# Patient Record
Sex: MALE | HISPANIC OR LATINO | Employment: OTHER | URBAN - METROPOLITAN AREA
[De-identification: names, ages, dates, MRNs, and addresses within clinical notes are randomized per-mention and may not be internally consistent; named-entity substitution may affect disease eponyms.]

---

## 2017-01-06 ENCOUNTER — HOSPITAL ENCOUNTER (OUTPATIENT)
Dept: LAB | Facility: MEDICAL CENTER | Age: 72
End: 2017-01-06
Attending: FAMILY MEDICINE
Payer: MEDICARE

## 2017-01-06 LAB
ALBUMIN SERPL BCP-MCNC: 4.3 G/DL (ref 3.2–4.9)
ALBUMIN/GLOB SERPL: 1.5 G/DL
ALP SERPL-CCNC: 52 U/L (ref 30–99)
ALT SERPL-CCNC: 23 U/L (ref 2–50)
ANION GAP SERPL CALC-SCNC: 9 MMOL/L (ref 0–11.9)
AST SERPL-CCNC: 22 U/L (ref 12–45)
BILIRUB SERPL-MCNC: 0.6 MG/DL (ref 0.1–1.5)
BUN SERPL-MCNC: 14 MG/DL (ref 8–22)
CALCIUM SERPL-MCNC: 9.6 MG/DL (ref 8.5–10.5)
CHLORIDE SERPL-SCNC: 96 MMOL/L (ref 96–112)
CHOLEST SERPL-MCNC: 127 MG/DL (ref 100–199)
CO2 SERPL-SCNC: 34 MMOL/L (ref 20–33)
CREAT SERPL-MCNC: 0.86 MG/DL (ref 0.5–1.4)
EST. AVERAGE GLUCOSE BLD GHB EST-MCNC: 146 MG/DL
GLOBULIN SER CALC-MCNC: 2.8 G/DL (ref 1.9–3.5)
GLUCOSE SERPL-MCNC: 134 MG/DL (ref 65–99)
HBA1C MFR BLD: 6.7 % (ref 0–5.6)
HDLC SERPL-MCNC: 40 MG/DL
LDLC SERPL CALC-MCNC: 61 MG/DL
POTASSIUM SERPL-SCNC: 3.6 MMOL/L (ref 3.6–5.5)
PROT SERPL-MCNC: 7.1 G/DL (ref 6–8.2)
SODIUM SERPL-SCNC: 139 MMOL/L (ref 135–145)
TRIGL SERPL-MCNC: 128 MG/DL (ref 0–149)

## 2017-01-06 PROCEDURE — 36415 COLL VENOUS BLD VENIPUNCTURE: CPT

## 2017-01-06 PROCEDURE — 80061 LIPID PANEL: CPT

## 2017-01-06 PROCEDURE — 83036 HEMOGLOBIN GLYCOSYLATED A1C: CPT

## 2017-01-06 PROCEDURE — 80053 COMPREHEN METABOLIC PANEL: CPT

## 2017-01-14 ENCOUNTER — HOSPITAL ENCOUNTER (OUTPATIENT)
Dept: RADIOLOGY | Facility: MEDICAL CENTER | Age: 72
End: 2017-01-14
Attending: FAMILY MEDICINE
Payer: MEDICARE

## 2017-01-14 DIAGNOSIS — J45.21 MILD INTERMITTENT ASTHMA WITH EXACERBATION: ICD-10-CM

## 2017-01-14 PROCEDURE — 71020 DX-CHEST-2 VIEWS: CPT

## 2017-02-27 ENCOUNTER — APPOINTMENT (OUTPATIENT)
Dept: SLEEP MEDICINE | Facility: MEDICAL CENTER | Age: 72
End: 2017-02-27
Payer: MEDICARE

## 2017-04-20 ENCOUNTER — HOSPITAL ENCOUNTER (OUTPATIENT)
Dept: LAB | Facility: MEDICAL CENTER | Age: 72
End: 2017-04-20
Attending: FAMILY MEDICINE
Payer: MEDICARE

## 2017-04-20 LAB
ALBUMIN SERPL BCP-MCNC: 4.1 G/DL (ref 3.2–4.9)
ALBUMIN/GLOB SERPL: 1.2 G/DL
ALP SERPL-CCNC: 59 U/L (ref 30–99)
ALT SERPL-CCNC: 22 U/L (ref 2–50)
ANION GAP SERPL CALC-SCNC: 7 MMOL/L (ref 0–11.9)
AST SERPL-CCNC: 19 U/L (ref 12–45)
BILIRUB SERPL-MCNC: 0.7 MG/DL (ref 0.1–1.5)
BUN SERPL-MCNC: 25 MG/DL (ref 8–22)
CALCIUM SERPL-MCNC: 9.5 MG/DL (ref 8.5–10.5)
CHLORIDE SERPL-SCNC: 95 MMOL/L (ref 96–112)
CHOLEST SERPL-MCNC: 145 MG/DL (ref 100–199)
CO2 SERPL-SCNC: 33 MMOL/L (ref 20–33)
CREAT SERPL-MCNC: 1.31 MG/DL (ref 0.5–1.4)
ERYTHROCYTE [DISTWIDTH] IN BLOOD BY AUTOMATED COUNT: 46.1 FL (ref 35.9–50)
EST. AVERAGE GLUCOSE BLD GHB EST-MCNC: 166 MG/DL
GFR SERPL CREATININE-BSD FRML MDRD: 53 ML/MIN/1.73 M 2
GLOBULIN SER CALC-MCNC: 3.5 G/DL (ref 1.9–3.5)
GLUCOSE SERPL-MCNC: 147 MG/DL (ref 65–99)
HBA1C MFR BLD: 7.4 % (ref 0–5.6)
HCT VFR BLD AUTO: 42.8 % (ref 42–52)
HDLC SERPL-MCNC: 32 MG/DL
HGB BLD-MCNC: 14.4 G/DL (ref 14–18)
LDLC SERPL CALC-MCNC: 82 MG/DL
MCH RBC QN AUTO: 29.2 PG (ref 27–33)
MCHC RBC AUTO-ENTMCNC: 33.6 G/DL (ref 33.7–35.3)
MCV RBC AUTO: 86.8 FL (ref 81.4–97.8)
PLATELET # BLD AUTO: 264 K/UL (ref 164–446)
PMV BLD AUTO: 9 FL (ref 9–12.9)
POTASSIUM SERPL-SCNC: 3.7 MMOL/L (ref 3.6–5.5)
PROT SERPL-MCNC: 7.6 G/DL (ref 6–8.2)
RBC # BLD AUTO: 4.93 M/UL (ref 4.7–6.1)
SODIUM SERPL-SCNC: 135 MMOL/L (ref 135–145)
TRIGL SERPL-MCNC: 157 MG/DL (ref 0–149)
WBC # BLD AUTO: 9.4 K/UL (ref 4.8–10.8)

## 2017-04-20 PROCEDURE — 83036 HEMOGLOBIN GLYCOSYLATED A1C: CPT

## 2017-04-20 PROCEDURE — 80061 LIPID PANEL: CPT

## 2017-04-20 PROCEDURE — 36415 COLL VENOUS BLD VENIPUNCTURE: CPT

## 2017-04-20 PROCEDURE — 80053 COMPREHEN METABOLIC PANEL: CPT

## 2017-04-20 PROCEDURE — 85027 COMPLETE CBC AUTOMATED: CPT

## 2017-08-08 ENCOUNTER — SLEEP CENTER VISIT (OUTPATIENT)
Dept: SLEEP MEDICINE | Facility: MEDICAL CENTER | Age: 72
End: 2017-08-08
Payer: MEDICARE

## 2017-08-08 VITALS
TEMPERATURE: 98.1 F | HEIGHT: 64 IN | SYSTOLIC BLOOD PRESSURE: 156 MMHG | BODY MASS INDEX: 40.97 KG/M2 | HEART RATE: 101 BPM | DIASTOLIC BLOOD PRESSURE: 90 MMHG | OXYGEN SATURATION: 92 % | WEIGHT: 240 LBS | RESPIRATION RATE: 16 BRPM

## 2017-08-08 DIAGNOSIS — G47.33 OSA TREATED WITH BIPAP: ICD-10-CM

## 2017-08-08 PROCEDURE — 99213 OFFICE O/P EST LOW 20 MIN: CPT | Performed by: NURSE PRACTITIONER

## 2017-08-08 NOTE — MR AVS SNAPSHOT
"Jonathan Rebolledo   2017 9:40 AM   Sleep Center Visit   MRN: [id number]    Department:  [facility]   Dept Phone:  [phone]    Description:  Male : 1945   Provider:  VEE Martinez           Reason for Visit     Annual Exam           Allergies as of 2017     No Known Allergies      You were diagnosed with     ONI treated with BiPAP   [[id number]]         Vital Signs     Blood Pressure Pulse Temperature Respirations Height Weight    156/90 mmHg 101 36.7 °C (98.1 °F) 16 1.626 m (5' 4.02\") 108.863 kg (240 lb)    Body Mass Index Oxygen Saturation Smoking Status             41.18 kg/m2 92% Former Smoker         Basic Information     Date Of Birth Sex Race Ethnicity Preferred Language    1945 Male  or   Origin (Bulgarian,Irish,Algerian,Tuvaluan, etc) English      Your appointments     2018  9:40 AM   Follow UP with VEE Martinez   [facility] (--)    [address] [place] [id number]   [phone]              Problem List              ICD-10-CM Priority Class Noted - Resolved    ONI treated with BiPAP G47.33   5/10/2016 - Present      Health Maintenance        Date Due Completion Dates    IMM DTaP/Tdap/Td Vaccine (1 - Tdap) 1962 ---    COLONOSCOPY 1993 ---    IMM ZOSTER VACCINE 2003 ---    IMM PNEUMOCOCCAL 65+ (ADULT) LOW/MEDIUM RISK SERIES (2 of 2 - PCV13) 2013    IMM INFLUENZA (1) 2017            Current Immunizations     Influenza TIV (IM) 2012    Pneumococcal polysaccharide vaccine (PPSV-23) 2012      Below and/or attached are the medications your provider expects you to take. Review all of your home medications and newly ordered medications with your provider and/or pharmacist. Follow medication instructions as directed by your provider and/or pharmacist. Please keep your medication list with you and share with your provider. Update the " information when medications are discontinued, doses are changed, or new medications (including over-the-counter products) are added; and carry medication information at all times in the event of emergency situations     Allergies:  No Known Allergies          Medications  Valid as of: August 08, 2017 - 10:00 AM    Generic Name Brand Name Tablet Size Instructions for use    AmLODIPine Besylate (Tab) NORVASC 10 MG Take 10 mg by mouth every day.        Benazepril HCl (Tab) LOTENSIN 40 MG Take 40 mg by mouth every day.        Bepotastine Besilate (Solution) Bepotastine Besilate 1.5 % by Ophthalmic route.        Finasteride (Tab) PROSCAR 5 MG Take 5 mg by mouth every day.        Finasteride (Tab) PROSCAR 5 MG Take 5 mg by mouth every day.        Labetalol HCl (Tab) NORMODYNE 300 MG Take 300 mg by mouth 2 times a day.        MetFORMIN HCl (Tab) GLUCOPHAGE 500 MG Take 500 mg by mouth 2 times a day, with meals.        Simvastatin (Tab) ZOCOR 20 MG Take 20 mg by mouth every evening.        Triamterene-HCTZ (Cap) MAXZIDE-25/DYAZIDE 37.5-25 MG Take 1 Cap by mouth every morning.        .                 Medicines prescribed today were sent to:     [facility] [address] [place] [address] - [address] [facility]    [address] [place] [id number]    Phone: [phone] Fax: [phone]    Open 24 Hours?: No      Medication refill instructions:       If your prescription bottle indicates you have medication refills left, it is not necessary to call your provider’s office. Please contact your pharmacy and they will refill your medication.    If your prescription bottle indicates you do not have any refills left, you may request refills at any time through one of the following ways: The online [employer] system (except Urgent Care), by calling your provider’s office, or by asking your pharmacy to contact your provider’s office with a refill request. Medication refills are processed only during regular business hours and may not be available until the  next business day. Your provider may request additional information or to have a follow-up visit with you prior to refilling your medication.   *Please Note: Medication refills are assigned a new Rx number when refilled electronically. Your pharmacy may indicate that no refills were authorized even though a new prescription for the same medication is available at the pharmacy. Please request the medicine by name with the pharmacy before contacting your provider for a refill.        Instructions    1.  Continue BiPAP 18/15 cm.  2. Patient encouraged to use his machine nightly for 7-8 hours.  3. Cleaning equipment weekly and replace supplies as allowed by insurance.  4. Patient is encouraged to use Nasogel or Ayr drops for dry nasal passages.              [employer] Access Code: [email or web address]  Expires: 9/7/2017 10:00 AM    [employer]  A secure, online tool to manage your health information     [employer]® is a secure, online tool that connects you to your personalized health information from the privacy of your home -- day or night - making it very easy for you to manage your healthcare. Once the activation process is completed, you can even access your medical information using the [employer] quentin, which is available for free in the Apple Quentin store or Google Play store.     [employer] provides the following levels of access (as shown below):   My Chart Features   [facility] Doctor [facility]  Specialists [facility]  Urgent  Care Non-[facility]  Primary Care  Doctor   Email your healthcare team securely and privately 24/7 X X X    Manage appointments: schedule your next appointment; view details of past/upcoming appointments X      Request prescription refills. X      View recent personal medical records, including lab and immunizations X X X X   View health record, including health history, allergies, medications X X X X   Read reports about your outpatient visits, procedures, consult and ER notes X X X X   See your  discharge summary, which is a recap of your hospital and/or ER visit that includes your diagnosis, lab results, and care plan. X X       How to register for [employer]:  1. Go to  [email or web address].  2. Click on the Sign Up Now box, which takes you to the New Member Sign Up page. You will need to provide the following information:  a. Enter your [employer] Access Code exactly as it appears at the top of this page. (You will not need to use this code after you’ve completed the sign-up process. If you do not sign up before the expiration date, you must request a new code.)   b. Enter your date of birth.   c. Enter your home email address.   d. Click Submit, and follow the next screen’s instructions.  3. Create a [employer] ID. This will be your [employer] login ID and cannot be changed, so think of one that is secure and easy to remember.  4. Create a [employer] password. You can change your password at any time.  5. Enter your Password Reset Question and Answer. This can be used at a later time if you forget your password.   6. Enter your e-mail address. This allows you to receive e-mail notifications when new information is available in [employer].  7. Click Sign Up. You can now view your health information.    For assistance activating your [employer] account, call [phone]

## 2017-08-08 NOTE — PATIENT INSTRUCTIONS
1.  Continue BiPAP 18/15 cm.  2. Patient encouraged to use his machine nightly for 7-8 hours.  3. Cleaning equipment weekly and replace supplies as allowed by insurance.  4. Patient is encouraged to use Nasogel or Ayr drops for dry nasal passages.

## 2017-08-08 NOTE — PROGRESS NOTES
"Chief Complaint   Patient presents with   • Annual Exam         HPI:  This is a 72 y.o. male with a history of obstructive sleep apnea.  Polysomnogram indicates AHI 77.3 and minimum saturation 49%. The patient is compliant with BiPAP 18/14 cm. Compliance dated 7/9-8/7/17 indicates 73% usage for 27 minutes. The patient's AHI is been reduced to 0.4. The patient reports continued issues with dry nasal passages. He reports increasing his humidity with no change in symptoms. I have strongly encouraged him to use his machine every night for 7-8 hours.    Past Medical History   Diagnosis Date   • Diabetes    • Asthma    • Back pain    • Sleep apnea    • ONI treated with BiPAP 5/10/2016       Past Surgical History   Procedure Laterality Date   • Full thickness block resection  2/19/2013     Performed by Greyson Lopez M.D. at [facility]   • Biopsy general  2/19/2013     Performed by Greyson Lopez M.D. at [facility]   • Cataract extraction with iol         Social History   Substance Use Topics   • Smoking status: Former Smoker -- 1.00 packs/day for 10 years     Types: Cigarettes     Start date: 01/01/1985     Quit date: 01/01/1995   • Smokeless tobacco: Never Used   • Alcohol Use: None       ROS:   Constitutional: Denies fevers, chills, sweats, fatigue, and weight loss.  Eyes: Denies glasses.  Ears/nose/mouth/throat: Denies injury.  Cardiovascular: Denies chest pain, tightness.  Respiratory: Denies shortness of breath, cough, sputum, wheezing, hemoptysis.  GI: Denies heartburn, difficulty swallowing, nausea, and vomiting.  Neurological: Denies frequent headaches, dizziness, weakness.  Sleep: See history of present illness.    Vitals:  Filed Vitals:    08/08/17 0928   Height: 1.626 m (5' 4.02\")   Weight: 108.863 kg (240 lb)   Weight % change since last entry.: 0 %   BP: 156/90   Pulse: 101   BMI (Calculated): 41.18   Resp: 16   Temp: 36.7 °C (98.1 °F)   O2 sat % room air: 92 % "     Allergies:  Review of patient's allergies indicates no known allergies.    Medications.  Current Outpatient Prescriptions   Medication Sig Dispense Refill   • finasteride (PROSCAR) 5 MG Tab Take 5 mg by mouth every day.     • Bepotastine Besilate (BEPREVE) 1.5 % SOLN by Ophthalmic route.     • labetalol (NORMODYNE) 300 MG TABS Take 300 mg by mouth 2 times a day.     • metformin (GLUCOPHAGE) 500 MG TABS Take 500 mg by mouth 2 times a day, with meals.     • benazepril (LOTENSIN) 40 MG tablet Take 40 mg by mouth every day.     • triamterene/hctz (MAXZIDE-25/DYAZIDE) 37.5-25 MG CAPS Take 1 Cap by mouth every morning.     • simvastatin (ZOCOR) 20 MG TABS Take 20 mg by mouth every evening.     • amlodipine (NORVASC) 10 MG TABS Take 10 mg by mouth every day.     • finasteride (PROSCAR) 5 MG TABS Take 5 mg by mouth every day.       No current facility-administered medications for this visit.       PHYSICAL EXAM:  Appearance: Well-developed, well-nourished, no acute distress.  Eyes. PERRL.  Hearing: Grossly intact.  Oropharynx: Tongue normal, posterior pharynx without erythema or exudate.  Respiratory effort: No intercostal retractions or use of accessory muscles.  Lung auscultation: No crackles, wheezing.  Heart auscultation: No murmur, gallop, or rub. Regular rate and rhythm.  Extremities: No cyanosis or edema.  Gait and Station: Normal  Orientation: Oriented to time, place, and person.    Assessment:  1. ONI treated with BiPAP  DME MASK AND SUPPLIES         Plan:  1.  Continue BiPAP 18/15 cm.  2. Patient encouraged to use his machine nightly for 7-8 hours.  3. Cleaning equipment weekly and replace supplies as allowed by insurance.  4. Patient is encouraged to use Nasogel or Ayr drops for dry nasal passages.    Return in about 6 months (around 2/8/2018) for With JOSHUA Amor.

## 2017-09-01 ENCOUNTER — HOSPITAL ENCOUNTER (OUTPATIENT)
Dept: LAB | Facility: MEDICAL CENTER | Age: 72
End: 2017-09-01
Attending: FAMILY MEDICINE
Payer: MEDICARE

## 2017-09-01 LAB
ALBUMIN SERPL BCP-MCNC: 4.2 G/DL (ref 3.2–4.9)
ALBUMIN/GLOB SERPL: 1.3 G/DL
ALP SERPL-CCNC: 54 U/L (ref 30–99)
ALT SERPL-CCNC: 22 U/L (ref 2–50)
ANION GAP SERPL CALC-SCNC: 9 MMOL/L (ref 0–11.9)
AST SERPL-CCNC: 19 U/L (ref 12–45)
BILIRUB SERPL-MCNC: 0.4 MG/DL (ref 0.1–1.5)
BUN SERPL-MCNC: 12 MG/DL (ref 8–22)
CALCIUM SERPL-MCNC: 9.1 MG/DL (ref 8.5–10.5)
CHLORIDE SERPL-SCNC: 98 MMOL/L (ref 96–112)
CHOLEST SERPL-MCNC: 129 MG/DL (ref 100–199)
CO2 SERPL-SCNC: 30 MMOL/L (ref 20–33)
CREAT SERPL-MCNC: 0.7 MG/DL (ref 0.5–1.4)
CREAT UR-MCNC: 85.1 MG/DL
ERYTHROCYTE [DISTWIDTH] IN BLOOD BY AUTOMATED COUNT: 43.8 FL (ref 35.9–50)
EST. AVERAGE GLUCOSE BLD GHB EST-MCNC: 192 MG/DL
FASTING STATUS PATIENT QL REPORTED: NORMAL
GFR SERPL CREATININE-BSD FRML MDRD: >60 ML/MIN/1.73 M 2
GLOBULIN SER CALC-MCNC: 3.2 G/DL (ref 1.9–3.5)
GLUCOSE SERPL-MCNC: 143 MG/DL (ref 65–99)
HBA1C MFR BLD: 8.3 % (ref 0–5.6)
HCT VFR BLD AUTO: 45.9 % (ref 42–52)
HDLC SERPL-MCNC: 35 MG/DL
HGB BLD-MCNC: 15.1 G/DL (ref 14–18)
LDLC SERPL CALC-MCNC: 53 MG/DL
MCH RBC QN AUTO: 29.5 PG (ref 27–33)
MCHC RBC AUTO-ENTMCNC: 32.9 G/DL (ref 33.7–35.3)
MCV RBC AUTO: 89.8 FL (ref 81.4–97.8)
MICROALBUMIN UR-MCNC: 6 MG/DL
MICROALBUMIN/CREAT UR: 71 MG/G (ref 0–30)
PLATELET # BLD AUTO: 215 K/UL (ref 164–446)
PMV BLD AUTO: 9.1 FL (ref 9–12.9)
POTASSIUM SERPL-SCNC: 4.1 MMOL/L (ref 3.6–5.5)
PROT SERPL-MCNC: 7.4 G/DL (ref 6–8.2)
RBC # BLD AUTO: 5.11 M/UL (ref 4.7–6.1)
SODIUM SERPL-SCNC: 137 MMOL/L (ref 135–145)
TRIGL SERPL-MCNC: 207 MG/DL (ref 0–149)
TSH SERPL DL<=0.005 MIU/L-ACNC: 2.75 UIU/ML (ref 0.3–3.7)
WBC # BLD AUTO: 7.8 K/UL (ref 4.8–10.8)

## 2017-09-01 PROCEDURE — 83036 HEMOGLOBIN GLYCOSYLATED A1C: CPT

## 2017-09-01 PROCEDURE — 84443 ASSAY THYROID STIM HORMONE: CPT

## 2017-09-01 PROCEDURE — 80061 LIPID PANEL: CPT

## 2017-09-01 PROCEDURE — 80053 COMPREHEN METABOLIC PANEL: CPT

## 2017-09-01 PROCEDURE — 82570 ASSAY OF URINE CREATININE: CPT

## 2017-09-01 PROCEDURE — 85027 COMPLETE CBC AUTOMATED: CPT

## 2017-09-01 PROCEDURE — 36415 COLL VENOUS BLD VENIPUNCTURE: CPT

## 2017-09-01 PROCEDURE — 82043 UR ALBUMIN QUANTITATIVE: CPT

## 2017-10-19 ENCOUNTER — APPOINTMENT (RX ONLY)
Dept: URBAN - METROPOLITAN AREA CLINIC 4 | Facility: CLINIC | Age: 72
Setting detail: DERMATOLOGY
End: 2017-10-19

## 2017-10-19 DIAGNOSIS — L91.8 OTHER HYPERTROPHIC DISORDERS OF THE SKIN: ICD-10-CM

## 2017-10-19 DIAGNOSIS — L82.1 OTHER SEBORRHEIC KERATOSIS: ICD-10-CM

## 2017-10-19 DIAGNOSIS — L81.4 OTHER MELANIN HYPERPIGMENTATION: ICD-10-CM

## 2017-10-19 PROBLEM — I10 ESSENTIAL (PRIMARY) HYPERTENSION: Status: ACTIVE | Noted: 2017-10-19

## 2017-10-19 PROBLEM — E13.9 OTHER SPECIFIED DIABETES MELLITUS WITHOUT COMPLICATIONS: Status: ACTIVE | Noted: 2017-10-19

## 2017-10-19 PROBLEM — L57.0 ACTINIC KERATOSIS: Status: ACTIVE | Noted: 2017-10-19

## 2017-10-19 PROCEDURE — 99213 OFFICE O/P EST LOW 20 MIN: CPT

## 2017-10-19 PROCEDURE — ? COUNSELING

## 2017-10-19 PROCEDURE — ? LIQUID NITROGEN

## 2017-10-19 ASSESSMENT — LOCATION DETAILED DESCRIPTION DERM
LOCATION DETAILED: RIGHT DISTAL POSTERIOR UPPER ARM
LOCATION DETAILED: RIGHT PROXIMAL RADIAL DORSAL FOREARM
LOCATION DETAILED: LEFT ANTERIOR EARLOBE
LOCATION DETAILED: INFERIOR THORACIC SPINE
LOCATION DETAILED: LEFT DISTAL DORSAL FOREARM
LOCATION DETAILED: LEFT POSTERIOR SHOULDER
LOCATION DETAILED: SUPERIOR THORACIC SPINE
LOCATION DETAILED: LEFT FOREHEAD
LOCATION DETAILED: LEFT SUPERIOR LATERAL UPPER BACK
LOCATION DETAILED: LEFT INFERIOR CENTRAL MALAR CHEEK
LOCATION DETAILED: LEFT SUPERIOR MEDIAL UPPER BACK
LOCATION DETAILED: RIGHT SUPERIOR LATERAL NECK
LOCATION DETAILED: LEFT LATERAL MALAR CHEEK
LOCATION DETAILED: LEFT SUPERIOR UPPER BACK
LOCATION DETAILED: LEFT INFERIOR FOREHEAD
LOCATION DETAILED: RIGHT PROXIMAL POSTERIOR UPPER ARM
LOCATION DETAILED: LEFT DISTAL POSTERIOR UPPER ARM
LOCATION DETAILED: LEFT AXILLARY VAULT
LOCATION DETAILED: LEFT LATERAL ZYGOMA
LOCATION DETAILED: RIGHT CENTRAL LATERAL NECK
LOCATION DETAILED: LEFT INFERIOR LATERAL FOREHEAD
LOCATION DETAILED: LEFT SUPERIOR ANTERIOR NECK
LOCATION DETAILED: RIGHT AXILLARY VAULT
LOCATION DETAILED: LEFT MEDIAL UPPER BACK
LOCATION DETAILED: STERNUM
LOCATION DETAILED: RIGHT PROXIMAL DORSAL FOREARM
LOCATION DETAILED: RIGHT SUPERIOR LATERAL MALAR CHEEK
LOCATION DETAILED: LEFT CENTRAL LATERAL NECK
LOCATION DETAILED: RIGHT SUPERIOR UPPER BACK
LOCATION DETAILED: LEFT INFERIOR MEDIAL MIDBACK
LOCATION DETAILED: LEFT CLAVICULAR NECK
LOCATION DETAILED: LEFT MID-UPPER BACK
LOCATION DETAILED: LEFT DORSAL WRIST
LOCATION DETAILED: RIGHT ANTERIOR SHOULDER
LOCATION DETAILED: RIGHT INFERIOR UPPER BACK
LOCATION DETAILED: LEFT SUPERIOR LATERAL NECK
LOCATION DETAILED: LEFT PROXIMAL DORSAL FOREARM
LOCATION DETAILED: RIGHT DISTAL DORSAL FOREARM
LOCATION DETAILED: RIGHT CENTRAL TEMPLE
LOCATION DETAILED: RIGHT CENTRAL ZYGOMA
LOCATION DETAILED: RIGHT MID-UPPER BACK
LOCATION DETAILED: EPIGASTRIC SKIN

## 2017-10-19 ASSESSMENT — LOCATION ZONE DERM
LOCATION ZONE: FACE
LOCATION ZONE: TRUNK
LOCATION ZONE: ARM
LOCATION ZONE: EAR
LOCATION ZONE: NECK
LOCATION ZONE: AXILLAE

## 2017-10-19 ASSESSMENT — LOCATION SIMPLE DESCRIPTION DERM
LOCATION SIMPLE: LEFT WRIST
LOCATION SIMPLE: LEFT UPPER BACK
LOCATION SIMPLE: RIGHT SHOULDER
LOCATION SIMPLE: RIGHT FOREARM
LOCATION SIMPLE: RIGHT TEMPLE
LOCATION SIMPLE: LEFT LOWER BACK
LOCATION SIMPLE: LEFT CHEEK
LOCATION SIMPLE: LEFT ANTERIOR NECK
LOCATION SIMPLE: RIGHT AXILLARY VAULT
LOCATION SIMPLE: LEFT SHOULDER
LOCATION SIMPLE: LEFT FOREARM
LOCATION SIMPLE: LEFT AXILLARY VAULT
LOCATION SIMPLE: LEFT FOREHEAD
LOCATION SIMPLE: NECK
LOCATION SIMPLE: RIGHT UPPER BACK
LOCATION SIMPLE: RIGHT UPPER ARM
LOCATION SIMPLE: LEFT UPPER ARM
LOCATION SIMPLE: LEFT EAR
LOCATION SIMPLE: RIGHT CHEEK
LOCATION SIMPLE: CHEST
LOCATION SIMPLE: RIGHT ZYGOMA
LOCATION SIMPLE: UPPER BACK
LOCATION SIMPLE: ABDOMEN
LOCATION SIMPLE: LEFT ZYGOMA

## 2017-10-19 NOTE — PROCEDURE: LIQUID NITROGEN
Add 52 Modifier (Optional): no
Post-Care Instructions: I reviewed with the patient in detail post-care instructions. Patient is to wear sunprotection, and avoid picking at any of the treated lesions. Pt may apply Vaseline to crusted or scabbing areas.
Detail Level: Detailed
Consent: The patient's consent was obtained including but not limited to risks of crusting, scabbing, blistering, scarring, darker or lighter pigmentary change, recurrence, incomplete removal and infection.
Duration Of Freeze Thaw-Cycle (Seconds): 0
Medical Necessity Information: It is in your best interest to select a reason for this procedure from the list below. All of these items fulfill various CMS LCD requirements except the new and changing color options.
Medical Necessity Clause: This procedure was medically necessary because the lesions that were treated were:

## 2017-10-19 NOTE — HPI: SKIN LESIONS
Is This A New Presentation, Or A Follow-Up?: Follow Up Skin Lesions
Have Your Skin Lesions Been Treated?: been treated

## 2017-12-04 ENCOUNTER — HOSPITAL ENCOUNTER (OUTPATIENT)
Dept: LAB | Facility: MEDICAL CENTER | Age: 72
End: 2017-12-04
Attending: FAMILY MEDICINE
Payer: MEDICARE

## 2017-12-04 LAB
ALBUMIN SERPL BCP-MCNC: 4 G/DL (ref 3.2–4.9)
ALBUMIN/GLOB SERPL: 1.3 G/DL
ALP SERPL-CCNC: 51 U/L (ref 30–99)
ALT SERPL-CCNC: 15 U/L (ref 2–50)
ANION GAP SERPL CALC-SCNC: 5 MMOL/L (ref 0–11.9)
AST SERPL-CCNC: 15 U/L (ref 12–45)
BILIRUB SERPL-MCNC: 0.5 MG/DL (ref 0.1–1.5)
BUN SERPL-MCNC: 16 MG/DL (ref 8–22)
CALCIUM SERPL-MCNC: 9 MG/DL (ref 8.5–10.5)
CHLORIDE SERPL-SCNC: 101 MMOL/L (ref 96–112)
CHOLEST SERPL-MCNC: 117 MG/DL (ref 100–199)
CO2 SERPL-SCNC: 32 MMOL/L (ref 20–33)
CREAT SERPL-MCNC: 0.83 MG/DL (ref 0.5–1.4)
EST. AVERAGE GLUCOSE BLD GHB EST-MCNC: 148 MG/DL
GFR SERPL CREATININE-BSD FRML MDRD: >60 ML/MIN/1.73 M 2
GLOBULIN SER CALC-MCNC: 3.1 G/DL (ref 1.9–3.5)
GLUCOSE SERPL-MCNC: 144 MG/DL (ref 65–99)
HBA1C MFR BLD: 6.8 % (ref 0–5.6)
HDLC SERPL-MCNC: 35 MG/DL
LDLC SERPL CALC-MCNC: 48 MG/DL
POTASSIUM SERPL-SCNC: 4.1 MMOL/L (ref 3.6–5.5)
PROT SERPL-MCNC: 7.1 G/DL (ref 6–8.2)
SODIUM SERPL-SCNC: 138 MMOL/L (ref 135–145)
TRIGL SERPL-MCNC: 169 MG/DL (ref 0–149)

## 2017-12-04 PROCEDURE — 83036 HEMOGLOBIN GLYCOSYLATED A1C: CPT

## 2017-12-04 PROCEDURE — 80061 LIPID PANEL: CPT

## 2017-12-04 PROCEDURE — 80053 COMPREHEN METABOLIC PANEL: CPT

## 2017-12-04 PROCEDURE — 36415 COLL VENOUS BLD VENIPUNCTURE: CPT

## 2018-02-21 ENCOUNTER — SLEEP CENTER VISIT (OUTPATIENT)
Dept: SLEEP MEDICINE | Facility: MEDICAL CENTER | Age: 73
End: 2018-02-21
Payer: MEDICARE

## 2018-02-21 VITALS
RESPIRATION RATE: 16 BRPM | TEMPERATURE: 97.9 F | HEART RATE: 80 BPM | OXYGEN SATURATION: 93 % | DIASTOLIC BLOOD PRESSURE: 84 MMHG | SYSTOLIC BLOOD PRESSURE: 134 MMHG | WEIGHT: 262 LBS | BODY MASS INDEX: 44.73 KG/M2 | HEIGHT: 64 IN

## 2018-02-21 DIAGNOSIS — G47.33 OSA TREATED WITH BIPAP: ICD-10-CM

## 2018-02-21 PROCEDURE — 99213 OFFICE O/P EST LOW 20 MIN: CPT | Performed by: FAMILY MEDICINE

## 2018-02-21 NOTE — PROGRESS NOTES
[facility] Follow Up Note     Date: 2/21/2018 / Time: 8:08 AM    Patient ID:   Name:             Jonathan Rebolledo     YOB: 1945  Age:                 72 y.o.  male   MRN:               [id number]      Thank you for requesting a sleep medicine consultation on Jonathan Rebolledo at the sleep center. He presents today with the chief complaints of ONI follow up.     HISTORY OF PRESENT ILLNESS:        Polysomnogram indicates AHI 77.3 and minimum saturation 49%. The patient is on BiPAP 18/14 cm. He goes to sleep around 10 pm and wakes up around 6 am.  He is using BiPAP most days of the week. Pt reports 1 hrs of average nightly use of BiPAP. Pt denies snoring, gasping,choking.Pt has  significant mask leak that is interfering with sleep his mask is more than 6 months old. He is also unable to use the BiPPA due to dry mouth     The 30 day compliance was downloaded which shows inadequate compliance with more that 4 hr usage about 0%. The AHI is has improved to 0.7/hr.  The symptoms of excessive daytime, snoring and gasping has improved.             REVIEW OF SYSTEMS:       Constitutional: Denies fevers, Denies weight changes  Eyes: Denies changes in vision, no eye pain  Ears/Nose/Throat/Mouth: Denies nasal congestion or sore throat   Cardiovascular: Denies chest pain or palpitations   Respiratory: Denies shortness of breath , Denies cough  Gastrointestinal/Hepatic: Denies abdominal pain, nausea, vomiting, diarrhea, constipation or GI bleeding   Genitourinary: Denies bladder dysfunction, dysuria or frequency  Musculoskeletal/Rheum: Denies  joint pain and swelling   Skin/Breast: Denies rash,   Neurological: Denies headache, confusion, memory loss or focal weakness/parasthesias  Psychiatric: denies mood disorder     Comprehensive review of systems form is reviewed with the patient and is attached in the EMR.     PMH:  has a past medical history of Asthma; Back pain; Diabetes; ONI treated with  BiPAP (5/10/2016); and Sleep apnea. He also has no past medical history of ASTHMA.  MEDS:   Current Outpatient Prescriptions:   •  finasteride (PROSCAR) 5 MG Tab, Take 5 mg by mouth every day., Disp: , Rfl:   •  Bepotastine Besilate (BEPREVE) 1.5 % SOLN, by Ophthalmic route., Disp: , Rfl:   •  labetalol (NORMODYNE) 300 MG TABS, Take 300 mg by mouth 2 times a day., Disp: , Rfl:   •  metformin (GLUCOPHAGE) 500 MG TABS, Take 500 mg by mouth 2 times a day, with meals., Disp: , Rfl:   •  benazepril (LOTENSIN) 40 MG tablet, Take 40 mg by mouth every day., Disp: , Rfl:   •  triamterene/hctz (MAXZIDE-25/DYAZIDE) 37.5-25 MG CAPS, Take 1 Cap by mouth every morning., Disp: , Rfl:   •  simvastatin (ZOCOR) 20 MG TABS, Take 20 mg by mouth every evening., Disp: , Rfl:   •  amlodipine (NORVASC) 10 MG TABS, Take 10 mg by mouth every day., Disp: , Rfl:   •  finasteride (PROSCAR) 5 MG TABS, Take 5 mg by mouth every day., Disp: , Rfl:   ALLERGIES: No Known Allergies  SURGHX:   Past Surgical History:   Procedure Laterality Date   • FULL THICKNESS BLOCK RESECTION  2/19/2013    Performed by Greyson Lopez M.D. at [facility]   • BIOPSY GENERAL  2/19/2013    Performed by Greyson Lopez M.D. at [facility]   • CATARACT EXTRACTION WITH IOL       SOCHX:  reports that he quit smoking about 23 years ago. His smoking use included Cigarettes. He started smoking about 33 years ago. He has a 10.00 pack-year smoking history. He has never used smokeless tobacco..  FH:   Family History   Problem Relation Age of Onset   • Cancer Neg Hx          Physical Exam:  Vitals/ General Appearance:   Weight/BMI: There is no height or weight on file to calculate BMI.  There were no vitals taken for this visit.  There were no vitals filed for this visit.    Pt. is alert and oriented to time, place and person. Cooperative and in no apparent distress.       1. Head: Atraumatic, normocephalic.   2. Ears: Normal tympanic membrane and  no discharge  3. Nose: No inferior turbinate hypertophy, no septal deviation, no polyp.   4. Throat: Oropharynx appears crowded in that the palate is overhanging (Malam Agata scale 3).   5. Neck: Supple. No thyromegaly  6. Chest: Trachea central, no spine deformity   7. Lungs auscultation: B/L good air entry, vesicular breath sounds, no adventitious sounds  8. Heart auscultation: 1st and 2nd heart sounds normal, regular rhythm. No appreciable murmur.  9. Abdomen: Soft, non tender, no organomegaly. Bowel sounds present  10. Extremities:no clubbing, no pedal edema.  11. Skin: No rash  12. NEUROLOGICAL EXAMINATION: On neurological exam, the patient was alert and oriented x3. speech was clear and fluent without dysarthria.      INVESTIGATIONS:           ASSESSMENT AND PLAN     1.Obstructive Sleep Apnea (ONI) 30 day compliance was downloaded which shows inadequate compliance. The symptoms of excessive daytime, snoring and gasping has improved      The pathophysiology of ONI and the increased risk of cardiovascular morbidity from untreated ONI is discussed in detail with the patient.      He is urged to avoid supine sleep, weight gain and alcoholic beverages since all of these can worsen ONI. He is cautioned against drowsy driving. If He feels sleepy while driving, He must pull over for a break/nap, rather than persist on the road, in the interest of He own safety and that of others on the road.   Plan   - Continue BiPAP 18/14 cm with nasal nuance M pillow mask    - compliance download was reviewed and discussed with the pt   - compliance was reinforced     2.  Regarding treatment of other past medical problems and general health maintenance,  He is urged to follow up with PCP.

## 2018-03-05 ENCOUNTER — HOSPITAL ENCOUNTER (OUTPATIENT)
Dept: LAB | Facility: MEDICAL CENTER | Age: 73
End: 2018-03-05
Attending: FAMILY MEDICINE
Payer: MEDICARE

## 2018-03-05 LAB
ALBUMIN SERPL BCP-MCNC: 4.2 G/DL (ref 3.2–4.9)
ALBUMIN/GLOB SERPL: 1.5 G/DL
ALP SERPL-CCNC: 44 U/L (ref 30–99)
ALT SERPL-CCNC: 24 U/L (ref 2–50)
ANION GAP SERPL CALC-SCNC: 7 MMOL/L (ref 0–11.9)
AST SERPL-CCNC: 20 U/L (ref 12–45)
BILIRUB SERPL-MCNC: 0.3 MG/DL (ref 0.1–1.5)
BUN SERPL-MCNC: 17 MG/DL (ref 8–22)
CALCIUM SERPL-MCNC: 9.2 MG/DL (ref 8.5–10.5)
CHLORIDE SERPL-SCNC: 102 MMOL/L (ref 96–112)
CHOLEST SERPL-MCNC: 118 MG/DL (ref 100–199)
CO2 SERPL-SCNC: 29 MMOL/L (ref 20–33)
CREAT SERPL-MCNC: 0.77 MG/DL (ref 0.5–1.4)
EST. AVERAGE GLUCOSE BLD GHB EST-MCNC: 151 MG/DL
GLOBULIN SER CALC-MCNC: 2.8 G/DL (ref 1.9–3.5)
GLUCOSE SERPL-MCNC: 131 MG/DL (ref 65–99)
HBA1C MFR BLD: 6.9 % (ref 0–5.6)
HDLC SERPL-MCNC: 31 MG/DL
LDLC SERPL CALC-MCNC: 44 MG/DL
POTASSIUM SERPL-SCNC: 3.8 MMOL/L (ref 3.6–5.5)
PROT SERPL-MCNC: 7 G/DL (ref 6–8.2)
SODIUM SERPL-SCNC: 138 MMOL/L (ref 135–145)
TRIGL SERPL-MCNC: 213 MG/DL (ref 0–149)

## 2018-03-05 PROCEDURE — 36415 COLL VENOUS BLD VENIPUNCTURE: CPT

## 2018-03-05 PROCEDURE — 80061 LIPID PANEL: CPT

## 2018-03-05 PROCEDURE — 83036 HEMOGLOBIN GLYCOSYLATED A1C: CPT

## 2018-03-05 PROCEDURE — 80053 COMPREHEN METABOLIC PANEL: CPT

## 2018-05-18 ENCOUNTER — APPOINTMENT (RX ONLY)
Dept: URBAN - METROPOLITAN AREA CLINIC 4 | Facility: CLINIC | Age: 73
Setting detail: DERMATOLOGY
End: 2018-05-18

## 2018-05-18 DIAGNOSIS — D22 MELANOCYTIC NEVI: ICD-10-CM

## 2018-05-18 DIAGNOSIS — L81.4 OTHER MELANIN HYPERPIGMENTATION: ICD-10-CM

## 2018-05-18 DIAGNOSIS — L82.1 OTHER SEBORRHEIC KERATOSIS: ICD-10-CM

## 2018-05-18 PROBLEM — D48.5 NEOPLASM OF UNCERTAIN BEHAVIOR OF SKIN: Status: ACTIVE | Noted: 2018-05-18

## 2018-05-18 PROCEDURE — ? LIQUID NITROGEN

## 2018-05-18 PROCEDURE — 11100: CPT

## 2018-05-18 PROCEDURE — 99213 OFFICE O/P EST LOW 20 MIN: CPT | Mod: 25

## 2018-05-18 PROCEDURE — ? BIOPSY BY SHAVE METHOD

## 2018-05-18 PROCEDURE — ? COUNSELING

## 2018-05-18 ASSESSMENT — LOCATION DETAILED DESCRIPTION DERM
LOCATION DETAILED: LEFT SUPERIOR MEDIAL MALAR CHEEK
LOCATION DETAILED: RIGHT POSTERIOR SHOULDER
LOCATION DETAILED: RIGHT CLAVICULAR NECK
LOCATION DETAILED: RIGHT SUPERIOR LATERAL UPPER BACK
LOCATION DETAILED: LEFT MEDIAL TEMPLE
LOCATION DETAILED: LEFT RADIAL DORSAL HAND
LOCATION DETAILED: RIGHT ANTERIOR DISTAL UPPER ARM
LOCATION DETAILED: LEFT SUPERIOR FOREHEAD
LOCATION DETAILED: LEFT CENTRAL TEMPLE
LOCATION DETAILED: RIGHT LATERAL UPPER BACK
LOCATION DETAILED: RIGHT CENTRAL POSTAURICULAR SKIN
LOCATION DETAILED: LEFT SUPERIOR LATERAL NECK
LOCATION DETAILED: RIGHT INFERIOR FOREHEAD
LOCATION DETAILED: LEFT ANTECUBITAL SKIN
LOCATION DETAILED: LEFT INFERIOR MEDIAL MALAR CHEEK
LOCATION DETAILED: LEFT CENTRAL ZYGOMA
LOCATION DETAILED: INFERIOR THORACIC SPINE
LOCATION DETAILED: LEFT MEDIAL SUPERIOR CHEST
LOCATION DETAILED: RIGHT CENTRAL LATERAL NECK
LOCATION DETAILED: LEFT SUPERIOR CENTRAL MALAR CHEEK
LOCATION DETAILED: RIGHT INFERIOR LATERAL UPPER BACK
LOCATION DETAILED: RIGHT SUPERIOR UPPER BACK
LOCATION DETAILED: LEFT SUPERIOR MEDIAL LOWER BACK
LOCATION DETAILED: LEFT SUPERIOR MEDIAL MIDBACK
LOCATION DETAILED: LEFT LATERAL SUPERIOR CHEST
LOCATION DETAILED: RIGHT INFERIOR UPPER BACK
LOCATION DETAILED: RIGHT LATERAL TRAPEZIAL NECK
LOCATION DETAILED: LEFT VENTRAL PROXIMAL FOREARM
LOCATION DETAILED: LEFT CLAVICULAR SKIN
LOCATION DETAILED: LEFT POSTERIOR SHOULDER
LOCATION DETAILED: LEFT MEDIAL INFERIOR CHEST
LOCATION DETAILED: GLABELLA
LOCATION DETAILED: LEFT INFERIOR MEDIAL UPPER BACK
LOCATION DETAILED: LEFT SUPERIOR ANTERIOR NECK
LOCATION DETAILED: LEFT LATERAL UPPER BACK
LOCATION DETAILED: RIGHT SUPERIOR LATERAL NECK
LOCATION DETAILED: RIGHT VENTRAL PROXIMAL FOREARM
LOCATION DETAILED: RIGHT LATERAL MALAR CHEEK
LOCATION DETAILED: LEFT LATERAL TEMPLE
LOCATION DETAILED: RIGHT RADIAL DORSAL HAND
LOCATION DETAILED: LEFT LATERAL FOREHEAD
LOCATION DETAILED: SUPERIOR LUMBAR SPINE
LOCATION DETAILED: RIGHT INFERIOR TEMPLE
LOCATION DETAILED: RIGHT SUPERIOR MEDIAL LOWER BACK
LOCATION DETAILED: LEFT INFERIOR ANTERIOR NECK
LOCATION DETAILED: LEFT NASAL SIDEWALL
LOCATION DETAILED: RIGHT MID-UPPER BACK
LOCATION DETAILED: RIGHT CENTRAL MALAR CHEEK
LOCATION DETAILED: LEFT INFERIOR FOREHEAD
LOCATION DETAILED: RIGHT SUPERIOR LATERAL MIDBACK
LOCATION DETAILED: STERNUM
LOCATION DETAILED: LEFT INFERIOR UPPER BACK
LOCATION DETAILED: EPIGASTRIC SKIN
LOCATION DETAILED: LEFT SUPERIOR UPPER BACK
LOCATION DETAILED: RIGHT CENTRAL ZYGOMA
LOCATION DETAILED: LEFT MEDIAL ZYGOMA
LOCATION DETAILED: LEFT SUPERIOR LATERAL UPPER BACK
LOCATION DETAILED: RIGHT MEDIAL SUPERIOR CHEST
LOCATION DETAILED: RIGHT MEDIAL UPPER BACK
LOCATION DETAILED: RIGHT SUPERIOR MEDIAL MIDBACK
LOCATION DETAILED: RIGHT CENTRAL TEMPLE
LOCATION DETAILED: RIGHT CLAVICULAR SKIN
LOCATION DETAILED: LEFT CLAVICULAR NECK

## 2018-05-18 ASSESSMENT — LOCATION SIMPLE DESCRIPTION DERM
LOCATION SIMPLE: LEFT SHOULDER
LOCATION SIMPLE: NECK
LOCATION SIMPLE: LEFT ELBOW
LOCATION SIMPLE: RIGHT TEMPLE
LOCATION SIMPLE: LEFT HAND
LOCATION SIMPLE: LEFT NOSE
LOCATION SIMPLE: LEFT TEMPLE
LOCATION SIMPLE: RIGHT CHEEK
LOCATION SIMPLE: RIGHT HAND
LOCATION SIMPLE: LEFT CHEEK
LOCATION SIMPLE: LEFT LOWER BACK
LOCATION SIMPLE: RIGHT UPPER ARM
LOCATION SIMPLE: LEFT FOREHEAD
LOCATION SIMPLE: POSTERIOR NECK
LOCATION SIMPLE: RIGHT CLAVICULAR SKIN
LOCATION SIMPLE: ABDOMEN
LOCATION SIMPLE: CHEST
LOCATION SIMPLE: SCALP
LOCATION SIMPLE: RIGHT FOREHEAD
LOCATION SIMPLE: RIGHT SHOULDER
LOCATION SIMPLE: LEFT CLAVICULAR SKIN
LOCATION SIMPLE: RIGHT FOREARM
LOCATION SIMPLE: GLABELLA
LOCATION SIMPLE: UPPER BACK
LOCATION SIMPLE: LEFT FOREARM
LOCATION SIMPLE: LEFT ZYGOMA
LOCATION SIMPLE: RIGHT UPPER BACK
LOCATION SIMPLE: LOWER BACK
LOCATION SIMPLE: RIGHT ZYGOMA
LOCATION SIMPLE: LEFT ANTERIOR NECK
LOCATION SIMPLE: LEFT UPPER BACK
LOCATION SIMPLE: RIGHT LOWER BACK
LOCATION SIMPLE: RIGHT ANTERIOR NECK

## 2018-05-18 ASSESSMENT — LOCATION ZONE DERM
LOCATION ZONE: SCALP
LOCATION ZONE: ARM
LOCATION ZONE: NOSE
LOCATION ZONE: TRUNK
LOCATION ZONE: HAND
LOCATION ZONE: NECK
LOCATION ZONE: FACE

## 2018-05-18 NOTE — PROCEDURE: LIQUID NITROGEN
Medical Necessity Clause: This procedure was medically necessary because the lesions that were treated were:
Add 52 Modifier (Optional): no
Medical Necessity Information: It is in your best interest to select a reason for this procedure from the list below. All of these items fulfill various CMS LCD requirements except the new and changing color options.
Post-Care Instructions: I reviewed with the patient in detail post-care instructions. Patient is to wear sunprotection, and avoid picking at any of the treated lesions. Pt may apply Vaseline to crusted or scabbing areas.
Duration Of Freeze Thaw-Cycle (Seconds): 0
Detail Level: Detailed
Consent: The patient's consent was obtained including but not limited to risks of crusting, scabbing, blistering, scarring, darker or lighter pigmentary change, recurrence, incomplete removal and infection.

## 2018-05-18 NOTE — PROCEDURE: BIOPSY BY SHAVE METHOD
Was A Bandage Applied: Yes
Dressing: bandage
Billing Type: Third-Party Bill
Additional Anesthesia Volume In Cc (Will Not Render If 0): 0
Hemostasis: Electrocautery
Post-Care Instructions: I reviewed with the patient in detail post-care instructions. Patient is to keep the biopsy site dry overnight, and then apply bacitracin twice daily until healed. Patient may apply hydrogen peroxide soaks to remove any crusting.
Silver Nitrate Text: The wound bed was treated with silver nitrate after the biopsy was performed.
Bill For Surgical Tray: no
Lab Facility: 
Biopsy Type: H and E
Anesthesia Volume In Cc: 0.5
Wound Care: Vaseline
Curettage Text: The wound bed was treated with curettage after the biopsy was performed.
Electrodesiccation Text: The wound bed was treated with electrodesiccation after the biopsy was performed.
Cryotherapy Text: The wound bed was treated with cryotherapy after the biopsy was performed.
Lab: [id number]
Size Of Lesion In Cm: 0.8
Consent: Written consent was obtained and risks were reviewed including but not limited to scarring, infection, bleeding, scabbing, incomplete removal, nerve damage and allergy to anesthesia.
Anesthesia Type: 1% lidocaine with epinephrine
Notification Instructions: Patient will be notified of biopsy results. However, patient instructed to call the office if not contacted within 2 weeks.
Electrodesiccation And Curettage Text: The wound bed was treated with electrodesiccation and curettage after the biopsy was performed.
Type Of Destruction Used: Electrodesiccation
Biopsy Method: Dermablade
Detail Level: Detailed

## 2018-05-22 ENCOUNTER — APPOINTMENT (OUTPATIENT)
Dept: SLEEP MEDICINE | Facility: MEDICAL CENTER | Age: 73
End: 2018-05-22
Payer: MEDICARE

## 2018-06-01 ENCOUNTER — SLEEP CENTER VISIT (OUTPATIENT)
Dept: SLEEP MEDICINE | Facility: MEDICAL CENTER | Age: 73
End: 2018-06-01
Payer: MEDICARE

## 2018-06-01 VITALS
SYSTOLIC BLOOD PRESSURE: 130 MMHG | BODY MASS INDEX: 45.24 KG/M2 | HEIGHT: 64 IN | OXYGEN SATURATION: 94 % | WEIGHT: 265 LBS | DIASTOLIC BLOOD PRESSURE: 80 MMHG | HEART RATE: 86 BPM | RESPIRATION RATE: 18 BRPM

## 2018-06-01 DIAGNOSIS — G47.33 OSA TREATED WITH BIPAP: ICD-10-CM

## 2018-06-01 PROCEDURE — 99213 OFFICE O/P EST LOW 20 MIN: CPT | Performed by: INTERNAL MEDICINE

## 2018-06-01 RX ORDER — GLIPIZIDE AND METFORMIN HCL 5; 500 MG/1; MG/1
1 TABLET, FILM COATED ORAL
COMMUNITY
End: 2021-11-30 | Stop reason: SDUPTHER

## 2018-06-01 NOTE — PROGRESS NOTES
Chief Complaint   Patient presents with   • Follow-Up     ONI       HPI: This patient is a 73 y.o. Male who returns for follow-up of sleep apnea.  He has severe ONI with AHI 77 events per hour desaturations to 49%.  He has been on BiPAP: 18/15 cm of water with prior poor compliance.  He has since worked with his DME and switched supplies and humidifier which he feels has made a significant difference in his tolerance to CPAP.  He no longer has a dry mouth which caused him to remove his CPAP mask prematurely.  Compliance card shows 100% BiPAP usage for over 4 hours nightly.  His average AHI is 0.4.  He is sleeping better on BiPAP and awakening feeling rested.  He denies daytime somnolence.      Past Medical History:   Diagnosis Date   • Asthma    • Back pain    • Diabetes    • ONI treated with BiPAP 5/10/2016   • Sleep apnea        Social History     Social History   • Marital status:      Spouse name: N/A   • Number of children: N/A   • Years of education: N/A     Occupational History   • Not on file.     Social History Main Topics   • Smoking status: Former Smoker     Packs/day: 1.00     Years: 10.00     Types: Cigarettes     Start date: 1/1/1985     Quit date: 1/1/1995   • Smokeless tobacco: Never Used   • Alcohol use 2.4 - 3.6 oz/week     4 - 6 Cans of beer per week   • Drug use: Unknown   • Sexual activity: Not on file     Other Topics Concern   • Not on file     Social History Narrative    ** Merged History Encounter **            Family History   Problem Relation Age of Onset   • Cancer Neg Hx        Current Outpatient Prescriptions on File Prior to Visit   Medication Sig Dispense Refill   • Bepotastine Besilate (BEPREVE) 1.5 % SOLN by Ophthalmic route.     • labetalol (NORMODYNE) 300 MG TABS Take 300 mg by mouth 2 times a day.     • simvastatin (ZOCOR) 20 MG TABS Take 20 mg by mouth every evening.     • amlodipine (NORVASC) 10 MG TABS Take 10 mg by mouth every day.     • finasteride (PROSCAR) 5 MG TABS  "Take 5 mg by mouth every day.       No current facility-administered medications on file prior to visit.        Allergies: Patient has no known allergies.    ROS:   Constitutional: Denies fevers, chills, night sweats, fatigue or weight loss  Eyes: Denies vision loss, pain, drainage, double vision  Ears, Nose, Throat: Denies earache, difficulty hearing, tinnitus, nasal congestion, hoarseness  Cardiovascular: Denies chest pain, tightness, palpitations, orthopnea or edema  Respiratory: Denies shortness of breath, cough, wheezing, hemoptysis  Sleep: Denies daytime sleepiness, snoring, apneas, insomnia, morning headaches  GI: Denies heartburn, dysphagia, nausea, abdominal pain, diarrhea or constipation  : Denies frequent urination, hematuria, discharge or painful urination  Musculoskeletal: Denies back pain, painful joints, sore muscles  Neurological: Denies weakness or headaches  Skin: No rashes    Blood pressure 130/80, pulse 86, resp. rate 18, height 1.626 m (5' 4\"), weight 120.2 kg (265 lb), SpO2 94 %.    Physical Exam:  Appearance: Well-nourished, well-developed, in no acute distress  HEENT: Normocephalic, atraumatic, white sclera, PERRLA, Mallampati 3  Neck: No adenopathy or masses  Respiratory: no intercostal retractions or accessory muscle use  Lungs auscultation: Clear to auscultation bilaterally  Cardiovascular: Regular rate rhythm. No murmurs, rubs or gallops.  No LE edema  Abdomen: soft, nondistended  Gait: Normal  Digits: No clubbing, cyanosis  Motor: No focal deficits  Orientation: Oriented to time, person and place    Diagnosis:  1. ONI treated with BiPAP     2. BMI 45.0-49.9, adult ([facility]CC)         Plan:  The patient shows significantly improved compliance on BiPAP, with normal AHI.  Continue BiPAP: 18/15 cm of water for minimum 4 hours nightly.  Replace nasal pillows monthly.  Weight loss encouraged.  Return in about 6 months (around 12/1/2018).      "

## 2018-06-02 ENCOUNTER — HOSPITAL ENCOUNTER (OUTPATIENT)
Dept: LAB | Facility: MEDICAL CENTER | Age: 73
End: 2018-06-02
Attending: FAMILY MEDICINE
Payer: MEDICARE

## 2018-06-02 LAB
ALBUMIN SERPL BCP-MCNC: 4 G/DL (ref 3.2–4.9)
ALBUMIN/GLOB SERPL: 1.3 G/DL
ALP SERPL-CCNC: 44 U/L (ref 30–99)
ALT SERPL-CCNC: 22 U/L (ref 2–50)
ANION GAP SERPL CALC-SCNC: 10 MMOL/L (ref 0–11.9)
AST SERPL-CCNC: 17 U/L (ref 12–45)
BILIRUB SERPL-MCNC: 0.4 MG/DL (ref 0.1–1.5)
BUN SERPL-MCNC: 15 MG/DL (ref 8–22)
CALCIUM SERPL-MCNC: 8.9 MG/DL (ref 8.5–10.5)
CHLORIDE SERPL-SCNC: 103 MMOL/L (ref 96–112)
CHOLEST SERPL-MCNC: 122 MG/DL (ref 100–199)
CO2 SERPL-SCNC: 27 MMOL/L (ref 20–33)
CREAT SERPL-MCNC: 0.82 MG/DL (ref 0.5–1.4)
EST. AVERAGE GLUCOSE BLD GHB EST-MCNC: 148 MG/DL
GLOBULIN SER CALC-MCNC: 3 G/DL (ref 1.9–3.5)
GLUCOSE SERPL-MCNC: 113 MG/DL (ref 65–99)
HBA1C MFR BLD: 6.8 % (ref 0–5.6)
HDLC SERPL-MCNC: 40 MG/DL
LDLC SERPL CALC-MCNC: 57 MG/DL
POTASSIUM SERPL-SCNC: 4.2 MMOL/L (ref 3.6–5.5)
PROT SERPL-MCNC: 7 G/DL (ref 6–8.2)
SODIUM SERPL-SCNC: 140 MMOL/L (ref 135–145)
TRIGL SERPL-MCNC: 123 MG/DL (ref 0–149)

## 2018-06-02 PROCEDURE — 83036 HEMOGLOBIN GLYCOSYLATED A1C: CPT

## 2018-06-02 PROCEDURE — 80061 LIPID PANEL: CPT

## 2018-06-02 PROCEDURE — 36415 COLL VENOUS BLD VENIPUNCTURE: CPT

## 2018-06-02 PROCEDURE — 80053 COMPREHEN METABOLIC PANEL: CPT

## 2018-08-24 ENCOUNTER — APPOINTMENT (RX ONLY)
Dept: URBAN - METROPOLITAN AREA CLINIC 4 | Facility: CLINIC | Age: 73
Setting detail: DERMATOLOGY
End: 2018-08-24

## 2018-08-24 DIAGNOSIS — L82.1 OTHER SEBORRHEIC KERATOSIS: ICD-10-CM

## 2018-08-24 DIAGNOSIS — L81.0 POSTINFLAMMATORY HYPERPIGMENTATION: ICD-10-CM

## 2018-08-24 PROCEDURE — 99212 OFFICE O/P EST SF 10 MIN: CPT

## 2018-08-24 PROCEDURE — ? LIQUID NITROGEN

## 2018-08-24 PROCEDURE — ? COUNSELING

## 2018-08-24 ASSESSMENT — LOCATION DETAILED DESCRIPTION DERM
LOCATION DETAILED: LEFT LATERAL TEMPLE
LOCATION DETAILED: RIGHT MID-UPPER BACK
LOCATION DETAILED: LEFT VENTRAL PROXIMAL FOREARM
LOCATION DETAILED: RIGHT SUPERIOR MEDIAL UPPER BACK
LOCATION DETAILED: LEFT MEDIAL TEMPLE
LOCATION DETAILED: LEFT MEDIAL ZYGOMA
LOCATION DETAILED: RIGHT ULNAR DORSAL HAND
LOCATION DETAILED: RIGHT MEDIAL SUPERIOR CHEST
LOCATION DETAILED: RIGHT CLAVICULAR NECK
LOCATION DETAILED: LEFT VENTRAL LATERAL PROXIMAL FOREARM
LOCATION DETAILED: RIGHT SUPERIOR UPPER BACK
LOCATION DETAILED: RIGHT LATERAL NECK
LOCATION DETAILED: LEFT CLAVICULAR SKIN
LOCATION DETAILED: RIGHT INFERIOR LATERAL MIDBACK
LOCATION DETAILED: LEFT MEDIAL INFERIOR CHEST
LOCATION DETAILED: RIGHT CENTRAL TEMPLE
LOCATION DETAILED: RIGHT RADIAL DORSAL HAND
LOCATION DETAILED: RIGHT VENTRAL PROXIMAL FOREARM
LOCATION DETAILED: LEFT SUPERIOR FRONTAL SCALP
LOCATION DETAILED: LEFT INFERIOR MEDIAL UPPER BACK
LOCATION DETAILED: LEFT INFERIOR ANTERIOR NECK
LOCATION DETAILED: RIGHT LATERAL ZYGOMA
LOCATION DETAILED: RIGHT LATERAL TRAPEZIAL NECK
LOCATION DETAILED: RIGHT PROXIMAL POSTERIOR UPPER ARM
LOCATION DETAILED: RIGHT INFERIOR LATERAL UPPER BACK
LOCATION DETAILED: RIGHT MEDIAL TRAPEZIAL NECK
LOCATION DETAILED: LEFT MID-UPPER BACK
LOCATION DETAILED: RIGHT CLAVICULAR SKIN
LOCATION DETAILED: RIGHT FOREHEAD
LOCATION DETAILED: LEFT DISTAL DORSAL FOREARM
LOCATION DETAILED: LEFT ANTECUBITAL SKIN
LOCATION DETAILED: LEFT FOREHEAD
LOCATION DETAILED: RIGHT SUPERIOR LATERAL MIDBACK
LOCATION DETAILED: RIGHT DISTAL DORSAL FOREARM
LOCATION DETAILED: RIGHT DISTAL ULNAR DORSAL FOREARM
LOCATION DETAILED: RIGHT POSTERIOR SHOULDER
LOCATION DETAILED: LEFT SUPERIOR MEDIAL MIDBACK
LOCATION DETAILED: RIGHT MEDIAL INFERIOR CHEST
LOCATION DETAILED: LEFT CENTRAL LATERAL NECK
LOCATION DETAILED: LEFT CLAVICULAR NECK
LOCATION DETAILED: LEFT ANTERIOR PROXIMAL UPPER ARM
LOCATION DETAILED: RIGHT INFERIOR POSTERIOR NECK
LOCATION DETAILED: LEFT INFERIOR UPPER BACK
LOCATION DETAILED: RIGHT PROXIMAL DORSAL FOREARM
LOCATION DETAILED: EPIGASTRIC SKIN
LOCATION DETAILED: RIGHT LATERAL DORSAL WRIST
LOCATION DETAILED: RIGHT LATERAL UPPER BACK
LOCATION DETAILED: RIGHT CENTRAL ZYGOMA
LOCATION DETAILED: LEFT PROXIMAL DORSAL FOREARM
LOCATION DETAILED: STERNUM
LOCATION DETAILED: RIGHT CENTRAL POSTAURICULAR SKIN

## 2018-08-24 ASSESSMENT — LOCATION ZONE DERM
LOCATION ZONE: SCALP
LOCATION ZONE: TRUNK
LOCATION ZONE: HAND
LOCATION ZONE: NECK
LOCATION ZONE: FACE
LOCATION ZONE: ARM

## 2018-08-24 ASSESSMENT — LOCATION SIMPLE DESCRIPTION DERM
LOCATION SIMPLE: RIGHT CLAVICULAR SKIN
LOCATION SIMPLE: RIGHT UPPER BACK
LOCATION SIMPLE: RIGHT ANTERIOR NECK
LOCATION SIMPLE: LEFT CLAVICULAR SKIN
LOCATION SIMPLE: NECK
LOCATION SIMPLE: POSTERIOR NECK
LOCATION SIMPLE: RIGHT HAND
LOCATION SIMPLE: RIGHT FOREHEAD
LOCATION SIMPLE: LEFT ANTERIOR NECK
LOCATION SIMPLE: SCALP
LOCATION SIMPLE: RIGHT SHOULDER
LOCATION SIMPLE: LEFT TEMPLE
LOCATION SIMPLE: LEFT ZYGOMA
LOCATION SIMPLE: CHEST
LOCATION SIMPLE: RIGHT UPPER ARM
LOCATION SIMPLE: LEFT ELBOW
LOCATION SIMPLE: LEFT LOWER BACK
LOCATION SIMPLE: LEFT FOREHEAD
LOCATION SIMPLE: RIGHT LOWER BACK
LOCATION SIMPLE: LEFT FOREARM
LOCATION SIMPLE: RIGHT FOREARM
LOCATION SIMPLE: RIGHT TEMPLE
LOCATION SIMPLE: ABDOMEN
LOCATION SIMPLE: LEFT UPPER ARM
LOCATION SIMPLE: RIGHT ZYGOMA
LOCATION SIMPLE: RIGHT WRIST
LOCATION SIMPLE: LEFT UPPER BACK

## 2018-08-24 NOTE — PROCEDURE: LIQUID NITROGEN
Duration Of Freeze Thaw-Cycle (Seconds): 0
Include Z78.9 (Other Specified Conditions Influencing Health Status) As An Associated Diagnosis?: No
Medical Necessity Clause: This procedure was medically necessary because the lesions that were treated were:  If lesion does not resolve, bx is needed.
Post-Care Instructions: I reviewed with the patient in detail post-care instructions. Patient is to wear sunprotection, and avoid picking at any of the treated lesions. Pt may apply Vaseline to crusted or scabbing areas.
Medical Necessity Information: It is in your best interest to select a reason for this procedure from the list below. All of these items fulfill various CMS LCD requirements except the new and changing color options.
Detail Level: Detailed
Consent: The patient's consent was obtained including but not limited to risks of crusting, scabbing, blistering, scarring, darker or lighter pigmentary change, recurrence, incomplete removal and infection.

## 2018-09-01 ENCOUNTER — HOSPITAL ENCOUNTER (OUTPATIENT)
Dept: LAB | Facility: MEDICAL CENTER | Age: 73
End: 2018-09-01
Attending: FAMILY MEDICINE
Payer: MEDICARE

## 2018-09-01 LAB
ALBUMIN SERPL BCP-MCNC: 4.1 G/DL (ref 3.2–4.9)
ALBUMIN/GLOB SERPL: 1.5 G/DL
ALP SERPL-CCNC: 47 U/L (ref 30–99)
ALT SERPL-CCNC: 27 U/L (ref 2–50)
ANION GAP SERPL CALC-SCNC: 7 MMOL/L (ref 0–11.9)
APPEARANCE UR: CLEAR
AST SERPL-CCNC: 25 U/L (ref 12–45)
BILIRUB SERPL-MCNC: 0.3 MG/DL (ref 0.1–1.5)
BILIRUB UR QL STRIP.AUTO: NEGATIVE
BUN SERPL-MCNC: 18 MG/DL (ref 8–22)
CALCIUM SERPL-MCNC: 8.9 MG/DL (ref 8.5–10.5)
CHLORIDE SERPL-SCNC: 101 MMOL/L (ref 96–112)
CHOLEST SERPL-MCNC: 132 MG/DL (ref 100–199)
CO2 SERPL-SCNC: 31 MMOL/L (ref 20–33)
COLOR UR: YELLOW
CREAT SERPL-MCNC: 0.95 MG/DL (ref 0.5–1.4)
CREAT UR-MCNC: 155.6 MG/DL
EST. AVERAGE GLUCOSE BLD GHB EST-MCNC: 163 MG/DL
GLOBULIN SER CALC-MCNC: 2.7 G/DL (ref 1.9–3.5)
GLUCOSE SERPL-MCNC: 146 MG/DL (ref 65–99)
GLUCOSE UR STRIP.AUTO-MCNC: NEGATIVE MG/DL
HBA1C MFR BLD: 7.3 % (ref 0–5.6)
HDLC SERPL-MCNC: 34 MG/DL
KETONES UR STRIP.AUTO-MCNC: NEGATIVE MG/DL
LDLC SERPL CALC-MCNC: 54 MG/DL
LEUKOCYTE ESTERASE UR QL STRIP.AUTO: NEGATIVE
MICRO URNS: NORMAL
MICROALBUMIN UR-MCNC: 8 MG/DL
MICROALBUMIN/CREAT UR: 51 MG/G (ref 0–30)
NITRITE UR QL STRIP.AUTO: NEGATIVE
PH UR STRIP.AUTO: 7 [PH]
POTASSIUM SERPL-SCNC: 3.9 MMOL/L (ref 3.6–5.5)
PROT SERPL-MCNC: 6.8 G/DL (ref 6–8.2)
PROT UR QL STRIP: NEGATIVE MG/DL
RBC UR QL AUTO: NEGATIVE
SODIUM SERPL-SCNC: 139 MMOL/L (ref 135–145)
SP GR UR STRIP.AUTO: 1.02
TRIGL SERPL-MCNC: 222 MG/DL (ref 0–149)
TSH SERPL DL<=0.005 MIU/L-ACNC: 3.34 UIU/ML (ref 0.38–5.33)
UROBILINOGEN UR STRIP.AUTO-MCNC: 0.2 MG/DL

## 2018-09-01 PROCEDURE — 84443 ASSAY THYROID STIM HORMONE: CPT

## 2018-09-01 PROCEDURE — 81003 URINALYSIS AUTO W/O SCOPE: CPT

## 2018-09-01 PROCEDURE — 83036 HEMOGLOBIN GLYCOSYLATED A1C: CPT

## 2018-09-01 PROCEDURE — 82570 ASSAY OF URINE CREATININE: CPT

## 2018-09-01 PROCEDURE — 82043 UR ALBUMIN QUANTITATIVE: CPT

## 2018-09-01 PROCEDURE — 36415 COLL VENOUS BLD VENIPUNCTURE: CPT

## 2018-09-01 PROCEDURE — 80061 LIPID PANEL: CPT

## 2018-09-01 PROCEDURE — 80053 COMPREHEN METABOLIC PANEL: CPT

## 2018-09-28 ENCOUNTER — HOSPITAL ENCOUNTER (OUTPATIENT)
Dept: RADIOLOGY | Facility: MEDICAL CENTER | Age: 73
End: 2018-09-28
Attending: FAMILY MEDICINE
Payer: MEDICARE

## 2018-09-28 DIAGNOSIS — Z87.891 PERSONAL HISTORY OF TOBACCO USE, PRESENTING HAZARDS TO HEALTH: ICD-10-CM

## 2018-09-28 DIAGNOSIS — Z00.00 ROUTINE GENERAL MEDICAL EXAMINATION AT A HEALTH CARE FACILITY: ICD-10-CM

## 2018-09-28 PROCEDURE — 76775 US EXAM ABDO BACK WALL LIM: CPT

## 2018-11-30 ENCOUNTER — APPOINTMENT (RX ONLY)
Dept: URBAN - METROPOLITAN AREA CLINIC 4 | Facility: CLINIC | Age: 73
Setting detail: DERMATOLOGY
End: 2018-11-30

## 2018-11-30 DIAGNOSIS — L82.1 OTHER SEBORRHEIC KERATOSIS: ICD-10-CM

## 2018-11-30 DIAGNOSIS — L81.4 OTHER MELANIN HYPERPIGMENTATION: ICD-10-CM

## 2018-11-30 PROCEDURE — 99213 OFFICE O/P EST LOW 20 MIN: CPT

## 2018-11-30 PROCEDURE — ? COUNSELING

## 2018-11-30 PROCEDURE — ? LIQUID NITROGEN

## 2018-11-30 ASSESSMENT — LOCATION DETAILED DESCRIPTION DERM
LOCATION DETAILED: LEFT ANTECUBITAL SKIN
LOCATION DETAILED: RIGHT ANTERIOR DISTAL UPPER ARM
LOCATION DETAILED: LEFT SUPERIOR FRONTAL SCALP
LOCATION DETAILED: RIGHT LATERAL ZYGOMA
LOCATION DETAILED: RIGHT CENTRAL ZYGOMA
LOCATION DETAILED: STERNUM
LOCATION DETAILED: LEFT MEDIAL ZYGOMA
LOCATION DETAILED: LEFT SUPERIOR MEDIAL MIDBACK
LOCATION DETAILED: LEFT MID-UPPER BACK
LOCATION DETAILED: SUPERIOR LUMBAR SPINE
LOCATION DETAILED: RIGHT DISTAL DORSAL FOREARM
LOCATION DETAILED: LEFT INFERIOR MEDIAL UPPER BACK
LOCATION DETAILED: RIGHT LATERAL UPPER BACK
LOCATION DETAILED: RIGHT SUPERIOR UPPER BACK
LOCATION DETAILED: RIGHT PROXIMAL POSTERIOR UPPER ARM
LOCATION DETAILED: RIGHT LATERAL NECK
LOCATION DETAILED: RIGHT LATERAL DORSAL WRIST
LOCATION DETAILED: LEFT CLAVICULAR SKIN
LOCATION DETAILED: LEFT CLAVICULAR NECK
LOCATION DETAILED: RIGHT MEDIAL TRAPEZIAL NECK
LOCATION DETAILED: RIGHT CLAVICULAR NECK
LOCATION DETAILED: RIGHT INFERIOR LATERAL UPPER BACK
LOCATION DETAILED: RIGHT LATERAL TRAPEZIAL NECK
LOCATION DETAILED: LEFT MEDIAL TEMPLE
LOCATION DETAILED: LEFT ANTERIOR PROXIMAL UPPER ARM
LOCATION DETAILED: RIGHT DISTAL ULNAR DORSAL FOREARM
LOCATION DETAILED: LEFT FOREHEAD
LOCATION DETAILED: LEFT DISTAL DORSAL FOREARM
LOCATION DETAILED: RIGHT SUPERIOR MEDIAL UPPER BACK
LOCATION DETAILED: RIGHT ULNAR DORSAL HAND
LOCATION DETAILED: RIGHT CLAVICULAR SKIN
LOCATION DETAILED: LEFT PROXIMAL DORSAL FOREARM
LOCATION DETAILED: INFERIOR MID FOREHEAD
LOCATION DETAILED: RIGHT RADIAL DORSAL HAND
LOCATION DETAILED: RIGHT INFERIOR POSTERIOR NECK
LOCATION DETAILED: RIGHT POSTERIOR SHOULDER
LOCATION DETAILED: RIGHT INFERIOR LATERAL MIDBACK
LOCATION DETAILED: EPIGASTRIC SKIN
LOCATION DETAILED: RIGHT CENTRAL TEMPLE
LOCATION DETAILED: LEFT VENTRAL PROXIMAL FOREARM
LOCATION DETAILED: GLABELLA
LOCATION DETAILED: RIGHT VENTRAL PROXIMAL FOREARM
LOCATION DETAILED: LEFT LATERAL TEMPLE
LOCATION DETAILED: LEFT CENTRAL LATERAL NECK
LOCATION DETAILED: RIGHT CENTRAL POSTAURICULAR SKIN
LOCATION DETAILED: LEFT INFERIOR ANTERIOR NECK
LOCATION DETAILED: RIGHT MEDIAL SUPERIOR CHEST
LOCATION DETAILED: RIGHT SUPERIOR LATERAL MIDBACK
LOCATION DETAILED: RIGHT SUPERIOR MEDIAL MIDBACK
LOCATION DETAILED: RIGHT PROXIMAL DORSAL FOREARM
LOCATION DETAILED: LEFT MEDIAL INFERIOR CHEST
LOCATION DETAILED: RIGHT FOREHEAD
LOCATION DETAILED: RIGHT MEDIAL INFERIOR CHEST

## 2018-11-30 ASSESSMENT — LOCATION SIMPLE DESCRIPTION DERM
LOCATION SIMPLE: LOWER BACK
LOCATION SIMPLE: LEFT CLAVICULAR SKIN
LOCATION SIMPLE: CHEST
LOCATION SIMPLE: LEFT UPPER ARM
LOCATION SIMPLE: INFERIOR FOREHEAD
LOCATION SIMPLE: LEFT TEMPLE
LOCATION SIMPLE: RIGHT UPPER ARM
LOCATION SIMPLE: POSTERIOR NECK
LOCATION SIMPLE: NECK
LOCATION SIMPLE: RIGHT HAND
LOCATION SIMPLE: RIGHT TEMPLE
LOCATION SIMPLE: RIGHT ANTERIOR NECK
LOCATION SIMPLE: RIGHT UPPER BACK
LOCATION SIMPLE: RIGHT FOREARM
LOCATION SIMPLE: LEFT FOREARM
LOCATION SIMPLE: RIGHT WRIST
LOCATION SIMPLE: RIGHT CLAVICULAR SKIN
LOCATION SIMPLE: LEFT LOWER BACK
LOCATION SIMPLE: GLABELLA
LOCATION SIMPLE: LEFT FOREHEAD
LOCATION SIMPLE: LEFT UPPER BACK
LOCATION SIMPLE: RIGHT FOREHEAD
LOCATION SIMPLE: RIGHT SHOULDER
LOCATION SIMPLE: ABDOMEN
LOCATION SIMPLE: SCALP
LOCATION SIMPLE: RIGHT LOWER BACK
LOCATION SIMPLE: RIGHT ZYGOMA
LOCATION SIMPLE: LEFT ZYGOMA
LOCATION SIMPLE: LEFT ANTERIOR NECK
LOCATION SIMPLE: LEFT ELBOW

## 2018-11-30 ASSESSMENT — LOCATION ZONE DERM
LOCATION ZONE: ARM
LOCATION ZONE: HAND
LOCATION ZONE: FACE
LOCATION ZONE: SCALP
LOCATION ZONE: NECK
LOCATION ZONE: TRUNK

## 2018-11-30 NOTE — PROCEDURE: LIQUID NITROGEN
Duration Of Freeze Thaw-Cycle (Seconds): 0
Include Z78.9 (Other Specified Conditions Influencing Health Status) As An Associated Diagnosis?: No
Consent: The patient's consent was obtained including but not limited to risks of crusting, scabbing, blistering, scarring, darker or lighter pigmentary change, recurrence, incomplete removal and infection.
Post-Care Instructions: I reviewed with the patient in detail post-care instructions. Patient is to wear sunprotection, and avoid picking at any of the treated lesions. Pt may apply Vaseline to crusted or scabbing areas.
Detail Level: Detailed
Medical Necessity Information: It is in your best interest to select a reason for this procedure from the list below. All of these items fulfill various CMS LCD requirements except the new and changing color options.
Medical Necessity Clause: This procedure was medically necessary because the lesions that were treated were:  If lesion does not resolve, bx is needed.

## 2018-12-15 ENCOUNTER — HOSPITAL ENCOUNTER (OUTPATIENT)
Dept: LAB | Facility: MEDICAL CENTER | Age: 73
End: 2018-12-15
Attending: FAMILY MEDICINE
Payer: MEDICARE

## 2018-12-15 LAB
ERYTHROCYTE [DISTWIDTH] IN BLOOD BY AUTOMATED COUNT: 46.3 FL (ref 35.9–50)
HCT VFR BLD AUTO: 43.3 % (ref 42–52)
HGB BLD-MCNC: 14.3 G/DL (ref 14–18)
MCH RBC QN AUTO: 30.2 PG (ref 27–33)
MCHC RBC AUTO-ENTMCNC: 33 G/DL (ref 33.7–35.3)
MCV RBC AUTO: 91.5 FL (ref 81.4–97.8)
PLATELET # BLD AUTO: 223 K/UL (ref 164–446)
PMV BLD AUTO: 9.1 FL (ref 9–12.9)
RBC # BLD AUTO: 4.73 M/UL (ref 4.7–6.1)
WBC # BLD AUTO: 9 K/UL (ref 4.8–10.8)

## 2018-12-15 PROCEDURE — 85027 COMPLETE CBC AUTOMATED: CPT

## 2018-12-15 PROCEDURE — 80061 LIPID PANEL: CPT

## 2018-12-15 PROCEDURE — 36415 COLL VENOUS BLD VENIPUNCTURE: CPT

## 2018-12-15 PROCEDURE — 83036 HEMOGLOBIN GLYCOSYLATED A1C: CPT

## 2018-12-15 PROCEDURE — 80053 COMPREHEN METABOLIC PANEL: CPT

## 2018-12-16 LAB
ALBUMIN SERPL BCP-MCNC: 4.3 G/DL (ref 3.2–4.9)
ALBUMIN/GLOB SERPL: 1.7 G/DL
ALP SERPL-CCNC: 52 U/L (ref 30–99)
ALT SERPL-CCNC: 28 U/L (ref 2–50)
ANION GAP SERPL CALC-SCNC: 13 MMOL/L (ref 0–11.9)
AST SERPL-CCNC: 23 U/L (ref 12–45)
BILIRUB SERPL-MCNC: 0.3 MG/DL (ref 0.1–1.5)
BUN SERPL-MCNC: 15 MG/DL (ref 8–22)
CALCIUM SERPL-MCNC: 9.1 MG/DL (ref 8.5–10.5)
CHLORIDE SERPL-SCNC: 105 MMOL/L (ref 96–112)
CHOLEST SERPL-MCNC: 110 MG/DL (ref 100–199)
CO2 SERPL-SCNC: 23 MMOL/L (ref 20–33)
CREAT SERPL-MCNC: 0.84 MG/DL (ref 0.5–1.4)
FASTING STATUS PATIENT QL REPORTED: NORMAL
GLOBULIN SER CALC-MCNC: 2.5 G/DL (ref 1.9–3.5)
GLUCOSE SERPL-MCNC: 139 MG/DL (ref 65–99)
HDLC SERPL-MCNC: 35 MG/DL
LDLC SERPL CALC-MCNC: 54 MG/DL
POTASSIUM SERPL-SCNC: 4.2 MMOL/L (ref 3.6–5.5)
PROT SERPL-MCNC: 6.8 G/DL (ref 6–8.2)
SODIUM SERPL-SCNC: 141 MMOL/L (ref 135–145)
TRIGL SERPL-MCNC: 104 MG/DL (ref 0–149)

## 2018-12-18 LAB
EST. AVERAGE GLUCOSE BLD GHB EST-MCNC: 143 MG/DL
HBA1C MFR BLD: 6.6 % (ref 0–5.6)

## 2018-12-21 ENCOUNTER — SLEEP CENTER VISIT (OUTPATIENT)
Dept: SLEEP MEDICINE | Facility: MEDICAL CENTER | Age: 73
End: 2018-12-21
Payer: MEDICARE

## 2018-12-21 VITALS
WEIGHT: 243.9 LBS | OXYGEN SATURATION: 93 % | HEIGHT: 64 IN | SYSTOLIC BLOOD PRESSURE: 126 MMHG | HEART RATE: 85 BPM | DIASTOLIC BLOOD PRESSURE: 74 MMHG | RESPIRATION RATE: 18 BRPM | BODY MASS INDEX: 41.64 KG/M2 | TEMPERATURE: 97.5 F

## 2018-12-21 DIAGNOSIS — G47.33 OSA TREATED WITH BIPAP: ICD-10-CM

## 2018-12-21 PROCEDURE — 99213 OFFICE O/P EST LOW 20 MIN: CPT | Performed by: INTERNAL MEDICINE

## 2018-12-21 NOTE — PROGRESS NOTES
Chief Complaint   Patient presents with   • Follow-Up     6 month follow up       HPI: This patient is a 73 y.o. Male who returns for follow-up of sleep apnea.  He has severe ONI with AHI :77 events per hour desaturations to 49%.  He has been on BiPAP: 18/15 cm of water with compliance card confirming 100% BiPAP usage for 4.5 hours nightly.  His average AHI is 0.3.  He uses nasal pillows which he replaces weekly.  He sleeps well on BiPAP and awakens feeling rested.  Weight has been stable.  Denies any issues with BiPAP use.      Past Medical History:   Diagnosis Date   • Asthma    • Back pain    • Diabetes    • ONI treated with BiPAP 5/10/2016   • Sleep apnea        Social History     Social History   • Marital status:      Spouse name: N/A   • Number of children: N/A   • Years of education: N/A     Occupational History   • Not on file.     Social History Main Topics   • Smoking status: Former Smoker     Packs/day: 1.00     Years: 10.00     Types: Cigarettes     Start date: 1/1/1985     Quit date: 1/1/1995   • Smokeless tobacco: Never Used   • Alcohol use 2.4 - 3.6 oz/week     4 - 6 Cans of beer per week   • Drug use: Unknown   • Sexual activity: Not on file     Other Topics Concern   • Not on file     Social History Narrative    ** Merged History Encounter **            Family History   Problem Relation Age of Onset   • Cancer Neg Hx        Current Outpatient Prescriptions on File Prior to Visit   Medication Sig Dispense Refill   • glipizide-metformin (METAGLIP) 5-500 MG per tablet Take 1 Tab by mouth 2 times daily, before breakfast and dinner.     • Bepotastine Besilate (BEPREVE) 1.5 % SOLN by Ophthalmic route.     • labetalol (NORMODYNE) 300 MG TABS Take 300 mg by mouth 2 times a day.     • simvastatin (ZOCOR) 20 MG TABS Take 20 mg by mouth every evening.     • amlodipine (NORVASC) 10 MG TABS Take 10 mg by mouth every day.     • finasteride (PROSCAR) 5 MG TABS Take 5 mg by mouth every day.       No current  "facility-administered medications on file prior to visit.        Allergies: Patient has no known allergies.    ROS:   Constitutional: Denies fevers, chills, night sweats, fatigue or weight loss  Eyes: Denies vision loss, pain, drainage, double vision  Ears, Nose, Throat: Denies earache, difficulty hearing, tinnitus, nasal congestion, hoarseness  Cardiovascular: Denies chest pain, tightness, palpitations, orthopnea or edema  Respiratory: Denies shortness of breath, cough, wheezing, hemoptysis  Sleep: Denies daytime sleepiness, snoring, apneas, insomnia, morning headaches  GI: Denies heartburn, dysphagia, nausea, abdominal pain, diarrhea or constipation  : Denies frequent urination, hematuria, discharge or painful urination  Musculoskeletal: Denies back pain, painful joints, sore muscles  Neurological: Denies weakness or headaches  Skin: No rashes    Blood pressure 126/74, pulse 85, temperature 36.4 °C (97.5 °F), temperature source Temporal, resp. rate 18, height 1.626 m (5' 4\"), weight 110.6 kg (243 lb 14.4 oz), SpO2 93 %.    Physical Exam:  Appearance: Well-nourished, well-developed, in no acute distress  HEENT: Normocephalic, atraumatic, white sclera, PERRLA, Mallampati 3  Neck: No adenopathy or masses  Respiratory: no intercostal retractions or accessory muscle use  Lungs auscultation: Clear to auscultation bilaterally  Cardiovascular: Regular rate rhythm. No murmurs, rubs or gallops.  No LE edema  Abdomen: soft, nondistended  Gait: Normal  Digits: No clubbing, cyanosis  Motor: No focal deficits  Orientation: Oriented to time, person and place    Diagnosis:  1. ONI treated with BiPAP     2. BMI 40.0-44.9, adult ([facility])  OBESITY COUNSELING (No Charge): Patient identified as having weight management issue.  Appropriate orders and counseling given.       Plan:  Jonathan shows excellent compliance and response to BiPAP therapy and is benefiting from treatment.  Maintain BiPAP: 18/15 cm of water. Replace nasal pillows at " least monthly.  Patient's body mass index is 41.87 kg/m². Exercise and nutrition counseling were performed at this visit.  Return in about 6 months (around 6/21/2019).

## 2019-03-01 ENCOUNTER — APPOINTMENT (RX ONLY)
Dept: URBAN - METROPOLITAN AREA CLINIC 4 | Facility: CLINIC | Age: 74
Setting detail: DERMATOLOGY
End: 2019-03-01

## 2019-03-01 DIAGNOSIS — L81.0 POSTINFLAMMATORY HYPERPIGMENTATION: ICD-10-CM

## 2019-03-01 DIAGNOSIS — L82.1 OTHER SEBORRHEIC KERATOSIS: ICD-10-CM

## 2019-03-01 PROCEDURE — ? LIQUID NITROGEN

## 2019-03-01 PROCEDURE — ? COUNSELING

## 2019-03-01 PROCEDURE — 99213 OFFICE O/P EST LOW 20 MIN: CPT

## 2019-03-01 ASSESSMENT — LOCATION DETAILED DESCRIPTION DERM
LOCATION DETAILED: LEFT INFERIOR ANTERIOR NECK
LOCATION DETAILED: LEFT SUPERIOR LATERAL MIDBACK
LOCATION DETAILED: LEFT MID-UPPER BACK
LOCATION DETAILED: LEFT INFERIOR MEDIAL UPPER BACK
LOCATION DETAILED: LEFT SUPERIOR LATERAL UPPER BACK
LOCATION DETAILED: LEFT LATERAL UPPER BACK
LOCATION DETAILED: LEFT INFERIOR LATERAL NECK
LOCATION DETAILED: SUPERIOR THORACIC SPINE
LOCATION DETAILED: RIGHT SUPERIOR LATERAL MIDBACK
LOCATION DETAILED: RIGHT SUPERIOR ANTERIOR NECK
LOCATION DETAILED: RIGHT MEDIAL UPPER BACK
LOCATION DETAILED: RIGHT SUPERIOR MEDIAL MIDBACK
LOCATION DETAILED: RIGHT POSTERIOR SHOULDER
LOCATION DETAILED: LEFT INFERIOR MEDIAL MIDBACK
LOCATION DETAILED: LEFT INFERIOR FOREHEAD
LOCATION DETAILED: LEFT SUPERIOR UPPER BACK
LOCATION DETAILED: RIGHT MID-UPPER BACK
LOCATION DETAILED: RIGHT CENTRAL MALAR CHEEK
LOCATION DETAILED: LEFT INFERIOR LATERAL FOREHEAD
LOCATION DETAILED: RIGHT SUPERIOR LATERAL UPPER BACK

## 2019-03-01 ASSESSMENT — LOCATION SIMPLE DESCRIPTION DERM
LOCATION SIMPLE: LEFT LOWER BACK
LOCATION SIMPLE: UPPER BACK
LOCATION SIMPLE: RIGHT SHOULDER
LOCATION SIMPLE: RIGHT LOWER BACK
LOCATION SIMPLE: RIGHT UPPER BACK
LOCATION SIMPLE: LEFT ANTERIOR NECK
LOCATION SIMPLE: RIGHT ANTERIOR NECK
LOCATION SIMPLE: RIGHT CHEEK
LOCATION SIMPLE: LEFT UPPER BACK
LOCATION SIMPLE: LEFT FOREHEAD

## 2019-03-01 ASSESSMENT — LOCATION ZONE DERM
LOCATION ZONE: TRUNK
LOCATION ZONE: FACE
LOCATION ZONE: NECK
LOCATION ZONE: ARM

## 2019-03-01 NOTE — PROCEDURE: LIQUID NITROGEN
Add 52 Modifier (Optional): no
Duration Of Freeze Thaw-Cycle (Seconds): 0
Consent: The patient's consent was obtained including but not limited to risks of crusting, scabbing, blistering, scarring, darker or lighter pigmentary change, recurrence, incomplete removal and infection.
Post-Care Instructions: I reviewed with the patient in detail post-care instructions. Patient is to wear sunprotection, and avoid picking at any of the treated lesions. Pt may apply Vaseline to crusted or scabbing areas.
Medical Necessity Clause: This procedure was medically necessary because the lesions that were treated were:  If lesion does not resolve, bx is needed.
Detail Level: Detailed
Medical Necessity Information: It is in your best interest to select a reason for this procedure from the list below. All of these items fulfill various CMS LCD requirements except the new and changing color options.

## 2019-04-20 ENCOUNTER — HOSPITAL ENCOUNTER (OUTPATIENT)
Facility: MEDICAL CENTER | Age: 74
End: 2019-04-22
Attending: EMERGENCY MEDICINE | Admitting: HOSPITALIST
Payer: MEDICARE

## 2019-04-20 ENCOUNTER — APPOINTMENT (OUTPATIENT)
Dept: RADIOLOGY | Facility: MEDICAL CENTER | Age: 74
End: 2019-04-20
Attending: EMERGENCY MEDICINE
Payer: MEDICARE

## 2019-04-20 DIAGNOSIS — I45.10 RBBB: ICD-10-CM

## 2019-04-20 DIAGNOSIS — R55 NEAR SYNCOPE: ICD-10-CM

## 2019-04-20 LAB
ALBUMIN SERPL BCP-MCNC: 4 G/DL (ref 3.2–4.9)
ALBUMIN/GLOB SERPL: 1.6 G/DL
ALP SERPL-CCNC: 49 U/L (ref 30–99)
ALT SERPL-CCNC: 24 U/L (ref 2–50)
ANION GAP SERPL CALC-SCNC: 7 MMOL/L (ref 0–11.9)
APPEARANCE UR: ABNORMAL
AST SERPL-CCNC: 21 U/L (ref 12–45)
BACTERIA #/AREA URNS HPF: NEGATIVE /HPF
BASOPHILS # BLD AUTO: 0.5 % (ref 0–1.8)
BASOPHILS # BLD: 0.04 K/UL (ref 0–0.12)
BILIRUB SERPL-MCNC: 0.4 MG/DL (ref 0.1–1.5)
BILIRUB UR QL STRIP.AUTO: NEGATIVE
BUN SERPL-MCNC: 16 MG/DL (ref 8–22)
CALCIUM SERPL-MCNC: 8.4 MG/DL (ref 8.5–10.5)
CHLORIDE SERPL-SCNC: 103 MMOL/L (ref 96–112)
CO2 SERPL-SCNC: 28 MMOL/L (ref 20–33)
COLOR UR: YELLOW
CREAT SERPL-MCNC: 0.93 MG/DL (ref 0.5–1.4)
EKG IMPRESSION: NORMAL
EOSINOPHIL # BLD AUTO: 0.21 K/UL (ref 0–0.51)
EOSINOPHIL NFR BLD: 2.9 % (ref 0–6.9)
EPI CELLS #/AREA URNS HPF: NEGATIVE /HPF
ERYTHROCYTE [DISTWIDTH] IN BLOOD BY AUTOMATED COUNT: 44.2 FL (ref 35.9–50)
GLOBULIN SER CALC-MCNC: 2.5 G/DL (ref 1.9–3.5)
GLUCOSE SERPL-MCNC: 116 MG/DL (ref 65–99)
GLUCOSE UR STRIP.AUTO-MCNC: NEGATIVE MG/DL
HCT VFR BLD AUTO: 40.4 % (ref 42–52)
HGB BLD-MCNC: 13.2 G/DL (ref 14–18)
HYALINE CASTS #/AREA URNS LPF: ABNORMAL /LPF
IMM GRANULOCYTES # BLD AUTO: 0.02 K/UL (ref 0–0.11)
IMM GRANULOCYTES NFR BLD AUTO: 0.3 % (ref 0–0.9)
KETONES UR STRIP.AUTO-MCNC: NEGATIVE MG/DL
LEUKOCYTE ESTERASE UR QL STRIP.AUTO: NEGATIVE
LYMPHOCYTES # BLD AUTO: 2.49 K/UL (ref 1–4.8)
LYMPHOCYTES NFR BLD: 34 % (ref 22–41)
MAGNESIUM SERPL-MCNC: 1.8 MG/DL (ref 1.5–2.5)
MCH RBC QN AUTO: 29.5 PG (ref 27–33)
MCHC RBC AUTO-ENTMCNC: 32.7 G/DL (ref 33.7–35.3)
MCV RBC AUTO: 90.2 FL (ref 81.4–97.8)
MICRO URNS: ABNORMAL
MONOCYTES # BLD AUTO: 0.77 K/UL (ref 0–0.85)
MONOCYTES NFR BLD AUTO: 10.5 % (ref 0–13.4)
NEUTROPHILS # BLD AUTO: 3.79 K/UL (ref 1.82–7.42)
NEUTROPHILS NFR BLD: 51.8 % (ref 44–72)
NITRITE UR QL STRIP.AUTO: NEGATIVE
NRBC # BLD AUTO: 0 K/UL
NRBC BLD-RTO: 0 /100 WBC
PH UR STRIP.AUTO: 8 [PH]
PLATELET # BLD AUTO: 196 K/UL (ref 164–446)
PMV BLD AUTO: 8.8 FL (ref 9–12.9)
POTASSIUM SERPL-SCNC: 3.6 MMOL/L (ref 3.6–5.5)
PROT SERPL-MCNC: 6.5 G/DL (ref 6–8.2)
PROT UR QL STRIP: NEGATIVE MG/DL
RBC # BLD AUTO: 4.48 M/UL (ref 4.7–6.1)
RBC # URNS HPF: ABNORMAL /HPF
RBC UR QL AUTO: NEGATIVE
SODIUM SERPL-SCNC: 138 MMOL/L (ref 135–145)
SP GR UR STRIP.AUTO: 1.02
TROPONIN I SERPL-MCNC: 0.01 NG/ML (ref 0–0.04)
UROBILINOGEN UR STRIP.AUTO-MCNC: 0.2 MG/DL
WBC # BLD AUTO: 7.3 K/UL (ref 4.8–10.8)
WBC #/AREA URNS HPF: ABNORMAL /HPF

## 2019-04-20 PROCEDURE — 36415 COLL VENOUS BLD VENIPUNCTURE: CPT

## 2019-04-20 PROCEDURE — 85025 COMPLETE CBC W/AUTO DIFF WBC: CPT

## 2019-04-20 PROCEDURE — 93005 ELECTROCARDIOGRAM TRACING: CPT

## 2019-04-20 PROCEDURE — 99285 EMERGENCY DEPT VISIT HI MDM: CPT

## 2019-04-20 PROCEDURE — 84443 ASSAY THYROID STIM HORMONE: CPT

## 2019-04-20 PROCEDURE — G0378 HOSPITAL OBSERVATION PER HR: HCPCS

## 2019-04-20 PROCEDURE — 99220 PR INITIAL OBSERVATION CARE,LEVL III: CPT | Performed by: HOSPITALIST

## 2019-04-20 PROCEDURE — 80053 COMPREHEN METABOLIC PANEL: CPT

## 2019-04-20 PROCEDURE — 71045 X-RAY EXAM CHEST 1 VIEW: CPT

## 2019-04-20 PROCEDURE — 81001 URINALYSIS AUTO W/SCOPE: CPT

## 2019-04-20 PROCEDURE — 84484 ASSAY OF TROPONIN QUANT: CPT

## 2019-04-20 PROCEDURE — 83735 ASSAY OF MAGNESIUM: CPT

## 2019-04-20 PROCEDURE — 84100 ASSAY OF PHOSPHORUS: CPT

## 2019-04-20 RX ORDER — SIMVASTATIN 20 MG
20 TABLET ORAL NIGHTLY
Status: DISCONTINUED | OUTPATIENT
Start: 2019-04-21 | End: 2019-04-22 | Stop reason: HOSPADM

## 2019-04-20 RX ORDER — BISACODYL 10 MG
10 SUPPOSITORY, RECTAL RECTAL
Status: DISCONTINUED | OUTPATIENT
Start: 2019-04-20 | End: 2019-04-22 | Stop reason: HOSPADM

## 2019-04-20 RX ORDER — ONDANSETRON 2 MG/ML
4 INJECTION INTRAMUSCULAR; INTRAVENOUS EVERY 4 HOURS PRN
Status: DISCONTINUED | OUTPATIENT
Start: 2019-04-20 | End: 2019-04-22 | Stop reason: HOSPADM

## 2019-04-20 RX ORDER — LABETALOL 300 MG/1
300 TABLET, FILM COATED ORAL 2 TIMES DAILY
Status: DISCONTINUED | OUTPATIENT
Start: 2019-04-21 | End: 2019-04-22 | Stop reason: HOSPADM

## 2019-04-20 RX ORDER — ONDANSETRON 4 MG/1
4 TABLET, ORALLY DISINTEGRATING ORAL EVERY 4 HOURS PRN
Status: DISCONTINUED | OUTPATIENT
Start: 2019-04-20 | End: 2019-04-22 | Stop reason: HOSPADM

## 2019-04-20 RX ORDER — AMLODIPINE BESYLATE 10 MG/1
10 TABLET ORAL DAILY
Status: DISCONTINUED | OUTPATIENT
Start: 2019-04-21 | End: 2019-04-22 | Stop reason: HOSPADM

## 2019-04-20 RX ORDER — DEXTROSE MONOHYDRATE 25 G/50ML
25 INJECTION, SOLUTION INTRAVENOUS
Status: DISCONTINUED | OUTPATIENT
Start: 2019-04-20 | End: 2019-04-22 | Stop reason: HOSPADM

## 2019-04-20 RX ORDER — POLYETHYLENE GLYCOL 3350 17 G/17G
1 POWDER, FOR SOLUTION ORAL
Status: DISCONTINUED | OUTPATIENT
Start: 2019-04-20 | End: 2019-04-22 | Stop reason: HOSPADM

## 2019-04-20 RX ORDER — HEPARIN SODIUM 5000 [USP'U]/ML
5000 INJECTION, SOLUTION INTRAVENOUS; SUBCUTANEOUS EVERY 8 HOURS
Status: DISCONTINUED | OUTPATIENT
Start: 2019-04-21 | End: 2019-04-22 | Stop reason: HOSPADM

## 2019-04-20 RX ORDER — AMOXICILLIN 250 MG
2 CAPSULE ORAL 2 TIMES DAILY
Status: DISCONTINUED | OUTPATIENT
Start: 2019-04-21 | End: 2019-04-22 | Stop reason: HOSPADM

## 2019-04-21 ENCOUNTER — APPOINTMENT (OUTPATIENT)
Dept: RADIOLOGY | Facility: MEDICAL CENTER | Age: 74
End: 2019-04-21
Attending: HOSPITALIST
Payer: MEDICARE

## 2019-04-21 ENCOUNTER — APPOINTMENT (OUTPATIENT)
Dept: CARDIOLOGY | Facility: MEDICAL CENTER | Age: 74
End: 2019-04-21
Attending: HOSPITALIST
Payer: MEDICARE

## 2019-04-21 PROBLEM — D64.9 ANEMIA: Status: ACTIVE | Noted: 2019-04-21

## 2019-04-21 PROBLEM — E78.5 HLD (HYPERLIPIDEMIA): Status: ACTIVE | Noted: 2019-04-21

## 2019-04-21 PROBLEM — N40.0 BPH (BENIGN PROSTATIC HYPERPLASIA): Status: ACTIVE | Noted: 2019-04-21

## 2019-04-21 PROBLEM — I10 HTN (HYPERTENSION): Status: ACTIVE | Noted: 2019-04-21

## 2019-04-21 PROBLEM — R55 SYNCOPE: Status: ACTIVE | Noted: 2019-04-21

## 2019-04-21 PROBLEM — E66.9 OBESITY: Status: ACTIVE | Noted: 2019-04-21

## 2019-04-21 PROBLEM — E11.9 DM (DIABETES MELLITUS) (HCC): Status: ACTIVE | Noted: 2019-04-21

## 2019-04-21 LAB
ALBUMIN SERPL BCP-MCNC: 3.7 G/DL (ref 3.2–4.9)
ALBUMIN/GLOB SERPL: 1.4 G/DL
ALP SERPL-CCNC: 46 U/L (ref 30–99)
ALT SERPL-CCNC: 20 U/L (ref 2–50)
ANION GAP SERPL CALC-SCNC: 9 MMOL/L (ref 0–11.9)
AST SERPL-CCNC: 16 U/L (ref 12–45)
BASOPHILS # BLD AUTO: 0.4 % (ref 0–1.8)
BASOPHILS # BLD: 0.03 K/UL (ref 0–0.12)
BILIRUB SERPL-MCNC: 0.4 MG/DL (ref 0.1–1.5)
BUN SERPL-MCNC: 22 MG/DL (ref 8–22)
CALCIUM SERPL-MCNC: 8.6 MG/DL (ref 8.5–10.5)
CHLORIDE SERPL-SCNC: 105 MMOL/L (ref 96–112)
CO2 SERPL-SCNC: 30 MMOL/L (ref 20–33)
CREAT SERPL-MCNC: 1.04 MG/DL (ref 0.5–1.4)
EOSINOPHIL # BLD AUTO: 0.18 K/UL (ref 0–0.51)
EOSINOPHIL NFR BLD: 2.4 % (ref 0–6.9)
ERYTHROCYTE [DISTWIDTH] IN BLOOD BY AUTOMATED COUNT: 43.8 FL (ref 35.9–50)
GLOBULIN SER CALC-MCNC: 2.7 G/DL (ref 1.9–3.5)
GLUCOSE BLD-MCNC: 130 MG/DL (ref 65–99)
GLUCOSE BLD-MCNC: 137 MG/DL (ref 65–99)
GLUCOSE BLD-MCNC: 151 MG/DL (ref 65–99)
GLUCOSE BLD-MCNC: 172 MG/DL (ref 65–99)
GLUCOSE SERPL-MCNC: 128 MG/DL (ref 65–99)
HCT VFR BLD AUTO: 39.4 % (ref 42–52)
HGB BLD-MCNC: 12.9 G/DL (ref 14–18)
IMM GRANULOCYTES # BLD AUTO: 0.03 K/UL (ref 0–0.11)
IMM GRANULOCYTES NFR BLD AUTO: 0.4 % (ref 0–0.9)
LV EJECT FRACT  99904: 60
LYMPHOCYTES # BLD AUTO: 2.57 K/UL (ref 1–4.8)
LYMPHOCYTES NFR BLD: 33.7 % (ref 22–41)
MCH RBC QN AUTO: 29.4 PG (ref 27–33)
MCHC RBC AUTO-ENTMCNC: 32.7 G/DL (ref 33.7–35.3)
MCV RBC AUTO: 89.7 FL (ref 81.4–97.8)
MONOCYTES # BLD AUTO: 0.78 K/UL (ref 0–0.85)
MONOCYTES NFR BLD AUTO: 10.2 % (ref 0–13.4)
NEUTROPHILS # BLD AUTO: 4.03 K/UL (ref 1.82–7.42)
NEUTROPHILS NFR BLD: 52.9 % (ref 44–72)
NRBC # BLD AUTO: 0 K/UL
NRBC BLD-RTO: 0 /100 WBC
PHOSPHATE SERPL-MCNC: 2.7 MG/DL (ref 2.5–4.5)
PLATELET # BLD AUTO: 186 K/UL (ref 164–446)
PMV BLD AUTO: 8.4 FL (ref 9–12.9)
POTASSIUM SERPL-SCNC: 3.8 MMOL/L (ref 3.6–5.5)
PROT SERPL-MCNC: 6.4 G/DL (ref 6–8.2)
RBC # BLD AUTO: 4.39 M/UL (ref 4.7–6.1)
SODIUM SERPL-SCNC: 144 MMOL/L (ref 135–145)
TROPONIN I SERPL-MCNC: 0.01 NG/ML (ref 0–0.04)
TSH SERPL DL<=0.005 MIU/L-ACNC: 3.32 UIU/ML (ref 0.38–5.33)
WBC # BLD AUTO: 7.6 K/UL (ref 4.8–10.8)

## 2019-04-21 PROCEDURE — G0378 HOSPITAL OBSERVATION PER HR: HCPCS

## 2019-04-21 PROCEDURE — 94660 CPAP INITIATION&MGMT: CPT

## 2019-04-21 PROCEDURE — 93880 EXTRACRANIAL BILAT STUDY: CPT

## 2019-04-21 PROCEDURE — 93306 TTE W/DOPPLER COMPLETE: CPT

## 2019-04-21 PROCEDURE — 82962 GLUCOSE BLOOD TEST: CPT | Mod: 91

## 2019-04-21 PROCEDURE — 93306 TTE W/DOPPLER COMPLETE: CPT | Mod: 26 | Performed by: INTERNAL MEDICINE

## 2019-04-21 PROCEDURE — 700111 HCHG RX REV CODE 636 W/ 250 OVERRIDE (IP): Performed by: HOSPITALIST

## 2019-04-21 PROCEDURE — 36415 COLL VENOUS BLD VENIPUNCTURE: CPT

## 2019-04-21 PROCEDURE — 96372 THER/PROPH/DIAG INJ SC/IM: CPT | Mod: XU

## 2019-04-21 PROCEDURE — 700117 HCHG RX CONTRAST REV CODE 255: Performed by: HOSPITALIST

## 2019-04-21 PROCEDURE — 70450 CT HEAD/BRAIN W/O DYE: CPT

## 2019-04-21 PROCEDURE — A9270 NON-COVERED ITEM OR SERVICE: HCPCS | Performed by: HOSPITALIST

## 2019-04-21 PROCEDURE — 70553 MRI BRAIN STEM W/O & W/DYE: CPT

## 2019-04-21 PROCEDURE — 85025 COMPLETE CBC W/AUTO DIFF WBC: CPT

## 2019-04-21 PROCEDURE — 84484 ASSAY OF TROPONIN QUANT: CPT

## 2019-04-21 PROCEDURE — A9585 GADOBUTROL INJECTION: HCPCS | Performed by: HOSPITALIST

## 2019-04-21 PROCEDURE — 99226 PR SUBSEQUENT OBSERVATION CARE,LEVEL III: CPT | Performed by: HOSPITALIST

## 2019-04-21 PROCEDURE — 700102 HCHG RX REV CODE 250 W/ 637 OVERRIDE(OP): Performed by: HOSPITALIST

## 2019-04-21 PROCEDURE — 80053 COMPREHEN METABOLIC PANEL: CPT

## 2019-04-21 RX ORDER — FINASTERIDE 5 MG/1
5 TABLET, FILM COATED ORAL DAILY
Status: DISCONTINUED | OUTPATIENT
Start: 2019-04-21 | End: 2019-04-22 | Stop reason: HOSPADM

## 2019-04-21 RX ORDER — GADOBUTROL 604.72 MG/ML
10 INJECTION INTRAVENOUS ONCE
Status: COMPLETED | OUTPATIENT
Start: 2019-04-21 | End: 2019-04-21

## 2019-04-21 RX ADMIN — LABETALOL HCL 300 MG: 300 TABLET, FILM COATED ORAL at 08:36

## 2019-04-21 RX ADMIN — HEPARIN SODIUM 5000 UNITS: 5000 INJECTION, SOLUTION INTRAVENOUS; SUBCUTANEOUS at 05:53

## 2019-04-21 RX ADMIN — HUMAN ALBUMIN MICROSPHERES AND PERFLUTREN 3 ML: 10; .22 INJECTION, SOLUTION INTRAVENOUS at 11:00

## 2019-04-21 RX ADMIN — AMLODIPINE BESYLATE 10 MG: 10 TABLET ORAL at 05:53

## 2019-04-21 RX ADMIN — HEPARIN SODIUM 5000 UNITS: 5000 INJECTION, SOLUTION INTRAVENOUS; SUBCUTANEOUS at 14:05

## 2019-04-21 RX ADMIN — SENNOSIDES, DOCUSATE SODIUM 2 TABLET: 50; 8.6 TABLET, FILM COATED ORAL at 17:37

## 2019-04-21 RX ADMIN — SIMVASTATIN 20 MG: 20 TABLET, FILM COATED ORAL at 21:23

## 2019-04-21 RX ADMIN — ASPIRIN 81 MG: 81 TABLET, COATED ORAL at 17:37

## 2019-04-21 RX ADMIN — LABETALOL HCL 300 MG: 300 TABLET, FILM COATED ORAL at 17:37

## 2019-04-21 RX ADMIN — HEPARIN SODIUM 5000 UNITS: 5000 INJECTION, SOLUTION INTRAVENOUS; SUBCUTANEOUS at 21:23

## 2019-04-21 RX ADMIN — INSULIN HUMAN 1 UNITS: 100 INJECTION, SOLUTION PARENTERAL at 17:36

## 2019-04-21 RX ADMIN — FINASTERIDE 5 MG: 5 TABLET, FILM COATED ORAL at 05:53

## 2019-04-21 RX ADMIN — GADOBUTROL 10 ML: 604.72 INJECTION INTRAVENOUS at 15:39

## 2019-04-21 ASSESSMENT — COGNITIVE AND FUNCTIONAL STATUS - GENERAL
MOVING FROM LYING ON BACK TO SITTING ON SIDE OF FLAT BED: A LITTLE
SUGGESTED CMS G CODE MODIFIER MOBILITY: CK
HELP NEEDED FOR BATHING: A LITTLE
WALKING IN HOSPITAL ROOM: A LITTLE
DRESSING REGULAR LOWER BODY CLOTHING: A LITTLE
MOBILITY SCORE: 19
SUGGESTED CMS G CODE MODIFIER DAILY ACTIVITY: CK
MOVING TO AND FROM BED TO CHAIR: A LITTLE
TOILETING: A LITTLE
DRESSING REGULAR UPPER BODY CLOTHING: A LITTLE
DAILY ACTIVITIY SCORE: 18
EATING MEALS: A LITTLE
PERSONAL GROOMING: A LITTLE
CLIMB 3 TO 5 STEPS WITH RAILING: A LITTLE
STANDING UP FROM CHAIR USING ARMS: A LITTLE

## 2019-04-21 ASSESSMENT — ENCOUNTER SYMPTOMS
HEMOPTYSIS: 0
FEVER: 0
FLANK PAIN: 0
SHORTNESS OF BREATH: 1
BRUISES/BLEEDS EASILY: 0
PALPITATIONS: 0
COUGH: 0
SENSORY CHANGE: 0
DIZZINESS: 0
VOMITING: 0
CHILLS: 0
BLURRED VISION: 0
NAUSEA: 0
WEAKNESS: 1
SINUS PAIN: 0
BLURRED VISION: 1
HALLUCINATIONS: 0
MYALGIAS: 0
HEADACHES: 0
HEARTBURN: 0
DEPRESSION: 0
DIZZINESS: 1
EYE DISCHARGE: 0
SPEECH CHANGE: 0
ABDOMINAL PAIN: 0
NECK PAIN: 0
FOCAL WEAKNESS: 0
DOUBLE VISION: 0

## 2019-04-21 ASSESSMENT — LIFESTYLE VARIABLES
ALCOHOL_USE: NO
SUBSTANCE_ABUSE: 0

## 2019-04-21 ASSESSMENT — PATIENT HEALTH QUESTIONNAIRE - PHQ9
2. FEELING DOWN, DEPRESSED, IRRITABLE, OR HOPELESS: NOT AT ALL
SUM OF ALL RESPONSES TO PHQ9 QUESTIONS 1 AND 2: 0
1. LITTLE INTEREST OR PLEASURE IN DOING THINGS: NOT AT ALL

## 2019-04-21 NOTE — CARE PLAN
Problem: Safety  Goal: Will remain free from falls  Outcome: PROGRESSING AS EXPECTED  Pt mobility assessed at beginning of shift. Pt is standby assist. Fall precautions in place. Non-slip socks on. Bed in lowest locked position. Bed alarm on. Call light within reach. Pt educated to call for assistance and verbalizes understanding.      Problem: Knowledge Deficit  Goal: Knowledge of disease process/condition, treatment plan, diagnostic tests, and medications will improve  Outcome: PROGRESSING AS EXPECTED  Pt educated about disease process. Reason why medications are taken. And informed about treatment plan.

## 2019-04-21 NOTE — PROGRESS NOTES
Assumed care of PT A&O 4. Pt resting in bed with no signs of labored breathing. Tele monitor in place, cardiac rhythm being monitored. Call light within reach, bed in lowest position, upper bed rails up. Pt was updated on plan of care for the day . Will continue to monitor.

## 2019-04-21 NOTE — ED NOTES
Med Rec Updated and Complete per Pts family at bedside with List (returned)  Allergies Reviewed  No PO ABX last 30 days.    Family reports Pt took morning and night meds today.

## 2019-04-21 NOTE — THERAPY
Physical Therapy Contact Note    PT order received and acknowledged. Per RN patient currently undergoing MRI. Will re attempt as appropriate and able.    France Martínez, PT, DPT  [phone]

## 2019-04-21 NOTE — ED NOTES
Tele RN at bedside to transport patient upstairs. Bedside report given to Tele RN. Patient A/Ox4, good color and NAD upon leaving department.

## 2019-04-21 NOTE — ED PROVIDER NOTES
ED Provider Note    CHIEF COMPLAINT  Chief Complaint   Patient presents with   • Near Syncopal     Pt leaned over to get some chips at home, when standing up got lightheaded and felt he was going to pass out. Pt's family member assisted him to a chair.    • Chest Pain     8/10 initially, given 324 ASA and x2 0.4 nitro with EMS, pain decreased to 5/10   • Shortness of Breath     Developed SOB as well, felt he was having trouble breathing       HPI  Jonathan Rebolledo is a 74 y.o. male who presents with a near syncopal episode.  He states that he stood up to go to the kitchen and grabbed some chips to eat while watching TV.  He suddenly felt very lightheaded and almost passed out and hit the ground.  A family member was present in the room and was able to catch him prior to contact to the ground.  Patient had slight confusion following this.  He also had what the family was describing as an apparent panic attack.  The patient was tearful and frightened.    Family at bedside notes that he had a similar episode about 3 years ago that was almost identical and he was admitted with an unremarkable work-up at that time.  No clear diagnosis was established at that time.  Denies prior cardiovascular disease history.  Does have a history of hypertension and diabetes.  Blood glucose was checked at home following the syncopal event and it was found to be normal.  No seizure history.  No seizure-like activity.  No headaches.  No change in vision or weakness.    Patient did note he had some chest discomfort described as a tightness that was nonradiating and in the center of his chest.  About 9 out of 10 in terms of pain.  Now about 5 out of 10.  He was given aspirin en route by EMS.    No prior cardiovascular disease history.  No history of PE.  No significant risk factors for this.  No lower extremity swelling or pain.    REVIEW OF SYSTEMS  See HPI for further details. All other systems are negative.     PAST MEDICAL HISTORY    "has a past medical history of Asthma; Back pain; Diabetes; ONI treated with BiPAP (5/10/2016); and Sleep apnea.    SOCIAL HISTORY  Social History     Social History Main Topics   • Smoking status: Former Smoker     Packs/day: 1.00     Years: 10.00     Types: Cigarettes     Start date: 1/1/1985     Quit date: 1/1/1995   • Smokeless tobacco: Never Used   • Alcohol use 2.4 - 3.6 oz/week     4 - 6 Cans of beer per week   • Drug use: No   • Sexual activity: Not on file       SURGICAL HISTORY   has a past surgical history that includes full thickness block resection (2/19/2013); biopsy general (2/19/2013); and cataract extraction with iol.    CURRENT MEDICATIONS  Home Medications     Reviewed by Fariba Luna (Pharmacy Tech) on 04/20/19 at [facility]  Med List Status: Complete   Medication Last Dose Status   amlodipine (NORVASC) 10 MG TABS 4/20/2019 Active   finasteride (PROSCAR) 5 MG TABS 4/20/2019 Active   glipizide-metformin (METAGLIP) 5-500 MG per tablet 4/20/2019 Active   labetalol (NORMODYNE) 300 MG TABS 4/20/2019 Active   simvastatin (ZOCOR) 20 MG TABS 4/20/2019 Active                ALLERGIES  No Known Allergies    PHYSICAL EXAM  VITAL SIGNS: Pulse 80   Resp 20   Ht 1.626 m (5' 4\")   Wt 111.5 kg (245 lb 14.8 oz)   SpO2 94%   BMI 42.21 kg/m²   Pulse ox interpretation: I interpret this pulse ox as normal.  Constitutional: Alert in no apparent distress.  HENT: No signs of trauma, Bilateral external ears normal, Nose normal.   Eyes: Pupils are equal and reactive, Conjunctiva normal, Non-icteric.   Neck: Normal range of motion, No tenderness, Supple, No stridor.   Cardiovascular: Regular rate and rhythm.   Thorax & Lungs: Normal breath sounds, No respiratory distress, No wheezing, No chest tenderness.   Abdomen: Bowel sounds normal, Soft, No tenderness, No masses, No pulsatile masses. No peritoneal signs.  Skin: Warm, Dry, No erythema, No rash.   Back: No bony tenderness, No CVA tenderness.   Extremities: " Intact distal pulses, No edema, No tenderness, No cyanosis  Musculoskeletal: Good range of motion in all major joints. No tenderness to palpation or major deformities noted.   Neurologic: Alert, Normal motor function and gait, Normal sensory function, No focal deficits noted.   Psychiatric: Anxious in appearance      DIAGNOSTIC STUDIES / PROCEDURES    EKG  Results for orders placed or performed during the hospital encounter of 19   EKG   Result Value Ref Range    Report       [facility] Emergency Dept.    Test Date:  2019  Pt Name:    OLENA HOLLAND           Department: ER  MRN:        [id number]                      Room:       [facility]  Gender:     Male                         Technician: [id number]  :        1945                   Requested By:BOY SORIANO  Order #:    [id number]                    Reading MD: BOY SORIANO MD    Measurements  Intervals                                Axis  Rate:       82                           P:          44  UT:         156                          QRS:        -59  QRSD:       134                          T:          11  QT:         420  QTc:        491    Interpretive Statements  SINUS RHYTHM  RBBB AND LAFB  Compared to ECG 2016 06:06:06  Left anterior fascicular block now present  Right bundle-branch block now present  Left-axis deviation no longer present    Electronically Signed On 2019 22:31:18 PDT by BOY SORIANO MD         LABS  Labs Reviewed   CBC WITH DIFFERENTIAL - Abnormal; Notable for the following:        Result Value    RBC 4.48 (*)     Hemoglobin 13.2 (*)     Hematocrit 40.4 (*)     MCHC 32.7 (*)     MPV 8.8 (*)     All other components within normal limits   COMP METABOLIC PANEL - Abnormal; Notable for the following:     Glucose 116 (*)     Calcium 8.4 (*)     All other components within normal limits   TROPONIN   MAGNESIUM   ESTIMATED GFR   URINALYSIS   CBC WITH DIFFERENTIAL   COMP METABOLIC PANEL   TROPONIN    PHOSPHORUS   TSH       RADIOLOGY  DX-CHEST-PORTABLE (1 VIEW)   Final Result         1.  No acute cardiopulmonary disease.   2.  Cardiomegaly   3.  Atherosclerosis      EC-ECHOCARDIOGRAM COMPLETE W/O CONT    (Results Pending)       COURSE & MEDICAL DECISION MAKING    Medications   senna-docusate (PERICOLACE or SENOKOT S) 8.6-50 MG per tablet 2 Tab (not administered)     And   polyethylene glycol/lytes (MIRALAX) PACKET 1 Packet (not administered)     And   magnesium hydroxide (MILK OF MAGNESIA) suspension 30 mL (not administered)     And   bisacodyl (DULCOLAX) suppository 10 mg (not administered)   heparin injection 5,000 Units (not administered)   ondansetron (ZOFRAN) syringe/vial injection 4 mg (not administered)   ondansetron (ZOFRAN ODT) dispertab 4 mg (not administered)   labetalol (NORMODYNE) tablet 300 mg (not administered)   amLODIPine (NORVASC) tablet 10 mg (not administered)   simvastatin (ZOCOR) tablet 20 mg (not administered)   insulin regular (HUMULIN R) injection 1-6 Units (not administered)     And   glucose 4 g chewable tablet 16 g (not administered)     And   dextrose 50% (D50W) injection 25 mL (not administered)       Pertinent Labs & Imaging studies reviewed. (See chart for details)  74 y.o. male with a history of diabetes and hypertension presenting with near syncopal event at home today.  Had a similar episode about 3 years ago for which she was admitted and had an unremarkable work-up.  Had some associated chest pain today without vomiting, diaphoresis.  Mild chest discomfort at this time.  EKG showing right bundle branch block morphology that is new compared to prior EKG from a few years ago.  No signs of acute ischemia.  Troponin is negative.  Patient has a heart score of 3 given age and risk factors.  Low risk for pulmonary embolus and no hypoxia or active shortness of breath symptoms at this time.  He did feel slightly short of breath and panicked at that time of onset of symptoms.  No  "lower extremity swelling or calf tenderness.    During the patient's stay here in the emergency department, on cardiac monitor, he was found to have a transient episode of what appears to be V. fib/torsades that lasted a few seconds.  He was asymptomatic at the time.    Given the patient's near syncopal episode with abnormal EKG, the patient warrants further inpatient evaluation.  Laboratory studies are largely unremarkable including negative troponin.  Normal vitals.  The patient is in no distress.  Resting comfortably here in the emergency department.  Did receive aspirin prior to arrival    11:38 PM spoke with the hospitalist, Dr. Acharya, he is agreeable with the admission.      /72   Pulse 76   Temp 36.9 °C (98.4 °F) (Temporal)   Resp 15   Ht 1.626 m (5' 4\")   Wt 111.5 kg (245 lb 14.8 oz)   SpO2 97%   BMI 42.21 kg/m²     The patient was referred to primary care where they will receive further BP management.        FINAL IMPRESSION  1. Near syncope    2. RBBB            Electronically signed by: Tor Goldstein, 4/20/2019 10:21 PM    "

## 2019-04-21 NOTE — PROGRESS NOTES
Pt resting in bed at this time. Even visible chest rise. No signs of distress. Bed alarm on. Call light in place. Bed in low and locked position. Family at bedside.

## 2019-04-21 NOTE — ASSESSMENT & PLAN NOTE
Unclear etiology but by history possible cardio neurogenic in origin possible vasovagal, echo is within normal limits, carotid ultrasound show right-sided 50-65% stenosis, continue telemetry, will check MRI brain.

## 2019-04-21 NOTE — PROGRESS NOTES
[facility] Medicine Daily Progress Note    Date of Service  4/21/2019    Chief Complaint  74 y.o. male admitted 4/20/2019 with syncope      Interval Problem Update  Patient is resting in bed, family at bedside had long conversation with patient and family regarding plan of care and result of his evaluation, patient is alert and oriented follows commands, no fever or chills no palpitation, no chest pain no dizziness.     Consultants/Specialty  none    Code Status  Full code    Disposition  Tbd.     Review of Systems  Review of Systems   Constitutional: Negative for chills and fever.   HENT: Negative for congestion and sinus pain.    Eyes: Negative for blurred vision and double vision.   Respiratory: Negative for cough and hemoptysis.    Cardiovascular: Negative for chest pain and palpitations.   Gastrointestinal: Negative for heartburn and nausea.   Genitourinary: Negative for dysuria and urgency.   Musculoskeletal: Negative for myalgias and neck pain.   Skin: Negative for itching.   Neurological: Negative for dizziness and headaches.   Endo/Heme/Allergies: Does not bruise/bleed easily.   Psychiatric/Behavioral: Negative for depression and suicidal ideas.        Physical Exam  Temp:  [36.5 °C (97.7 °F)-37.3 °C (99.2 °F)] 37.3 °C (99.1 °F)  Pulse:  [69-80] 77  Resp:  [15-24] 16  BP: (104-153)/(57-96) 123/78  SpO2:  [93 %-97 %] 95 %    Physical Exam   Constitutional: He is oriented to person, place, and time. He appears well-nourished. No distress.   HENT:   Head: Normocephalic and atraumatic.   Mouth/Throat: No oropharyngeal exudate.   Eyes: Conjunctivae are normal. Right eye exhibits no discharge. Left eye exhibits no discharge. No scleral icterus.   Neck: Normal range of motion. Neck supple. No JVD present.   Cardiovascular: Normal rate and regular rhythm.  Exam reveals no gallop.    No murmur heard.  Pulmonary/Chest: Effort normal and breath sounds normal. No stridor. No respiratory distress. He has no wheezes. He  has no rales.   Abdominal: Soft. Bowel sounds are normal. He exhibits no distension. There is no tenderness. There is no rebound.   Musculoskeletal: Normal range of motion. He exhibits no edema.   Lymphadenopathy:     He has no cervical adenopathy.   Neurological: He is alert and oriented to person, place, and time. He exhibits normal muscle tone.   Skin: Skin is dry. No erythema.   Nursing note and vitals reviewed.      Fluids    Intake/Output Summary (Last 24 hours) at 04/21/19 1548  Last data filed at 04/21/19 1300   Gross per 24 hour   Intake              480 ml   Output                0 ml   Net              480 ml       Laboratory  Recent Labs      04/20/19 2227 04/21/19   0341   WBC  7.3  7.6   RBC  4.48*  4.39*   HEMOGLOBIN  13.2*  12.9*   HEMATOCRIT  40.4*  39.4*   MCV  90.2  89.7   MCH  29.5  29.4   MCHC  32.7*  32.7*   RDW  44.2  43.8   PLATELETCT  196  186   MPV  8.8*  8.4*     Recent Labs      04/20/19 2227 04/21/19   0341   SODIUM  138  144   POTASSIUM  3.6  3.8   CHLORIDE  103  105   CO2  28  30   GLUCOSE  116*  128*   BUN  16  22   CREATININE  0.93  1.04   CALCIUM  8.4*  8.6                   Imaging  EC-ECHOCARDIOGRAM COMPLETE W/O CONT   Final Result      US-CAROTID DOPPLER BILAT         CT-HEAD W/O   Final Result         1.  No acute intracranial abnormality is identified, there are nonspecific white matter changes, commonly associated with small vessel ischemic disease.  Associated mild cerebral atrophy is noted.   2.  Atherosclerosis.      DX-CHEST-PORTABLE (1 VIEW)   Final Result         1.  No acute cardiopulmonary disease.   2.  Cardiomegaly   3.  Atherosclerosis      EC-ECHOCARDIOGRAM COMPLETE W/ CONT    (Results Pending)   MR-BRAIN-WITH & W/O    (Results Pending)        Assessment/Plan  * Syncope   Assessment & Plan    Unclear etiology but by history possible cardio neurogenic in origin possible vasovagal, echo is within normal limits, carotid ultrasound show right-sided 50-65%  stenosis, continue telemetry, will check MRI brain.     Obesity   Assessment & Plan    Body mass index is 41.93 kg/m².  Morbid obesity  Need to lose weight.      Anemia   Assessment & Plan    Mild, continue monitoring, recheck iron levels     HLD (hyperlipidemia)   Assessment & Plan    Lipid panel in a.m., continue starting     BPH (benign prostatic hyperplasia)   Assessment & Plan    Continue Proscar      DM (diabetes mellitus) (HCC)   Assessment & Plan    Holding oral medication, continue sliding scale insulin     HTN (hypertension)   Assessment & Plan    Continue amlodipine     ONI treated with BiPAP- (present on admission)   Assessment & Plan    Ordered for cpap/bipap          VTE prophylaxis: heparin

## 2019-04-21 NOTE — PROGRESS NOTES
· 2 RN skin check complete with LADI Villanueva.  · Devices in place none..     Pt's skin intact. Pt is ambulatory, no assistive devices. Pt's bilat LE are discolored, brawny, and dry skin/flaky. Pitting 2+ edema. Feet are dry/ flaky. Pedal pulses +1

## 2019-04-21 NOTE — ED NOTES
Pt assisted to stand to use urinal, denies dizziness/chest pain/SOB. Urine specimen collected and sent. ERP at bedside to update pt and family.

## 2019-04-21 NOTE — ED TRIAGE NOTES
"Chief Complaint   Patient presents with   • Near Syncopal     Pt leaned over to get some chips at home, when standing up got lightheaded and felt he was going to pass out. Pt's family member assisted him to a chair.    • Chest Pain     8/10 initially, given 324 ASA and x2 0.4 nitro with EMS, pain decreased to 5/10   • Shortness of Breath     Developed SOB as well, felt he was having trouble breathing     /72   Pulse 80   Temp 36.9 °C (98.4 °F) (Temporal)   Resp 20   Ht 1.626 m (5' 4\")   Wt 111.5 kg (245 lb 14.8 oz)   SpO2 94%   BMI 42.21 kg/m²     Pt BIB [facility] for above. Labs collected and sent.   "

## 2019-04-21 NOTE — H&P
Hospital Medicine History & Physical Note    Date of Service  4/20/2019    Primary Care Physician  Angel Guerin M.D.    Consultants  none    Code Status  full    Chief Complaint  Near syncope     History of Presenting Illness  74 y.o. male who presented 4/20/2019 with pmx of  Diabetes, htn, inderjit who presents with syncopal episode.  This patient was watching TV when he stepped to go to the kitchen.  He went to grab some chips while he fell very lightheaded and dizzy.  He also has shortness of breath and chest pain at that time.  Family at the bedside states that he was having a panic attack.  He was tearful and frightened.  He did state he loss vision but was awake during the event.  It is unclear if he had any true syncope.  He had similar episode 3 years ago.  He has no known alleviating or exacerbating factors to his symptoms.  He will be admitted to the hospital for syncope workup    Review of Systems  Review of Systems   Constitutional: Negative for chills and fever.   HENT: Negative for congestion, hearing loss and tinnitus.    Eyes: Positive for blurred vision. Negative for double vision and discharge.   Respiratory: Positive for shortness of breath. Negative for cough and hemoptysis.    Cardiovascular: Positive for chest pain. Negative for palpitations and leg swelling.   Gastrointestinal: Negative for abdominal pain, heartburn, nausea and vomiting.   Genitourinary: Negative for dysuria and flank pain.   Musculoskeletal: Negative for joint pain and myalgias.   Skin: Negative for rash.   Neurological: Positive for dizziness and weakness. Negative for sensory change, speech change and focal weakness.   Endo/Heme/Allergies: Negative for environmental allergies. Does not bruise/bleed easily.   Psychiatric/Behavioral: Negative for depression, hallucinations and substance abuse.       Past Medical History   has a past medical history of Asthma; Back pain; Diabetes; INDERJIT treated with BiPAP (5/10/2016); and Sleep  apnea. He also has no past medical history of ASTHMA.    Surgical History   has a past surgical history that includes full thickness block resection (2/19/2013); biopsy general (2/19/2013); and cataract extraction with iol.     Family History  Reviewed and not pertinent     Social History   reports that he quit smoking about 24 years ago. His smoking use included Cigarettes. He started smoking about 34 years ago. He has a 10.00 pack-year smoking history. He has never used smokeless tobacco. He reports that he drinks about 2.4 - 3.6 oz of alcohol per week . He reports that he does not use drugs.    Allergies  No Known Allergies    Medications  Prior to Admission Medications   Prescriptions Last Dose Informant Patient Reported? Taking?   amlodipine (NORVASC) 10 MG TABS 4/20/2019 at AM Family Member Yes No   Sig: Take 10 mg by mouth every day.   finasteride (PROSCAR) 5 MG TABS 4/20/2019 at AM Family Member Yes No   Sig: Take 5 mg by mouth every day.   glipizide-metformin (METAGLIP) 5-500 MG per tablet 4/20/2019 at PM Family Member Yes No   Sig: Take 1 Tab by mouth 2 times daily, before breakfast and dinner.   labetalol (NORMODYNE) 300 MG TABS 4/20/2019 at PM Family Member Yes No   Sig: Take 300 mg by mouth 2 times a day.   simvastatin (ZOCOR) 20 MG TABS 4/20/2019 at PM Family Member Yes No   Sig: Take 20 mg by mouth every evening.      Facility-Administered Medications: None       Physical Exam  Temp:  [36.9 °C (98.4 °F)] 36.9 °C (98.4 °F)  Pulse:  [76-80] 76  Resp:  [15-20] 15  BP: (122)/(72) 122/72  SpO2:  [94 %-97 %] 97 %    Physical Exam   Constitutional: He is oriented to person, place, and time. He appears well-developed and well-nourished. He appears distressed.   HENT:   Head: Normocephalic and atraumatic.   Eyes: Pupils are equal, round, and reactive to light. Conjunctivae and EOM are normal.   Neck: Normal range of motion. Neck supple. No JVD present.   Cardiovascular: Normal rate, regular rhythm, normal  heart sounds and intact distal pulses.    No murmur heard.  Pulmonary/Chest: Effort normal and breath sounds normal. No respiratory distress. He exhibits no tenderness.   Abdominal: Soft. Bowel sounds are normal. He exhibits distension. There is no tenderness.   Musculoskeletal: Normal range of motion. He exhibits no edema.   Neurological: He is alert and oriented to person, place, and time. No cranial nerve deficit. He exhibits normal muscle tone.   Skin: Skin is warm and dry. No erythema.   Psychiatric:   anxious   Nursing note and vitals reviewed.      Laboratory:  Recent Labs      04/20/19 2227   WBC  7.3   RBC  4.48*   HEMOGLOBIN  13.2*   HEMATOCRIT  40.4*   MCV  90.2   MCH  29.5   MCHC  32.7*   RDW  44.2   PLATELETCT  196   MPV  8.8*     Recent Labs      04/20/19 2227   SODIUM  138   POTASSIUM  3.6   CHLORIDE  103   CO2  28   GLUCOSE  116*   BUN  16   CREATININE  0.93   CALCIUM  8.4*     Recent Labs      04/20/19   2227   ALTSGPT  24   ASTSGOT  21   ALKPHOSPHAT  49   TBILIRUBIN  0.4   GLUCOSE  116*                 Recent Labs      04/20/19   2227   TROPONINI  0.01       Urinalysis:    No results found     Imaging:  DX-CHEST-PORTABLE (1 VIEW)   Final Result         1.  No acute cardiopulmonary disease.   2.  Cardiomegaly   3.  Atherosclerosis      EC-ECHOCARDIOGRAM COMPLETE W/O CONT    (Results Pending)   US-CAROTID DOPPLER BILAT    (Results Pending)         Assessment/Plan:  I anticipate this patient is appropriate for observation status at this time.    * Syncope   Assessment & Plan    Unclear etiology, does describe symptoms consistent with both panic attack and possible amaurosis fugax  He does have new right bundle branch block on EKG, he also had chest pain during the event.  Serial troponin has been ordered  Cardiac monitoring for any arrhythmias  Echocardiogram has been ordered  Given the chest pain this patient may need a cardiac stress test.  He did have some visual changes so have ordered a  carotid evaluation and check CT head   Check orthostatics   No seizure like activity reported     Anemia   Assessment & Plan    Mild, no evidence of bleeding   Cont to monitor      HLD (hyperlipidemia)   Assessment & Plan    Resume statin therapy      BPH (benign prostatic hyperplasia)   Assessment & Plan    Resume proscar      DM (diabetes mellitus) (HCC)   Assessment & Plan    SSI Ordered  Hold home oral meds for now      HTN (hypertension)   Assessment & Plan    Resume home anti hypertensive medications      ONI treated with BiPAP- (present on admission)   Assessment & Plan    Ordered for cpap/bipap         VTE prophylaxis: heparin

## 2019-04-22 VITALS
SYSTOLIC BLOOD PRESSURE: 118 MMHG | WEIGHT: 245.59 LBS | OXYGEN SATURATION: 93 % | BODY MASS INDEX: 41.93 KG/M2 | TEMPERATURE: 97.2 F | RESPIRATION RATE: 18 BRPM | HEART RATE: 70 BPM | HEIGHT: 64 IN | DIASTOLIC BLOOD PRESSURE: 79 MMHG

## 2019-04-22 LAB
25(OH)D3 SERPL-MCNC: 15 NG/ML (ref 30–100)
ANION GAP SERPL CALC-SCNC: 11 MMOL/L (ref 0–11.9)
BUN SERPL-MCNC: 14 MG/DL (ref 8–22)
CALCIUM SERPL-MCNC: 9 MG/DL (ref 8.5–10.5)
CHLORIDE SERPL-SCNC: 101 MMOL/L (ref 96–112)
CHOLEST SERPL-MCNC: 123 MG/DL (ref 100–199)
CO2 SERPL-SCNC: 27 MMOL/L (ref 20–33)
CREAT SERPL-MCNC: 0.88 MG/DL (ref 0.5–1.4)
FERRITIN SERPL-MCNC: 64.2 NG/ML (ref 22–322)
FOLATE SERPL-MCNC: 12.2 NG/ML
GLUCOSE BLD-MCNC: 130 MG/DL (ref 65–99)
GLUCOSE BLD-MCNC: 182 MG/DL (ref 65–99)
GLUCOSE SERPL-MCNC: 138 MG/DL (ref 65–99)
HCT VFR BLD AUTO: 40.8 % (ref 42–52)
HDLC SERPL-MCNC: 32 MG/DL
HGB BLD-MCNC: 13.6 G/DL (ref 14–18)
IRON SATN MFR SERPL: 21 % (ref 15–55)
IRON SERPL-MCNC: 83 UG/DL (ref 50–180)
LDLC SERPL CALC-MCNC: 44 MG/DL
MAGNESIUM SERPL-MCNC: 2 MG/DL (ref 1.5–2.5)
POTASSIUM SERPL-SCNC: 3.6 MMOL/L (ref 3.6–5.5)
SODIUM SERPL-SCNC: 139 MMOL/L (ref 135–145)
TIBC SERPL-MCNC: 403 UG/DL (ref 250–450)
TRIGL SERPL-MCNC: 233 MG/DL (ref 0–149)
VIT B12 SERPL-MCNC: 201 PG/ML (ref 211–911)

## 2019-04-22 PROCEDURE — 82962 GLUCOSE BLOOD TEST: CPT

## 2019-04-22 PROCEDURE — 96372 THER/PROPH/DIAG INJ SC/IM: CPT

## 2019-04-22 PROCEDURE — 36415 COLL VENOUS BLD VENIPUNCTURE: CPT

## 2019-04-22 PROCEDURE — 93880 EXTRACRANIAL BILAT STUDY: CPT | Mod: 26 | Performed by: SURGERY

## 2019-04-22 PROCEDURE — 83735 ASSAY OF MAGNESIUM: CPT

## 2019-04-22 PROCEDURE — A9270 NON-COVERED ITEM OR SERVICE: HCPCS | Performed by: HOSPITALIST

## 2019-04-22 PROCEDURE — 80061 LIPID PANEL: CPT

## 2019-04-22 PROCEDURE — 85014 HEMATOCRIT: CPT

## 2019-04-22 PROCEDURE — G0378 HOSPITAL OBSERVATION PER HR: HCPCS

## 2019-04-22 PROCEDURE — 97161 PT EVAL LOW COMPLEX 20 MIN: CPT

## 2019-04-22 PROCEDURE — 82746 ASSAY OF FOLIC ACID SERUM: CPT

## 2019-04-22 PROCEDURE — 700102 HCHG RX REV CODE 250 W/ 637 OVERRIDE(OP): Performed by: HOSPITALIST

## 2019-04-22 PROCEDURE — 83550 IRON BINDING TEST: CPT

## 2019-04-22 PROCEDURE — 82306 VITAMIN D 25 HYDROXY: CPT

## 2019-04-22 PROCEDURE — 83540 ASSAY OF IRON: CPT

## 2019-04-22 PROCEDURE — 80048 BASIC METABOLIC PNL TOTAL CA: CPT

## 2019-04-22 PROCEDURE — 700111 HCHG RX REV CODE 636 W/ 250 OVERRIDE (IP): Performed by: HOSPITALIST

## 2019-04-22 PROCEDURE — 82607 VITAMIN B-12: CPT

## 2019-04-22 PROCEDURE — 99217 PR OBSERVATION CARE DISCHARGE: CPT | Performed by: HOSPITALIST

## 2019-04-22 PROCEDURE — 82728 ASSAY OF FERRITIN: CPT

## 2019-04-22 PROCEDURE — 85018 HEMOGLOBIN: CPT

## 2019-04-22 RX ORDER — CHOLECALCIFEROL (VITAMIN D3) 125 MCG
1000 CAPSULE ORAL DAILY
Status: DISCONTINUED | OUTPATIENT
Start: 2019-04-22 | End: 2019-04-22 | Stop reason: HOSPADM

## 2019-04-22 RX ORDER — ASPIRIN 81 MG/1
81 TABLET ORAL DAILY
Qty: 30 TAB | Refills: 0 | Status: SHIPPED | OUTPATIENT
Start: 2019-04-23 | End: 2019-07-22

## 2019-04-22 RX ADMIN — VITAMIN D, TAB 1000IU (100/BT) 1000 UNITS: 25 TAB at 08:08

## 2019-04-22 RX ADMIN — LABETALOL HCL 300 MG: 300 TABLET, FILM COATED ORAL at 05:03

## 2019-04-22 RX ADMIN — ASPIRIN 81 MG: 81 TABLET, COATED ORAL at 05:03

## 2019-04-22 RX ADMIN — AMLODIPINE BESYLATE 10 MG: 10 TABLET ORAL at 05:03

## 2019-04-22 RX ADMIN — CYANOCOBALAMIN TAB 500 MCG 1000 MCG: 500 TAB at 08:08

## 2019-04-22 RX ADMIN — INSULIN HUMAN 1 UNITS: 100 INJECTION, SOLUTION PARENTERAL at 00:28

## 2019-04-22 RX ADMIN — HEPARIN SODIUM 5000 UNITS: 5000 INJECTION, SOLUTION INTRAVENOUS; SUBCUTANEOUS at 05:03

## 2019-04-22 RX ADMIN — SENNOSIDES, DOCUSATE SODIUM 2 TABLET: 50; 8.6 TABLET, FILM COATED ORAL at 05:03

## 2019-04-22 RX ADMIN — FINASTERIDE 5 MG: 5 TABLET, FILM COATED ORAL at 05:03

## 2019-04-22 ASSESSMENT — COGNITIVE AND FUNCTIONAL STATUS - GENERAL
SUGGESTED CMS G CODE MODIFIER MOBILITY: CH
MOBILITY SCORE: 24

## 2019-04-22 ASSESSMENT — GAIT ASSESSMENTS
GAIT LEVEL OF ASSIST: SUPERVISED
DISTANCE (FEET): 200

## 2019-04-22 NOTE — PROGRESS NOTES
D/c instructions given to pt regarding medications and follow up appt. Educated on worsening s/s, pt understands and questions answered. Vss, sats >90 on RA

## 2019-04-22 NOTE — THERAPY
"Physical Therapy Evaluation completed.   Bed Mobility:  Supine to Sit:  (up in chair)  Transfers: Sit to Stand: Supervised  Gait: Level Of Assist: Supervised with No Equipment Needed       Plan of Care: Patient with no further skilled PT needs in the acute care setting at this time  Discharge Recommendations: Equipment: No Equipment Needed.  74 y.o. male who presented 4/20/2019 with pmx of  Diabetes, htn, inderjit who presents with syncopal episode. Pt reports feeling lightheaded and dizzy during the event, shortness of breath and chest pain at that time.  Family at the bedside states that he was having a panic attack.  He was tearful and frightened.  He did state he loss vision but was awake during the event.  It is unclear if he had any true syncope. Today, pt is calm and joking, happy to get up to walk. Pt tolerated ambulation x 200 feet with no AD and up/down stairs with supervision, only report is SOB, but not lightheaded. Pt lives with wife and adult daughter, will have assist at home as needed. No further inpt PT needs. See \"Rehab Therapy-Acute\" Patient Summary Report for complete documentation.     "

## 2019-04-22 NOTE — CARE PLAN
Problem: Safety  Goal: Will remain free from injury  Outcome: PROGRESSING AS EXPECTED  Pt knows to use call light prior to getting out of bed    Problem: Infection  Goal: Will remain free from infection  Outcome: PROGRESSING AS EXPECTED  WBC monitored,

## 2019-04-22 NOTE — DISCHARGE INSTRUCTIONS
Discharge Instructions    Discharged to home by car with relative. Discharged via walking, hospital escort: Refused.  Special equipment needed: Not Applicable    Be sure to schedule a follow-up appointment with your primary care doctor or any specialists as instructed.     Discharge Plan:   Diet Plan: Discussed  Activity Level: Discussed  Confirmed Follow up Appointment: Patient to Call and Schedule Appointment  Confirmed Symptoms Management: Discussed  Medication Reconciliation Updated: Yes  Pneumococcal Vaccine Administered/Refused: Not given - Patient refused pneumococcal vaccine  Influenza Vaccine Indication: Not indicated: Previously immunized this influenza season and > 8 years of age    I understand that a diet low in cholesterol, fat, and sodium is recommended for good health. Unless I have been given specific instructions below for another diet, I accept this instruction as my diet prescription.   Other diet: diabetic diet    Special Instructions: START EXERCISE ROUTINE AND LOSE WEIGHT    · Is patient discharged on Warfarin / Coumadin?   No       Syncope  Introduction  Syncope is when you lose temporarily pass out (faint). Signs that you may be about to pass out include:  · Feeling dizzy or light-headed.  · Feeling sick to your stomach (nauseous).  · Seeing all white or all black.  · Having cold, clammy skin.  If you passed out, get help right away. Call your local emergency services (911 in the [place] Do not drive yourself to the hospital.  Follow these instructions at home:  Pay attention to any changes in your symptoms. Take these actions to help with your condition:  · Have someone stay with you until you feel stable.  · Do not drive, use machinery, or play sports until your doctor says it is okay.  · Keep all follow-up visits as told by your doctor. This is important.  · If you start to feel like you might pass out, lie down right away and raise (elevate) your feet above the level of your heart.  Breathe deeply and steadily. Wait until all of the symptoms are gone.  · Drink enough fluid to keep your pee (urine) clear or pale yellow.  · If you are taking blood pressure or heart medicine, get up slowly and spend many minutes getting ready to sit and then stand. This can help with dizziness.  · Take over-the-counter and prescription medicines only as told by your doctor.  Get help right away if:  · You have a very bad headache.  · You have unusual pain in your chest, tummy, or back.  · You are bleeding from your mouth or rectum.  · You have black or tarry poop (stool).  · You have a very fast or uneven heartbeat (palpitations).  · It hurts to breathe.  · You pass out once or more than once.  · You have jerky movements that you cannot control (seizure).  · You are confused.  · You have trouble walking.  · You are very weak.  · You have vision problems.  These symptoms may be an emergency. Do not wait to see if the symptoms will go away. Get medical help right away. Call your local emergency services (911 in the [place] Do not drive yourself to the hospital.   This information is not intended to replace advice given to you by your health care provider. Make sure you discuss any questions you have with your health care provider.  Document Released: 06/05/2009 Document Revised: 05/25/2017 Document Reviewed: 08/31/2016  © 2017 [employer]vier      Depression / Suicide Risk    As you are discharged from this [facility] facility, it is important to learn how to keep safe from harming yourself.    Recognize the warning signs:  · Abrupt changes in personality, positive or negative- including increase in energy   · Giving away possessions  · Change in eating patterns- significant weight changes-  positive or negative  · Change in sleeping patterns- unable to sleep or sleeping all the time   · Unwillingness or inability to communicate  · Depression  · Unusual sadness, discouragement and loneliness  · Talk of wanting to  die  · Neglect of personal appearance   · Rebelliousness- reckless behavior  · Withdrawal from people/activities they love  · Confusion- inability to concentrate     If you or a loved one observes any of these behaviors or has concerns about self-harm, here's what you can do:  · Talk about it- your feelings and reasons for harming yourself  · Remove any means that you might use to hurt yourself (examples: pills, rope, extension cords, firearm)  · Get professional help from the community (Mental Health, Substance Abuse, psychological counseling)  · Do not be alone:Call your Safe Contact- someone whom you trust who will be there for you.  · Call your local CRISIS HOTLINE 874-9824 or 762-424-3917  · Call your local Children's Mobile Crisis Response Team [place] (088) 274-5700 or www.[employer]  · Call the toll free National Suicide Prevention Hotlines   · National Suicide Prevention Lifeline 270-322-DRQV (3771)  · National Hope Line Network 800-SUICIDE (539-0695)

## 2019-04-22 NOTE — DISCHARGE SUMMARY
Discharge Summary    CHIEF COMPLAINT ON ADMISSION  Chief Complaint   Patient presents with   • Near Syncopal     Pt leaned over to get some chips at home, when standing up got lightheaded and felt he was going to pass out. Pt's family member assisted him to a chair.    • Chest Pain     8/10 initially, given 324 ASA and x2 0.4 nitro with EMS, pain decreased to 5/10   • Shortness of Breath     Developed SOB as well, felt he was having trouble breathing       Reason for Admission    Syncope    Admission Date  4/20/2019    CODE STATUS  Full code    HPI & HOSPITAL COURSE  Please see original H&P for specific information, patient was admitted due to near syncope episode, initial workup neg, ekg showed NSR, neg trop, echo wnl, carotid us showed right carotid stenosis 50-69% MRI brain did not show acute stroke but showed old right sided small stroke patient started on ASA, he is on statin, lipid panel showed mild elevated TG normal LDL he is back to baseline near syncope possible vasovagal, patient will f/u with PCP and with vascular surgery as outpatient, patient has low vit d and vit b12 he is started on supplement.   Patient is alert and oriented follows commands, ambulating w/o dizziness, family at bedside all questions answered they expressed understanding of his dc plan and agreed with it.   Patient was recommended to start exercise routine and follow low fat diet.          Therefore, he is discharged in good and stable condition to home with close outpatient follow-up.        Discharge Date  4/22/2019    FOLLOW UP ITEMS POST DISCHARGE  PCP  Vascular surgery    DISCHARGE DIAGNOSES  Principal Problem:    Syncope POA: Unknown  Active Problems:    ONI treated with BiPAP POA: Yes      Overview: AHI 77.3, minimum saturation 49%, on BiPAP 17/13cmcm.    HTN (hypertension) POA: Unknown    DM (diabetes mellitus) (HCC) POA: Unknown    BPH (benign prostatic hyperplasia) POA: Unknown    HLD (hyperlipidemia) POA: Unknown    Anemia  POA: Unknown    Obesity POA: Unknown  Resolved Problems:    * No resolved hospital problems. *      FOLLOW UP  Future Appointments  Date Time Provider Department Center   6/21/2019 10:40 AM Alisha Cisse M.D. [facility] None     Angel Guerin M.D.  [phone]  [phone]  [place] [address]  [phone]    In 1 week        MEDICATIONS ON DISCHARGE     Medication List      START taking these medications      Instructions   aspirin 81 MG EC tablet  Start taking on:  4/23/2019   Take 1 Tab by mouth every day.  Dose:  81 mg     Cholecalciferol 1000 UNIT Tabs  Start taking on:  4/23/2019  Commonly known as:  VITAMIND D3   Take 1 Tab by mouth every day.  Dose:  1000 Units     cyanocobalamin 1000 MCG Tabs  Start taking on:  4/23/2019  Commonly known as:  VITAMIN B12   Take 1 Tab by mouth every day.  Dose:  1000 mcg        CONTINUE taking these medications      Instructions   amLODIPine 10 MG Tabs  Commonly known as:  NORVASC   Take 10 mg by mouth every day.  Dose:  10 mg     finasteride 5 MG Tabs  Commonly known as:  PROSCAR   Take 5 mg by mouth every day.  Dose:  5 mg     glipizide-metformin 5-500 MG per tablet  Commonly known as:  METAGLIP   Take 1 Tab by mouth 2 times daily, before breakfast and dinner.  Dose:  1 Tab     labetalol 300 MG Tabs  Commonly known as:  NORMODYNE   Take 300 mg by mouth 2 times a day.  Dose:  300 mg     simvastatin 20 MG Tabs  Commonly known as:  ZOCOR   Take 20 mg by mouth every evening.  Dose:  20 mg            Allergies  No Known Allergies    DIET  Low fat diet    ACTIVITY  As tolerated.  Weight bearing as tolerated    CONSULTATIONS  none    PROCEDURES  none    LABORATORY  Lab Results   Component Value Date    SODIUM 139 04/22/2019    POTASSIUM 3.6 04/22/2019    CHLORIDE 101 04/22/2019    CO2 27 04/22/2019    GLUCOSE 138 (H) 04/22/2019    BUN 14 04/22/2019    CREATININE 0.88 04/22/2019    CREATININE 1.0 02/19/2007        Lab Results   Component Value Date    WBC 7.6 04/21/2019    HEMOGLOBIN  13.6 (L) 04/22/2019    HEMATOCRIT 40.8 (L) 04/22/2019    PLATELETCT 186 04/21/2019        Total time of the discharge process exceeds 30 minutes.

## 2019-04-23 ENCOUNTER — PATIENT OUTREACH (OUTPATIENT)
Dept: HEALTH INFORMATION MANAGEMENT | Facility: OTHER | Age: 74
End: 2019-04-23

## 2019-04-23 RX ORDER — ATORVASTATIN CALCIUM 20 MG/1
40 TABLET, FILM COATED ORAL NIGHTLY
COMMUNITY
End: 2019-07-22

## 2019-05-01 LAB — LV EJECT FRACT  99904: 60

## 2019-05-22 ENCOUNTER — PATIENT OUTREACH (OUTPATIENT)
Dept: HEALTH INFORMATION MANAGEMENT | Facility: OTHER | Age: 74
End: 2019-05-22

## 2019-05-31 ENCOUNTER — OFFICE VISIT (OUTPATIENT)
Dept: CARDIOLOGY | Facility: MEDICAL CENTER | Age: 74
End: 2019-05-31
Payer: MEDICARE

## 2019-05-31 VITALS
SYSTOLIC BLOOD PRESSURE: 128 MMHG | WEIGHT: 242.51 LBS | DIASTOLIC BLOOD PRESSURE: 84 MMHG | BODY MASS INDEX: 41.4 KG/M2 | OXYGEN SATURATION: 95 % | HEART RATE: 86 BPM | HEIGHT: 64 IN

## 2019-05-31 DIAGNOSIS — E78.5 DYSLIPIDEMIA: ICD-10-CM

## 2019-05-31 DIAGNOSIS — R55 VASOVAGAL NEAR SYNCOPE: Primary | ICD-10-CM

## 2019-05-31 DIAGNOSIS — I45.10 RBBB: ICD-10-CM

## 2019-05-31 DIAGNOSIS — I10 ESSENTIAL HYPERTENSION, BENIGN: ICD-10-CM

## 2019-05-31 DIAGNOSIS — I44.4 LAFB (LEFT ANTERIOR FASCICULAR BLOCK): ICD-10-CM

## 2019-05-31 PROCEDURE — 99204 OFFICE O/P NEW MOD 45 MIN: CPT | Performed by: INTERNAL MEDICINE

## 2019-05-31 RX ORDER — LISINOPRIL 20 MG/1
20 TABLET ORAL DAILY
Qty: 30 TAB | Refills: 11 | Status: SHIPPED | OUTPATIENT
Start: 2019-05-31 | End: 2019-06-04 | Stop reason: SDUPTHER

## 2019-05-31 ASSESSMENT — ENCOUNTER SYMPTOMS
BLURRED VISION: 1
SHORTNESS OF BREATH: 1
BACK PAIN: 1
CARDIOVASCULAR NEGATIVE: 1
BRUISES/BLEEDS EASILY: 1
HEARTBURN: 1

## 2019-05-31 NOTE — PATIENT INSTRUCTIONS
Taper off of labetalol taking 1/2 tab twice daily for the days, then 1/2 tab once daily for 3 days, then stop the medication    After the taper, start lisinopril 20 mg by mouth daily    After starting lisinopril, get a non-fasting blood test in 2 weeks

## 2019-05-31 NOTE — LETTER
Name:          Jonathan Rebolledo   YOB: 1945  Date:     05/31/2019      Angel Guerin M.D.  [address]  [place] [address]     Alex Mcdaniel MD  [address], [address]  [place] [address]  Phone: [phone]  Back Line: [phone]  Fax: [phone]  E-mail: [email or web address]   Dear Dr. Guerin,    We had the pleasure of seeing your patient, Jonathan Rebolledo, in Cardiology Clinic at [facility] today.    As you know, he is a 74-year-old man with a history of type 2 diabetes, hypertension, dyslipidemia, COPD, and sleep apnea referred after a syncopal episode for evaluation.    The syncopal episodes that he describes are consistent with neurocardiogenic etiology, and I encouraged increased hydration.  However, in conjunction with his conduction disease including right bundle branch block and left anterior fascicular block I do think that labetalol may be at part responsible for such episodes.  As such, I asked him to taper off of his labetalol and put him on lisinopril at 20 mg p.o. daily.  Apart from that, I do not think he needs additional cardiac work-up at this time.    Return in about 3 months (around 8/31/2019).    Thank you for the referral and please do not hesitate to contact me at any time. My contact information is listed above.    This note was dictated using Dragon speech recognition software.     A full note including my physical examination and a full list of rectified medications is available in our medical record, and can be faxed as well.    Alex Mcdaniel MD  Cardiologist  [facility]

## 2019-05-31 NOTE — PROGRESS NOTES
Chief Complaint   Patient presents with   • Shortness of Breath       Subjective:   Jonathan Rebolledo is a 74 -year-old man with a history of type 2 diabetes, hypertension, dyslipidemia, COPD, and sleep apnea referred after a syncopal episode for evaluation.    He tells me today about 2 episodes of syncope that he has had in the last several years.  2-1/2 years ago in September 2016 he had gotten up to go to the bathroom when he felt shaky developed blurry vision and visual loss and syncopized.  He ultimately was evaluated in the hospital at that time, and was diagnosed with dehydration and sent home after an otherwise negative work-up.    He tells me more recently about his episode in April when he had been watching TV for some time and got up to get a bag of chips from the counter when he developed blurry vision shakiness and recurrent falls though does not sound to have had any clear syncopal episode.  His daughter is a medical assistant and took his blood pressure to 144/68 mmHg and his heart rate is 118 bpm.  He tells me that he had associated vertigo and midsternal chest discomfort.    Outside of those episodes he tells me that he drinks 4 x 12 ounce bottles of water per day.  He has not been checking his blood pressure regularly lately though his niece took some blood pressures a few days after his discharge and they were generally in the 120s-130s mmHg range.    Past Medical History:   Diagnosis Date   • Asthma    • Back pain    • Diabetes    • ONI treated with BiPAP 5/10/2016   • Sleep apnea      Past Surgical History:   Procedure Laterality Date   • FULL THICKNESS BLOCK RESECTION  2/19/2013    Performed by Greyson Lopez M.D. at [facility]   • BIOPSY GENERAL  2/19/2013    Performed by Greyson Lopez M.D. at [facility]   • CATARACT EXTRACTION WITH IOL       Family History   Problem Relation Age of Onset   • Heart Attack Brother    • Cancer Neg Hx      Social History      Social History   • Marital status:      Spouse name: N/A   • Number of children: N/A   • Years of education: N/A     Occupational History   • Not on file.     Social History Main Topics   • Smoking status: Former Smoker     Packs/day: 1.00     Years: 10.00     Types: Cigarettes     Start date: 1/1/1985     Quit date: 1/1/1995   • Smokeless tobacco: Never Used   • Alcohol use 2.4 - 3.6 oz/week     4 - 6 Cans of beer per week   • Drug use: No   • Sexual activity: Not on file     Other Topics Concern   • Not on file     Social History Narrative    ** Merged History Encounter **          No Known Allergies  Outpatient Encounter Prescriptions as of 5/31/2019   Medication Sig Dispense Refill   • lisinopril (PRINIVIL) 20 MG Tab Take 1 Tab by mouth every day. 30 Tab 11   • atorvastatin (LIPITOR) 20 MG Tab Take 40 mg by mouth every evening.     • aspirin EC 81 MG EC tablet Take 1 Tab by mouth every day. 30 Tab 0   • cyanocobalamin (VITAMIN B12) 1000 MCG Tab Take 1 Tab by mouth every day. 30 Tab 0   • vitamin D (VITAMIND D3) 1000 UNIT Tab Take 1 Tab by mouth every day. 60 Tab 0   • glipizide-metformin (METAGLIP) 5-500 MG per tablet Take 1 Tab by mouth 2 times daily, before breakfast and dinner.     • amlodipine (NORVASC) 10 MG TABS Take 10 mg by mouth every day.     • finasteride (PROSCAR) 5 MG TABS Take 5 mg by mouth every day.     • [DISCONTINUED] labetalol (NORMODYNE) 300 MG TABS Take 300 mg by mouth 2 times a day.       No facility-administered encounter medications on file as of 5/31/2019.      Review of Systems   Eyes: Positive for blurred vision (Glaucoma).   Respiratory: Positive for shortness of breath.    Cardiovascular: Negative.    Gastrointestinal: Positive for heartburn.   Musculoskeletal: Positive for back pain and joint pain.   Skin: Positive for itching.   Endo/Heme/Allergies: Bruises/bleeds easily.   All other systems reviewed and are negative.       Objective:   /84 (BP Location: Right arm,  "Patient Position: Sitting, BP Cuff Size: Adult)   Pulse 86   Ht 1.626 m (5' 4\")   Wt 110 kg (242 lb 8.1 oz)   SpO2 95%   BMI 41.63 kg/m²     Physical Exam   Constitutional: He is oriented to person, place, and time. He appears well-developed and well-nourished. No distress.   Pleasant, in no distress   Eyes: Pupils are equal, round, and reactive to light. EOM are normal.   Neck: No JVD present.   Cardiovascular: Normal rate and regular rhythm.  Exam reveals no gallop and no friction rub.    No murmur heard.  No carotid bruits   Pulmonary/Chest: Effort normal and breath sounds normal. No respiratory distress. He has no wheezes. He has no rales.   Abdominal: Soft. Bowel sounds are normal. He exhibits no distension.   Central obesity noted   Musculoskeletal: He exhibits no edema (No significant lower extremity edema bilaterally).   Neurological: He is alert and oriented to person, place, and time.   Skin: Skin is warm and dry. Rash noted. He is not diaphoretic. No erythema. No pallor.   Venous stasis changes   Psychiatric: He has a normal mood and affect. Judgment and thought content normal.   Vitals reviewed.    Lab Results   Component Value Date/Time    WBC 7.6 04/21/2019 03:41 AM    RBC 4.39 (L) 04/21/2019 03:41 AM    HEMOGLOBIN 13.6 (L) 04/22/2019 05:14 AM    HEMATOCRIT 40.8 (L) 04/22/2019 05:14 AM    MCV 89.7 04/21/2019 03:41 AM    MCH 29.4 04/21/2019 03:41 AM    MCHC 32.7 (L) 04/21/2019 03:41 AM    MPV 8.4 (L) 04/21/2019 03:41 AM        Lab Results   Component Value Date/Time    SODIUM 139 04/22/2019 05:14 AM    POTASSIUM 3.6 04/22/2019 05:14 AM    CHLORIDE 101 04/22/2019 05:14 AM    CO2 27 04/22/2019 05:14 AM    GLUCOSE 138 (H) 04/22/2019 05:14 AM    BUN 14 04/22/2019 05:14 AM    CREATININE 0.88 04/22/2019 05:14 AM    CREATININE 1.0 02/19/2007 05:55 PM        Lab Results   Component Value Date/Time    ASTSGOT 16 04/21/2019 03:41 AM    ALTSGPT 20 04/21/2019 03:41 AM        Lab Results   Component Value " "Date/Time    CHOLSTRLTOT 123 04/22/2019 05:14 AM    LDL 44 04/22/2019 05:14 AM    HDL 32 (A) 04/22/2019 05:14 AM    TRIGLYCERIDE 233 (H) 04/22/2019 05:14 AM         No results found for this or any previous visit.    SPECT myocardial perfusion imaging, 3/2012:  \"1. THE GATED STUDY REVEALS A NORMAL EJECTION FRACTION WITHOUT SEGMENTAL WALL MOTION ABNORMALITY.  2. SPECT IMAGES ARE FELT TO BE NORMAL WITHOUT EVIDENCE OF SIGNIFICANT ISCHEMIA OR INFARCTION\"    Echocardiogram, 4/21/2019, images and report reviewed, my interpretation: Normal left ventricular ejection fraction of 60%, no valvular disease identified outside of aortic sclerosis.  The study was technically difficult.    Assessment:     1. Vasovagal near syncope     2. Essential hypertension, benign  lisinopril (PRINIVIL) 20 MG Tab    Basic Metabolic Panel   3. Dyslipidemia     4. RBBB     5. LAFB (left anterior fascicular block)         Medical Decision Making:  Today's Assessment / Status / Plan:     The syncopal episodes that he describes are consistent with neurocardiogenic etiology, and I encouraged increased hydration.  However, in conjunction with his conduction disease including right bundle branch block and left anterior fascicular block I do think that labetalol may be at part responsible for such episodes.  As such, I asked him to taper off of his labetalol and put him on lisinopril at 20 mg p.o. daily.  Apart from that, I do not think he needs additional cardiac work-up at this time.    Alex Mcdaniel MD  Cardiologist, [facility] [place]     Return in about 3 months (around 8/31/2019).    "

## 2019-06-04 DIAGNOSIS — I10 ESSENTIAL HYPERTENSION, BENIGN: ICD-10-CM

## 2019-06-04 RX ORDER — LISINOPRIL 20 MG/1
20 TABLET ORAL DAILY
Qty: 100 TAB | Refills: 3 | Status: SHIPPED | OUTPATIENT
Start: 2019-06-04 | End: 2019-07-22

## 2019-06-04 NOTE — PROGRESS NOTES
Patient Jonathan Slade was admitted to [facility] on 4/20/19 for syncope.  The patient was discharged on 4/22/19 and instructed to follow up with his PCP and vascular surgeon.  The patient successfully filled his discharge medications.  The patient's daughter declined to schedule with his PCP and Vascular Surgeon and these appointments have not been scheduled. The patient is scheduled for 2 future appointments with Cardiology on 5/31/19 and Pulmonology on 6/21/19.  PPS 90.

## 2019-06-15 ENCOUNTER — HOSPITAL ENCOUNTER (OUTPATIENT)
Dept: LAB | Facility: MEDICAL CENTER | Age: 74
End: 2019-06-15
Attending: FAMILY MEDICINE
Payer: MEDICARE

## 2019-06-15 LAB
ALBUMIN SERPL BCP-MCNC: 4.3 G/DL (ref 3.2–4.9)
ALBUMIN/GLOB SERPL: 1.4 G/DL
ALP SERPL-CCNC: 50 U/L (ref 30–99)
ALT SERPL-CCNC: 27 U/L (ref 2–50)
ANION GAP SERPL CALC-SCNC: 7 MMOL/L (ref 0–11.9)
APPEARANCE UR: CLEAR
AST SERPL-CCNC: 19 U/L (ref 12–45)
BILIRUB SERPL-MCNC: 0.3 MG/DL (ref 0.1–1.5)
BILIRUB UR QL STRIP.AUTO: NEGATIVE
BUN SERPL-MCNC: 20 MG/DL (ref 8–22)
CALCIUM SERPL-MCNC: 9.3 MG/DL (ref 8.5–10.5)
CHLORIDE SERPL-SCNC: 102 MMOL/L (ref 96–112)
CHOLEST SERPL-MCNC: 112 MG/DL (ref 100–199)
CO2 SERPL-SCNC: 29 MMOL/L (ref 20–33)
COLOR UR: YELLOW
CREAT SERPL-MCNC: 0.88 MG/DL (ref 0.5–1.4)
CREAT UR-MCNC: 97.1 MG/DL
ERYTHROCYTE [DISTWIDTH] IN BLOOD BY AUTOMATED COUNT: 46.8 FL (ref 35.9–50)
FASTING STATUS PATIENT QL REPORTED: NORMAL
GLOBULIN SER CALC-MCNC: 3 G/DL (ref 1.9–3.5)
GLUCOSE SERPL-MCNC: 132 MG/DL (ref 65–99)
GLUCOSE UR STRIP.AUTO-MCNC: NEGATIVE MG/DL
HCT VFR BLD AUTO: 46.4 % (ref 42–52)
HDLC SERPL-MCNC: 35 MG/DL
HGB BLD-MCNC: 14.6 G/DL (ref 14–18)
KETONES UR STRIP.AUTO-MCNC: NEGATIVE MG/DL
LDLC SERPL CALC-MCNC: 56 MG/DL
LEUKOCYTE ESTERASE UR QL STRIP.AUTO: NEGATIVE
MCH RBC QN AUTO: 28.5 PG (ref 27–33)
MCHC RBC AUTO-ENTMCNC: 31.5 G/DL (ref 33.7–35.3)
MCV RBC AUTO: 90.4 FL (ref 81.4–97.8)
MICRO URNS: NORMAL
MICROALBUMIN UR-MCNC: 7.6 MG/DL
MICROALBUMIN/CREAT UR: 78 MG/G (ref 0–30)
NITRITE UR QL STRIP.AUTO: NEGATIVE
PH UR STRIP.AUTO: 6.5 [PH]
PLATELET # BLD AUTO: 254 K/UL (ref 164–446)
PMV BLD AUTO: 9.6 FL (ref 9–12.9)
POTASSIUM SERPL-SCNC: 4.3 MMOL/L (ref 3.6–5.5)
PROT SERPL-MCNC: 7.3 G/DL (ref 6–8.2)
PROT UR QL STRIP: NEGATIVE MG/DL
RBC # BLD AUTO: 5.13 M/UL (ref 4.7–6.1)
RBC UR QL AUTO: NEGATIVE
SODIUM SERPL-SCNC: 138 MMOL/L (ref 135–145)
SP GR UR STRIP.AUTO: 1.02
TRIGL SERPL-MCNC: 103 MG/DL (ref 0–149)
TSH SERPL DL<=0.005 MIU/L-ACNC: 3.07 UIU/ML (ref 0.38–5.33)
UROBILINOGEN UR STRIP.AUTO-MCNC: 0.2 MG/DL
WBC # BLD AUTO: 10 K/UL (ref 4.8–10.8)

## 2019-06-15 PROCEDURE — 85027 COMPLETE CBC AUTOMATED: CPT

## 2019-06-15 PROCEDURE — 80061 LIPID PANEL: CPT

## 2019-06-15 PROCEDURE — 82043 UR ALBUMIN QUANTITATIVE: CPT

## 2019-06-15 PROCEDURE — 84443 ASSAY THYROID STIM HORMONE: CPT

## 2019-06-15 PROCEDURE — 82570 ASSAY OF URINE CREATININE: CPT

## 2019-06-15 PROCEDURE — 80053 COMPREHEN METABOLIC PANEL: CPT

## 2019-06-15 PROCEDURE — 81003 URINALYSIS AUTO W/O SCOPE: CPT

## 2019-06-15 PROCEDURE — 83036 HEMOGLOBIN GLYCOSYLATED A1C: CPT

## 2019-06-15 PROCEDURE — 36415 COLL VENOUS BLD VENIPUNCTURE: CPT

## 2019-06-16 LAB
EST. AVERAGE GLUCOSE BLD GHB EST-MCNC: 146 MG/DL
HBA1C MFR BLD: 6.7 % (ref 0–5.6)

## 2019-06-21 ENCOUNTER — APPOINTMENT (OUTPATIENT)
Dept: SLEEP MEDICINE | Facility: MEDICAL CENTER | Age: 74
End: 2019-06-21
Payer: MEDICARE

## 2019-06-28 ENCOUNTER — SLEEP CENTER VISIT (OUTPATIENT)
Dept: SLEEP MEDICINE | Facility: MEDICAL CENTER | Age: 74
End: 2019-06-28
Payer: MEDICARE

## 2019-06-28 VITALS
SYSTOLIC BLOOD PRESSURE: 134 MMHG | OXYGEN SATURATION: 92 % | RESPIRATION RATE: 15 BRPM | BODY MASS INDEX: 40.63 KG/M2 | DIASTOLIC BLOOD PRESSURE: 82 MMHG | WEIGHT: 238 LBS | HEART RATE: 85 BPM | HEIGHT: 64 IN

## 2019-06-28 DIAGNOSIS — G47.33 OSA TREATED WITH BIPAP: ICD-10-CM

## 2019-06-28 PROCEDURE — 99213 OFFICE O/P EST LOW 20 MIN: CPT | Performed by: FAMILY MEDICINE

## 2019-06-28 NOTE — PROGRESS NOTES
[facility] Follow Up Note     Date: 6/28/2019 / Time: 10:57 AM    Patient ID:   Name:             Jonathan Rebolledo     YOB: 1945  Age:                 74 y.o.  male   MRN:               [id number]      Thank you for requesting a sleep medicine consultation on Jonathan Rebolledo at the sleep center. He presents today with the chief complaints of ONI follow up.     HISTORY OF PRESENT ILLNESS:       Pt is currently on  BiPAP 18/14 cm with nasal nuance. He goes to sleep around 10-11 pm and wakes up around 5:30-6 am. He is getting about 6.5 hrs of sleep on a good night. He rarely has a bad night. Overall, he doesnot finds his sleep refreshing.  He does not take regular naps.   He is using BiPAp most days of the week. Pt reports 3 hrs of average nightly use of CPAP. Pt denies snoring, gasping,choking.Pt also denies significant mask leak that is interfering with sleep. The 30 day compliance was downloaded which shows inadequate compliance with more that 4 hr usage about 40%. The AHI is has improved to 0.3/hr. The mask leak is normal. The symptoms of excessive daytime, snoring and gasping has improved. The compliance has dropped due to mask leak which is due to stretched out head gear,    SLEEP HISTORY   Polysomnogram indicates AHI 77.3 and minimum saturation 49%      REVIEW OF SYSTEMS:       Constitutional: Denies fevers, Denies weight changes  Eyes: Denies changes in vision, no eye pain  Ears/Nose/Throat/Mouth: Denies nasal congestion or sore throat   Cardiovascular: Denies chest pain or palpitations   Respiratory: Denies shortness of breath , Denies cough  Gastrointestinal/Hepatic: Denies abdominal pain, nausea, vomiting, diarrhea, constipation or GI bleeding   Genitourinary: Deniesdysuria or frequency  Musculoskeletal/Rheum: Denies  joint pain and swelling   Skin/Breast: Denies rash,   Neurological: Denies headache, confusion, memory loss or focal weakness/parasthesias  Psychiatric:  denies mood disorder   Sleep: + snoring    Comprehensive review of systems form is reviewed with the patient and is attached in the EMR.     PMH:  has a past medical history of Asthma; Back pain; Diabetes; ONI treated with BiPAP (5/10/2016); and Sleep apnea. He also has no past medical history of ASTHMA.  MEDS:   Current Outpatient Prescriptions:   •  lisinopril (PRINIVIL) 20 MG Tab, Take 1 Tab by mouth every day., Disp: 100 Tab, Rfl: 3  •  atorvastatin (LIPITOR) 20 MG Tab, Take 40 mg by mouth every evening., Disp: , Rfl:   •  aspirin EC 81 MG EC tablet, Take 1 Tab by mouth every day., Disp: 30 Tab, Rfl: 0  •  cyanocobalamin (VITAMIN B12) 1000 MCG Tab, Take 1 Tab by mouth every day., Disp: 30 Tab, Rfl: 0  •  vitamin D (VITAMIND D3) 1000 UNIT Tab, Take 1 Tab by mouth every day., Disp: 60 Tab, Rfl: 0  •  glipizide-metformin (METAGLIP) 5-500 MG per tablet, Take 1 Tab by mouth 2 times daily, before breakfast and dinner., Disp: , Rfl:   •  amlodipine (NORVASC) 10 MG TABS, Take 10 mg by mouth every day., Disp: , Rfl:   •  finasteride (PROSCAR) 5 MG TABS, Take 5 mg by mouth every day., Disp: , Rfl:   ALLERGIES: No Known Allergies  SURGHX:   Past Surgical History:   Procedure Laterality Date   • FULL THICKNESS BLOCK RESECTION  2/19/2013    Performed by Greyson Lopez M.D. at [facility]   • BIOPSY GENERAL  2/19/2013    Performed by Greyson Lopez M.D. at [facility]   • CATARACT EXTRACTION WITH IOL       SOCHX:  reports that he quit smoking about 24 years ago. His smoking use included Cigarettes. He started smoking about 34 years ago. He has a 10.00 pack-year smoking history. He has never used smokeless tobacco. He reports that he drinks about 2.4 - 3.6 oz of alcohol per week . He reports that he does not use drugs..  FH:   Family History   Problem Relation Age of Onset   • Heart Attack Brother    • Cancer Neg Hx          Physical Exam:  Vitals/ General Appearance:   Weight/BMI: Body mass  "index is 40.85 kg/m².  /82 (BP Location: Left arm, Patient Position: Sitting, BP Cuff Size: Adult)   Pulse 85   Resp 15   Ht 1.626 m (5' 4\")   Wt 108 kg (238 lb)   SpO2 92%   Vitals:    06/28/19 1038   BP: 134/82   BP Location: Left arm   Patient Position: Sitting   BP Cuff Size: Adult   Pulse: 85   Resp: 15   SpO2: 92%   Weight: 108 kg (238 lb)   Height: 1.626 m (5' 4\")       Pt. is alert and oriented to time, place and person. Cooperative and in no apparent distress.       -Head: Atraumatic, normocephalic.   -. Ears: Normal tympanic membrane and no discharge  -. Nose: No inferior turbinate hypertoph  -. Throat: Oropharynx appears crowded in that the palate is overhanging -. Neck: Supple. No thyromegaly  -. Chest: Trachea central, no spine deformity   -. Lungs auscultation: B/L good air entry, vesicular breath sounds, no adventitious sounds  -. Heart auscultation: 1st and 2nd heart sounds normal, regular rhythm. No appreciable murmur.  - Extremities: no clubbing, no pedal edema.  - Skin: No rash  - NEUROLOGICAL EXAMINATION: On neurological exam, the patient was alert and oriented x3. speech was clear and fluent without dysarthria.      ASSESSMENT AND PLAN     1. Sleep Apnea      The pathophysiology of sleep anea and the increased risk of cardiovascular morbidity from untreated sleep apnea is discussed in detail with the patient     He is urged to avoid supine sleep, weight gain and alcoholic beverages since all of these can worsen sleep apnea. He is cautioned against drowsy driving. If He feels sleepy while driving, He must pull over for a break/nap, rather than persist on the road, in the interest of He own safety and that of others on the road.   Plan   - Continue BIPAP at 18/14 cm with nuance mask    - compliance download was reviewed and discussed with the pt   - compliance was reinforced     2. Regarding treatment of other past medical problems and general health maintenance,  He is urged to follow " up with PCP.

## 2019-07-22 ENCOUNTER — HOSPITAL ENCOUNTER (INPATIENT)
Facility: MEDICAL CENTER | Age: 74
LOS: 2 days | DRG: 379 | End: 2019-07-24
Attending: EMERGENCY MEDICINE | Admitting: HOSPITALIST
Payer: MEDICARE

## 2019-07-22 DIAGNOSIS — K92.2 UPPER GI BLEED: ICD-10-CM

## 2019-07-22 DIAGNOSIS — R10.13 ABDOMINAL PAIN, ACUTE, EPIGASTRIC: ICD-10-CM

## 2019-07-22 DIAGNOSIS — D62 ACUTE BLOOD LOSS ANEMIA: ICD-10-CM

## 2019-07-22 PROBLEM — D50.0 ANEMIA DUE TO GASTROINTESTINAL BLOOD LOSS: Status: ACTIVE | Noted: 2019-07-22

## 2019-07-22 LAB
ABO + RH BLD: NORMAL
ABO GROUP BLD: NORMAL
ALBUMIN SERPL BCP-MCNC: 3.7 G/DL (ref 3.2–4.9)
ALBUMIN/GLOB SERPL: 1.6 G/DL
ALP SERPL-CCNC: 37 U/L (ref 30–99)
ALT SERPL-CCNC: 18 U/L (ref 2–50)
ANION GAP SERPL CALC-SCNC: 8 MMOL/L (ref 0–11.9)
APTT PPP: 26.3 SEC (ref 24.7–36)
AST SERPL-CCNC: 15 U/L (ref 12–45)
BASOPHILS # BLD AUTO: 0.6 % (ref 0–1.8)
BASOPHILS # BLD: 0.05 K/UL (ref 0–0.12)
BILIRUB SERPL-MCNC: 0.2 MG/DL (ref 0.1–1.5)
BLD GP AB SCN SERPL QL: NORMAL
BUN SERPL-MCNC: 31 MG/DL (ref 8–22)
CALCIUM SERPL-MCNC: 8.4 MG/DL (ref 8.5–10.5)
CHLORIDE SERPL-SCNC: 104 MMOL/L (ref 96–112)
CO2 SERPL-SCNC: 25 MMOL/L (ref 20–33)
CREAT SERPL-MCNC: 0.73 MG/DL (ref 0.5–1.4)
EKG IMPRESSION: NORMAL
EOSINOPHIL # BLD AUTO: 0.22 K/UL (ref 0–0.51)
EOSINOPHIL NFR BLD: 2.7 % (ref 0–6.9)
ERYTHROCYTE [DISTWIDTH] IN BLOOD BY AUTOMATED COUNT: 48.3 FL (ref 35.9–50)
GLOBULIN SER CALC-MCNC: 2.3 G/DL (ref 1.9–3.5)
GLUCOSE BLD-MCNC: 148 MG/DL (ref 65–99)
GLUCOSE SERPL-MCNC: 148 MG/DL (ref 65–99)
HCT VFR BLD AUTO: 28.2 % (ref 42–52)
HGB BLD-MCNC: 8.5 G/DL (ref 14–18)
HGB BLD-MCNC: 8.7 G/DL (ref 14–18)
HGB BLD-MCNC: 8.9 G/DL (ref 14–18)
IMM GRANULOCYTES # BLD AUTO: 0.05 K/UL (ref 0–0.11)
IMM GRANULOCYTES NFR BLD AUTO: 0.6 % (ref 0–0.9)
INR PPP: 1.07 (ref 0.87–1.13)
LIPASE SERPL-CCNC: 28 U/L (ref 11–82)
LYMPHOCYTES # BLD AUTO: 1.99 K/UL (ref 1–4.8)
LYMPHOCYTES NFR BLD: 24.2 % (ref 22–41)
MCH RBC QN AUTO: 29.5 PG (ref 27–33)
MCHC RBC AUTO-ENTMCNC: 31.6 G/DL (ref 33.7–35.3)
MCV RBC AUTO: 93.4 FL (ref 81.4–97.8)
MONOCYTES # BLD AUTO: 0.65 K/UL (ref 0–0.85)
MONOCYTES NFR BLD AUTO: 7.9 % (ref 0–13.4)
NEUTROPHILS # BLD AUTO: 5.27 K/UL (ref 1.82–7.42)
NEUTROPHILS NFR BLD: 64 % (ref 44–72)
NRBC # BLD AUTO: 0 K/UL
NRBC BLD-RTO: 0 /100 WBC
PLATELET # BLD AUTO: 197 K/UL (ref 164–446)
PMV BLD AUTO: 8.9 FL (ref 9–12.9)
POTASSIUM SERPL-SCNC: 4 MMOL/L (ref 3.6–5.5)
PROT SERPL-MCNC: 6 G/DL (ref 6–8.2)
PROTHROMBIN TIME: 14.1 SEC (ref 12–14.6)
RBC # BLD AUTO: 3.02 M/UL (ref 4.7–6.1)
RH BLD: NORMAL
SODIUM SERPL-SCNC: 137 MMOL/L (ref 135–145)
WBC # BLD AUTO: 8.2 K/UL (ref 4.8–10.8)

## 2019-07-22 PROCEDURE — 700105 HCHG RX REV CODE 258: Performed by: HOSPITALIST

## 2019-07-22 PROCEDURE — A9270 NON-COVERED ITEM OR SERVICE: HCPCS | Performed by: HOSPITALIST

## 2019-07-22 PROCEDURE — 85610 PROTHROMBIN TIME: CPT

## 2019-07-22 PROCEDURE — 85025 COMPLETE CBC W/AUTO DIFF WBC: CPT

## 2019-07-22 PROCEDURE — 770006 HCHG ROOM/CARE - MED/SURG/GYN SEMI*

## 2019-07-22 PROCEDURE — 85730 THROMBOPLASTIN TIME PARTIAL: CPT

## 2019-07-22 PROCEDURE — 99223 1ST HOSP IP/OBS HIGH 75: CPT | Performed by: HOSPITALIST

## 2019-07-22 PROCEDURE — 80053 COMPREHEN METABOLIC PANEL: CPT

## 2019-07-22 PROCEDURE — 83690 ASSAY OF LIPASE: CPT

## 2019-07-22 PROCEDURE — C9113 INJ PANTOPRAZOLE SODIUM, VIA: HCPCS | Performed by: EMERGENCY MEDICINE

## 2019-07-22 PROCEDURE — 96375 TX/PRO/DX INJ NEW DRUG ADDON: CPT

## 2019-07-22 PROCEDURE — 93005 ELECTROCARDIOGRAM TRACING: CPT | Performed by: EMERGENCY MEDICINE

## 2019-07-22 PROCEDURE — 302135 SEQUENTIAL COMPRESSION MACHINE: Performed by: HOSPITALIST

## 2019-07-22 PROCEDURE — 86901 BLOOD TYPING SEROLOGIC RH(D): CPT

## 2019-07-22 PROCEDURE — 700102 HCHG RX REV CODE 250 W/ 637 OVERRIDE(OP): Performed by: HOSPITALIST

## 2019-07-22 PROCEDURE — 700105 HCHG RX REV CODE 258: Performed by: EMERGENCY MEDICINE

## 2019-07-22 PROCEDURE — 36415 COLL VENOUS BLD VENIPUNCTURE: CPT

## 2019-07-22 PROCEDURE — 86900 BLOOD TYPING SEROLOGIC ABO: CPT

## 2019-07-22 PROCEDURE — 96374 THER/PROPH/DIAG INJ IV PUSH: CPT

## 2019-07-22 PROCEDURE — C9113 INJ PANTOPRAZOLE SODIUM, VIA: HCPCS | Performed by: HOSPITALIST

## 2019-07-22 PROCEDURE — 82962 GLUCOSE BLOOD TEST: CPT

## 2019-07-22 PROCEDURE — 99285 EMERGENCY DEPT VISIT HI MDM: CPT

## 2019-07-22 PROCEDURE — 85018 HEMOGLOBIN: CPT

## 2019-07-22 PROCEDURE — A9270 NON-COVERED ITEM OR SERVICE: HCPCS | Performed by: EMERGENCY MEDICINE

## 2019-07-22 PROCEDURE — 700111 HCHG RX REV CODE 636 W/ 250 OVERRIDE (IP): Performed by: HOSPITALIST

## 2019-07-22 PROCEDURE — 86850 RBC ANTIBODY SCREEN: CPT

## 2019-07-22 PROCEDURE — 700111 HCHG RX REV CODE 636 W/ 250 OVERRIDE (IP): Performed by: EMERGENCY MEDICINE

## 2019-07-22 PROCEDURE — 700102 HCHG RX REV CODE 250 W/ 637 OVERRIDE(OP): Performed by: EMERGENCY MEDICINE

## 2019-07-22 RX ORDER — ONDANSETRON 2 MG/ML
4 INJECTION INTRAMUSCULAR; INTRAVENOUS EVERY 4 HOURS PRN
Status: DISCONTINUED | OUTPATIENT
Start: 2019-07-22 | End: 2019-07-22

## 2019-07-22 RX ORDER — LISINOPRIL 20 MG/1
20 TABLET ORAL DAILY
COMMUNITY
End: 2020-02-19 | Stop reason: SDUPTHER

## 2019-07-22 RX ORDER — PANTOPRAZOLE SODIUM 40 MG/10ML
40 INJECTION, POWDER, LYOPHILIZED, FOR SOLUTION INTRAVENOUS 2 TIMES DAILY
Status: DISCONTINUED | OUTPATIENT
Start: 2019-07-22 | End: 2019-07-24 | Stop reason: HOSPADM

## 2019-07-22 RX ORDER — POLYETHYLENE GLYCOL 3350 17 G/17G
1 POWDER, FOR SOLUTION ORAL
Status: DISCONTINUED | OUTPATIENT
Start: 2019-07-22 | End: 2019-07-24 | Stop reason: HOSPADM

## 2019-07-22 RX ORDER — ACETAMINOPHEN 325 MG/1
650 TABLET ORAL EVERY 6 HOURS PRN
Status: DISCONTINUED | OUTPATIENT
Start: 2019-07-22 | End: 2019-07-24 | Stop reason: HOSPADM

## 2019-07-22 RX ORDER — CHLORAL HYDRATE 500 MG
1000 CAPSULE ORAL DAILY
COMMUNITY

## 2019-07-22 RX ORDER — ONDANSETRON 4 MG/1
4 TABLET, ORALLY DISINTEGRATING ORAL EVERY 4 HOURS PRN
Status: DISCONTINUED | OUTPATIENT
Start: 2019-07-22 | End: 2019-07-22

## 2019-07-22 RX ORDER — BISACODYL 10 MG
10 SUPPOSITORY, RECTAL RECTAL
Status: DISCONTINUED | OUTPATIENT
Start: 2019-07-22 | End: 2019-07-24 | Stop reason: HOSPADM

## 2019-07-22 RX ORDER — SODIUM CHLORIDE, SODIUM LACTATE, POTASSIUM CHLORIDE, CALCIUM CHLORIDE 600; 310; 30; 20 MG/100ML; MG/100ML; MG/100ML; MG/100ML
INJECTION, SOLUTION INTRAVENOUS CONTINUOUS
Status: DISCONTINUED | OUTPATIENT
Start: 2019-07-22 | End: 2019-07-24 | Stop reason: ALTCHOICE

## 2019-07-22 RX ORDER — LANOLIN ALCOHOL/MO/W.PET/CERES
1000 CREAM (GRAM) TOPICAL DAILY
COMMUNITY
End: 2021-11-30

## 2019-07-22 RX ORDER — ONDANSETRON 2 MG/ML
4 INJECTION INTRAMUSCULAR; INTRAVENOUS ONCE
Status: COMPLETED | OUTPATIENT
Start: 2019-07-22 | End: 2019-07-22

## 2019-07-22 RX ORDER — ATORVASTATIN CALCIUM 40 MG/1
40 TABLET, FILM COATED ORAL DAILY
Refills: 3 | COMMUNITY
Start: 2019-06-05 | End: 2021-11-30 | Stop reason: SDUPTHER

## 2019-07-22 RX ORDER — SODIUM CHLORIDE 9 MG/ML
1000 INJECTION, SOLUTION INTRAVENOUS ONCE
Status: COMPLETED | OUTPATIENT
Start: 2019-07-22 | End: 2019-07-22

## 2019-07-22 RX ORDER — AMOXICILLIN 250 MG
2 CAPSULE ORAL 2 TIMES DAILY
Status: DISCONTINUED | OUTPATIENT
Start: 2019-07-22 | End: 2019-07-24 | Stop reason: HOSPADM

## 2019-07-22 RX ADMIN — ONDANSETRON 4 MG: 2 INJECTION INTRAMUSCULAR; INTRAVENOUS at 09:09

## 2019-07-22 RX ADMIN — LIDOCAINE HYDROCHLORIDE 30 ML: 20 SOLUTION OROPHARYNGEAL at 09:08

## 2019-07-22 RX ADMIN — SODIUM CHLORIDE, POTASSIUM CHLORIDE, SODIUM LACTATE AND CALCIUM CHLORIDE: 600; 310; 30; 20 INJECTION, SOLUTION INTRAVENOUS at 14:30

## 2019-07-22 RX ADMIN — PANTOPRAZOLE SODIUM 40 MG: 40 INJECTION, POWDER, LYOPHILIZED, FOR SOLUTION INTRAVENOUS at 17:16

## 2019-07-22 RX ADMIN — SODIUM CHLORIDE 1000 ML: 9 INJECTION, SOLUTION INTRAVENOUS at 09:06

## 2019-07-22 RX ADMIN — SENNOSIDES, DOCUSATE SODIUM 2 TABLET: 50; 8.6 TABLET, FILM COATED ORAL at 17:16

## 2019-07-22 RX ADMIN — SODIUM CHLORIDE 80 MG: 9 INJECTION, SOLUTION INTRAVENOUS at 09:49

## 2019-07-22 ASSESSMENT — COGNITIVE AND FUNCTIONAL STATUS - GENERAL
MOBILITY SCORE: 24
DAILY ACTIVITIY SCORE: 24
SUGGESTED CMS G CODE MODIFIER DAILY ACTIVITY: CH
SUGGESTED CMS G CODE MODIFIER MOBILITY: CH

## 2019-07-22 ASSESSMENT — PATIENT HEALTH QUESTIONNAIRE - PHQ9
SUM OF ALL RESPONSES TO PHQ9 QUESTIONS 1 AND 2: 0
2. FEELING DOWN, DEPRESSED, IRRITABLE, OR HOPELESS: NOT AT ALL
1. LITTLE INTEREST OR PLEASURE IN DOING THINGS: NOT AT ALL

## 2019-07-22 ASSESSMENT — ENCOUNTER SYMPTOMS
ABDOMINAL PAIN: 1
LOSS OF CONSCIOUSNESS: 0
WEAKNESS: 0
BLOOD IN STOOL: 1
SHORTNESS OF BREATH: 1
DIZZINESS: 1
FEVER: 0
VOMITING: 1
CHILLS: 0

## 2019-07-22 ASSESSMENT — LIFESTYLE VARIABLES
DO YOU DRINK ALCOHOL: NO
EVER_SMOKED: NEVER

## 2019-07-22 NOTE — H&P
"Hospital Medicine History & Physical Note    Date of Service  7/22/2019    Primary Care Physician  Angel Guerin M.D.    Consultants  Dr. Rothman GI, was called from the ER    Code Status  full    Chief Complaint  Black stools    History of Presenting Illness  74 y.o. male who presented 7/22/2019 with 3 days of black stools. Mr. Rebolledo has a past medical history of diabetes, hypertension, and obstructive sleep apnea takes an aspirin a day and 3 days ago noticed black stools that have been about 2-3 times per day.  Last night he had about 4 episodes of black stools.  The days ago, he vomited twice and it up \"clumps\" were black in nature and quite malodorous.  He states he has had epigastric pain is felt dizzy and short of breath though no chest pain.  He does take one aspirin per day and only takes Aleve about 1-2 times per month.  Presented emergency room with these complaints here has a drop in his hemoglobin which was 14.6 on 6/15/2019 now down to 8.9 today.  He will be admitted for upper GI bleed and gastroneurology has been consulted for possible EGD.  His wife and daughter at the bedside and is with daughter will bring in his CPAP machine for tonight.    Review of Systems  Review of Systems   Constitutional: Negative for chills and fever.   Respiratory: Positive for shortness of breath.    Cardiovascular: Negative for chest pain.   Gastrointestinal: Positive for abdominal pain, blood in stool, melena and vomiting.   Neurological: Positive for dizziness. Negative for loss of consciousness and weakness.   All other systems reviewed and are negative.      Past Medical History   has a past medical history of Asthma; Back pain; Diabetes; ONI treated with BiPAP (5/10/2016); and Sleep apnea. He also has no past medical history of ASTHMA.    Surgical History   has a past surgical history that includes full thickness block resection (2/19/2013); biopsy general (2/19/2013); and cataract extraction with iol. "     Family History  family history includes Heart Attack in his brother.     Social History   reports that he quit smoking about 24 years ago. His smoking use included Cigarettes. He started smoking about 34 years ago. He has a 10.00 pack-year smoking history. He has never used smokeless tobacco. He reports that he drinks about 2.4 - 3.6 oz of alcohol per week . He reports that he does not use drugs.he lives with his wife    Allergies  No Known Allergies    Medications  Prior to Admission Medications   Prescriptions Last Dose Informant Patient Reported? Taking?   Cyanocobalamin (VITAMIN B-12) 1000 MCG Tab 7/21/2019 at AM Family Member Yes Yes   Sig: Take 1,000 mcg by mouth every day.   Omega-3 Fatty Acids (FISH OIL) 1000 MG Cap capsule 7/21/2019 at AM Family Member Yes Yes   Sig: Take 1,000 mg by mouth every day.   amlodipine (NORVASC) 10 MG TABS 7/21/2019 at PM Family Member Yes No   Sig: Take 10 mg by mouth every day.   aspirin EC (ECOTRIN) 81 MG Tablet Delayed Response 7/21/2019 at PM Family Member Yes Yes   Sig: Take 81 mg by mouth every day.   atorvastatin (LIPITOR) 40 MG Tab 7/21/2019 at PM Family Member Yes No   Sig: Take 40 mg by mouth every day.   finasteride (PROSCAR) 5 MG TABS 7/21/2019 at PM Family Member Yes No   Sig: Take 5 mg by mouth every day.   glipizide-metformin (METAGLIP) 5-500 MG per tablet 7/21/2019 at PM Family Member Yes No   Sig: Take 1 Tab by mouth 2 times daily, before breakfast and dinner.   lisinopril (PRINIVIL) 20 MG Tab 7/21/2019 at PM Family Member Yes Yes   Sig: Take 20 mg by mouth every day.   vitamin D (CHOLECALCIFEROL) 1000 UNIT Tab 7/21/2019 at AM Family Member Yes Yes   Sig: Take 1,000 Units by mouth every day.      Facility-Administered Medications: None       Physical Exam  Temp:  [36.9 °C (98.4 °F)] 36.9 °C (98.4 °F)  Pulse:  [88-97] 88  SpO2:  [95 %-98 %] 95 %    Physical Exam   Constitutional: He is oriented to person, place, and time. No distress.   obese   HENT:   Head:  Normocephalic and atraumatic.   Dry mucous membranes   Eyes: Right eye exhibits no discharge. Left eye exhibits no discharge. No scleral icterus.   Pale conjunctiva   Neck: Normal range of motion. Neck supple.   Cardiovascular: Normal rate and regular rhythm.    No murmur heard.  Pulmonary/Chest: Effort normal. No respiratory distress. He has no wheezes.   Abdominal: Soft. He exhibits distension.   Mild epigastric tenderness   Musculoskeletal: He exhibits no tenderness.   Slight edema   Neurological: He is alert and oriented to person, place, and time.   Skin: Skin is warm and dry. He is not diaphoretic.   Venous stasis changes shins   Psychiatric: He has a normal mood and affect. His behavior is normal.   Nursing note and vitals reviewed.      Laboratory:  Recent Labs      07/22/19   0830   WBC  8.2   RBC  3.02*   HEMOGLOBIN  8.9*   HEMATOCRIT  28.2*   MCV  93.4   MCH  29.5   MCHC  31.6*   RDW  48.3   PLATELETCT  197   MPV  8.9*     Recent Labs      07/22/19   0830   SODIUM  137   POTASSIUM  4.0   CHLORIDE  104   CO2  25   GLUCOSE  148*   BUN  31*   CREATININE  0.73   CALCIUM  8.4*     Recent Labs      07/22/19   0830   ALTSGPT  18   ASTSGOT  15   ALKPHOSPHAT  37   TBILIRUBIN  0.2   LIPASE  28   GLUCOSE  148*     Recent Labs      07/22/19   0830   APTT  26.3   INR  1.07     No results for input(s): NTPROBNP in the last 72 hours.      No results for input(s): TROPONINT in the last 72 hours.    Urinalysis:    No results found     Imaging:  No orders to display         Assessment/Plan:  I anticipate this patient will require at least two midnights for appropriate medical management, necessitating inpatient admission.    * Upper GI bleed- (present on admission)   Assessment & Plan    Associated with acute blood loss  Hb down to 8.9  IV protonix  GI consulted for probable EGD  Hold aspirin     Anemia due to gastrointestinal blood loss- (present on admission)   Assessment & Plan    Hemoglobin 6/15/19 was 14.6  Hb is now  down to 8.9  Secondary to upper GI bleed  Serial Hb levels q8 hours and transfuse for Hb less than 7      Obesity- (present on admission)   Assessment & Plan    BMI 38     DM (diabetes mellitus) (HCC)- (present on admission)   Assessment & Plan    With hyperglycemia present upon admit; glucose in the   Hold home glipizide-metformin and utilize sliding scale once he is tolerating a diet     HTN (hypertension)- (present on admission)   Assessment & Plan    Hold outpatient norvasc 10 mg and lisinopril 20 mg due to risk of hypotension in the setting of GI bleed     ONI treated with BiPAP- (present on admission)   Assessment & Plan    Hx of  His family will bring in his home CPAP machine.         VTE prophylaxis: SCDs

## 2019-07-22 NOTE — ASSESSMENT & PLAN NOTE
Hold outpatient norvasc 10 mg and lisinopril 20 mg due to risk of hypotension in the setting of GI bleed

## 2019-07-22 NOTE — DISCHARGE PLANNING
Pt independent prior to admission, lives with his wife and has good family support. His daughter (Melba Holm [phone]) states pt has DPOA but does not have paperwork here.     Care Transition Team Assessment    Information Source  Orientation : Oriented x 4  Information Given By: Patient, Relative  Informant's Name: Craigr  Who is responsible for making decisions for patient? : Patient    Readmission Evaluation  Is this a readmission?: No    Elopement Risk  Legal Hold: No  Ambulatory or Self Mobile in Wheelchair: Yes    Interdisciplinary Discharge Planning  Does Admitting Nurse Feel This Could be a Complex Discharge?: No  Primary Care Physician: Angel Guerin with [facility]  Lives with - Patient's Self Care Capacity: Spouse  Support Systems: Spouse / Significant Other, Children  Do You Take your Prescribed Medications Regularly: Yes  Able to Return to Previous ADL's: Yes  Mobility Issues: No  Prior Services: None  Patient Expects to be Discharged to:: home  Assistance Needed: No  Durable Medical Equipment: Other - Specify (CPAP)  DME Provider / Phone: pt owns cpap    Discharge Preparedness  What is your plan after discharge?: Home with help  What are your discharge supports?: Spouse, Child  Prior Functional Level: Independent with Activities of Daily Living  Difficulity with ADLs: None  Difficulity with IADLs: None    Functional Assesment  Prior Functional Level: Independent with Activities of Daily Living    Finances  Financial Barriers to Discharge: No  Prescription Coverage: Yes    Advance Directive  Advance Directive?: DPOA for Health Care  Durable Power of  Name and Contact : Melba Holm [phone]    Domestic Abuse  Have you ever been the victim of abuse or violence?: No    Discharge Risks or Barriers  Discharge risks or barriers?: No    Anticipated Discharge Information  Anticipated discharge disposition: Home  Discharge Address: [address] [place] [address] Ari Jackson  Discharge Contact Phone  Number: [phone]

## 2019-07-22 NOTE — ASSESSMENT & PLAN NOTE
With hyperglycemia present upon admit; glucose in the   Hold home glipizide-metformin and utilize sliding scale once he is tolerating a diet

## 2019-07-22 NOTE — CONSULTS
DATE OF SERVICE:  07/22/2019    GASTROENTEROLOGY CONSULTATION    REFERRING PHYSICIAN:  Jorge Huang DO    REASON FOR CONSULTATION:  GI bleeding and anemia.    HISTORY OF PRESENT ILLNESS:  The patient is a 74-year-old gentleman who on   Saturday felt particularly unwell and vomited up some liquidy material, which   possibly contained some clots.  He subsequently then passed several liquid   black tarry stools, which had a very foul smell per his wife's report.  The   patient appeared very pale, and weak.  In the emergency room, the patient was   found to have a hemoglobin of 8.9, compared with his baseline last hemoglobin   in June of this year of 14.6.  Platelet count is 197,000.  INR 1.07.  His   chemistry panel demonstrates an elevated BUN of 31 with a creatinine of 0.73.    His liver tests are normal.  His iron studies demonstrated an iron level of   83, TIBC of 403 with a saturation of 21%.  His troponin is not elevated.  The   patient does use an aspirin on a daily basis.  He does not use any kind of   antiacid and/or antisecretory medications.  He denies any chronic upper GI   symptoms, though since Saturday, he has been having some epigastric   discomfort.  He reports having had a colonoscopy about 12 years ago, which   demonstrated some diverticulosis, otherwise normal.  Prior to Saturday, his   bowel movements have been regular and normal.    PAST MEDICAL HISTORY:  Significant for asthma, back pain, diabetes,   obstructive sleep apnea.    PAST SURGICAL HISTORY:  Includes cataract surgery.    MEDICATIONS AT HOME:  Include vitamin B12, fish oil, Norvasc, aspirin,   Lipitor, Proscar, metformin, Prinivil, and cholecalciferol.    ALLERGIES:  No known drug allergies.    SOCIAL HISTORY:  The patient has remote history of smoking, with a total of   10-pack-year smoking history.  He reports occasional alcohol use.    FAMILY HISTORY:  Significant for coronary artery disease with MI in his   brother.    REVIEW OF  SYSTEMS:  Significant for some shortness of breath earlier today,   weakness, epigastric discomfort, vomiting, and melena as described above.  No   complaints of fevers, chills, chest pain, dysuria or hematuria.  No lower   extremity swelling, numbness, tingling, pain, or weakness.    PHYSICAL EXAMINATION:  VITAL SIGNS:  Temperature is 36.9, pulse 88, blood pressure is 116/65.  GENERAL:  This is a well-developed gentleman who is alert and oriented and   able to answer my questions appropriately.  HEENT:  Sclerae are anicteric.  LUNGS:  Clear to auscultation bilaterally.  HEART:  Has a regular rhythm.  ABDOMEN:  Soft.  There is some mild tenderness in the epigastric region   without rebound or guarding.  Bowel sounds are active.  EXTREMITIES:  No rashes.  There is some very mild edema bilaterally in his   lower extremities.    LABORATORY DATA:  Chemistry panel is normal.  Iron studies normal.  Hemoglobin   is 8.9 with an MCV of 93.4.    ASSESSMENT AND PLAN:  1.  Gastrointestinal bleeding.  2.  Normocytic anemia.  3.  Diabetes.  4.  Obstructive sleep apnea.  5.  Chronic back pain.  6.  History of asthma.    DISCUSSION:  The patient is a 74-year-old gentleman with a gastrointestinal   bleed with subsequent anemia.  I suspect this is likely secondary to an upper   GI source, most likely peptic ulcer disease given his use of aspirin on a   daily basis.  The patient is currently hemodynamically stable, and is doing   much better with IV hydration.  I would recommend transfusion if his   hemoglobin falls below 7.  He has already been placed on IV PPIs, which is   appropriate.  He does not need antibiotics from the GI standpoint.  We will   plan on proceeding with an upper endoscopy as soon as this can be arranged,   ideally later this afternoon.  I discussed the upper endoscopy with the   patient, and he agrees to proceed.  It is possible that the patient may   require a colonoscopy on this particular admission; however,  we can defer on   this decision until after his upper endoscopy.    Thank you for allowing us to participate in the care of this patient.       ____________________________________     MD AMADOU PERES / ARPAN    DD:  07/22/2019 11:24:38  DT:  07/22/2019 11:45:58    D#:  [id number]  Job#:  [id number]

## 2019-07-22 NOTE — ED NOTES
Pt bib [facility] with c/c black stools x4 since Saturday & epigastric pain.  Pt a&ox4.  Pt given 4mg Zofran OTD by medics PTA.  Blood sugar 124.  Chart up for ERP evaluation.

## 2019-07-22 NOTE — ASSESSMENT & PLAN NOTE
Hemoglobin dropped  Secondary to upper GI bleed  Serial Hb levels q8 hours and transfuse for Hb less than 7

## 2019-07-22 NOTE — CARE PLAN
Problem: Infection  Goal: Will remain free from infection  Outcome: PROGRESSING AS EXPECTED  Appropriate ABX prescribed and administered. Pt progressing towards remaining free from infection.

## 2019-07-22 NOTE — CARE PLAN
Problem: Safety  Goal: Will remain free from falls  Outcome: PROGRESSING AS EXPECTED  Fall precautions maintained. Nonskid socks maintained, bed to lowest position, side rails up X2, call light in reach, belongings within reach.

## 2019-07-22 NOTE — ED PROVIDER NOTES
"ED Provider  Scribed for Jorge Huang D.O. by Ruel Stanton. 7/22/2019  8:34 AM    Means of arrival: EMS  History obtained from: Patient and daughter  History limited by: None    CHIEF COMPLAINT  Chief Complaint   Patient presents with   • Lower GI Bleed     black stool x4 days   • Epigastric Pain       HPI  Jonathan Rebolledo is a 74 y.o. male who presents to the ER complaining of diarrhea and vomiting that began 7/20/19. The patient's stool and vomit is described as \"very, very dark\". He reports associated upper abdominal pain, nausea, lightheadedness, chills, and sweats. The patient reports his nausea has been mildly alleviated by Zofran given by EMS en route. He denies headache.  He is not currently using Pepto Bismol. No history of frequent Motrin or Tylenol use. He had diverticulosis about 10-12 years ago and saw a gastroenterologist who prescribed medications. Additionally, he has a history of hypertension, CVA, and right bundle branch block, and diabetes. His daughter states that his sugar levels have been normal.    REVIEW OF SYSTEMS  See HPI for further details. All other systems are negative.     PAST MEDICAL HISTORY   has a past medical history of Asthma; Back pain; Diabetes; ONI treated with BiPAP (5/10/2016); and Sleep apnea.    SOCIAL HISTORY  Social History     Social History Main Topics   • Smoking status: Former Smoker     Packs/day: 1.00     Years: 10.00     Types: Cigarettes     Start date: 1/1/1985     Quit date: 1/1/1995   • Smokeless tobacco: Never Used   • Alcohol use 2.4 - 3.6 oz/week     4 - 6 Cans of beer per week   • Drug use: No   • Sexual activity: Not noted       SURGICAL HISTORY   has a past surgical history that includes full thickness block resection (2/19/2013); biopsy general (2/19/2013); and cataract extraction with iol.    CURRENT MEDICATIONS  Current Outpatient Prescriptions:   •  lisinopril (PRINIVIL) 20 MG Tab, Take 1 Tab by mouth every day., Disp: 100 Tab, Rfl: 3  •  " "atorvastatin (LIPITOR) 20 MG Tab, Take 40 mg by mouth every evening., Disp: , Rfl:   •  aspirin EC 81 MG EC tablet, Take 1 Tab by mouth every day., Disp: 30 Tab, Rfl: 0  •  cyanocobalamin (VITAMIN B12) 1000 MCG Tab, Take 1 Tab by mouth every day., Disp: 30 Tab, Rfl: 0  •  vitamin D (VITAMIND D3) 1000 UNIT Tab, Take 1 Tab by mouth every day., Disp: 60 Tab, Rfl: 0  •  glipizide-metformin (METAGLIP) 5-500 MG per tablet, Take 1 Tab by mouth 2 times daily, before breakfast and dinner., Disp: , Rfl:   •  amlodipine (NORVASC) 10 MG TABS, Take 10 mg by mouth every day., Disp: , Rfl:   •  finasteride (PROSCAR) 5 MG TABS, Take 5 mg by mouth every day., Disp: , Rfl:     ALLERGIES  No Known Allergies    PHYSICAL EXAM  VITAL SIGNS: Pulse 97   Temp 36.9 °C (98.4 °F) (Temporal)   Ht 1.676 m (5' 6\")   Wt 107.5 kg (237 lb)   SpO2 98%   BMI 38.25 kg/m²   Constitutional: Alert in no apparent distress.  HENT: No signs of trauma, mucous membranes are moist  Eyes: Conjunctiva normal, Non-icteric.   Neck: Normal range of motion, No tenderness, Supple.  Lymphatic: No lymphadenopathy noted.   Cardiovascular: Regular rate and rhythm, no murmurs.   Thorax & Lungs: Normal breath sounds, No respiratory distress, No wheezing, No chest tenderness.   Abdomen: Mild epigastric tenderness, Stool is black, Bowel sounds normal, Soft, No masses, No pulsatile masses. No peritoneal signs, No guarding, no rigidity.   Skin: Warm, Dry, normal color.   Back: No bony tenderness, No CVA tenderness.   Extremities: No edema, No tenderness, No cyanosis  Musculoskeletal: Good range of motion in all major joints. No tenderness to palpation or major deformities noted.   Neurologic: Alert and oriented x4, Normal motor function, Normal sensory function, No focal deficits noted.   Psychiatric: Affect normal, Judgment normal, Mood normal.     MEDICAL DECISION MAKING  This is a 74 y.o. male who presents with a vomiting, diarrhea.  The diarrhea has been black vomit is " been black also.  Is a midepigastric pain.  He is also complaining of lightheadedness.  Differential included gastritis, GI bleed, gastroenteritis.  On rectal exam the stool is grossly Hemoccult positive.  Hemoglobin is 8.9 which is a significant decrease from 1 month ago his previous records shows that he had a hemoglobin of 14.  While his hemoglobin has dropped significantly and is not needing a transfusion at this time.  This will require further observation.  I did speak with the hospitalist and with gastroenterology for consult and admission.    DIAGNOSTIC STUDIES / PROCEDURES    LABS  Results for orders placed or performed during the hospital encounter of 07/22/19   CBC WITH DIFFERENTIAL   Result Value Ref Range    WBC 8.2 4.8 - 10.8 K/uL    RBC 3.02 (L) 4.70 - 6.10 M/uL    Hemoglobin 8.9 (L) 14.0 - 18.0 g/dL    Hematocrit 28.2 (L) 42.0 - 52.0 %    MCV 93.4 81.4 - 97.8 fL    MCH 29.5 27.0 - 33.0 pg    MCHC 31.6 (L) 33.7 - 35.3 g/dL    RDW 48.3 35.9 - 50.0 fL    Platelet Count 197 164 - 446 K/uL    MPV 8.9 (L) 9.0 - 12.9 fL    Neutrophils-Polys 64.00 44.00 - 72.00 %    Lymphocytes 24.20 22.00 - 41.00 %    Monocytes 7.90 0.00 - 13.40 %    Eosinophils 2.70 0.00 - 6.90 %    Basophils 0.60 0.00 - 1.80 %    Immature Granulocytes 0.60 0.00 - 0.90 %    Nucleated RBC 0.00 /100 WBC    Neutrophils (Absolute) 5.27 1.82 - 7.42 K/uL    Lymphs (Absolute) 1.99 1.00 - 4.80 K/uL    Monos (Absolute) 0.65 0.00 - 0.85 K/uL    Eos (Absolute) 0.22 0.00 - 0.51 K/uL    Baso (Absolute) 0.05 0.00 - 0.12 K/uL    Immature Granulocytes (abs) 0.05 0.00 - 0.11 K/uL    NRBC (Absolute) 0.00 K/uL   COMP METABOLIC PANEL   Result Value Ref Range    Sodium 137 135 - 145 mmol/L    Potassium 4.0 3.6 - 5.5 mmol/L    Chloride 104 96 - 112 mmol/L    Co2 25 20 - 33 mmol/L    Anion Gap 8.0 0.0 - 11.9    Glucose 148 (H) 65 - 99 mg/dL    Bun 31 (H) 8 - 22 mg/dL    Creatinine 0.73 0.50 - 1.40 mg/dL    Calcium 8.4 (L) 8.5 - 10.5 mg/dL    AST(SGOT) 15 12 - 45  U/L    ALT(SGPT) 18 2 - 50 U/L    Alkaline Phosphatase 37 30 - 99 U/L    Total Bilirubin 0.2 0.1 - 1.5 mg/dL    Albumin 3.7 3.2 - 4.9 g/dL    Total Protein 6.0 6.0 - 8.2 g/dL    Globulin 2.3 1.9 - 3.5 g/dL    A-G Ratio 1.6 g/dL   LIPASE   Result Value Ref Range    Lipase 28 11 - 82 U/L   PROTHROMBIN TIME (INR)   Result Value Ref Range    PT 14.1 12.0 - 14.6 sec    INR 1.07 0.87 - 1.13   APTT   Result Value Ref Range    APTT 26.3 24.7 - 36.0 sec   COD - Adult (Type and Screen)   Result Value Ref Range    ABO Grouping Only O     Rh Grouping Only POS     Antibody Screen-Cod NEG    ESTIMATED GFR   Result Value Ref Range    GFR If African American >60 >60 mL/min/1.73 m 2    GFR If Non African American >60 >60 mL/min/1.73 m 2   ABO Rh Confirm   Result Value Ref Range    ABO Rh Confirm O POS    EKG (NOW)   Result Value Ref Range    Report       [facility] Emergency Dept.    Test Date:  2019  Pt Name:    OLENA HOLLAND           Department: ER  MRN:        [id number]                      Room:       [facility]  Gender:     Male                         Technician: [id number]  :        [id number]                   Requested By:BONIFACIO NASH  Order #:    [id number]                    Reading MD: BONIFACIO NASH D.O.    Measurements  Intervals                                Axis  Rate:       89                           P:          67  MA:         152                          QRS:        -36  QRSD:       134                          T:          21  QT:         408  QTc:        497    Interpretive Statements  SINUS RHYTHM  RBBB AND LAFB  Compared to ECG 2019 22:19:48  No significant changes    Electronically Signed On 2019 10:03:07 PDT by BONIFACIO NASH D.O.       All labs reviewed by me.  EKG interpreted as above by me.     COURSE  Pertinent Labs & Imaging studies reviewed. (See chart for details)    8:34 AM - Patient seen and examined at bedside. Discussed plan of care. The patient will  be resuscitated with 1L NS IV and medicated with GI cocktail oral suspension 30 mL, Zofran, 4 mg, NS infusion 1000 mL, Protonix 80 mg. Ordered for COD-Adult, CBC with differential, CMP, Lipase, Prothrombin time (INR), APTT, Estimated GFR, ABO Rh Confirm, EKG to evaluate his symptoms.     9:30 AM - Patient was reevaluated at bedside. Discussed lab results with the patient and informed them that his stool was positive for blood.     9:47 AM - Paged Hospitalist.    9:53 AM - Paged GRACIE Sparrow.     9:53 AM - Patient was reevaluated at bedside. The patient's abdominal pain and nausea have resolved following fluid and medication administration. I updated him on the findings and discussed the plan for admission. The patient understands and agrees to the plan for admission.     10:00 AM - I spoke to GRACIE Sparrow. He agrees to consult regarding the patient's results.     10:26 AM - I spoke to Dr. Tan, Hospitalist, who accepts the patient for admission.     DISPOSITION:  Patient will be admitted to Dr. Tan, Hospitalist, in guarded condition.    FINAL IMPRESSION  1. Abdominal pain, acute, epigastric    2. Upper GI bleed    3. Acute blood loss anemia         Ruel MUNROE (Waqar), am scribing for, and in the presence of, Jorge Huang D.O..    Electronically signed by: Ruel Stanton (Scribe), 7/22/2019    Jorge MUNROE D.O. personally performed the services described in this documentation, as scribed by Ruel Stanton in my presence, and it is both accurate and complete. C    The note accurately reflects work and decisions made by me.  Jorge Huang  7/22/2019  1:07 PM

## 2019-07-22 NOTE — ASSESSMENT & PLAN NOTE
Associated with acute blood loss  IV protonix  GI consulted s/p EGD today appreciate rec.   Hold aspirin

## 2019-07-22 NOTE — ED NOTES
Med Rec completed per patient, family, and med list (returned)  Allergies reviewed  No ORAL antibiotics in last 14 days

## 2019-07-23 LAB
HGB BLD-MCNC: 7.9 G/DL (ref 14–18)
HGB BLD-MCNC: 8 G/DL (ref 14–18)
PATHOLOGY CONSULT NOTE: NORMAL

## 2019-07-23 PROCEDURE — 160203 HCHG ENDO MINUTES - 1ST 30 MINS LEVEL 4: Performed by: INTERNAL MEDICINE

## 2019-07-23 PROCEDURE — C9113 INJ PANTOPRAZOLE SODIUM, VIA: HCPCS | Performed by: HOSPITALIST

## 2019-07-23 PROCEDURE — 88312 SPECIAL STAINS GROUP 1: CPT

## 2019-07-23 PROCEDURE — 160035 HCHG PACU - 1ST 60 MINS PHASE I: Performed by: INTERNAL MEDICINE

## 2019-07-23 PROCEDURE — 99152 MOD SED SAME PHYS/QHP 5/>YRS: CPT | Performed by: INTERNAL MEDICINE

## 2019-07-23 PROCEDURE — 36415 COLL VENOUS BLD VENIPUNCTURE: CPT

## 2019-07-23 PROCEDURE — 85018 HEMOGLOBIN: CPT | Mod: 91

## 2019-07-23 PROCEDURE — 500066 HCHG BITE BLOCK, ECT: Performed by: INTERNAL MEDICINE

## 2019-07-23 PROCEDURE — 99232 SBSQ HOSP IP/OBS MODERATE 35: CPT | Performed by: INTERNAL MEDICINE

## 2019-07-23 PROCEDURE — 88305 TISSUE EXAM BY PATHOLOGIST: CPT

## 2019-07-23 PROCEDURE — 700111 HCHG RX REV CODE 636 W/ 250 OVERRIDE (IP)

## 2019-07-23 PROCEDURE — 700105 HCHG RX REV CODE 258: Performed by: HOSPITALIST

## 2019-07-23 PROCEDURE — 700111 HCHG RX REV CODE 636 W/ 250 OVERRIDE (IP): Performed by: INTERNAL MEDICINE

## 2019-07-23 PROCEDURE — 160048 HCHG OR STATISTICAL LEVEL 1-5: Performed by: INTERNAL MEDICINE

## 2019-07-23 PROCEDURE — 94660 CPAP INITIATION&MGMT: CPT

## 2019-07-23 PROCEDURE — 770006 HCHG ROOM/CARE - MED/SURG/GYN SEMI*

## 2019-07-23 PROCEDURE — 700111 HCHG RX REV CODE 636 W/ 250 OVERRIDE (IP): Performed by: HOSPITALIST

## 2019-07-23 PROCEDURE — 0DB68ZX EXCISION OF STOMACH, VIA NATURAL OR ARTIFICIAL OPENING ENDOSCOPIC, DIAGNOSTIC: ICD-10-PCS | Performed by: INTERNAL MEDICINE

## 2019-07-23 PROCEDURE — 160002 HCHG RECOVERY MINUTES (STAT): Performed by: INTERNAL MEDICINE

## 2019-07-23 RX ORDER — MIDAZOLAM HYDROCHLORIDE 1 MG/ML
.5-2 INJECTION INTRAMUSCULAR; INTRAVENOUS PRN
Status: DISCONTINUED | OUTPATIENT
Start: 2019-07-23 | End: 2019-07-23 | Stop reason: HOSPADM

## 2019-07-23 RX ORDER — MIDAZOLAM HYDROCHLORIDE 1 MG/ML
INJECTION INTRAMUSCULAR; INTRAVENOUS
Status: DISCONTINUED | OUTPATIENT
Start: 2019-07-23 | End: 2019-07-23 | Stop reason: HOSPADM

## 2019-07-23 RX ORDER — SODIUM CHLORIDE 9 MG/ML
500 INJECTION, SOLUTION INTRAVENOUS
Status: DISCONTINUED | OUTPATIENT
Start: 2019-07-23 | End: 2019-07-23 | Stop reason: HOSPADM

## 2019-07-23 RX ADMIN — SODIUM CHLORIDE, POTASSIUM CHLORIDE, SODIUM LACTATE AND CALCIUM CHLORIDE: 600; 310; 30; 20 INJECTION, SOLUTION INTRAVENOUS at 00:18

## 2019-07-23 RX ADMIN — PANTOPRAZOLE SODIUM 40 MG: 40 INJECTION, POWDER, LYOPHILIZED, FOR SOLUTION INTRAVENOUS at 06:11

## 2019-07-23 RX ADMIN — PANTOPRAZOLE SODIUM 40 MG: 40 INJECTION, POWDER, LYOPHILIZED, FOR SOLUTION INTRAVENOUS at 17:40

## 2019-07-23 RX ADMIN — SODIUM CHLORIDE, POTASSIUM CHLORIDE, SODIUM LACTATE AND CALCIUM CHLORIDE: 600; 310; 30; 20 INJECTION, SOLUTION INTRAVENOUS at 23:32

## 2019-07-23 RX ADMIN — SODIUM CHLORIDE, POTASSIUM CHLORIDE, SODIUM LACTATE AND CALCIUM CHLORIDE: 600; 310; 30; 20 INJECTION, SOLUTION INTRAVENOUS at 17:40

## 2019-07-23 ASSESSMENT — ENCOUNTER SYMPTOMS
EYE REDNESS: 0
NAUSEA: 0
PALPITATIONS: 0
ABDOMINAL PAIN: 0
DIZZINESS: 0
VOMITING: 0
BACK PAIN: 0
HEADACHES: 0
DEPRESSION: 0
MYALGIAS: 0
FEVER: 0
EYE PAIN: 0
COUGH: 0
SHORTNESS OF BREATH: 0
CHILLS: 0

## 2019-07-23 NOTE — PROGRESS NOTES
[facility] Medicine Daily Progress Note    Date of Service  7/23/2019    Chief Complaint  74 y.o. male admitted 7/22/2019 with black stool.    Hospital Course    This is a 75 y/o M with PMHX of DM, HTN and ONI. Who was admitted on 7/22/19 after presenting to ER complaining of black tarry stools. He was found also to have anemia and there is concern for upper GI bleed. So pt was admitted for further work up and treatment . GI was consulted and pt had EGD done on 7/23/19.      Interval Problem Update  Pt seen and examined, afebrile, no overnight events, afebrile, no nausea or vomiting, no abdominal pain, GI following EGD today appreciate rec     Consultants/Specialty  GI    Code Status  Full code     Disposition  Home when medically cleared     Review of Systems  Review of Systems   Constitutional: Negative for chills and fever.   HENT: Negative for ear discharge and ear pain.    Eyes: Negative for pain and redness.   Respiratory: Negative for cough and shortness of breath.    Cardiovascular: Negative for chest pain and palpitations.   Gastrointestinal: Positive for melena. Negative for abdominal pain, nausea and vomiting.   Musculoskeletal: Negative for back pain and myalgias.   Skin: Negative for itching and rash.   Neurological: Negative for dizziness and headaches.   Psychiatric/Behavioral: Negative for depression and suicidal ideas.        Physical Exam  Temp:  [36.2 °C (97.1 °F)-36.7 °C (98 °F)] 36.3 °C (97.3 °F)  Pulse:  [82-93] 93  Resp:  [13-23] 17  BP: (107-132)/(51-70) 113/51  SpO2:  [90 %-100 %] 95 %    Physical Exam   Constitutional: He is oriented to person, place, and time.   HENT:   Head: Normocephalic and atraumatic.   Eyes: Conjunctivae are normal. No scleral icterus.   Neck: Neck supple. No JVD present.   Cardiovascular: Normal rate.  Exam reveals no gallop.    Pulmonary/Chest: He has no wheezes. He has no rales.   Abdominal: Soft. Bowel sounds are normal. He exhibits no distension. There is no  tenderness.   Musculoskeletal: He exhibits no edema or tenderness.   Neurological: He is alert and oriented to person, place, and time.   Skin: Skin is warm and dry. No erythema.   Nursing note and vitals reviewed.      Fluids    Intake/Output Summary (Last 24 hours) at 07/23/19 1255  Last data filed at 07/23/19 0600   Gross per 24 hour   Intake             1440 ml   Output                0 ml   Net             1440 ml       Laboratory  Recent Labs      07/22/19   0830   07/22/19   1846  07/23/19   0235  07/23/19   1056   WBC  8.2   --    --    --    --    RBC  3.02*   --    --    --    --    HEMOGLOBIN  8.9*   < >  8.7*  8.0*  7.9*   HEMATOCRIT  28.2*   --    --    --    --    MCV  93.4   --    --    --    --    MCH  29.5   --    --    --    --    MCHC  31.6*   --    --    --    --    RDW  48.3   --    --    --    --    PLATELETCT  197   --    --    --    --    MPV  8.9*   --    --    --    --     < > = values in this interval not displayed.     Recent Labs      07/22/19   0830   SODIUM  137   POTASSIUM  4.0   CHLORIDE  104   CO2  25   GLUCOSE  148*   BUN  31*   CREATININE  0.73   CALCIUM  8.4*     Recent Labs      07/22/19   0830   APTT  26.3   INR  1.07               Imaging  No orders to display        Assessment/Plan  * Upper GI bleed- (present on admission)   Assessment & Plan    Associated with acute blood loss  IV protonix  GI consulted s/p EGD today appreciate rec.   Hold aspirin     Anemia due to gastrointestinal blood loss- (present on admission)   Assessment & Plan    Hemoglobin dropped  Secondary to upper GI bleed  Serial Hb levels q8 hours and transfuse for Hb less than 7      Obesity- (present on admission)   Assessment & Plan    BMI 38     DM (diabetes mellitus) (HCC)- (present on admission)   Assessment & Plan    With hyperglycemia present upon admit; glucose in the   Hold home glipizide-metformin and utilize sliding scale once he is tolerating a diet     HTN (hypertension)- (present on  admission)   Assessment & Plan    Hold outpatient norvasc 10 mg and lisinopril 20 mg due to risk of hypotension in the setting of GI bleed     ONI treated with BiPAP- (present on admission)   Assessment & Plan    Hx of  His family will bring in his home CPAP machine.          VTE prophylaxis: scd

## 2019-07-23 NOTE — PROGRESS NOTES
Rec'd report from day shift RN. Assumed pt care. Assessment completed. AA&OX4. Denies pain at this time. No s/s of discomfort or distress. Pt self turns in bed. BLE are bronze/brown colored. Pt is up self, and ambulates with a steady gait. Bed in lowest position, bed locked, treaded socks in place, RN and CNA numbers provided, call light within reach.

## 2019-07-23 NOTE — OR SURGEON
Immediate Post OP Note    PreOp Diagnosis: GI bleed, anemia    PostOp Diagnosis: Large gastric ulcers (2), clean-based, diffuse gastritis.      Procedure(s):  GASTROSCOPY - Wound Class: Clean Contaminated    Surgeon(s):  Tyler Rothman M.D.    Anesthesiologist/Type of Anesthesia:  No anesthesia staff entered./fentanyl 75, versed 4.    Start 9:53, Stop 10:03    Surgical Staff:  Endoscopy Technician: Sandra Galaviz  Sedation/Monitoring Nurse: Ken James R.N.    Specimens removed if any:  ID Type Source Tests Collected by Time Destination   A : r/o gastritis Tissue Gastric PATHOLOGY SPECIMEN Tyler Rothman M.D. 7/23/2019 10:02 AM        Estimated Blood Loss: none  Findings: as above, o/w normal    Complications: none    Plan:  Advance diet.  Continue IV PPI bid for now.  Anticipate d/c tomorrow AM.  F/U biopsy for H. Pylori, treat if positive.  Will need repeat EGD in 4-6 weeks as outpatient to confirm healing.        7/23/2019 10:06 AM Tyler Rothman M.D.

## 2019-07-23 NOTE — CARE PLAN
Problem: Venous Thromboembolism (VTW)/Deep Vein Thrombosis (DVT) Prevention:  Goal: Patient will participate in Venous Thrombosis (VTE)/Deep Vein Thrombosis (DVT)Prevention Measures    Intervention: Assess and monitor for anticoagulation complications  SCDs in place for VTE prophylaxis.       Problem: Knowledge Deficit  Goal: Knowledge of disease process/condition, treatment plan, diagnostic tests, and medications will improve    Intervention: Explain information regarding disease process/condition, treatment plan, diagnostic tests, and medications and document in education  Discussed plan of care with pt including possible EGD per GI MD notes. Pt verbalized understanding of EGD plan, was also informed by MDs.

## 2019-07-23 NOTE — OP REPORT
DATE OF SERVICE:  07/23/2019    PROCEDURE:  Upper endoscopy with biopsy.    INDICATION FOR PROCEDURE:  Anemia and GI bleeding.    CONSENT:  Informed consent was obtained directly from the patient after   benefits, risks and possible alternatives were discussed.    MEDICATIONS:  The patient received a total of 75 mcg of fentanyl, 4 mg of   Versed given throughout the procedure.  Start time was 9:53 a.m., stop time   was 10:03 a.m.  The medications were given under my direct supervision   throughout.    PROCEDURE DESCRIPTION:  The patient was placed in left lateral decubitus   position and provided with supplemental oxygen via nasal cannula.  When ready,   the upper endoscope was placed in the patient's mouth and advanced easily and   carefully to the esophagus and subsequently the stomach and duodenum.    FINDINGS:  The esophagus appeared normal including views of the GE junction.    In the stomach, there was diffuse gastritis, particularly in the distal   stomach, as well as a 1 cm clean-based ulcer at the angulus, and a clean based   ulcer, which was approximately 2 cm in the prepyloric region.  Neither of   these ulcers had obvious malignant features.  Biopsy was obtained to rule out   the possibility of active H. pylori.  The duodenum appeared normal to the   second portion.  There was no blood or bleeding throughout the entire   examination.  The scope was then withdrawn after excess air was removed from   the gastric lumen.    COMPLICATIONS:  No complications during or in the immediate postoperative   period.    IMPRESSION:  1.  Diffuse gastritis.  2.  Two clean-based ulcers, one at the angulus, and the other in the   prepyloric region as described above.  3.  Otherwise, normal upper endoscopy.    RECOMMENDATION:  The patient will have his diet advanced.  We will continue   him on IV PPIs b.i.d. for now.  We will follow up on the biopsies and treat H.   pylori if positive.  The patient will require repeat upper  endoscopy in 4-6   weeks as an outpatient to confirm healing.  I anticipate discharge tomorrow   morning as long as the patient demonstrates clinical stability and absence of   bleeding.       ____________________________________     MD AMADOU PERES / ARPAN    DD:  07/23/2019 10:11:00  DT:  07/23/2019 10:16:30    D#:  [id number]  Job#:  [id number]

## 2019-07-23 NOTE — CARE PLAN
Problem: Bowel/Gastric:  Goal: Normal bowel function is maintained or improved  Outcome: PROGRESSING AS EXPECTED  Appropriate diet, tests, and activity prescribed. Pt progressing towards improvement in bowel function.

## 2019-07-24 VITALS
TEMPERATURE: 97.7 F | SYSTOLIC BLOOD PRESSURE: 136 MMHG | HEIGHT: 66 IN | WEIGHT: 238.1 LBS | OXYGEN SATURATION: 94 % | HEART RATE: 96 BPM | RESPIRATION RATE: 18 BRPM | BODY MASS INDEX: 38.27 KG/M2 | DIASTOLIC BLOOD PRESSURE: 76 MMHG

## 2019-07-24 LAB
ANION GAP SERPL CALC-SCNC: 6 MMOL/L (ref 0–11.9)
BUN SERPL-MCNC: 14 MG/DL (ref 8–22)
CALCIUM SERPL-MCNC: 8 MG/DL (ref 8.5–10.5)
CHLORIDE SERPL-SCNC: 104 MMOL/L (ref 96–112)
CO2 SERPL-SCNC: 28 MMOL/L (ref 20–33)
CREAT SERPL-MCNC: 0.99 MG/DL (ref 0.5–1.4)
ERYTHROCYTE [DISTWIDTH] IN BLOOD BY AUTOMATED COUNT: 49.1 FL (ref 35.9–50)
ERYTHROCYTE [DISTWIDTH] IN BLOOD BY AUTOMATED COUNT: 49.4 FL (ref 35.9–50)
GLUCOSE SERPL-MCNC: 151 MG/DL (ref 65–99)
HCT VFR BLD AUTO: 25 % (ref 42–52)
HCT VFR BLD AUTO: 25.8 % (ref 42–52)
HGB BLD-MCNC: 7.9 G/DL (ref 14–18)
HGB BLD-MCNC: 7.9 G/DL (ref 14–18)
MCH RBC QN AUTO: 29.3 PG (ref 27–33)
MCH RBC QN AUTO: 29.6 PG (ref 27–33)
MCHC RBC AUTO-ENTMCNC: 30.6 G/DL (ref 33.7–35.3)
MCHC RBC AUTO-ENTMCNC: 31.6 G/DL (ref 33.7–35.3)
MCV RBC AUTO: 93.6 FL (ref 81.4–97.8)
MCV RBC AUTO: 95.6 FL (ref 81.4–97.8)
PLATELET # BLD AUTO: 168 K/UL (ref 164–446)
PLATELET # BLD AUTO: 179 K/UL (ref 164–446)
PMV BLD AUTO: 8.6 FL (ref 9–12.9)
PMV BLD AUTO: 8.6 FL (ref 9–12.9)
POTASSIUM SERPL-SCNC: 4.3 MMOL/L (ref 3.6–5.5)
RBC # BLD AUTO: 2.67 M/UL (ref 4.7–6.1)
RBC # BLD AUTO: 2.7 M/UL (ref 4.7–6.1)
SODIUM SERPL-SCNC: 138 MMOL/L (ref 135–145)
WBC # BLD AUTO: 5.8 K/UL (ref 4.8–10.8)
WBC # BLD AUTO: 8.4 K/UL (ref 4.8–10.8)

## 2019-07-24 PROCEDURE — 80048 BASIC METABOLIC PNL TOTAL CA: CPT

## 2019-07-24 PROCEDURE — 85027 COMPLETE CBC AUTOMATED: CPT

## 2019-07-24 PROCEDURE — 700111 HCHG RX REV CODE 636 W/ 250 OVERRIDE (IP): Performed by: HOSPITALIST

## 2019-07-24 PROCEDURE — 36415 COLL VENOUS BLD VENIPUNCTURE: CPT

## 2019-07-24 PROCEDURE — C9113 INJ PANTOPRAZOLE SODIUM, VIA: HCPCS | Performed by: HOSPITALIST

## 2019-07-24 PROCEDURE — 99239 HOSP IP/OBS DSCHRG MGMT >30: CPT | Performed by: INTERNAL MEDICINE

## 2019-07-24 RX ORDER — OMEPRAZOLE 20 MG/1
20 CAPSULE, DELAYED RELEASE ORAL 2 TIMES DAILY
Qty: 60 CAP | Refills: 1 | Status: SHIPPED | OUTPATIENT
Start: 2019-07-24 | End: 2021-11-30

## 2019-07-24 RX ADMIN — PANTOPRAZOLE SODIUM 40 MG: 40 INJECTION, POWDER, LYOPHILIZED, FOR SOLUTION INTRAVENOUS at 04:50

## 2019-07-24 ASSESSMENT — ENCOUNTER SYMPTOMS
GASTROINTESTINAL NEGATIVE: 1
RESPIRATORY NEGATIVE: 1
CARDIOVASCULAR NEGATIVE: 1

## 2019-07-24 NOTE — DISCHARGE INSTRUCTIONS
Discharge Instructions    Discharged to home by car with relative. Discharged via walking, hospital escort: Refused.  Special equipment needed: Not Applicable    Be sure to schedule a follow-up appointment with your primary care doctor or any specialists as instructed.     Discharge Plan:   Pneumococcal Vaccine Administered/Refused: Not given - Patient refused pneumococcal vaccine  Influenza Vaccine Indication: Patient Refuses    I understand that a diet low in cholesterol, fat, and sodium is recommended for good health. Unless I have been given specific instructions below for another diet, I accept this instruction as my diet prescription.   Other diet: Cardiac    Special Instructions: None    · Is patient discharged on Warfarin / Coumadin?   No     Depression / Suicide Risk    As you are discharged from this [facility] facility, it is important to learn how to keep safe from harming yourself.    Recognize the warning signs:  · Abrupt changes in personality, positive or negative- including increase in energy   · Giving away possessions  · Change in eating patterns- significant weight changes-  positive or negative  · Change in sleeping patterns- unable to sleep or sleeping all the time   · Unwillingness or inability to communicate  · Depression  · Unusual sadness, discouragement and loneliness  · Talk of wanting to die  · Neglect of personal appearance   · Rebelliousness- reckless behavior  · Withdrawal from people/activities they love  · Confusion- inability to concentrate     If you or a loved one observes any of these behaviors or has concerns about self-harm, here's what you can do:  · Talk about it- your feelings and reasons for harming yourself  · Remove any means that you might use to hurt yourself (examples: pills, rope, extension cords, firearm)  · Get professional help from the community (Mental Health, Substance Abuse, psychological counseling)  · Do not be alone:Call your Safe Contact- someone whom  you trust who will be there for you.  · Call your local CRISIS HOTLINE 939-7629 or 055-252-6346  · Call your local Children's Mobile Crisis Response Team [place] (656) 194-4250 or www.[employer] Less[employer]  · Call the toll free National Suicide Prevention Hotlines   · National Suicide Prevention Lifeline 205-944-UGEJ (1055)  · [facility] Line Network 800-SUICIDE (448-4516)      Discharge Instructions per Dominic Benitez M.D.    Follow up with GI as outpt as will need repeat EGD in 4-6 weeks  Do not use aspirin    DIET: Diabetic diet     ACTIVITY: as tolerated    DIAGNOSIS: Upper GI bleed     Return to ER if symptoms worsen

## 2019-07-24 NOTE — PROGRESS NOTES
A&Ox4, no pain reported, up self, running LR @ 100.  All pt needs met at this time.      Bed is locked and in lowest position, call light and bedside table are within reach, treaded slipper socks are on, and hourly rounding is in place.

## 2019-07-24 NOTE — CARE PLAN
Problem: Safety  Goal: Will remain free from injury  Outcome: PROGRESSING AS EXPECTED  Safety precautions in use including use of call bell for assistance, bed in lowest position, use of treaded socks. Objects in room moved to allow access to bathroom

## 2019-07-24 NOTE — CARE PLAN
Problem: Communication  Goal: The ability to communicate needs accurately and effectively will improve  Outcome: PROGRESSING AS EXPECTED      Problem: Safety  Goal: Will remain free from falls  Outcome: PROGRESSING AS EXPECTED  Bed is locked and in lowest position, call light and bedside table are within reach, treaded slipper socks are on, and hourly rounding is in place.

## 2019-07-24 NOTE — PROGRESS NOTES
Patient refused SCDs, educated regarding importance.  Education reinforced by LADI Isidro.  Patient continues to refuse.

## 2019-07-24 NOTE — PROGRESS NOTES
"Received bedside report from Night RN. Pt awake and alert. Bed alarm in not use. No complains of pain at this time. Discussed plan of care and possible discharge today. Monitor hemoglobin levels. /60   Pulse 92   Temp 36.6 °C (97.8 °F) (Temporal)   Resp 20   Ht 1.676 m (5' 6\")   Wt 108 kg (238 lb 1.6 oz)   SpO2 90%   BMI 38.43 kg/m²     "

## 2019-07-24 NOTE — PROGRESS NOTES
Patient given discharge and follow up instructions. He acknowledged understanding. Pt transported home with family member in private car.

## 2019-07-24 NOTE — PROGRESS NOTES
Gastroenterology Progress Note     Author: Tyler Rothman   Date & Time Created: 2019 10:57 AM    Chief Complaint:  Gi bleed    Interval History:  Gastric ulcers/gastritis seen yesterday at EGD.    No signs of ongoing bleeding, Hgb stable.  Tolerating diet, no complaints.    Review of Systems:  Review of Systems   Respiratory: Negative.    Cardiovascular: Negative.    Gastrointestinal: Negative.        Physical Exam:  Physical Exam   Cardiovascular: Normal rate.    Pulmonary/Chest: Effort normal.   Abdominal: Soft.       Labs:          Recent Labs      19   SODIUM  137  138   POTASSIUM  4.0  4.3   CHLORIDE  104  104   CO2  25  28   BUN  31*  14   CREATININE  0.73  0.99   CALCIUM  8.4*  8.0*     Recent Labs      19   ALTSGPT  18   --    ASTSGOT  15   --    ALKPHOSPHAT  37   --    TBILIRUBIN  0.2   --    LIPASE  28   --    GLUCOSE  148*  151*     Recent Labs      19   0819   1056  19   RBC  3.02*   --    --   2.67*  2.70*   HEMOGLOBIN  8.9*   < >  7.9*  7.9*  7.9*   HEMATOCRIT  28.2*   --    --   25.0*  25.8*   PLATELETCT  197   --    --   168  179   PROTHROMBTM  14.1   --    --    --    --    APTT  26.3   --    --    --    --    INR  1.07   --    --    --    --     < > = values in this interval not displayed.     Recent Labs      1943   WBC  8.2  8.4  5.8   NEUTSPOLYS  64.00   --    --    LYMPHOCYTES  24.20   --    --    MONOCYTES  7.90   --    --    EOSINOPHILS  2.70   --    --    BASOPHILS  0.60   --    --    ASTSGOT  15   --    --    ALTSGPT  18   --    --    ALKPHOSPHAT  37   --    --    TBILIRUBIN  0.2   --    --      Hemodynamics:  Temp (24hrs), Av.3 °C (97.4 °F), Min:36 °C (96.8 °F), Max:36.6 °C (97.8 °F)  Temperature: 36.5 °C (97.7 °F)  Pulse  Av.7  Min: 82  Max: 97   Blood Pressure : 136/76     Respiratory:    Respiration: 18, Pulse Oximetry: 94  %        RUL Breath Sounds: Clear, RML Breath Sounds: Clear, RLL Breath Sounds: Diminished, REBECCA Breath Sounds: Clear, LLL Breath Sounds: Diminished  Fluids:    Intake/Output Summary (Last 24 hours) at 07/24/19 1057  Last data filed at 07/24/19 1047   Gross per 24 hour   Intake          3411.67 ml   Output                0 ml   Net          3411.67 ml        GI/Nutrition:  Orders Placed This Encounter   Procedures   • Diet Order Cardiac     Standing Status:   Standing     Number of Occurrences:   1     Order Specific Question:   Diet:     Answer:   Cardiac [6]     Medical Decision Making, by Problem:  Active Hospital Problems    Diagnosis   • *Upper GI bleed [K92.2]   • Anemia due to gastrointestinal blood loss [D50.0]   • DM (diabetes mellitus) (HCC) [E11.9]   • HTN (hypertension) [I10]   • Obesity [E66.9]   • ONI treated with BiPAP [G47.33]       Plan:  OK for d/c home  Will arrange outpatient EGD to confirm ulcer healing in 6-8 weeks  Omeprazole 20 mg po bid as outpatient  Advance diet as tolerated.  No ASA or NSAIDs.    Discussed recommendations and plan with patient, all questions answered.  Will s/o please call if necessary.  Quality-Core Measures   Reviewed items::  Radiology images reviewed, Labs reviewed and Medications reviewed

## 2019-07-24 NOTE — DISCHARGE SUMMARY
Discharge Summary    CHIEF COMPLAINT ON ADMISSION  Chief Complaint   Patient presents with   • Lower GI Bleed     black stool x4 days   • Epigastric Pain       Reason for Admission  ems     Admission Date  7/22/2019    CODE STATUS  Prior    HPI & HOSPITAL COURSE      This is a 73 y/o M with PMHX of DM, HTN and ONI. Who was admitted on 7/22/19 after presenting to ER complaining of black tarry stools. He was found also to have anemia and there is concern for upper GI bleed. So pt was admitted for further work up and treatment . GI was consulted and pt had EGD done on 7/23/19. Which did showed 2 large gastric ulcers with clean base. Pt was continued on PPI, will need to be on 20 mg BID as outpt. His hemoglobin has been stable since EGD.  Per GI stand point can be discharged and will need to follow up with GI as outpt in 6-8 weeks for another EGD.  Pt feeling better, tolerating diet no more blody stool.  Pt will be discharged home today     The patient met 2-midnight criteria for an inpatient stay at the time of discharge.    Discharge Date  7/24/2019    FOLLOW UP ITEMS POST DISCHARGE  PCP  GI    DISCHARGE DIAGNOSES  Principal Problem:    Upper GI bleed POA: Yes  Active Problems:    Anemia due to gastrointestinal blood loss POA: Yes    ONI treated with BiPAP POA: Yes      Overview: AHI 77.3, minimum saturation 49%, on BiPAP 17/13cmcm.    HTN (hypertension) POA: Yes    DM (diabetes mellitus) (HCC) POA: Yes    Obesity POA: Yes  Resolved Problems:    * No resolved hospital problems. *      FOLLOW UP  Future Appointments  Date Time Provider Department Center   9/6/2019 12:40 PM Suzi Villatoro, A.P.N. [facility] None     Tyler Rothman M.D.  [address] [place] [address]  [phone]    Call        MEDICATIONS ON DISCHARGE     Medication List      START taking these medications      Instructions   omeprazole 20 MG delayed-release capsule  Commonly known as:  PRILOSEC   Take 1 Cap by mouth 2 times a day.  Dose:  20 mg         CONTINUE taking these medications      Instructions   amLODIPine 10 MG Tabs  Commonly known as:  NORVASC   Take 10 mg by mouth every day.  Dose:  10 mg     atorvastatin 40 MG Tabs  Commonly known as:  LIPITOR   Take 40 mg by mouth every day.  Dose:  40 mg     finasteride 5 MG Tabs  Commonly known as:  PROSCAR   Take 5 mg by mouth every day.  Dose:  5 mg     fish oil 1000 MG Caps capsule   Take 1,000 mg by mouth every day.  Dose:  1000 mg     glipizide-metformin 5-500 MG per tablet  Commonly known as:  METAGLIP   Take 1 Tab by mouth 2 times daily, before breakfast and dinner.  Dose:  1 Tab     lisinopril 20 MG Tabs  Commonly known as:  PRINIVIL   Take 20 mg by mouth every day.  Dose:  20 mg     vitamin B-12 1000 MCG Tabs   Take 1,000 mcg by mouth every day.  Dose:  1000 mcg     vitamin D 1000 UNIT Tabs  Commonly known as:  cholecalciferol   Take 1,000 Units by mouth every day.  Dose:  1000 Units        STOP taking these medications    aspirin EC 81 MG Tbec  Commonly known as:  ECOTRIN            Allergies  No Known Allergies    DIET  No orders of the defined types were placed in this encounter.      ACTIVITY  As tolerated.  Weight bearing as tolerated    CONSULTATIONS  GI    PROCEDURES  EGD    LABORATORY  Lab Results   Component Value Date    SODIUM 138 07/24/2019    POTASSIUM 4.3 07/24/2019    CHLORIDE 104 07/24/2019    CO2 28 07/24/2019    GLUCOSE 151 (H) 07/24/2019    BUN 14 07/24/2019    CREATININE 0.99 07/24/2019    CREATININE 1.0 02/19/2007        Lab Results   Component Value Date    WBC 5.8 07/24/2019    HEMOGLOBIN 7.9 (L) 07/24/2019    HEMATOCRIT 25.8 (L) 07/24/2019    PLATELETCT 179 07/24/2019        Total time of the discharge process exceeds 42 minutes.

## 2019-07-25 ENCOUNTER — PATIENT OUTREACH (OUTPATIENT)
Dept: HEALTH INFORMATION MANAGEMENT | Facility: OTHER | Age: 74
End: 2019-07-25

## 2019-07-26 ENCOUNTER — TELEPHONE (OUTPATIENT)
Dept: SLEEP MEDICINE | Facility: MEDICAL CENTER | Age: 74
End: 2019-07-26

## 2019-07-26 DIAGNOSIS — G47.33 OSA (OBSTRUCTIVE SLEEP APNEA): Primary | ICD-10-CM

## 2019-07-26 NOTE — TELEPHONE ENCOUNTER
Pt came into the [facility] stating that he contacted [facility] regarding an order for mask and supplies but was informed that they have not received an order. I checked the pt chart and I don't see an order for supplies.    Can you please sign pending order.

## 2019-07-29 NOTE — TELEPHONE ENCOUNTER
Order was faxed with last ov, demo, copy of id & insurance  Sent to DME:  [facility] / ph [phone] / fax [phone]

## 2019-08-09 ENCOUNTER — TELEPHONE (OUTPATIENT)
Dept: SLEEP MEDICINE | Facility: MEDICAL CENTER | Age: 74
End: 2019-08-09

## 2019-08-09 DIAGNOSIS — G47.33 OSA (OBSTRUCTIVE SLEEP APNEA): ICD-10-CM

## 2019-08-09 NOTE — TELEPHONE ENCOUNTER
Pt granddaughter cade called stating that the pt machine is not working properly anymore and it is not blowing any air anymore and this has been going on now for a week. She stated that the pt machine has had the machine for 5 years or more now. She asked if an order for a new machine could be place and faxed to [facility].     Last OV: 6/28/19 - Dr. Comer   NOV: NONE    Please advise.

## 2019-08-12 NOTE — TELEPHONE ENCOUNTER
Called pt granddaughter Geovanna to inform her the order for the new BIPAP was signed and faxed to [facility] with all the needed documentation.

## 2019-08-23 ENCOUNTER — PATIENT OUTREACH (OUTPATIENT)
Dept: HEALTH INFORMATION MANAGEMENT | Facility: OTHER | Age: 74
End: 2019-08-23

## 2019-08-23 NOTE — PROGRESS NOTES
A 74-year-old male was an emergent admission to [facility] from 7/22/2019 to 7/24/2019 to treat Gastrointestinal Hemorrhage. IHD visited the patient bedside. The patient was discharged Home. The patient was ordered to start/continue to take the following medication: Omeprazole. The patient successfully filled the medication.    The patient was ordered to follow-up with Specialist and PCP. The patient had the following appointment:   7/31/2019 @ 4:00 Angel Guerin general/family kalyan - CONFIRMED AS KEPT.     The patient has future appointments scheduled for:     1) 8/30/2019 @ 8:00 Tyler Rothman, gastroenterology - SCHEDULED     2) 9/6/2019 @ 12:40 Nereyda Villatoro cardiology - SCHEDULED     3) 9/20/2019 @ 3:00 Angel Guerin general/family kalyan - SCHEDULED    IHD followed the patient for a total of 31 days and patient was receptive to IHD services. PPS screening was conducted at 80%.

## 2019-08-31 ENCOUNTER — HOSPITAL ENCOUNTER (OUTPATIENT)
Dept: LAB | Facility: MEDICAL CENTER | Age: 74
End: 2019-08-31
Attending: INTERNAL MEDICINE
Payer: MEDICARE

## 2019-08-31 DIAGNOSIS — I10 ESSENTIAL HYPERTENSION, BENIGN: ICD-10-CM

## 2019-08-31 LAB
ANION GAP SERPL CALC-SCNC: 9 MMOL/L (ref 0–11.9)
BUN SERPL-MCNC: 14 MG/DL (ref 8–22)
CALCIUM SERPL-MCNC: 9.4 MG/DL (ref 8.5–10.5)
CHLORIDE SERPL-SCNC: 100 MMOL/L (ref 96–112)
CO2 SERPL-SCNC: 29 MMOL/L (ref 20–33)
CREAT SERPL-MCNC: 0.9 MG/DL (ref 0.5–1.4)
GLUCOSE SERPL-MCNC: 128 MG/DL (ref 65–99)
POTASSIUM SERPL-SCNC: 3.9 MMOL/L (ref 3.6–5.5)
SODIUM SERPL-SCNC: 138 MMOL/L (ref 135–145)

## 2019-08-31 PROCEDURE — 36415 COLL VENOUS BLD VENIPUNCTURE: CPT

## 2019-08-31 PROCEDURE — 80048 BASIC METABOLIC PNL TOTAL CA: CPT

## 2019-09-06 ENCOUNTER — OFFICE VISIT (OUTPATIENT)
Dept: CARDIOLOGY | Facility: MEDICAL CENTER | Age: 74
End: 2019-09-06
Payer: MEDICARE

## 2019-09-06 ENCOUNTER — HOSPITAL ENCOUNTER (OUTPATIENT)
Dept: LAB | Facility: MEDICAL CENTER | Age: 74
End: 2019-09-06
Attending: FAMILY MEDICINE
Payer: MEDICARE

## 2019-09-06 VITALS
BODY MASS INDEX: 40.84 KG/M2 | HEIGHT: 64 IN | WEIGHT: 239.2 LBS | SYSTOLIC BLOOD PRESSURE: 138 MMHG | DIASTOLIC BLOOD PRESSURE: 82 MMHG | OXYGEN SATURATION: 95 % | HEART RATE: 100 BPM

## 2019-09-06 DIAGNOSIS — I10 ESSENTIAL HYPERTENSION: ICD-10-CM

## 2019-09-06 DIAGNOSIS — E78.5 HYPERLIPIDEMIA, UNSPECIFIED HYPERLIPIDEMIA TYPE: ICD-10-CM

## 2019-09-06 LAB
ALBUMIN SERPL BCP-MCNC: 4 G/DL (ref 3.2–4.9)
ALBUMIN/GLOB SERPL: 1.3 G/DL
ALP SERPL-CCNC: 59 U/L (ref 30–99)
ALT SERPL-CCNC: 13 U/L (ref 2–50)
ANION GAP SERPL CALC-SCNC: 9 MMOL/L (ref 0–11.9)
AST SERPL-CCNC: 15 U/L (ref 12–45)
BILIRUB SERPL-MCNC: 0.4 MG/DL (ref 0.1–1.5)
BUN SERPL-MCNC: 20 MG/DL (ref 8–22)
CALCIUM SERPL-MCNC: 9.1 MG/DL (ref 8.5–10.5)
CHLORIDE SERPL-SCNC: 103 MMOL/L (ref 96–112)
CHOLEST SERPL-MCNC: 110 MG/DL (ref 100–199)
CO2 SERPL-SCNC: 29 MMOL/L (ref 20–33)
CREAT SERPL-MCNC: 0.83 MG/DL (ref 0.5–1.4)
ERYTHROCYTE [DISTWIDTH] IN BLOOD BY AUTOMATED COUNT: 48.4 FL (ref 35.9–50)
EST. AVERAGE GLUCOSE BLD GHB EST-MCNC: 128 MG/DL
FASTING STATUS PATIENT QL REPORTED: NORMAL
FERRITIN SERPL-MCNC: 12.9 NG/ML (ref 22–322)
GLOBULIN SER CALC-MCNC: 3.1 G/DL (ref 1.9–3.5)
GLUCOSE SERPL-MCNC: 127 MG/DL (ref 65–99)
HBA1C MFR BLD: 6.1 % (ref 0–5.6)
HCT VFR BLD AUTO: 36.6 % (ref 42–52)
HDLC SERPL-MCNC: 35 MG/DL
HGB BLD-MCNC: 10.9 G/DL (ref 14–18)
LDLC SERPL CALC-MCNC: 52 MG/DL
MCH RBC QN AUTO: 25.4 PG (ref 27–33)
MCHC RBC AUTO-ENTMCNC: 29.8 G/DL (ref 33.7–35.3)
MCV RBC AUTO: 85.3 FL (ref 81.4–97.8)
PLATELET # BLD AUTO: 307 K/UL (ref 164–446)
PMV BLD AUTO: 9.1 FL (ref 9–12.9)
POTASSIUM SERPL-SCNC: 4.2 MMOL/L (ref 3.6–5.5)
PROT SERPL-MCNC: 7.1 G/DL (ref 6–8.2)
RBC # BLD AUTO: 4.29 M/UL (ref 4.7–6.1)
SODIUM SERPL-SCNC: 141 MMOL/L (ref 135–145)
TRIGL SERPL-MCNC: 115 MG/DL (ref 0–149)
WBC # BLD AUTO: 7.7 K/UL (ref 4.8–10.8)

## 2019-09-06 PROCEDURE — 36415 COLL VENOUS BLD VENIPUNCTURE: CPT

## 2019-09-06 PROCEDURE — 85027 COMPLETE CBC AUTOMATED: CPT

## 2019-09-06 PROCEDURE — 80061 LIPID PANEL: CPT

## 2019-09-06 PROCEDURE — 83036 HEMOGLOBIN GLYCOSYLATED A1C: CPT

## 2019-09-06 PROCEDURE — 82728 ASSAY OF FERRITIN: CPT

## 2019-09-06 PROCEDURE — 80053 COMPREHEN METABOLIC PANEL: CPT

## 2019-09-06 PROCEDURE — 99213 OFFICE O/P EST LOW 20 MIN: CPT | Performed by: NURSE PRACTITIONER

## 2019-09-06 ASSESSMENT — ENCOUNTER SYMPTOMS
SPUTUM PRODUCTION: 0
HEADACHES: 0
DIZZINESS: 0
WHEEZING: 0
PALPITATIONS: 0
CLAUDICATION: 0
NAUSEA: 0
ORTHOPNEA: 0
PND: 0
COUGH: 0
CHILLS: 0
HEMOPTYSIS: 0
SHORTNESS OF BREATH: 0
VOMITING: 0
FEVER: 0

## 2019-09-06 NOTE — PROGRESS NOTES
Chief Complaint   Patient presents with   • Syncope       Subjective:   Jonathan Rebolledo is a 74 y.o. male who presents today with HTN, TLD, COPD, ONI, DM 2, referred for syncopal episode.  Patient was last seen by Dr. Alex Mcdaniel 5/31/2019.    The patient has had no syncopal episodes.  He denies dizziness, palpitations, chest pain, claudication, and PND.  He has some lower extremity edema left greater than right but has had previous knee surgery.    He reports his blood pressure is typically 130/80.  He is compliant with amlodipine 10 mg daily, atorvastatin 40 mg every evening, and lisinopril 20 mg daily.  He has no problems tolerating his medication and has no significant side effects from them.    Past Medical History:   Diagnosis Date   • Asthma    • Back pain    • Diabetes    • ONI treated with BiPAP 5/10/2016   • Sleep apnea      Past Surgical History:   Procedure Laterality Date   • GASTROSCOPY-ENDO N/A 7/23/2019    Procedure: GASTROSCOPY;  Surgeon: Tyler Rothman M.D.;  Location: ENDOSCOPY [facility];  Service: Gastroenterology   • FULL THICKNESS BLOCK RESECTION  2/19/2013    Performed by Greyson Lopez M.D. at SURGERY [facility] ORS   • BIOPSY GENERAL  2/19/2013    Performed by Greyson Lopez M.D. at SURGERY [facility] ORS   • CATARACT EXTRACTION WITH IOL       Family History   Problem Relation Age of Onset   • Heart Attack Brother    • Cancer Neg Hx      Social History     Socioeconomic History   • Marital status:      Spouse name: Not on file   • Number of children: Not on file   • Years of education: Not on file   • Highest education level: Not on file   Occupational History   • Not on file   Social Needs   • Financial resource strain: Not on file   • Food insecurity:     Worry: Not on file     Inability: Not on file   • Transportation needs:     Medical: Not on file     Non-medical: Not on file   Tobacco Use   • Smoking status: Former Smoker     Packs/day: 1.00     Years:  10.00     Pack years: 10.00     Types: Cigarettes     Start date: 1985     Last attempt to quit: 1995     Years since quittin.6   • Smokeless tobacco: Never Used   Substance and Sexual Activity   • Alcohol use: Yes     Alcohol/week: 2.4 - 3.6 oz     Types: 4 - 6 Cans of beer per week   • Drug use: No   • Sexual activity: Not on file   Lifestyle   • Physical activity:     Days per week: Not on file     Minutes per session: Not on file   • Stress: Not on file   Relationships   • Social connections:     Talks on phone: Not on file     Gets together: Not on file     Attends Anabaptism service: Not on file     Active member of club or organization: Not on file     Attends meetings of clubs or organizations: Not on file     Relationship status: Not on file   • Intimate partner violence:     Fear of current or ex partner: Not on file     Emotionally abused: Not on file     Physically abused: Not on file     Forced sexual activity: Not on file   Other Topics Concern   • Not on file   Social History Narrative    ** Merged History Encounter **          No Known Allergies  Outpatient Encounter Medications as of 2019   Medication Sig Dispense Refill   • omeprazole (PRILOSEC) 20 MG delayed-release capsule Take 1 Cap by mouth 2 times a day. 60 Cap 1   • atorvastatin (LIPITOR) 40 MG Tab Take 40 mg by mouth every day.  3   • Cyanocobalamin (VITAMIN B-12) 1000 MCG Tab Take 1,000 mcg by mouth every day.     • lisinopril (PRINIVIL) 20 MG Tab Take 20 mg by mouth every day.     • vitamin D (CHOLECALCIFEROL) 1000 UNIT Tab Take 1,000 Units by mouth every day.     • Omega-3 Fatty Acids (FISH OIL) 1000 MG Cap capsule Take 1,000 mg by mouth every day.     • glipizide-metformin (METAGLIP) 5-500 MG per tablet Take 1 Tab by mouth 2 times daily, before breakfast and dinner.     • amlodipine (NORVASC) 10 MG TABS Take 10 mg by mouth every day.     • finasteride (PROSCAR) 5 MG TABS Take 5 mg by mouth every day.       No  "facility-administered encounter medications on file as of 9/6/2019.      Review of Systems   Constitutional: Negative for chills and fever.   Respiratory: Negative for cough, hemoptysis, sputum production, shortness of breath and wheezing.    Cardiovascular: Negative for chest pain, palpitations, orthopnea, claudication, leg swelling and PND.   Gastrointestinal: Negative for nausea and vomiting.   Neurological: Negative for dizziness and headaches.   All other systems reviewed and are negative.       Objective:   /82 (BP Location: Right arm, Patient Position: Sitting, BP Cuff Size: Adult)   Pulse 100   Ht 1.626 m (5' 4\")   Wt 108.5 kg (239 lb 3.2 oz)   SpO2 95%   BMI 41.06 kg/m²     Physical Exam   Constitutional: He appears well-developed and well-nourished.   Eyes: EOM are normal.   Neck: Neck supple. No JVD present.   Cardiovascular: Normal rate, regular rhythm and normal heart sounds.   No murmur heard.  Pulmonary/Chest: Effort normal and breath sounds normal.   Abdominal: Soft.   Neurological:   CN II-XII grossly intact   Skin: Skin is warm and dry.   Psychiatric: He has a normal mood and affect. His behavior is normal. Judgment and thought content normal.   Nursing note and vitals reviewed.      Assessment:     1. Hyperlipidemia, unspecified hyperlipidemia type     2. Essential hypertension         Medical Decision Making:  Today's Assessment / Status / Plan:   Patient will continue on amlodipine, lisinopril, and atorvastatin.  He will follow-up in approximately 6 months and establish care with Dr. Crowley.  He will continue to monitor his blood pressure.    This has been dictated using voice recognition software.  Please allow for mistakes.    JOSHUA Martinez  "

## 2019-09-13 ENCOUNTER — APPOINTMENT (RX ONLY)
Dept: URBAN - METROPOLITAN AREA CLINIC 4 | Facility: CLINIC | Age: 74
Setting detail: DERMATOLOGY
End: 2019-09-13

## 2019-09-13 DIAGNOSIS — L91.8 OTHER HYPERTROPHIC DISORDERS OF THE SKIN: ICD-10-CM

## 2019-09-13 DIAGNOSIS — L82.1 OTHER SEBORRHEIC KERATOSIS: ICD-10-CM

## 2019-09-13 PROCEDURE — ? LIQUID NITROGEN

## 2019-09-13 PROCEDURE — 99213 OFFICE O/P EST LOW 20 MIN: CPT

## 2019-09-13 PROCEDURE — ? COUNSELING

## 2019-09-13 ASSESSMENT — LOCATION DETAILED DESCRIPTION DERM
LOCATION DETAILED: LEFT INFERIOR MEDIAL UPPER BACK
LOCATION DETAILED: RIGHT POSTERIOR SHOULDER
LOCATION DETAILED: LEFT SUPERIOR LATERAL FOREHEAD
LOCATION DETAILED: LEFT LATERAL BUCCAL CHEEK
LOCATION DETAILED: RIGHT MEDIAL FOREHEAD
LOCATION DETAILED: RIGHT CENTRAL TEMPLE
LOCATION DETAILED: LEFT PROXIMAL ULNAR DORSAL FOREARM
LOCATION DETAILED: LEFT ANTECUBITAL SKIN
LOCATION DETAILED: RIGHT CLAVICULAR SKIN
LOCATION DETAILED: RIGHT DISTAL DORSAL FOREARM
LOCATION DETAILED: RIGHT INFERIOR UPPER BACK
LOCATION DETAILED: RIGHT LATERAL INFERIOR CHEST
LOCATION DETAILED: LEFT PROXIMAL DORSAL FOREARM
LOCATION DETAILED: LEFT DISTAL RADIAL DORSAL FOREARM
LOCATION DETAILED: RIGHT VENTRAL DISTAL FOREARM
LOCATION DETAILED: EPIGASTRIC SKIN
LOCATION DETAILED: RIGHT SUPERIOR MEDIAL MIDBACK
LOCATION DETAILED: LEFT SUPERIOR PREAURICULAR CHEEK
LOCATION DETAILED: RIGHT RADIAL DORSAL HAND
LOCATION DETAILED: LEFT PROXIMAL RADIAL DORSAL FOREARM
LOCATION DETAILED: LEFT MEDIAL UPPER BACK
LOCATION DETAILED: LEFT SUPERIOR LATERAL UPPER BACK
LOCATION DETAILED: LEFT CENTRAL TEMPLE
LOCATION DETAILED: RIGHT LATERAL UPPER BACK
LOCATION DETAILED: RIGHT INFERIOR LATERAL UPPER BACK
LOCATION DETAILED: RIGHT PROXIMAL POSTERIOR UPPER ARM
LOCATION DETAILED: LEFT SUPERIOR ANTERIOR NECK
LOCATION DETAILED: RIGHT ULNAR DORSAL HAND
LOCATION DETAILED: RIGHT SUPERIOR UPPER BACK
LOCATION DETAILED: LEFT INFERIOR UPPER BACK
LOCATION DETAILED: LEFT SUPERIOR MEDIAL FOREHEAD
LOCATION DETAILED: LEFT CLAVICULAR NECK
LOCATION DETAILED: RIGHT ANTERIOR LATERAL DISTAL UPPER ARM
LOCATION DETAILED: RIGHT CLAVICULAR NECK
LOCATION DETAILED: RIGHT SUPERIOR MEDIAL UPPER BACK
LOCATION DETAILED: RIGHT VENTRAL PROXIMAL FOREARM
LOCATION DETAILED: LEFT LATERAL ELBOW
LOCATION DETAILED: GLABELLA
LOCATION DETAILED: LEFT VENTRAL PROXIMAL FOREARM
LOCATION DETAILED: LEFT DISTAL DORSAL FOREARM
LOCATION DETAILED: RIGHT DISTAL RADIAL DORSAL FOREARM
LOCATION DETAILED: RIGHT SUPERIOR FOREHEAD
LOCATION DETAILED: RIGHT SUPERIOR LATERAL FOREHEAD
LOCATION DETAILED: LEFT ANTERIOR DISTAL UPPER ARM
LOCATION DETAILED: LEFT LATERAL FOREHEAD
LOCATION DETAILED: RIGHT PROXIMAL DORSAL FOREARM
LOCATION DETAILED: RIGHT ANTERIOR PROXIMAL UPPER ARM
LOCATION DETAILED: RIGHT LATERAL TRAPEZIAL NECK
LOCATION DETAILED: LEFT POSTERIOR SHOULDER
LOCATION DETAILED: LEFT MEDIAL TEMPLE
LOCATION DETAILED: LEFT SUPERIOR MEDIAL UPPER BACK
LOCATION DETAILED: INFERIOR THORACIC SPINE
LOCATION DETAILED: RIGHT MID-UPPER BACK
LOCATION DETAILED: LEFT LATERAL TEMPLE
LOCATION DETAILED: RIGHT ANTERIOR DISTAL UPPER ARM
LOCATION DETAILED: LEFT SUPERIOR UPPER BACK
LOCATION DETAILED: RIGHT SUPERIOR LATERAL MIDBACK
LOCATION DETAILED: LEFT CENTRAL MALAR CHEEK

## 2019-09-13 ASSESSMENT — LOCATION SIMPLE DESCRIPTION DERM
LOCATION SIMPLE: UPPER BACK
LOCATION SIMPLE: LEFT FOREARM
LOCATION SIMPLE: RIGHT SHOULDER
LOCATION SIMPLE: CHEST
LOCATION SIMPLE: LEFT CHEEK
LOCATION SIMPLE: RIGHT FOREARM
LOCATION SIMPLE: LEFT ANTERIOR NECK
LOCATION SIMPLE: POSTERIOR NECK
LOCATION SIMPLE: LEFT UPPER BACK
LOCATION SIMPLE: RIGHT ANTERIOR NECK
LOCATION SIMPLE: LEFT TEMPLE
LOCATION SIMPLE: LEFT UPPER ARM
LOCATION SIMPLE: RIGHT FOREHEAD
LOCATION SIMPLE: RIGHT HAND
LOCATION SIMPLE: LEFT FOREHEAD
LOCATION SIMPLE: GLABELLA
LOCATION SIMPLE: ABDOMEN
LOCATION SIMPLE: RIGHT UPPER ARM
LOCATION SIMPLE: RIGHT CLAVICULAR SKIN
LOCATION SIMPLE: LEFT SHOULDER
LOCATION SIMPLE: RIGHT UPPER BACK
LOCATION SIMPLE: LEFT ELBOW
LOCATION SIMPLE: RIGHT TEMPLE
LOCATION SIMPLE: RIGHT LOWER BACK

## 2019-09-13 ASSESSMENT — LOCATION ZONE DERM
LOCATION ZONE: FACE
LOCATION ZONE: NECK
LOCATION ZONE: TRUNK
LOCATION ZONE: HAND
LOCATION ZONE: ARM

## 2019-09-13 NOTE — PROCEDURE: LIQUID NITROGEN
Render Post-Care Instructions In Note?: no
Medical Necessity Information: It is in your best interest to select a reason for this procedure from the list below. All of these items fulfill various CMS LCD requirements except the new and changing color options.
Detail Level: Detailed
Duration Of Freeze Thaw-Cycle (Seconds): 0
Consent: The patient's consent was obtained including but not limited to risks of crusting, scabbing, blistering, scarring, darker or lighter pigmentary change, recurrence, incomplete removal and infection.
Post-Care Instructions: I reviewed with the patient in detail post-care instructions. Patient is to wear sunprotection, and avoid picking at any of the treated lesions. Pt may apply Vaseline to crusted or scabbing areas.
Medical Necessity Clause: This procedure was medically necessary because the lesions that were treated were:  If lesion does not resolve, bx is needed.

## 2019-12-13 ENCOUNTER — HOSPITAL ENCOUNTER (OUTPATIENT)
Dept: LAB | Facility: MEDICAL CENTER | Age: 74
End: 2019-12-13
Attending: FAMILY MEDICINE
Payer: MEDICARE

## 2019-12-13 LAB
ALBUMIN SERPL BCP-MCNC: 4.3 G/DL (ref 3.2–4.9)
ALBUMIN/GLOB SERPL: 1.5 G/DL
ALP SERPL-CCNC: 56 U/L (ref 30–99)
ALT SERPL-CCNC: 18 U/L (ref 2–50)
ANION GAP SERPL CALC-SCNC: 11 MMOL/L (ref 0–11.9)
AST SERPL-CCNC: 18 U/L (ref 12–45)
BILIRUB SERPL-MCNC: 0.3 MG/DL (ref 0.1–1.5)
BUN SERPL-MCNC: 21 MG/DL (ref 8–22)
CALCIUM SERPL-MCNC: 9.8 MG/DL (ref 8.5–10.5)
CHLORIDE SERPL-SCNC: 102 MMOL/L (ref 96–112)
CHOLEST SERPL-MCNC: 116 MG/DL (ref 100–199)
CO2 SERPL-SCNC: 26 MMOL/L (ref 20–33)
CREAT SERPL-MCNC: 0.93 MG/DL (ref 0.5–1.4)
ERYTHROCYTE [DISTWIDTH] IN BLOOD BY AUTOMATED COUNT: 65.1 FL (ref 35.9–50)
FASTING STATUS PATIENT QL REPORTED: NORMAL
GLOBULIN SER CALC-MCNC: 2.9 G/DL (ref 1.9–3.5)
GLUCOSE SERPL-MCNC: 136 MG/DL (ref 65–99)
HCT VFR BLD AUTO: 43.5 % (ref 42–52)
HDLC SERPL-MCNC: 36 MG/DL
HGB BLD-MCNC: 13.5 G/DL (ref 14–18)
LDLC SERPL CALC-MCNC: 37 MG/DL
MCH RBC QN AUTO: 25.8 PG (ref 27–33)
MCHC RBC AUTO-ENTMCNC: 31 G/DL (ref 33.7–35.3)
MCV RBC AUTO: 83.2 FL (ref 81.4–97.8)
PLATELET # BLD AUTO: 300 K/UL (ref 164–446)
PMV BLD AUTO: 9.6 FL (ref 9–12.9)
POTASSIUM SERPL-SCNC: 3.9 MMOL/L (ref 3.6–5.5)
PROT SERPL-MCNC: 7.2 G/DL (ref 6–8.2)
RBC # BLD AUTO: 5.23 M/UL (ref 4.7–6.1)
SODIUM SERPL-SCNC: 139 MMOL/L (ref 135–145)
TRIGL SERPL-MCNC: 214 MG/DL (ref 0–149)
WBC # BLD AUTO: 8 K/UL (ref 4.8–10.8)

## 2019-12-13 PROCEDURE — 80061 LIPID PANEL: CPT

## 2019-12-13 PROCEDURE — 82728 ASSAY OF FERRITIN: CPT

## 2019-12-13 PROCEDURE — 36415 COLL VENOUS BLD VENIPUNCTURE: CPT

## 2019-12-13 PROCEDURE — 83036 HEMOGLOBIN GLYCOSYLATED A1C: CPT

## 2019-12-13 PROCEDURE — 80053 COMPREHEN METABOLIC PANEL: CPT

## 2019-12-13 PROCEDURE — 85027 COMPLETE CBC AUTOMATED: CPT

## 2019-12-14 LAB
EST. AVERAGE GLUCOSE BLD GHB EST-MCNC: 157 MG/DL
FERRITIN SERPL-MCNC: 29.3 NG/ML (ref 22–322)
HBA1C MFR BLD: 7.1 % (ref 0–5.6)

## 2020-01-06 ENCOUNTER — OFFICE VISIT (OUTPATIENT)
Dept: URGENT CARE | Facility: PHYSICIAN GROUP | Age: 75
End: 2020-01-06
Payer: MEDICARE

## 2020-01-06 ENCOUNTER — HOSPITAL ENCOUNTER (OUTPATIENT)
Dept: RADIOLOGY | Facility: MEDICAL CENTER | Age: 75
End: 2020-01-06
Attending: FAMILY MEDICINE
Payer: MEDICARE

## 2020-01-06 VITALS
TEMPERATURE: 98.3 F | RESPIRATION RATE: 24 BRPM | HEIGHT: 64 IN | SYSTOLIC BLOOD PRESSURE: 140 MMHG | HEART RATE: 80 BPM | OXYGEN SATURATION: 95 % | BODY MASS INDEX: 41.66 KG/M2 | DIASTOLIC BLOOD PRESSURE: 86 MMHG | WEIGHT: 244 LBS

## 2020-01-06 DIAGNOSIS — J22 LRTI (LOWER RESPIRATORY TRACT INFECTION): ICD-10-CM

## 2020-01-06 PROCEDURE — 99204 OFFICE O/P NEW MOD 45 MIN: CPT | Performed by: FAMILY MEDICINE

## 2020-01-06 PROCEDURE — 71046 X-RAY EXAM CHEST 2 VIEWS: CPT

## 2020-01-06 RX ORDER — MOMETASONE FUROATE AND FORMOTEROL FUMARATE DIHYDRATE 200; 5 UG/1; UG/1
1 AEROSOL RESPIRATORY (INHALATION)
COMMUNITY
Start: 2019-12-19 | End: 2021-07-23

## 2020-01-06 RX ORDER — ALBUTEROL SULFATE 90 UG/1
AEROSOL, METERED RESPIRATORY (INHALATION)
COMMUNITY
Start: 2019-12-19 | End: 2021-11-01

## 2020-01-06 RX ORDER — AZITHROMYCIN 250 MG/1
TABLET, FILM COATED ORAL
Qty: 6 TAB | Refills: 0 | Status: SHIPPED | OUTPATIENT
Start: 2020-01-06 | End: 2021-07-23

## 2020-01-06 ASSESSMENT — ENCOUNTER SYMPTOMS
CHILLS: 0
COUGH: 1
MYALGIAS: 0
SHORTNESS OF BREATH: 1
VOMITING: 0
WHEEZING: 1
NAUSEA: 0
DIZZINESS: 0
SORE THROAT: 0
FEVER: 0
EYE PAIN: 0

## 2020-01-06 NOTE — PROGRESS NOTES
Subjective:     Jonathan Rebolledo  is a 74 y.o. male who presents for Cough (phelegm, SOB, dizziness x6 days)       Cough   This is a new problem. The current episode started in the past 7 days. The problem has been gradually worsening. The problem occurs every few minutes. The cough is productive of sputum. Associated symptoms include shortness of breath and wheezing. Pertinent negatives include no chest pain, chills, fever, myalgias, nasal congestion, postnasal drip, rash or sore throat.     Past Medical History:   Diagnosis Date   • Asthma    • Back pain    • Diabetes    • ONI treated with BiPAP 5/10/2016   • Sleep apnea      Past Surgical History:   Procedure Laterality Date   • GASTROSCOPY-ENDO N/A 2019    Procedure: GASTROSCOPY;  Surgeon: Tyler Rothman M.D.;  Location: [facility];  Service: Gastroenterology   • FULL THICKNESS BLOCK RESECTION  2013    Performed by Greyson Lopez M.D. at SURGERY [facility] ORS   • BIOPSY GENERAL  2013    Performed by Greyson Lopez M.D. at SURGERY [facility]   • CATARACT EXTRACTION WITH IOL       Social History     Socioeconomic History   • Marital status:      Spouse name: Not on file   • Number of children: Not on file   • Years of education: Not on file   • Highest education level: Not on file   Occupational History   • Not on file   Social Needs   • Financial resource strain: Not on file   • Food insecurity:     Worry: Not on file     Inability: Not on file   • Transportation needs:     Medical: Not on file     Non-medical: Not on file   Tobacco Use   • Smoking status: Former Smoker     Packs/day: 1.00     Years: 10.00     Pack years: 10.00     Types: Cigarettes     Start date: 1985     Last attempt to quit: 1995     Years since quittin.0   • Smokeless tobacco: Never Used   Substance and Sexual Activity   • Alcohol use: Not Currently     Alcohol/week: 2.4 - 3.6 oz     Types: 4 - 6 Cans of beer per week   •  "Drug use: No   • Sexual activity: Not on file   Lifestyle   • Physical activity:     Days per week: Not on file     Minutes per session: Not on file   • Stress: Not on file   Relationships   • Social connections:     Talks on phone: Not on file     Gets together: Not on file     Attends Latter day service: Not on file     Active member of club or organization: Not on file     Attends meetings of clubs or organizations: Not on file     Relationship status: Not on file   • Intimate partner violence:     Fear of current or ex partner: Not on file     Emotionally abused: Not on file     Physically abused: Not on file     Forced sexual activity: Not on file   Other Topics Concern   • Not on file   Social History Narrative    ** Merged History Encounter **           Family History   Problem Relation Age of Onset   • Heart Attack Brother    • Cancer Neg Hx     Review of Systems   Constitutional: Negative for chills and fever.   HENT: Negative for postnasal drip and sore throat.    Eyes: Negative for pain.   Respiratory: Positive for cough, shortness of breath and wheezing.    Cardiovascular: Negative for chest pain.   Gastrointestinal: Negative for nausea and vomiting.   Genitourinary: Negative for hematuria.   Musculoskeletal: Negative for myalgias.   Skin: Negative for rash.   Neurological: Negative for dizziness.   No Known Allergies   Objective:   /86 (BP Location: Left arm, Patient Position: Sitting, BP Cuff Size: Adult)   Pulse 80   Temp 36.8 °C (98.3 °F) (Temporal)   Resp (!) 24   Ht 1.626 m (5' 4\")   Wt 110.7 kg (244 lb)   SpO2 95%   BMI 41.88 kg/m²   Physical Exam  Constitutional:       General: He is not in acute distress.     Appearance: He is well-developed.   HENT:      Head: Normocephalic and atraumatic.   Eyes:      Conjunctiva/sclera: Conjunctivae normal.      Pupils: Pupils are equal, round, and reactive to light.   Cardiovascular:      Rate and Rhythm: Normal rate and regular rhythm.      " Heart sounds: No murmur.   Pulmonary:      Effort: Pulmonary effort is normal. No respiratory distress.      Breath sounds: Wheezing present. No rhonchi or rales.   Abdominal:      General: There is no distension.      Palpations: Abdomen is soft.      Tenderness: There is no tenderness.   Skin:     General: Skin is warm and dry.   Neurological:      Mental Status: He is alert and oriented to person, place, and time.      Sensory: No sensory deficit.      Deep Tendon Reflexes: Reflexes are normal and symmetric.           Assessment/Plan:   Assessment    1. LRTI (lower respiratory tract infection)  - DX-CHEST-2 VIEWS; Future  - azithromycin (ZITHROMAX) 250 MG Tab; Take 2 tablets by mouth on day one. Take one tablet by mouth the remaining days until gone  Dispense: 6 Tab; Refill: 0    Other orders  - DULERA 200-5 MCG/ACT Aerosol; Inhale 1 Puff by mouth.  - albuterol 108 (90 Base) MCG/ACT Aero Soln inhalation aerosol; INHALE 1 PUFF BY MOUTH EVERY 4 HOURS AS NEEDED FOR SHORTNESS OF BREATH AND WHEEZE.    Differential diagnosis, natural history, supportive care, and indications for immediate follow-up discussed.

## 2020-02-19 DIAGNOSIS — I10 ESSENTIAL HYPERTENSION: Primary | ICD-10-CM

## 2020-02-19 RX ORDER — LISINOPRIL 20 MG/1
20 TABLET ORAL DAILY
Qty: 100 TAB | Refills: 3 | Status: SHIPPED | OUTPATIENT
Start: 2020-02-19 | End: 2021-05-18

## 2020-03-13 ENCOUNTER — APPOINTMENT (RX ONLY)
Dept: URBAN - METROPOLITAN AREA CLINIC 4 | Facility: CLINIC | Age: 75
Setting detail: DERMATOLOGY
End: 2020-03-13

## 2020-03-13 DIAGNOSIS — L29.8 OTHER PRURITUS: ICD-10-CM

## 2020-03-13 DIAGNOSIS — L81.0 POSTINFLAMMATORY HYPERPIGMENTATION: ICD-10-CM

## 2020-03-13 DIAGNOSIS — L82.1 OTHER SEBORRHEIC KERATOSIS: ICD-10-CM

## 2020-03-13 DIAGNOSIS — L29.89 OTHER PRURITUS: ICD-10-CM

## 2020-03-13 PROCEDURE — ? COUNSELING

## 2020-03-13 PROCEDURE — ? LIQUID NITROGEN

## 2020-03-13 PROCEDURE — 99213 OFFICE O/P EST LOW 20 MIN: CPT

## 2020-03-13 ASSESSMENT — LOCATION DETAILED DESCRIPTION DERM
LOCATION DETAILED: LEFT INFERIOR MEDIAL MIDBACK
LOCATION DETAILED: RIGHT SUPERIOR MEDIAL MIDBACK
LOCATION DETAILED: LEFT SUPERIOR UPPER BACK
LOCATION DETAILED: LEFT SUPERIOR MEDIAL LOWER BACK
LOCATION DETAILED: RIGHT SUPERIOR LATERAL MIDBACK
LOCATION DETAILED: RIGHT SUPERIOR LATERAL UPPER BACK
LOCATION DETAILED: LEFT POSTERIOR SHOULDER
LOCATION DETAILED: RIGHT INFERIOR LATERAL MIDBACK
LOCATION DETAILED: RIGHT SUPERIOR UPPER BACK
LOCATION DETAILED: LEFT LATERAL UPPER BACK
LOCATION DETAILED: LEFT INFERIOR LATERAL UPPER BACK
LOCATION DETAILED: LEFT SUPERIOR LATERAL MIDBACK
LOCATION DETAILED: RIGHT INFERIOR MEDIAL UPPER BACK
LOCATION DETAILED: LEFT INFERIOR UPPER BACK
LOCATION DETAILED: RIGHT INFERIOR LATERAL UPPER BACK
LOCATION DETAILED: RIGHT MEDIAL UPPER BACK
LOCATION DETAILED: INFERIOR THORACIC SPINE
LOCATION DETAILED: SUPERIOR LUMBAR SPINE
LOCATION DETAILED: LEFT SUPERIOR MEDIAL MIDBACK
LOCATION DETAILED: LEFT MID-UPPER BACK
LOCATION DETAILED: RIGHT INFERIOR MEDIAL MIDBACK
LOCATION DETAILED: LEFT INFERIOR LATERAL MIDBACK
LOCATION DETAILED: LEFT MEDIAL UPPER BACK
LOCATION DETAILED: RIGHT MID-UPPER BACK

## 2020-03-13 ASSESSMENT — LOCATION SIMPLE DESCRIPTION DERM
LOCATION SIMPLE: LEFT LOWER BACK
LOCATION SIMPLE: LEFT UPPER BACK
LOCATION SIMPLE: LEFT SHOULDER
LOCATION SIMPLE: LOWER BACK
LOCATION SIMPLE: RIGHT LOWER BACK
LOCATION SIMPLE: UPPER BACK
LOCATION SIMPLE: RIGHT UPPER BACK

## 2020-03-13 ASSESSMENT — LOCATION ZONE DERM
LOCATION ZONE: ARM
LOCATION ZONE: TRUNK

## 2020-06-18 ENCOUNTER — HOSPITAL ENCOUNTER (OUTPATIENT)
Dept: LAB | Facility: MEDICAL CENTER | Age: 75
End: 2020-06-18
Attending: FAMILY MEDICINE
Payer: MEDICARE

## 2020-06-18 LAB
ALBUMIN SERPL BCP-MCNC: 4.3 G/DL (ref 3.2–4.9)
ALBUMIN/GLOB SERPL: 1.5 G/DL
ALP SERPL-CCNC: 66 U/L (ref 30–99)
ALT SERPL-CCNC: 25 U/L (ref 2–50)
ANION GAP SERPL CALC-SCNC: 12 MMOL/L (ref 7–16)
APPEARANCE UR: CLEAR
AST SERPL-CCNC: 16 U/L (ref 12–45)
BILIRUB SERPL-MCNC: 0.3 MG/DL (ref 0.1–1.5)
BILIRUB UR QL STRIP.AUTO: NEGATIVE
BUN SERPL-MCNC: 13 MG/DL (ref 8–22)
CALCIUM SERPL-MCNC: 9.3 MG/DL (ref 8.5–10.5)
CHLORIDE SERPL-SCNC: 99 MMOL/L (ref 96–112)
CHOLEST SERPL-MCNC: 127 MG/DL (ref 100–199)
CO2 SERPL-SCNC: 28 MMOL/L (ref 20–33)
COLOR UR: YELLOW
CREAT SERPL-MCNC: 0.77 MG/DL (ref 0.5–1.4)
CREAT UR-MCNC: 57.35 MG/DL
ERYTHROCYTE [DISTWIDTH] IN BLOOD BY AUTOMATED COUNT: 45.4 FL (ref 35.9–50)
EST. AVERAGE GLUCOSE BLD GHB EST-MCNC: 177 MG/DL
FASTING STATUS PATIENT QL REPORTED: NORMAL
GLOBULIN SER CALC-MCNC: 2.8 G/DL (ref 1.9–3.5)
GLUCOSE SERPL-MCNC: 150 MG/DL (ref 65–99)
GLUCOSE UR STRIP.AUTO-MCNC: NEGATIVE MG/DL
HBA1C MFR BLD: 7.8 % (ref 0–5.6)
HCT VFR BLD AUTO: 45.6 % (ref 42–52)
HDLC SERPL-MCNC: 34 MG/DL
HGB BLD-MCNC: 14.6 G/DL (ref 14–18)
KETONES UR STRIP.AUTO-MCNC: NEGATIVE MG/DL
LDLC SERPL CALC-MCNC: 50 MG/DL
LEUKOCYTE ESTERASE UR QL STRIP.AUTO: NEGATIVE
MCH RBC QN AUTO: 29.1 PG (ref 27–33)
MCHC RBC AUTO-ENTMCNC: 32 G/DL (ref 33.7–35.3)
MCV RBC AUTO: 91 FL (ref 81.4–97.8)
MICRO URNS: NORMAL
MICROALBUMIN UR-MCNC: 7.6 MG/DL
MICROALBUMIN/CREAT UR: 133 MG/G (ref 0–30)
NITRITE UR QL STRIP.AUTO: NEGATIVE
PH UR STRIP.AUTO: 7 [PH] (ref 5–8)
PLATELET # BLD AUTO: 262 K/UL (ref 164–446)
PMV BLD AUTO: 8.9 FL (ref 9–12.9)
POTASSIUM SERPL-SCNC: 4.2 MMOL/L (ref 3.6–5.5)
PROT SERPL-MCNC: 7.1 G/DL (ref 6–8.2)
PROT UR QL STRIP: NEGATIVE MG/DL
RBC # BLD AUTO: 5.01 M/UL (ref 4.7–6.1)
RBC UR QL AUTO: NEGATIVE
SODIUM SERPL-SCNC: 139 MMOL/L (ref 135–145)
SP GR UR STRIP.AUTO: 1.01
TRIGL SERPL-MCNC: 216 MG/DL (ref 0–149)
TSH SERPL DL<=0.005 MIU/L-ACNC: 3.67 UIU/ML (ref 0.38–5.33)
UROBILINOGEN UR STRIP.AUTO-MCNC: 0.2 MG/DL
WBC # BLD AUTO: 9 K/UL (ref 4.8–10.8)

## 2020-06-18 PROCEDURE — 81003 URINALYSIS AUTO W/O SCOPE: CPT

## 2020-06-18 PROCEDURE — 83036 HEMOGLOBIN GLYCOSYLATED A1C: CPT

## 2020-06-18 PROCEDURE — 84443 ASSAY THYROID STIM HORMONE: CPT

## 2020-06-18 PROCEDURE — 82570 ASSAY OF URINE CREATININE: CPT

## 2020-06-18 PROCEDURE — 82043 UR ALBUMIN QUANTITATIVE: CPT

## 2020-06-18 PROCEDURE — 80061 LIPID PANEL: CPT

## 2020-06-18 PROCEDURE — 85027 COMPLETE CBC AUTOMATED: CPT

## 2020-06-18 PROCEDURE — 80053 COMPREHEN METABOLIC PANEL: CPT

## 2020-06-18 PROCEDURE — 36415 COLL VENOUS BLD VENIPUNCTURE: CPT

## 2020-07-07 ENCOUNTER — SLEEP CENTER VISIT (OUTPATIENT)
Dept: SLEEP MEDICINE | Facility: MEDICAL CENTER | Age: 75
End: 2020-07-07
Payer: MEDICARE

## 2020-07-07 VITALS
SYSTOLIC BLOOD PRESSURE: 142 MMHG | DIASTOLIC BLOOD PRESSURE: 84 MMHG | BODY MASS INDEX: 42.85 KG/M2 | WEIGHT: 251 LBS | RESPIRATION RATE: 14 BRPM | HEART RATE: 105 BPM | HEIGHT: 64 IN | OXYGEN SATURATION: 95 %

## 2020-07-07 DIAGNOSIS — G47.33 OSA TREATED WITH BIPAP: ICD-10-CM

## 2020-07-07 PROCEDURE — 99213 OFFICE O/P EST LOW 20 MIN: CPT | Performed by: FAMILY MEDICINE

## 2020-07-07 ASSESSMENT — FIBROSIS 4 INDEX: FIB4 SCORE: 0.94

## 2020-07-07 NOTE — PATIENT INSTRUCTIONS
"  CPAP and BPAP Information  CPAP and BPAP are methods of helping a person breathe with the use of air pressure. CPAP stands for \"continuous positive airway pressure.\" BPAP stands for \"bi-level positive airway pressure.\" In both methods, air is blown through your nose or mouth and into your air passages to help you breathe well.  CPAP and BPAP use different amounts of pressure to blow air. With CPAP, the amount of pressure stays the same while you breathe in and out. With BPAP, the amount of pressure is increased when you breathe in (inhale) so that you can take larger breaths. Your health care provider will recommend whether CPAP or BPAP would be more helpful for you.  Why are CPAP and BPAP treatments used?  CPAP or BPAP can be helpful if you have:  · Sleep apnea.  · Chronic obstructive pulmonary disease (COPD).  · Heart failure.  · Medical conditions that weaken the muscles of the chest including muscular dystrophy, or neurological diseases such as amyotrophic lateral sclerosis (ALS).  · Other problems that cause breathing to be weak, abnormal, or difficult.  CPAP is most commonly used for obstructive sleep apnea (ONI) to keep the airways from collapsing when the muscles relax during sleep.  How is CPAP or BPAP administered?  Both CPAP and BPAP are provided by a small machine with a flexible plastic tube that attaches to a plastic mask. You wear the mask. Air is blown through the mask into your nose or mouth. The amount of pressure that is used to blow the air can be adjusted on the machine. Your health care provider will determine the pressure setting that should be used based on your individual needs.  When should CPAP or BPAP be used?  In most cases, the mask only needs to be worn during sleep. Generally, the mask needs to be worn throughout the night and during any daytime naps. People with certain medical conditions may also need to wear the mask at other times when they are awake. Follow instructions from " your health care provider about when to use the machine.  What are some tips for using the mask?    · Because the mask needs to be snug, some people feel trapped or closed-in (claustrophobic) when first using the mask. If you feel this way, you may need to get used to the mask. One way to do this is by holding the mask loosely over your nose or mouth and then gradually applying the mask more snugly. You can also gradually increase the amount of time that you use the mask.  · Masks are available in various types and sizes. Some fit over your mouth and nose while others fit over just your nose. If your mask does not fit well, talk with your health care provider about getting a different one.  · If you are using a mask that fits over your nose and you tend to breathe through your mouth, a chin strap may be applied to help keep your mouth closed.  · The CPAP and BPAP machines have alarms that may sound if the mask comes off or develops a leak.  · If you have trouble with the mask, it is very important that you talk with your health care provider about finding a way to make the mask easier to tolerate. Do not stop using the mask. Stopping the use of the mask could have a negative impact on your health.  What are some tips for using the machine?  · Place your CPAP or BPAP machine on a secure table or stand near an electrical outlet.  · Know where the on/off switch is located on the machine.  · Follow instructions from your health care provider about how to set the pressure on your machine and when you should use it.  · Do not eat or drink while the CPAP or BPAP machine is on. Food or fluids could get pushed into your lungs by the pressure of the CPAP or BPAP.  · Do not smoke. Tobacco smoke residue can damage the machine.  · For home use, CPAP and BPAP machines can be rented or purchased through home health care companies. Many different brands of machines are available. Renting a machine before purchasing may help you  find out which particular machine works well for you.  · Keep the CPAP or BPAP machine and attachments clean. Ask your health care provider for specific instructions.  Get help right away if:  · You have redness or open areas around your nose or mouth where the mask fits.  · You have trouble using the CPAP or BPAP machine.  · You cannot tolerate wearing the CPAP or BPAP mask.  · You have pain, discomfort, and bloating in your abdomen.  Summary  · CPAP and BPAP are methods of helping a person breathe with the use of air pressure.  · Both CPAP and BPAP are provided by a small machine with a flexible plastic tube that attaches to a plastic mask.  · If you have trouble with the mask, it is very important that you talk with your health care provider about finding a way to make the mask easier to tolerate.  This information is not intended to replace advice given to you by your health care provider. Make sure you discuss any questions you have with your health care provider.  Document Released: 09/15/2005 Document Revised: 04/08/2020 Document Reviewed: 11/06/2017  Elsevier Patient Education © 2020 [employer]

## 2020-07-07 NOTE — PROGRESS NOTES
[facility] Follow Up Note     Date: 7/7/2020 / Time: 10:36 AM    Patient ID:   Name:             Jonathan Rebolledo     YOB: 1945  Age:                 75 y.o.  male   MRN:               [id number]      Thank you for requesting a sleep medicine consultation on Jonathan Rebolledo at the sleep center. He presents today with the chief complaints of ONI yearly follow up. Polysomnogram indicated AHI 77.3 and minimum saturation 49%.    HISTORY OF PRESENT ILLNESS:       Pt is currently on  BiPAP 18/14 cm with nasal nuance. He goes to sleep around 10-11 pm and wakes up around 7-9 am. He is getting about 7 hrs of sleep on a good night and about 5 hr of sleep on a bad night. The bad nights are about 1-2 per week. He wakes up 2-3 time for nocturia. Overall,  He does  finds his sleep refreshing. He does not take regular naps. Denies any changes or new sleep disorder. Denies usage of a sleep aid.    He is using BiPAP most days of the week. Pt reports 5 hrs of average nightly use of BiPAP. Pt denies snoring, gasping,choking.Pt also denies significant mask leak that is interfering with sleep. The 30 day compliance was downloaded which shows adequate compliance with more that 4 hr usage about 80%. The AHI is has improved to 0.5/hr. The mask leak is normal. The symptoms of ONI are well controlled with the therapy.     REVIEW OF SYSTEMS:       Constitutional: Denies fevers, Denies weight changes  Eyes: Denies changes in vision, no eye pain  Ears/Nose/Throat/Mouth: Denies nasal congestion or sore throat   Cardiovascular: Denies chest pain or palpitations   Respiratory: Denies shortness of breath , Denies cough  Gastrointestinal/Hepatic: Denies abdominal pain, nausea, vomiting,   Musculoskeletal/Rheum: Denies  joint pain and swelling   Skin/Breast: Denies rash,   Neurological: Denies headache,   Psychiatric: denies mood disorder   Sleep: denies snoring     Comprehensive review of systems form is  reviewed with the patient and is attached in the EMR.     PMH:  has a past medical history of Asthma, Back pain, Diabetes, ONI treated with BiPAP (5/10/2016), and Sleep apnea. He also has no past medical history of ASTHMA.  MEDS:   Current Outpatient Medications:   •  Ferrous Sulfate (IRON PO), Take  by mouth., Disp: , Rfl:   •  lisinopril (PRINIVIL) 20 MG Tab, Take 1 Tab by mouth every day., Disp: 100 Tab, Rfl: 3  •  DULERA 200-5 MCG/ACT Aerosol, Inhale 1 Puff by mouth., Disp: , Rfl:   •  albuterol 108 (90 Base) MCG/ACT Aero Soln inhalation aerosol, INHALE 1 PUFF BY MOUTH EVERY 4 HOURS AS NEEDED FOR SHORTNESS OF BREATH AND WHEEZE., Disp: , Rfl:   •  azithromycin (ZITHROMAX) 250 MG Tab, Take 2 tablets by mouth on day one. Take one tablet by mouth the remaining days until gone, Disp: 6 Tab, Rfl: 0  •  omeprazole (PRILOSEC) 20 MG delayed-release capsule, Take 1 Cap by mouth 2 times a day., Disp: 60 Cap, Rfl: 1  •  atorvastatin (LIPITOR) 40 MG Tab, Take 40 mg by mouth every day., Disp: , Rfl: 3  •  Cyanocobalamin (VITAMIN B-12) 1000 MCG Tab, Take 1,000 mcg by mouth every day., Disp: , Rfl:   •  vitamin D (CHOLECALCIFEROL) 1000 UNIT Tab, Take 1,000 Units by mouth every day., Disp: , Rfl:   •  Omega-3 Fatty Acids (FISH OIL) 1000 MG Cap capsule, Take 1,000 mg by mouth every day., Disp: , Rfl:   •  amlodipine (NORVASC) 10 MG TABS, Take 10 mg by mouth every day., Disp: , Rfl:   •  finasteride (PROSCAR) 5 MG TABS, Take 5 mg by mouth every day., Disp: , Rfl:   •  glipizide-metformin (METAGLIP) 5-500 MG per tablet, Take 1 Tab by mouth 2 times daily, before breakfast and dinner., Disp: , Rfl:   ALLERGIES: No Known Allergies  SURGHX:   Past Surgical History:   Procedure Laterality Date   • GASTROSCOPY-ENDO N/A 7/23/2019    Procedure: GASTROSCOPY;  Surgeon: Tyler Rothman M.D.;  Location: ENDOSCOPY [facility];  Service: Gastroenterology   • FULL THICKNESS BLOCK RESECTION  2/19/2013    Performed by Greyson Lopez M.D. at  "[facility]   • BIOPSY GENERAL  2/19/2013    Performed by Greyson Lopez M.D. at [facility]   • CATARACT EXTRACTION WITH IOL       SOCHX:  reports that he quit smoking about 25 years ago. His smoking use included cigarettes. He started smoking about 35 years ago. He has a 10.00 pack-year smoking history. He has never used smokeless tobacco. He reports previous alcohol use of about 2.4 - 3.6 oz of alcohol per week. He reports that he does not use drugs..  FH:   Family History   Problem Relation Age of Onset   • Heart Attack Brother    • Cancer Neg Hx          Physical Exam:  Vitals/ General Appearance:   Weight/BMI: Body mass index is 43.08 kg/m².  /84 (BP Location: Left arm, Patient Position: Sitting, BP Cuff Size: Adult)   Pulse (!) 105   Resp 14   Ht 1.626 m (5' 4\")   Wt 113.9 kg (251 lb)   SpO2 95%   Vitals:    07/07/20 1021   BP: 142/84   BP Location: Left arm   Patient Position: Sitting   BP Cuff Size: Adult   Pulse: (!) 105   Resp: 14   SpO2: 95%   Weight: 113.9 kg (251 lb)   Height: 1.626 m (5' 4\")       Pt. is alert and oriented to time, place and person. Cooperative and in no apparent distress.       Constitutional: Alert, no distress, well-groomed.  Skin: No rashes in visible areas.  Eye: Round. Conjunctiva clear, lids normal. No icterus.    Throat: Oropharynx appears crowded in that the palate is overhanging   ENMT: moist mucous membranes. Phonation normal.  Neck: No masses, no thyromegaly. Moves freely without pain.  -. Lungs auscultation: B/L good air entry, vesicular breath sounds, no adventitious sounds  -. Heart auscultation: 1st and 2nd heart sounds normal, regular rhythm. No appreciable murmur.  - Extremities: no clubbing, no pedal edema.  - NEUROLOGICAL EXAMINATION: On neurological exam, the patient was alert and oriented x3. speech was clear and fluent without dysarthria.    ASSESSMENT AND PLAN     1. Sleep Apnea  He is urged to avoid supine sleep, " weight gain and alcoholic beverages since all of these can worsen sleep apnea. He is cautioned against drowsy driving. If He feels sleepy while driving, He must pull over for a break/nap, rather than persist on the road, in the interest of He own safety and that of others on the road.   Plan   - Continue  BiPAP 18/14 cm with nasal nuance   - supplies are refilled    - OPO is ordered because his initial PSG showed significant sleep hypoxia. No recent OPO on file    - compliance download was reviewed and discussed with the pt   - compliance was reinforced     2. Regarding treatment of other past medical problems and general health maintenance,  He is urged to follow up with PCP.

## 2020-07-10 ENCOUNTER — APPOINTMENT (OUTPATIENT)
Dept: SLEEP MEDICINE | Facility: MEDICAL CENTER | Age: 75
End: 2020-07-10
Attending: FAMILY MEDICINE
Payer: MEDICARE

## 2020-07-10 DIAGNOSIS — G47.33 OSA TREATED WITH BIPAP: ICD-10-CM

## 2020-07-16 ENCOUNTER — TELEPHONE (OUTPATIENT)
Dept: SLEEP MEDICINE | Facility: MEDICAL CENTER | Age: 75
End: 2020-07-16

## 2020-07-16 ENCOUNTER — HOME STUDY (OUTPATIENT)
Dept: SLEEP MEDICINE | Facility: MEDICAL CENTER | Age: 75
End: 2020-07-16
Attending: FAMILY MEDICINE
Payer: MEDICARE

## 2020-07-16 PROCEDURE — 94762 N-INVAS EAR/PLS OXIMTRY CONT: CPT | Performed by: INTERNAL MEDICINE

## 2020-07-16 NOTE — TELEPHONE ENCOUNTER
LVM for pt in Occitan and english  informing him that there is no data on the OPO he complete a few days ago and I was wanting to ask him if use the machine or if something happened during the night. I also informed him that we are wanting to R/S this testing due to this and I advise the pt to give us a call to schedule this.

## 2020-07-20 NOTE — PROCEDURES
Interpretation:    This overnight oximetry was recorded on 7/17/2020 with the patient on BiPAP 18/14 cm water.  The analysis duration was 6 hours.  42 total oximetric events occurred encompassing 41.6 minutes .  The average event duration was 59.4 seconds .  The saturations were less than 90% for 12.2 % of the recording.  The edin saturation was 80 % .  The patient spent 6.3 minutes with saturations < 88%.  Overall, this continuous nocturnal oximetry demonstrates minimal nocturnal desaturation while on positive airway pressure therapy.    Recommendation:    Clinical correlation is needed.  Given that saturations were less than 80% for only 6.3 minutes there is likely little benefit in providing a change in bilevel treatment.              .

## 2020-07-22 ENCOUNTER — TELEPHONE (OUTPATIENT)
Dept: SLEEP MEDICINE | Facility: MEDICAL CENTER | Age: 75
End: 2020-07-22

## 2020-07-22 NOTE — TELEPHONE ENCOUNTER
Called pt regarding the result of the OPO he completed on 7/16/2020. Per Dr. Sainz Recommendation:   Clinical correlation is needed.  Given that saturations were less than 80% for only 6.3 minutes there is likely little benefit in providing a change in bilevel treatment.       However per Dr. Comer Ok to use the BiPAP at the current pressure. No change in therapy. Called the pt to relay Dr. Comer message pt understood.

## 2020-09-17 ENCOUNTER — HOSPITAL ENCOUNTER (OUTPATIENT)
Dept: LAB | Facility: MEDICAL CENTER | Age: 75
End: 2020-09-17
Attending: FAMILY MEDICINE
Payer: MEDICARE

## 2020-09-17 LAB
ALBUMIN SERPL BCP-MCNC: 4 G/DL (ref 3.2–4.9)
ALBUMIN/GLOB SERPL: 1.3 G/DL
ALP SERPL-CCNC: 66 U/L (ref 30–99)
ALT SERPL-CCNC: 21 U/L (ref 2–50)
ANION GAP SERPL CALC-SCNC: 14 MMOL/L (ref 7–16)
AST SERPL-CCNC: 19 U/L (ref 12–45)
BILIRUB SERPL-MCNC: 0.3 MG/DL (ref 0.1–1.5)
BUN SERPL-MCNC: 20 MG/DL (ref 8–22)
CALCIUM SERPL-MCNC: 9.4 MG/DL (ref 8.5–10.5)
CHLORIDE SERPL-SCNC: 98 MMOL/L (ref 96–112)
CHOLEST SERPL-MCNC: 119 MG/DL (ref 100–199)
CO2 SERPL-SCNC: 27 MMOL/L (ref 20–33)
CREAT SERPL-MCNC: 0.85 MG/DL (ref 0.5–1.4)
EST. AVERAGE GLUCOSE BLD GHB EST-MCNC: 180 MG/DL
FASTING STATUS PATIENT QL REPORTED: NORMAL
GLOBULIN SER CALC-MCNC: 3 G/DL (ref 1.9–3.5)
GLUCOSE SERPL-MCNC: 167 MG/DL (ref 65–99)
HBA1C MFR BLD: 7.9 % (ref 0–5.6)
HDLC SERPL-MCNC: 30 MG/DL
LDLC SERPL CALC-MCNC: 43 MG/DL
POTASSIUM SERPL-SCNC: 4.3 MMOL/L (ref 3.6–5.5)
PROT SERPL-MCNC: 7 G/DL (ref 6–8.2)
PSA SERPL-MCNC: 2.2 NG/ML (ref 0–4)
SODIUM SERPL-SCNC: 139 MMOL/L (ref 135–145)
TRIGL SERPL-MCNC: 229 MG/DL (ref 0–149)

## 2020-09-17 PROCEDURE — 83036 HEMOGLOBIN GLYCOSYLATED A1C: CPT

## 2020-09-17 PROCEDURE — 80061 LIPID PANEL: CPT

## 2020-09-17 PROCEDURE — 36415 COLL VENOUS BLD VENIPUNCTURE: CPT

## 2020-09-17 PROCEDURE — 80053 COMPREHEN METABOLIC PANEL: CPT

## 2020-09-17 PROCEDURE — 84153 ASSAY OF PSA TOTAL: CPT

## 2020-10-02 ENCOUNTER — OFFICE VISIT (OUTPATIENT)
Dept: CARDIOLOGY | Facility: MEDICAL CENTER | Age: 75
End: 2020-10-02
Payer: MEDICARE

## 2020-10-02 VITALS
OXYGEN SATURATION: 96 % | HEART RATE: 99 BPM | HEIGHT: 64 IN | DIASTOLIC BLOOD PRESSURE: 82 MMHG | SYSTOLIC BLOOD PRESSURE: 140 MMHG | WEIGHT: 248.4 LBS | BODY MASS INDEX: 42.41 KG/M2

## 2020-10-02 DIAGNOSIS — I10 ESSENTIAL HYPERTENSION, BENIGN: ICD-10-CM

## 2020-10-02 DIAGNOSIS — E78.2 MIXED HYPERLIPIDEMIA: ICD-10-CM

## 2020-10-02 DIAGNOSIS — E66.01 MORBID OBESITY (HCC): ICD-10-CM

## 2020-10-02 DIAGNOSIS — R55 VASOVAGAL SYNCOPE: ICD-10-CM

## 2020-10-02 DIAGNOSIS — R94.31 ABNORMAL EKG: ICD-10-CM

## 2020-10-02 DIAGNOSIS — I44.4 LAFB (LEFT ANTERIOR FASCICULAR BLOCK): ICD-10-CM

## 2020-10-02 PROCEDURE — 99214 OFFICE O/P EST MOD 30 MIN: CPT | Performed by: INTERNAL MEDICINE

## 2020-10-02 RX ORDER — CYCLOBENZAPRINE HCL 10 MG
TABLET ORAL
COMMUNITY
Start: 2020-09-23 | End: 2021-07-23

## 2020-10-02 RX ORDER — CLONAZEPAM 1 MG/1
TABLET ORAL
COMMUNITY
Start: 2020-09-05 | End: 2021-07-23

## 2020-10-02 RX ORDER — LOSARTAN POTASSIUM 100 MG/1
TABLET ORAL
COMMUNITY
Start: 2020-08-15 | End: 2021-11-30

## 2020-10-02 RX ORDER — LEVOTHYROXINE SODIUM 88 UG/1
TABLET ORAL
COMMUNITY
Start: 2020-09-05 | End: 2021-07-23

## 2020-10-02 RX ORDER — LOVASTATIN 40 MG/1
TABLET ORAL
COMMUNITY
Start: 2020-08-15 | End: 2021-07-23

## 2020-10-02 ASSESSMENT — ENCOUNTER SYMPTOMS
SHORTNESS OF BREATH: 0
LOSS OF CONSCIOUSNESS: 0
MYALGIAS: 0
COUGH: 0
PALPITATIONS: 0
DIZZINESS: 0

## 2020-10-02 ASSESSMENT — FIBROSIS 4 INDEX: FIB4 SCORE: 1.22

## 2020-10-02 NOTE — PROGRESS NOTES
Chief Complaint   Patient presents with   • HTN (Controlled)   • Syncope   • Hyperlipidemia       Subjective:   Jonathan Rebolledo is a 75 y.o. male who presents today for follow-up evaluation of vasovagal syncope, abnormal EKG with a history of hypertension, dyslipidemia, diabetes mellitus, ONI, COPD.      Jonathan Rebolledo is a 74 y.o. male who presents today with HTN, TLD, COPD, ONI, DM 2, referred for syncopal episode.  Patient was last seen by Dr. Alex Mcdaniel 5/31/2019    Last seen on 9/6/2019 by JOSHUA Amor.    Since 9/6/2019 the patient's had no cardiac problems or symptoms.  No recurrent syncope.  Has struggled to lose weight.  Past Medical History:   Diagnosis Date   • Asthma    • Back pain    • Diabetes    • ONI treated with BiPAP 5/10/2016   • Sleep apnea      Past Surgical History:   Procedure Laterality Date   • GASTROSCOPY-ENDO N/A 7/23/2019    Procedure: GASTROSCOPY;  Surgeon: Tyler Rothman M.D.;  Location: ENDOSCOPY [facility];  Service: Gastroenterology   • FULL THICKNESS BLOCK RESECTION  2/19/2013    Performed by Greyson Lopez M.D. at SURGERY [facility] ORS   • BIOPSY GENERAL  2/19/2013    Performed by Greyson Lopez M.D. at SURGERY [facility] ORS   • CATARACT EXTRACTION WITH IOL       Family History   Problem Relation Age of Onset   • Heart Attack Brother    • Cancer Neg Hx      Social History     Socioeconomic History   • Marital status:      Spouse name: Not on file   • Number of children: Not on file   • Years of education: Not on file   • Highest education level: Not on file   Occupational History   • Not on file   Social Needs   • Financial resource strain: Not on file   • Food insecurity     Worry: Not on file     Inability: Not on file   • Transportation needs     Medical: Not on file     Non-medical: Not on file   Tobacco Use   • Smoking status: Former Smoker     Packs/day: 1.00     Years: 10.00     Pack years: 10.00     Types: Cigarettes     Start date:  1985     Quit date: 1995     Years since quittin.7   • Smokeless tobacco: Never Used   Substance and Sexual Activity   • Alcohol use: Not Currently     Alcohol/week: 2.4 - 3.6 oz     Types: 4 - 6 Cans of beer per week   • Drug use: No   • Sexual activity: Not on file   Lifestyle   • Physical activity     Days per week: Not on file     Minutes per session: Not on file   • Stress: Not on file   Relationships   • Social connections     Talks on phone: Not on file     Gets together: Not on file     Attends Restoration service: Not on file     Active member of club or organization: Not on file     Attends meetings of clubs or organizations: Not on file     Relationship status: Not on file   • Intimate partner violence     Fear of current or ex partner: Not on file     Emotionally abused: Not on file     Physically abused: Not on file     Forced sexual activity: Not on file   Other Topics Concern   • Not on file   Social History Narrative    ** Merged History Encounter **          No Known Allergies  Outpatient Encounter Medications as of 10/2/2020   Medication Sig Dispense Refill   • lovastatin (MEVACOR) 40 MG tablet      • losartan (COZAAR) 100 MG Tab      • Ferrous Sulfate (IRON PO) Take  by mouth.     • lisinopril (PRINIVIL) 20 MG Tab Take 1 Tab by mouth every day. 100 Tab 3   • DULERA 200-5 MCG/ACT Aerosol Inhale 1 Puff by mouth.     • albuterol 108 (90 Base) MCG/ACT Aero Soln inhalation aerosol INHALE 1 PUFF BY MOUTH EVERY 4 HOURS AS NEEDED FOR SHORTNESS OF BREATH AND WHEEZE.     • omeprazole (PRILOSEC) 20 MG delayed-release capsule Take 1 Cap by mouth 2 times a day. 60 Cap 1   • atorvastatin (LIPITOR) 40 MG Tab Take 40 mg by mouth every day.  3   • Cyanocobalamin (VITAMIN B-12) 1000 MCG Tab Take 1,000 mcg by mouth every day.     • vitamin D (CHOLECALCIFEROL) 1000 UNIT Tab Take 1,000 Units by mouth every day.     • Omega-3 Fatty Acids (FISH OIL) 1000 MG Cap capsule Take 1,000 mg by mouth every day.     •  "glipizide-metformin (METAGLIP) 5-500 MG per tablet Take 1 Tab by mouth 2 times daily, before breakfast and dinner.     • amlodipine (NORVASC) 10 MG TABS Take 10 mg by mouth every day.     • clonazePAM (KLONOPIN) 1 MG Tab      • EUTHYROX 88 MCG Tab      • cyclobenzaprine (FLEXERIL) 10 mg Tab      • azithromycin (ZITHROMAX) 250 MG Tab Take 2 tablets by mouth on day one. Take one tablet by mouth the remaining days until gone (Patient not taking: Reported on 10/2/2020) 6 Tab 0   • finasteride (PROSCAR) 5 MG TABS Take 5 mg by mouth every day.       No facility-administered encounter medications on file as of 10/2/2020.      Review of Systems   Respiratory: Negative for cough and shortness of breath.    Cardiovascular: Negative for chest pain and palpitations.   Musculoskeletal: Negative for myalgias.   Neurological: Negative for dizziness and loss of consciousness.        Objective:   /82 (BP Location: Left arm, Patient Position: Sitting, BP Cuff Size: Adult)   Pulse 99   Ht 1.626 m (5' 4\")   Wt 112.7 kg (248 lb 6.4 oz)   SpO2 96%   BMI 42.64 kg/m²     Physical Exam   Constitutional: He is oriented to person, place, and time. He appears well-developed and well-nourished.   Eyes: Pupils are equal, round, and reactive to light. Conjunctivae are normal.   Neck: Normal range of motion. Neck supple. No JVD present.   Cardiovascular: Normal rate, regular rhythm, normal heart sounds and intact distal pulses.   Pulmonary/Chest: Effort normal and breath sounds normal. No accessory muscle usage. No respiratory distress. He has no wheezes. He has no rales.   Abdominal:   Obese.   Musculoskeletal:         General: Edema present.      Comments: Chronic dermatologic changes lower legs.  Mild pedal edema.   Neurological: He is alert and oriented to person, place, and time.   Skin: Skin is warm, dry and intact. No rash noted. No cyanosis. Nails show no clubbing.   Psychiatric: He has a normal mood and affect. His behavior is " normal.       ECHOCARDIOGRAM 04/29/2019  Normal left ventricular systolic function. Left ventricular ejection   fraction is visually estimated to be 60%.  Normal diastolic function.  Estimated right ventricular systolic pressure  is 34 mmHg + JVP.  Aortic sclerosis without stenosis.    MPI 3/19/2012  1. THE GATED STUDY REVEALS A NORMAL EJECTION FRACTION WITHOUT SEGMENTAL   WALL MOTION ABNORMALITY.   2. SPECT IMAGES ARE FELT TO BE NORMAL WITHOUT EVIDENCE OF SIGNIFICANT   ISCHEMIA OR INFARCTION.  Assessment:     1. Morbid obesity (HCC)     2. Vasovagal syncope     3. Essential hypertension, benign     4. Mixed hyperlipidemia     5. Abnormal EKG     6. LAFB (left anterior fascicular block)         Medical Decision Making:  Today's Assessment / Status / Plan:     Recommendation Discussion  1.  The patient stable from a cardiac standpoint.  2.  Discussed weight loss and will refer to [facility] for weight loss program.  3.  RTC 12 months.

## 2021-01-08 DIAGNOSIS — Z23 NEED FOR VACCINATION: ICD-10-CM

## 2021-03-19 ENCOUNTER — HOSPITAL ENCOUNTER (OUTPATIENT)
Dept: LAB | Facility: MEDICAL CENTER | Age: 76
End: 2021-03-19
Attending: FAMILY MEDICINE
Payer: MEDICARE

## 2021-03-19 LAB
ALBUMIN SERPL BCP-MCNC: 4.4 G/DL (ref 3.2–4.9)
ALBUMIN/GLOB SERPL: 1.5 G/DL
ALP SERPL-CCNC: 57 U/L (ref 30–99)
ALT SERPL-CCNC: 35 U/L (ref 2–50)
ANION GAP SERPL CALC-SCNC: 12 MMOL/L (ref 7–16)
APPEARANCE UR: CLEAR
AST SERPL-CCNC: 26 U/L (ref 12–45)
BACTERIA #/AREA URNS HPF: NEGATIVE /HPF
BASOPHILS # BLD AUTO: 0.6 % (ref 0–1.8)
BASOPHILS # BLD: 0.06 K/UL (ref 0–0.12)
BILIRUB SERPL-MCNC: 0.3 MG/DL (ref 0.1–1.5)
BILIRUB UR QL STRIP.AUTO: NEGATIVE
BUN SERPL-MCNC: 16 MG/DL (ref 8–22)
CALCIUM SERPL-MCNC: 9.7 MG/DL (ref 8.5–10.5)
CHLORIDE SERPL-SCNC: 97 MMOL/L (ref 96–112)
CHOLEST SERPL-MCNC: 122 MG/DL (ref 100–199)
CO2 SERPL-SCNC: 29 MMOL/L (ref 20–33)
COLOR UR: YELLOW
CREAT SERPL-MCNC: 0.81 MG/DL (ref 0.5–1.4)
CREAT UR-MCNC: 52.99 MG/DL
EOSINOPHIL # BLD AUTO: 0.24 K/UL (ref 0–0.51)
EOSINOPHIL NFR BLD: 2.5 % (ref 0–6.9)
EPI CELLS #/AREA URNS HPF: NEGATIVE /HPF
ERYTHROCYTE [DISTWIDTH] IN BLOOD BY AUTOMATED COUNT: 46.9 FL (ref 35.9–50)
EST. AVERAGE GLUCOSE BLD GHB EST-MCNC: 177 MG/DL
FASTING STATUS PATIENT QL REPORTED: NORMAL
GLOBULIN SER CALC-MCNC: 2.9 G/DL (ref 1.9–3.5)
GLUCOSE SERPL-MCNC: 148 MG/DL (ref 65–99)
GLUCOSE UR STRIP.AUTO-MCNC: NEGATIVE MG/DL
HBA1C MFR BLD: 7.8 % (ref 4–5.6)
HCT VFR BLD AUTO: 46.6 % (ref 42–52)
HDLC SERPL-MCNC: 34 MG/DL
HGB BLD-MCNC: 15.1 G/DL (ref 14–18)
HYALINE CASTS #/AREA URNS LPF: ABNORMAL /LPF
IMM GRANULOCYTES # BLD AUTO: 0.06 K/UL (ref 0–0.11)
IMM GRANULOCYTES NFR BLD AUTO: 0.6 % (ref 0–0.9)
KETONES UR STRIP.AUTO-MCNC: NEGATIVE MG/DL
LDLC SERPL CALC-MCNC: 47 MG/DL
LEUKOCYTE ESTERASE UR QL STRIP.AUTO: NEGATIVE
LYMPHOCYTES # BLD AUTO: 2.34 K/UL (ref 1–4.8)
LYMPHOCYTES NFR BLD: 24.6 % (ref 22–41)
MCH RBC QN AUTO: 29.4 PG (ref 27–33)
MCHC RBC AUTO-ENTMCNC: 32.4 G/DL (ref 33.7–35.3)
MCV RBC AUTO: 90.8 FL (ref 81.4–97.8)
MICRO URNS: ABNORMAL
MICROALBUMIN UR-MCNC: 15.3 MG/DL
MICROALBUMIN/CREAT UR: 289 MG/G (ref 0–30)
MONOCYTES # BLD AUTO: 0.89 K/UL (ref 0–0.85)
MONOCYTES NFR BLD AUTO: 9.4 % (ref 0–13.4)
NEUTROPHILS # BLD AUTO: 5.91 K/UL (ref 1.82–7.42)
NEUTROPHILS NFR BLD: 62.3 % (ref 44–72)
NITRITE UR QL STRIP.AUTO: NEGATIVE
NRBC # BLD AUTO: 0 K/UL
NRBC BLD-RTO: 0 /100 WBC
PH UR STRIP.AUTO: 7.5 [PH] (ref 5–8)
PLATELET # BLD AUTO: 274 K/UL (ref 164–446)
PMV BLD AUTO: 9.6 FL (ref 9–12.9)
POTASSIUM SERPL-SCNC: 3.7 MMOL/L (ref 3.6–5.5)
PROT SERPL-MCNC: 7.3 G/DL (ref 6–8.2)
PROT UR QL STRIP: 30 MG/DL
RBC # BLD AUTO: 5.13 M/UL (ref 4.7–6.1)
RBC # URNS HPF: ABNORMAL /HPF
RBC UR QL AUTO: NEGATIVE
SODIUM SERPL-SCNC: 138 MMOL/L (ref 135–145)
SP GR UR STRIP.AUTO: 1.01
TRIGL SERPL-MCNC: 205 MG/DL (ref 0–149)
TSH SERPL DL<=0.005 MIU/L-ACNC: 3.17 UIU/ML (ref 0.38–5.33)
UROBILINOGEN UR STRIP.AUTO-MCNC: 0.2 MG/DL
WBC # BLD AUTO: 9.5 K/UL (ref 4.8–10.8)
WBC #/AREA URNS HPF: ABNORMAL /HPF

## 2021-03-19 PROCEDURE — 85025 COMPLETE CBC W/AUTO DIFF WBC: CPT

## 2021-03-19 PROCEDURE — 82043 UR ALBUMIN QUANTITATIVE: CPT

## 2021-03-19 PROCEDURE — 83036 HEMOGLOBIN GLYCOSYLATED A1C: CPT

## 2021-03-19 PROCEDURE — 80061 LIPID PANEL: CPT

## 2021-03-19 PROCEDURE — 81001 URINALYSIS AUTO W/SCOPE: CPT

## 2021-03-19 PROCEDURE — 80053 COMPREHEN METABOLIC PANEL: CPT

## 2021-03-19 PROCEDURE — 84443 ASSAY THYROID STIM HORMONE: CPT

## 2021-03-19 PROCEDURE — 36415 COLL VENOUS BLD VENIPUNCTURE: CPT

## 2021-03-19 PROCEDURE — 82570 ASSAY OF URINE CREATININE: CPT

## 2021-05-17 DIAGNOSIS — I10 ESSENTIAL HYPERTENSION: ICD-10-CM

## 2021-05-19 RX ORDER — LISINOPRIL 20 MG/1
TABLET ORAL
Qty: 100 TABLET | Refills: 1 | Status: SHIPPED | OUTPATIENT
Start: 2021-05-19 | End: 2021-11-30 | Stop reason: SDUPTHER

## 2021-05-20 ENCOUNTER — PATIENT OUTREACH (OUTPATIENT)
Dept: HEALTH INFORMATION MANAGEMENT | Facility: OTHER | Age: 76
End: 2021-05-20

## 2021-05-20 NOTE — PROGRESS NOTES
Outcome: Called PT to schedule STANTON, Transfer PT to [facility] for in home visit to complete STANTON. PT scheduled on Thursday Harini 10, 2021.    Attempt # 1

## 2021-06-17 ENCOUNTER — HOSPITAL ENCOUNTER (OUTPATIENT)
Dept: LAB | Facility: MEDICAL CENTER | Age: 76
End: 2021-06-17
Attending: FAMILY MEDICINE
Payer: MEDICARE

## 2021-06-17 LAB
ALBUMIN SERPL BCP-MCNC: 4 G/DL (ref 3.2–4.9)
ALBUMIN/GLOB SERPL: 1.3 G/DL
ALP SERPL-CCNC: 58 U/L (ref 30–99)
ALT SERPL-CCNC: 29 U/L (ref 2–50)
ANION GAP SERPL CALC-SCNC: 12 MMOL/L (ref 7–16)
AST SERPL-CCNC: 22 U/L (ref 12–45)
BILIRUB SERPL-MCNC: 0.4 MG/DL (ref 0.1–1.5)
BUN SERPL-MCNC: 16 MG/DL (ref 8–22)
CALCIUM SERPL-MCNC: 9.6 MG/DL (ref 8.5–10.5)
CHLORIDE SERPL-SCNC: 96 MMOL/L (ref 96–112)
CO2 SERPL-SCNC: 28 MMOL/L (ref 20–33)
CREAT SERPL-MCNC: 0.86 MG/DL (ref 0.5–1.4)
EST. AVERAGE GLUCOSE BLD GHB EST-MCNC: 163 MG/DL
FASTING STATUS PATIENT QL REPORTED: NORMAL
GLOBULIN SER CALC-MCNC: 3 G/DL (ref 1.9–3.5)
GLUCOSE SERPL-MCNC: 146 MG/DL (ref 65–99)
HBA1C MFR BLD: 7.3 % (ref 4–5.6)
POTASSIUM SERPL-SCNC: 4.5 MMOL/L (ref 3.6–5.5)
PROT SERPL-MCNC: 7 G/DL (ref 6–8.2)
SODIUM SERPL-SCNC: 136 MMOL/L (ref 135–145)

## 2021-06-17 PROCEDURE — 80053 COMPREHEN METABOLIC PANEL: CPT

## 2021-06-17 PROCEDURE — 83036 HEMOGLOBIN GLYCOSYLATED A1C: CPT

## 2021-06-17 PROCEDURE — 36415 COLL VENOUS BLD VENIPUNCTURE: CPT

## 2021-07-22 NOTE — PROGRESS NOTES
Chief Complaint   Patient presents with   • Follow-Up     ONI       HPI:  Jonathan Rebolledo is a 76 y.o. year old male here today for follow-up on ONI.  Last seen July 7, 2020 by Dr. Comer.  Polysomnogram indicated AHI 77.3 and minimum saturation 49%. Pt is currently on  BiPAP 18/14 cm.  At last visit, he was ordered and O p.o. while on BiPAP.  This test was performed on July 17, 2020 and did show O2 saturations less than 80% for only 6.3 minutes with an O2 edin of 80% there was no needed change with therapy.    He states his bedtime includes going to sleep at 10 PM and waking up around 530.  He feels well rested.  He denies any morning headaches, palpitations, concentration or memory problems, or parasomnias.    He is currently using a [employer] machine and denies any complaints associated with the current recall including headache, upper airway irritation, cough, sinus infection, or chest pressure.    Compliance report was reviewed and does show 100% usage with an average time of 5 hours and 51 minutes and a resultant AHI of 0.7 with no excessive leakage noted on compliance.  Patient is currently using a [employer] machine on BiPAP at 18/14 centimeters/H2O.    ROS: As per HPI and otherwise negative if not stated.    Past Medical History:   Diagnosis Date   • Asthma    • Back pain    • Diabetes    • ONI treated with BiPAP 5/10/2016   • Sleep apnea        Past Surgical History:   Procedure Laterality Date   • GASTROSCOPY-ENDO N/A 7/23/2019    Procedure: GASTROSCOPY;  Surgeon: Tyler Rothman M.D.;  Location: [facility];  Service: Gastroenterology   • FULL THICKNESS BLOCK RESECTION  2/19/2013    Performed by Greyson Lopez M.D. at [facility]S   • BIOPSY GENERAL  2/19/2013    Performed by Greyson Lopez M.D. at SURGERY [facility] ORS   • CATARACT EXTRACTION WITH IOL         Family History   Problem Relation Age of Onset   • Heart Attack Brother    • Cancer Neg  "Hx        Allergies as of 07/23/2021   • (No Known Allergies)        Vitals:  /80 (BP Location: Left arm, Patient Position: Sitting, BP Cuff Size: Large adult)   Pulse (!) 104   Resp 16   Ht 1.626 m (5' 4\")   Wt 115 kg (253 lb)   SpO2 94%     Current medications as of today   Current Outpatient Medications   Medication Sig Dispense Refill   • albuterol 108 (90 Base) MCG/ACT Aero Soln inhalation aerosol INHALE 1 PUFF BY MOUTH EVERY 4 HOURS AS NEEDED FOR SHORTNESS OF BREATH AND WHEEZE.     • omeprazole (PRILOSEC) 20 MG delayed-release capsule Take 1 Cap by mouth 2 times a day. 60 Cap 1   • atorvastatin (LIPITOR) 40 MG Tab Take 40 mg by mouth every day.  3   • Cyanocobalamin (VITAMIN B-12) 1000 MCG Tab Take 1,000 mcg by mouth every day.     • vitamin D (CHOLECALCIFEROL) 1000 UNIT Tab Take 1,000 Units by mouth every day.     • Omega-3 Fatty Acids (FISH OIL) 1000 MG Cap capsule Take 1,000 mg by mouth every day.     • glipizide-metformin (METAGLIP) 5-500 MG per tablet Take 1 Tab by mouth 2 times daily, before breakfast and dinner.     • amlodipine (NORVASC) 10 MG TABS Take 10 mg by mouth every day.     • finasteride (PROSCAR) 5 MG TABS Take 5 mg by mouth every day.     • lisinopril (PRINIVIL) 20 MG Tab Take 1 tablet by mouth once daily 100 tablet 1   • losartan (COZAAR) 100 MG Tab        No current facility-administered medications for this visit.         Physical Exam:   Gen:           Alert and oriented, No apparent distress. Mood and affect appropriate, normal interaction with examiner.  Eyes:          PERRL, EOM intact, sclere white, conjunctive moist.  Ears:          Not examined.   Hearing:     Grossly intact.  Nose:          Normal, no lesions or deformities.  Dentition:    Good dentition.  Oropharynx:   Tongue normal, posterior pharynx without erythema or exudate.  Neck:        Supple, trachea midline, no masses.  Respiratory Effort: No intercostal retractions or use of accessory muscles.   Lung " Auscultation:      Clear to auscultation bilaterally; no rales, rhonchi or wheezing.  CV:            Regular rate and rhythm. No murmurs, rubs or gallops.  Abd:           Not examined.   Lymphadenopathy: Not examined.  Gait and Station: Normal.  Digits and Nails: No clubbing, cyanosis, petechiae, or nodes.   Cranial Nerves: II-XII grossly intact.  Skin:        No rashes, lesions or ulcers noted.               Ext:           No cyanosis or edema.      Assessment:  1. ONI treated with BiPAP  DME Mask and Supplies   2. BMI 40.0-44.9, adult (HCC)         Plan:  1. Continue using BiPAP at prescribed settings.  Order was placed today for mask and supplies which will be good for 1 year.  Clean mask and supplies frequently with mild soap and water or vinegar and water solution.  Avoid ozone type  as they may degrade plastics more rapidly than traditional .  Follow-up will be 1 year or sooner if needed.  Reach out via MyChart or phone if any questions or concerns arise.    * Recall of [employer] CPAP machines was reviewed with the patient today.   We were just recently told about these recalls. What I have been recommending to the patients is to call their supplying companies to figure out exact model of their machine. You can self  register your machine on [employer] website as mentioned in the letter that will be sent out by [employer], or ask your DME to assist you in the process.  We do not know if Resp[employer]ics will be giving out replacement machines or just repairing it. This is all new for us to let you know how this is going to pan out. The recent recall from [employer] is due to disintegration of the polyurethane foam. Currently I recommend to continue using your machine until or unless you experience black debris/particles within the air path or experiencing headaches, upper airway irritation, cough, chest pressure, sinus infection, irritation to skin/eyes/respiratory tract, inflammatory  response, asthma, nausea/vomiting to stop using the PAP therapy immediately and contact the office. It is ultimately your decision on whether you choose to continue using the CPAP machine. Patient was also recommended to reach out to their DME to let them know that they are using Resp[employer]ics machine, therefore when and if needed those machines can be replaced by their DME's. We will keep you posted as we get more information from the  for the DME.      [place] your device on the recall website at www.[employer]ada/src-update      *Patient was advised to follow-up sooner should he obtain a new machine prior to next f/u OV, and to have at least 30 days of compliance data for review.    2. recommended healthy diet with portion control , aerobic exercise 5x week.  Drink more water. Reduce carb intake in drinks. Try to eat 3 meals a day with last meal 4-6 hours prior to bedtime. Limit caloric intake to 1600 - 1800 kcal per day.Restrict carbohydrate intake to 50 g per day to 100 g per day  Please note that this dictation was created using voice recognition software. I have made every reasonable attempt to correct obvious errors, but it is possible there are errors of grammar and possibly content that I did not discover before finalizing the note.

## 2021-07-23 ENCOUNTER — SLEEP CENTER VISIT (OUTPATIENT)
Dept: SLEEP MEDICINE | Facility: MEDICAL CENTER | Age: 76
End: 2021-07-23
Payer: MEDICARE

## 2021-07-23 VITALS
HEART RATE: 104 BPM | HEIGHT: 64 IN | OXYGEN SATURATION: 94 % | WEIGHT: 253 LBS | RESPIRATION RATE: 16 BRPM | SYSTOLIC BLOOD PRESSURE: 140 MMHG | BODY MASS INDEX: 43.19 KG/M2 | DIASTOLIC BLOOD PRESSURE: 80 MMHG

## 2021-07-23 DIAGNOSIS — G47.33 OSA TREATED WITH BIPAP: ICD-10-CM

## 2021-07-23 PROBLEM — I10 ESSENTIAL (PRIMARY) HYPERTENSION: Status: ACTIVE | Noted: 2019-12-18

## 2021-07-23 PROCEDURE — 99213 OFFICE O/P EST LOW 20 MIN: CPT | Performed by: NURSE PRACTITIONER

## 2021-07-23 ASSESSMENT — FIBROSIS 4 INDEX: FIB4 SCORE: 1.16

## 2021-07-23 NOTE — PATIENT INSTRUCTIONS
Continue using BiPAP at prescribed settings.  Order was placed today for mask and supplies which will be good for 1 year.  Clean mask and supplies frequently with mild soap and water or vinegar and water solution.  Avoid ozone type  as they may degrade plastics more rapidly than traditional .  Follow-up will be 1 year or sooner if needed.  Reach out via MyChart or phone if any questions or concerns arise.    * Recall of [employer] CPAP machines was reviewed with the patient today.   We were just recently told about these recalls. What I have been recommending to the patients is to call their supplying companies to figure out exact model of their machine. You can self  register your machine on [employer] website as mentioned in the letter that will be sent out by [facility], or ask your DME to assist you in the process.  We do not know if Resp[employer]ics will be giving out replacement machines or just repairing it. This is all new for us to let you know how this is going to pan out. The recent recall from [employer] is due to disintegration of the polyurethane foam. Currently I recommend to continue using your machine until or unless you experience black debris/particles within the air path or experiencing headaches, upper airway irritation, cough, chest pressure, sinus infection, irritation to skin/eyes/respiratory tract, inflammatory response, asthma, nausea/vomiting to stop using the PAP therapy immediately and contact the office. It is ultimately your decision on whether you choose to continue using the CPAP machine. Patient was also recommended to reach out to their DME to let them know that they are using Resp[employer]ics machine, therefore when and if needed those machines can be replaced by their DME's. We will keep you posted as we get more information from the  for the DME.      [place] your device on the recall website at [email or web address]ush/src-update      *Patient was advised to  follow-up sooner should he obtain a new machine prior to next f/u OV, and to have at least 30 days of compliance data for review.

## 2021-09-17 ENCOUNTER — HOSPITAL ENCOUNTER (OUTPATIENT)
Dept: LAB | Facility: MEDICAL CENTER | Age: 76
End: 2021-09-17
Attending: FAMILY MEDICINE
Payer: MEDICARE

## 2021-09-17 LAB
ALBUMIN SERPL BCP-MCNC: 4.3 G/DL (ref 3.2–4.9)
ALBUMIN/GLOB SERPL: 1.4 G/DL
ALP SERPL-CCNC: 61 U/L (ref 30–99)
ALT SERPL-CCNC: 26 U/L (ref 2–50)
ANION GAP SERPL CALC-SCNC: 13 MMOL/L (ref 7–16)
AST SERPL-CCNC: 20 U/L (ref 12–45)
BASOPHILS # BLD AUTO: 0.7 % (ref 0–1.8)
BASOPHILS # BLD: 0.06 K/UL (ref 0–0.12)
BILIRUB SERPL-MCNC: 0.2 MG/DL (ref 0.1–1.5)
BUN SERPL-MCNC: 14 MG/DL (ref 8–22)
CALCIUM SERPL-MCNC: 9.7 MG/DL (ref 8.5–10.5)
CHLORIDE SERPL-SCNC: 97 MMOL/L (ref 96–112)
CHOLEST SERPL-MCNC: 121 MG/DL (ref 100–199)
CO2 SERPL-SCNC: 27 MMOL/L (ref 20–33)
CREAT SERPL-MCNC: 0.75 MG/DL (ref 0.5–1.4)
EOSINOPHIL # BLD AUTO: 0.26 K/UL (ref 0–0.51)
EOSINOPHIL NFR BLD: 2.9 % (ref 0–6.9)
ERYTHROCYTE [DISTWIDTH] IN BLOOD BY AUTOMATED COUNT: 44.8 FL (ref 35.9–50)
EST. AVERAGE GLUCOSE BLD GHB EST-MCNC: 177 MG/DL
FASTING STATUS PATIENT QL REPORTED: NORMAL
FERRITIN SERPL-MCNC: 147 NG/ML (ref 22–322)
GLOBULIN SER CALC-MCNC: 3 G/DL (ref 1.9–3.5)
GLUCOSE SERPL-MCNC: 142 MG/DL (ref 65–99)
HBA1C MFR BLD: 7.8 % (ref 4–5.6)
HCT VFR BLD AUTO: 46.2 % (ref 42–52)
HDLC SERPL-MCNC: 31 MG/DL
HGB BLD-MCNC: 15 G/DL (ref 14–18)
IMM GRANULOCYTES # BLD AUTO: 0.05 K/UL (ref 0–0.11)
IMM GRANULOCYTES NFR BLD AUTO: 0.6 % (ref 0–0.9)
IRON SATN MFR SERPL: 19 % (ref 15–55)
IRON SERPL-MCNC: 60 UG/DL (ref 50–180)
LDLC SERPL CALC-MCNC: 52 MG/DL
LYMPHOCYTES # BLD AUTO: 2.3 K/UL (ref 1–4.8)
LYMPHOCYTES NFR BLD: 25.5 % (ref 22–41)
MCH RBC QN AUTO: 29.1 PG (ref 27–33)
MCHC RBC AUTO-ENTMCNC: 32.5 G/DL (ref 33.7–35.3)
MCV RBC AUTO: 89.7 FL (ref 81.4–97.8)
MONOCYTES # BLD AUTO: 0.87 K/UL (ref 0–0.85)
MONOCYTES NFR BLD AUTO: 9.6 % (ref 0–13.4)
NEUTROPHILS # BLD AUTO: 5.48 K/UL (ref 1.82–7.42)
NEUTROPHILS NFR BLD: 60.7 % (ref 44–72)
NRBC # BLD AUTO: 0 K/UL
NRBC BLD-RTO: 0 /100 WBC
PLATELET # BLD AUTO: 288 K/UL (ref 164–446)
PMV BLD AUTO: 9.6 FL (ref 9–12.9)
POTASSIUM SERPL-SCNC: 3.7 MMOL/L (ref 3.6–5.5)
PROT SERPL-MCNC: 7.3 G/DL (ref 6–8.2)
RBC # BLD AUTO: 5.15 M/UL (ref 4.7–6.1)
SODIUM SERPL-SCNC: 137 MMOL/L (ref 135–145)
TIBC SERPL-MCNC: 316 UG/DL (ref 250–450)
TRIGL SERPL-MCNC: 192 MG/DL (ref 0–149)
UIBC SERPL-MCNC: 256 UG/DL (ref 110–370)
WBC # BLD AUTO: 9 K/UL (ref 4.8–10.8)

## 2021-09-17 PROCEDURE — 80053 COMPREHEN METABOLIC PANEL: CPT

## 2021-09-17 PROCEDURE — 82728 ASSAY OF FERRITIN: CPT

## 2021-09-17 PROCEDURE — 80061 LIPID PANEL: CPT

## 2021-09-17 PROCEDURE — 85025 COMPLETE CBC W/AUTO DIFF WBC: CPT

## 2021-09-17 PROCEDURE — 36415 COLL VENOUS BLD VENIPUNCTURE: CPT

## 2021-09-17 PROCEDURE — 83036 HEMOGLOBIN GLYCOSYLATED A1C: CPT

## 2021-09-17 PROCEDURE — 83550 IRON BINDING TEST: CPT

## 2021-09-17 PROCEDURE — 83540 ASSAY OF IRON: CPT

## 2021-11-01 ENCOUNTER — OFFICE VISIT (OUTPATIENT)
Dept: CARDIOLOGY | Facility: MEDICAL CENTER | Age: 76
End: 2021-11-01
Payer: MEDICARE

## 2021-11-01 VITALS
RESPIRATION RATE: 18 BRPM | HEIGHT: 64 IN | SYSTOLIC BLOOD PRESSURE: 164 MMHG | WEIGHT: 243.2 LBS | BODY MASS INDEX: 41.52 KG/M2 | HEART RATE: 99 BPM | DIASTOLIC BLOOD PRESSURE: 82 MMHG | OXYGEN SATURATION: 96 %

## 2021-11-01 DIAGNOSIS — R94.31 ABNORMAL EKG: ICD-10-CM

## 2021-11-01 DIAGNOSIS — E78.2 MIXED HYPERLIPIDEMIA: ICD-10-CM

## 2021-11-01 DIAGNOSIS — I45.10 RIGHT BUNDLE BRANCH BLOCK: ICD-10-CM

## 2021-11-01 DIAGNOSIS — I10 ESSENTIAL (PRIMARY) HYPERTENSION: ICD-10-CM

## 2021-11-01 DIAGNOSIS — I44.4 LAFB (LEFT ANTERIOR FASCICULAR BLOCK): ICD-10-CM

## 2021-11-01 PROBLEM — E78.5 HLD (HYPERLIPIDEMIA): Status: RESOLVED | Noted: 2019-04-21 | Resolved: 2021-11-01

## 2021-11-01 PROCEDURE — 99214 OFFICE O/P EST MOD 30 MIN: CPT | Performed by: INTERNAL MEDICINE

## 2021-11-01 RX ORDER — CLONAZEPAM 1 MG/1
TABLET ORAL
COMMUNITY
Start: 2021-10-31 | End: 2021-11-30

## 2021-11-01 ASSESSMENT — ENCOUNTER SYMPTOMS
LOSS OF CONSCIOUSNESS: 0
PALPITATIONS: 0
COUGH: 0
MYALGIAS: 0
SHORTNESS OF BREATH: 0
DIZZINESS: 0

## 2021-11-01 ASSESSMENT — FIBROSIS 4 INDEX: FIB4 SCORE: 1.06

## 2021-11-01 NOTE — PROGRESS NOTES
"Chief Complaint   Patient presents with   • Hyperlipidemia     Dx: Hyperlipidemia, unspecified hyperlipidemia type   • Syncope     Dx: Vasovagal near syncope   • Hypertension     Dx: HTN (hypertension)       Subjective:   Jonathan Rebolledo is a 75 y.o. male who presents today for follow-up evaluation of vasovagal syncope, abnormal EKG with a history of hypertension, dyslipidemia, diabetes mellitus, ONI, COPD.    Since 10/2/2020 appointment the patient has had no further episodes of syncope.  No chest pain or FAYE.  Closely followed by his PCP Dr. Angel Guerin.  Checks BP daily rounds \"140s\".  Did not follow-up last year when referred to [facility] program for weight loss.  States that his son-in-law is a certified nutritionist and is managing his diet.    Past Medical History:   Diagnosis Date   • Asthma    • Back pain    • Diabetes    • ONI treated with BiPAP 5/10/2016   • Sleep apnea      Past Surgical History:   Procedure Laterality Date   • GASTROSCOPY-ENDO N/A 2019    Procedure: GASTROSCOPY;  Surgeon: Tyler Rothman M.D.;  Location: ENDOSCOPY [facility];  Service: Gastroenterology   • FULL THICKNESS BLOCK RESECTION  2013    Performed by Greyson Lopez M.D. at SURGERY [facility] ORS   • BIOPSY GENERAL  2013    Performed by Greyson Lopez M.D. at [facility] ORS   • CATARACT EXTRACTION WITH IOL       Family History   Problem Relation Age of Onset   • Heart Attack Brother    • Cancer Neg Hx      Social History     Socioeconomic History   • Marital status:      Spouse name: Not on file   • Number of children: Not on file   • Years of education: Not on file   • Highest education level: Not on file   Occupational History   • Not on file   Tobacco Use   • Smoking status: Former Smoker     Packs/day: 1.00     Years: 10.00     Pack years: 10.00     Types: Cigarettes     Start date: 1985     Quit date: 1995     Years since quittin.8   • Smokeless " tobacco: Never Used   Vaping Use   • Vaping Use: Unknown   Substance and Sexual Activity   • Alcohol use: Not Currently     Alcohol/week: 2.4 - 3.6 oz     Types: 4 - 6 Cans of beer per week   • Drug use: No   • Sexual activity: Not on file   Other Topics Concern   • Not on file   Social History Narrative    ** Merged History Encounter **          Social Determinants of Health     Financial Resource Strain:    • Difficulty of Paying Living Expenses:    Food Insecurity:    • Worried About Running Out of Food in the Last Year:    • Ran Out of Food in the Last Year:    Transportation Needs:    • Lack of Transportation (Medical):    • Lack of Transportation (Non-Medical):    Physical Activity:    • Days of Exercise per Week:    • Minutes of Exercise per Session:    Stress:    • Feeling of Stress :    Social Connections:    • Frequency of Communication with Friends and Family:    • Frequency of Social Gatherings with Friends and Family:    • Attends Scientologist Services:    • Active Member of Clubs or Organizations:    • Attends Club or Organization Meetings:    • Marital Status:    Intimate Partner Violence:    • Fear of Current or Ex-Partner:    • Emotionally Abused:    • Physically Abused:    • Sexually Abused:      No Known Allergies  Outpatient Encounter Medications as of 11/1/2021   Medication Sig Dispense Refill   • clonazePAM (KLONOPIN) 1 MG Tab      • lisinopril (PRINIVIL) 20 MG Tab Take 1 tablet by mouth once daily 100 tablet 1   • losartan (COZAAR) 100 MG Tab      • omeprazole (PRILOSEC) 20 MG delayed-release capsule Take 1 Cap by mouth 2 times a day. 60 Cap 1   • atorvastatin (LIPITOR) 40 MG Tab Take 40 mg by mouth every day.  3   • Cyanocobalamin (VITAMIN B-12) 1000 MCG Tab Take 1,000 mcg by mouth every day.     • vitamin D (CHOLECALCIFEROL) 1000 UNIT Tab Take 1,000 Units by mouth every day.     • Omega-3 Fatty Acids (FISH OIL) 1000 MG Cap capsule Take 1,000 mg by mouth every day.     • glipizide-metformin  "(METAGLIP) 5-500 MG per tablet Take 1 Tab by mouth 2 times daily, before breakfast and dinner.     • amlodipine (NORVASC) 10 MG TABS Take 10 mg by mouth every day.     • finasteride (PROSCAR) 5 MG TABS Take 5 mg by mouth every day.     • albuterol 108 (90 Base) MCG/ACT Aero Soln inhalation aerosol INHALE 1 PUFF BY MOUTH EVERY 4 HOURS AS NEEDED FOR SHORTNESS OF BREATH AND WHEEZE. (Patient not taking: Reported on 11/1/2021)       No facility-administered encounter medications on file as of 11/1/2021.     Review of Systems   Respiratory: Negative for cough and shortness of breath.    Cardiovascular: Negative for chest pain and palpitations.   Musculoskeletal: Negative for myalgias.   Neurological: Negative for dizziness and loss of consciousness.        Objective:   BP (!) 164/82 (BP Location: Left arm, Patient Position: Sitting, BP Cuff Size: Adult)   Pulse 99   Resp 18   Ht 1.626 m (5' 4\")   Wt 110 kg (243 lb 3.2 oz)   SpO2 96%   BMI 41.75 kg/m²     Physical Exam  Constitutional:       Appearance: He is well-developed.   Neck:      Vascular: No JVD.   Cardiovascular:      Rate and Rhythm: Normal rate and regular rhythm.      Heart sounds: Normal heart sounds.   Pulmonary:      Effort: Pulmonary effort is normal. No accessory muscle usage or respiratory distress.      Breath sounds: Normal breath sounds. No wheezing or rales.   Abdominal:      Comments: Obese.   Musculoskeletal:      Cervical back: Normal range of motion and neck supple.      Comments: Chronic dermatologic changes lower legs.  Mild pedal edema.   Skin:     General: Skin is warm and dry.      Findings: No rash.      Nails: There is no clubbing.   Neurological:      Mental Status: He is alert and oriented to person, place, and time.   Psychiatric:         Behavior: Behavior normal.         ECHOCARDIOGRAM 04/29/2019  Normal left ventricular systolic function. Left ventricular ejection   fraction is visually estimated to be 60%.  Normal diastolic " function.  Estimated right ventricular systolic pressure  is 34 mmHg + JVP.  Aortic sclerosis without stenosis.    MPI 3/19/2012  1. THE GATED STUDY REVEALS A NORMAL EJECTION FRACTION WITHOUT SEGMENTAL   WALL MOTION ABNORMALITY.   2. SPECT IMAGES ARE FELT TO BE NORMAL WITHOUT EVIDENCE OF SIGNIFICANT   ISCHEMIA OR INFARCTION.  Assessment:     1. Abnormal EKG     2. LAFB (left anterior fascicular block)     3. RBBB     4. Essential (primary) hypertension     5. Mixed hyperlipidemia         Medical Decision Making:  Today's Assessment / Status / Plan:     Assessment  1.  Conduction system disease LF AB, RBBB.  2.  Hypertension.  3.  Dyslipidemia.  4.  ONI on BiPAP.  5.  Morbid obesity.  6.  History of vasovagal/orthostatic syncope.    Recommendation Discussion  1.  The patient is stable from a cardiac standpoint regarding his conduction system disease with no recurrent episode of syncope and dyslipidemia with LDL at goal.  2.  BP remains elevated and he is following up with his PCP for management of his hypertension.  3.  Again encouraged him to lose weight which will have the most beneficial impact on his cardiovascular health with improvement of diabetes mellitus and ONI.  4.  RTC 1 year, sooner if necessary.

## 2021-11-19 ENCOUNTER — HOSPITAL ENCOUNTER (OUTPATIENT)
Dept: LAB | Facility: MEDICAL CENTER | Age: 76
End: 2021-11-19
Attending: FAMILY MEDICINE
Payer: MEDICARE

## 2021-11-19 LAB
ALBUMIN SERPL BCP-MCNC: 4.3 G/DL (ref 3.2–4.9)
ALBUMIN/GLOB SERPL: 1.5 G/DL
ALP SERPL-CCNC: 57 U/L (ref 30–99)
ALT SERPL-CCNC: 25 U/L (ref 2–50)
ANION GAP SERPL CALC-SCNC: 11 MMOL/L (ref 7–16)
APPEARANCE UR: CLEAR
AST SERPL-CCNC: 19 U/L (ref 12–45)
BACTERIA #/AREA URNS HPF: NEGATIVE /HPF
BILIRUB SERPL-MCNC: 0.3 MG/DL (ref 0.1–1.5)
BILIRUB UR QL STRIP.AUTO: NEGATIVE
BUN SERPL-MCNC: 21 MG/DL (ref 8–22)
CALCIUM SERPL-MCNC: 9.4 MG/DL (ref 8.5–10.5)
CAOX CRY #/AREA URNS HPF: ABNORMAL /HPF
CHLORIDE SERPL-SCNC: 97 MMOL/L (ref 96–112)
CHOLEST SERPL-MCNC: 110 MG/DL (ref 100–199)
CO2 SERPL-SCNC: 28 MMOL/L (ref 20–33)
COLOR UR: YELLOW
CREAT SERPL-MCNC: 0.79 MG/DL (ref 0.5–1.4)
EPI CELLS #/AREA URNS HPF: ABNORMAL /HPF
EST. AVERAGE GLUCOSE BLD GHB EST-MCNC: 166 MG/DL
FASTING STATUS PATIENT QL REPORTED: NORMAL
GLOBULIN SER CALC-MCNC: 2.8 G/DL (ref 1.9–3.5)
GLUCOSE SERPL-MCNC: 131 MG/DL (ref 65–99)
GLUCOSE UR STRIP.AUTO-MCNC: NEGATIVE MG/DL
HBA1C MFR BLD: 7.4 % (ref 4–5.6)
HDLC SERPL-MCNC: 30 MG/DL
HYALINE CASTS #/AREA URNS LPF: ABNORMAL /LPF
KETONES UR STRIP.AUTO-MCNC: NEGATIVE MG/DL
LDLC SERPL CALC-MCNC: 28 MG/DL
LEUKOCYTE ESTERASE UR QL STRIP.AUTO: NEGATIVE
MICRO URNS: ABNORMAL
NITRITE UR QL STRIP.AUTO: NEGATIVE
PH UR STRIP.AUTO: 6.5 [PH] (ref 5–8)
POTASSIUM SERPL-SCNC: 3.6 MMOL/L (ref 3.6–5.5)
PROT SERPL-MCNC: 7.1 G/DL (ref 6–8.2)
PROT UR QL STRIP: 100 MG/DL
RBC # URNS HPF: ABNORMAL /HPF
RBC UR QL AUTO: NEGATIVE
SODIUM SERPL-SCNC: 136 MMOL/L (ref 135–145)
SP GR UR STRIP.AUTO: 1.02
TRIGL SERPL-MCNC: 261 MG/DL (ref 0–149)
UROBILINOGEN UR STRIP.AUTO-MCNC: 1 MG/DL
WBC #/AREA URNS HPF: ABNORMAL /HPF

## 2021-11-19 PROCEDURE — 80053 COMPREHEN METABOLIC PANEL: CPT

## 2021-11-19 PROCEDURE — 80061 LIPID PANEL: CPT

## 2021-11-19 PROCEDURE — 82043 UR ALBUMIN QUANTITATIVE: CPT

## 2021-11-19 PROCEDURE — 81001 URINALYSIS AUTO W/SCOPE: CPT

## 2021-11-19 PROCEDURE — 82570 ASSAY OF URINE CREATININE: CPT

## 2021-11-19 PROCEDURE — 83036 HEMOGLOBIN GLYCOSYLATED A1C: CPT

## 2021-11-19 PROCEDURE — 36415 COLL VENOUS BLD VENIPUNCTURE: CPT

## 2021-11-20 LAB
CREAT UR-MCNC: 122.79 MG/DL
MICROALBUMIN UR-MCNC: 29.6 MG/DL
MICROALBUMIN/CREAT UR: 241 MG/G (ref 0–30)

## 2021-11-30 ENCOUNTER — OFFICE VISIT (OUTPATIENT)
Dept: MEDICAL GROUP | Facility: OTHER | Age: 76
End: 2021-11-30
Payer: MEDICARE

## 2021-11-30 VITALS
TEMPERATURE: 97.7 F | SYSTOLIC BLOOD PRESSURE: 138 MMHG | RESPIRATION RATE: 14 BRPM | OXYGEN SATURATION: 93 % | HEART RATE: 102 BPM | DIASTOLIC BLOOD PRESSURE: 80 MMHG | BODY MASS INDEX: 41.2 KG/M2 | WEIGHT: 240 LBS

## 2021-11-30 DIAGNOSIS — N40.0 BENIGN PROSTATIC HYPERPLASIA WITHOUT LOWER URINARY TRACT SYMPTOMS: ICD-10-CM

## 2021-11-30 DIAGNOSIS — I10 ESSENTIAL HYPERTENSION, BENIGN: ICD-10-CM

## 2021-11-30 DIAGNOSIS — E78.2 MIXED HYPERLIPIDEMIA: ICD-10-CM

## 2021-11-30 DIAGNOSIS — E11.41 TYPE 2 DIABETES MELLITUS WITH DIABETIC MONONEUROPATHY, WITHOUT LONG-TERM CURRENT USE OF INSULIN (HCC): ICD-10-CM

## 2021-11-30 DIAGNOSIS — I10 ESSENTIAL HYPERTENSION: ICD-10-CM

## 2021-11-30 PROBLEM — E66.01 MORBID OBESITY (HCC): Status: RESOLVED | Noted: 2020-10-02 | Resolved: 2021-11-30

## 2021-11-30 PROBLEM — R55 VASOVAGAL SYNCOPE: Status: RESOLVED | Noted: 2020-10-02 | Resolved: 2021-11-30

## 2021-11-30 PROBLEM — K92.2 UPPER GI BLEED: Status: RESOLVED | Noted: 2019-07-22 | Resolved: 2021-11-30

## 2021-11-30 PROBLEM — D50.0 ANEMIA DUE TO GASTROINTESTINAL BLOOD LOSS: Status: RESOLVED | Noted: 2019-07-22 | Resolved: 2021-11-30

## 2021-11-30 PROBLEM — R55 SYNCOPE: Status: RESOLVED | Noted: 2019-04-21 | Resolved: 2021-11-30

## 2021-11-30 PROBLEM — D64.9 ANEMIA: Status: RESOLVED | Noted: 2019-04-21 | Resolved: 2021-11-30

## 2021-11-30 PROCEDURE — 99214 OFFICE O/P EST MOD 30 MIN: CPT | Performed by: FAMILY MEDICINE

## 2021-11-30 RX ORDER — FINASTERIDE 5 MG/1
5 TABLET, FILM COATED ORAL DAILY
Qty: 90 TABLET | Refills: 3 | Status: SHIPPED | OUTPATIENT
Start: 2021-11-30 | End: 2022-08-16 | Stop reason: SDUPTHER

## 2021-11-30 RX ORDER — AMLODIPINE BESYLATE 10 MG/1
10 TABLET ORAL DAILY
Qty: 90 TABLET | Refills: 3 | Status: SHIPPED | OUTPATIENT
Start: 2021-11-30 | End: 2022-09-07 | Stop reason: SDUPTHER

## 2021-11-30 RX ORDER — ATORVASTATIN CALCIUM 40 MG/1
40 TABLET, FILM COATED ORAL DAILY
Qty: 90 TABLET | Refills: 3 | Status: SHIPPED | OUTPATIENT
Start: 2021-11-30 | End: 2022-08-23

## 2021-11-30 RX ORDER — LISINOPRIL 20 MG/1
20 TABLET ORAL DAILY
Qty: 100 TABLET | Refills: 3 | Status: SHIPPED | OUTPATIENT
Start: 2021-11-30 | End: 2022-02-18

## 2021-11-30 RX ORDER — GLIPIZIDE AND METFORMIN HCL 5; 500 MG/1; MG/1
1 TABLET, FILM COATED ORAL
Qty: 180 TABLET | Refills: 3 | Status: SHIPPED | OUTPATIENT
Start: 2021-11-30 | End: 2022-09-07 | Stop reason: SDUPTHER

## 2021-11-30 ASSESSMENT — PATIENT HEALTH QUESTIONNAIRE - PHQ9: CLINICAL INTERPRETATION OF PHQ2 SCORE: 0

## 2021-11-30 ASSESSMENT — FIBROSIS 4 INDEX: FIB4 SCORE: 1.03

## 2021-12-01 ASSESSMENT — ENCOUNTER SYMPTOMS
CARDIOVASCULAR NEGATIVE: 1
WEIGHT LOSS: 0

## 2021-12-01 NOTE — PROGRESS NOTES
Subjective:   Jonathan Slade is a 76 y.o. male here for the evaluation and management of Follow-Up (LAB RESULTS )    BPH-stable with no urinary complaints    Diabetes-reviewed and discussed recent laboratory studies with improving blood sugar control    Hypertension-stable    Hyperlipidemia-stable with moderate elevation in triglycerides  Problem   Morbid Obesity (Hcc) (Resolved)   Vasovagal Syncope (Resolved)   Anemia Due to Gastrointestinal Blood Loss (Resolved)   Upper GI Bleed (Resolved)   Syncope (Resolved)   Anemia (Resolved)       Review of Systems   Constitutional: Negative for weight loss.   Cardiovascular: Negative.        Current Outpatient Medications   Medication Sig Dispense Refill   • amLODIPine (NORVASC) 10 MG Tab Take 1 Tablet by mouth every day. 90 Tablet 3   • atorvastatin (LIPITOR) 40 MG Tab Take 1 Tablet by mouth every day. 90 Tablet 3   • finasteride (PROSCAR) 5 MG Tab Take 1 Tablet by mouth every day. 90 Tablet 3   • glipizide-metformin (METAGLIP) 5-500 MG per tablet Take 1 Tablet by mouth 2 times daily, before breakfast and dinner. 180 Tablet 3   • lisinopril (PRINIVIL) 20 MG Tab Take 1 Tablet by mouth every day. 100 Tablet 3   • vitamin D (CHOLECALCIFEROL) 1000 UNIT Tab Take 1,000 Units by mouth every day.     • Omega-3 Fatty Acids (FISH OIL) 1000 MG Cap capsule Take 1,000 mg by mouth every day.       No current facility-administered medications for this visit.     Allergies  Patient has no known allergies.    Past Medical History:   Diagnosis Date   • Asthma    • Back pain    • Diabetes    • ONI treated with BiPAP 5/10/2016   • Sleep apnea      Patient Active Problem List    Diagnosis Date Noted   • Essential hypertension, benign 10/02/2020   • Mixed hyperlipidemia 10/02/2020   • Abnormal EKG 10/02/2020   • Essential (primary) hypertension 12/18/2019   • RBBB 05/31/2019   • LAFB (left anterior fascicular block) 05/31/2019   • DM (diabetes mellitus) (HCC) 04/21/2019   • BPH (benign  prostatic hyperplasia) 04/21/2019   • Obesity 04/21/2019   • ONI treated with BiPAP 05/10/2016       Past Surgical History  Past Surgical History:   Procedure Laterality Date   • GASTROSCOPY-ENDO N/A 7/23/2019    Procedure: GASTROSCOPY;  Surgeon: Tyler Rothman M.D.;  Location: ENDOSCOPY [facility];  Service: Gastroenterology   • FULL THICKNESS BLOCK RESECTION  2/19/2013    Performed by Greyson Lopez M.D. at [facility]   • BIOPSY GENERAL  2/19/2013    Performed by Greyson Lopez M.D. at [facility]   • CATARACT EXTRACTION WITH IOL          Objective:     Vitals:    11/30/21 1603   BP: 138/80   BP Location: Right arm   Patient Position: Sitting   BP Cuff Size: Adult   Pulse: (!) 102   Resp: 14   Temp: 36.5 °C (97.7 °F)   TempSrc: Temporal   SpO2: 93%   Weight: 109 kg (240 lb)     Body mass index is 41.2 kg/m².     Physical Exam  Constitutional:       Appearance: Normal appearance.   HENT:      Head: Normocephalic.   Eyes:      Extraocular Movements: Extraocular movements intact.      Conjunctiva/sclera: Conjunctivae normal.   Cardiovascular:      Rate and Rhythm: Normal rate and regular rhythm.      Heart sounds: Normal heart sounds.   Pulmonary:      Effort: Pulmonary effort is normal.      Breath sounds: Normal breath sounds.   Skin:     General: Skin is warm and dry.   Neurological:      Mental Status: He is alert and oriented to person, place, and time. Mental status is at baseline.   Psychiatric:         Mood and Affect: Mood normal.         Behavior: Behavior normal.         Assessment and Plan:   Jonathan Slade is a 76 y.o. male with a Follow-Up (LAB RESULTS )     The following was discussed with the patient today.    Problem List Items Addressed This Visit     DM (diabetes mellitus) (HCC)    Relevant Medications    glipizide-metformin (METAGLIP) 5-500 MG per tablet    BPH (benign prostatic hyperplasia)    Relevant Medications    finasteride (PROSCAR) 5 MG Tab     Essential hypertension, benign    Relevant Medications    amLODIPine (NORVASC) 10 MG Tab    atorvastatin (LIPITOR) 40 MG Tab    lisinopril (PRINIVIL) 20 MG Tab    Mixed hyperlipidemia    Relevant Medications    amLODIPine (NORVASC) 10 MG Tab    atorvastatin (LIPITOR) 40 MG Tab    lisinopril (PRINIVIL) 20 MG Tab      Other Visit Diagnoses     Essential hypertension        Relevant Medications    amLODIPine (NORVASC) 10 MG Tab    atorvastatin (LIPITOR) 40 MG Tab    lisinopril (PRINIVIL) 20 MG Tab        Laboratory studies reviewed and discussed, medications reviewed and refills provided, foot exam was benign and patient reports no significant foot related complaints.  Recommended ongoing efforts at lifestyle changes and current medications with follow-up planned in 3 months with repeat labs    Followup: No follow-ups on file.    Angel Guerin M.D.    Please note that this dictation was created using voice recognition software. I have made every reasonable attempt to correct obvious errors, but I expect that there are errors of grammar and possibly content that I did not discover before finalizing the note.

## 2022-02-11 ENCOUNTER — HOSPITAL ENCOUNTER (OUTPATIENT)
Dept: LAB | Facility: MEDICAL CENTER | Age: 77
End: 2022-02-11
Attending: FAMILY MEDICINE
Payer: MEDICARE

## 2022-02-11 DIAGNOSIS — I10 ESSENTIAL HYPERTENSION, BENIGN: ICD-10-CM

## 2022-02-11 DIAGNOSIS — E11.41 TYPE 2 DIABETES MELLITUS WITH DIABETIC MONONEUROPATHY, WITHOUT LONG-TERM CURRENT USE OF INSULIN (HCC): ICD-10-CM

## 2022-02-11 LAB
ALBUMIN SERPL BCP-MCNC: 4.4 G/DL (ref 3.2–4.9)
ALBUMIN/GLOB SERPL: 1.5 G/DL
ALP SERPL-CCNC: 64 U/L (ref 30–99)
ALT SERPL-CCNC: 22 U/L (ref 2–50)
ANION GAP SERPL CALC-SCNC: 13 MMOL/L (ref 7–16)
AST SERPL-CCNC: 18 U/L (ref 12–45)
BILIRUB SERPL-MCNC: 0.2 MG/DL (ref 0.1–1.5)
BUN SERPL-MCNC: 20 MG/DL (ref 8–22)
CALCIUM SERPL-MCNC: 9.9 MG/DL (ref 8.5–10.5)
CHLORIDE SERPL-SCNC: 96 MMOL/L (ref 96–112)
CHOLEST SERPL-MCNC: 113 MG/DL (ref 100–199)
CO2 SERPL-SCNC: 27 MMOL/L (ref 20–33)
CREAT SERPL-MCNC: 0.81 MG/DL (ref 0.5–1.4)
EST. AVERAGE GLUCOSE BLD GHB EST-MCNC: 166 MG/DL
FASTING STATUS PATIENT QL REPORTED: NORMAL
GLOBULIN SER CALC-MCNC: 2.9 G/DL (ref 1.9–3.5)
GLUCOSE SERPL-MCNC: 139 MG/DL (ref 65–99)
HBA1C MFR BLD: 7.4 % (ref 4–5.6)
HDLC SERPL-MCNC: 32 MG/DL
LDLC SERPL CALC-MCNC: 51 MG/DL
POTASSIUM SERPL-SCNC: 4 MMOL/L (ref 3.6–5.5)
PROT SERPL-MCNC: 7.3 G/DL (ref 6–8.2)
SODIUM SERPL-SCNC: 136 MMOL/L (ref 135–145)
TRIGL SERPL-MCNC: 149 MG/DL (ref 0–149)

## 2022-02-11 PROCEDURE — 80061 LIPID PANEL: CPT

## 2022-02-11 PROCEDURE — 80053 COMPREHEN METABOLIC PANEL: CPT

## 2022-02-11 PROCEDURE — 36415 COLL VENOUS BLD VENIPUNCTURE: CPT

## 2022-02-11 PROCEDURE — 83036 HEMOGLOBIN GLYCOSYLATED A1C: CPT

## 2022-02-18 ENCOUNTER — OFFICE VISIT (OUTPATIENT)
Dept: MEDICAL GROUP | Facility: OTHER | Age: 77
End: 2022-02-18
Payer: MEDICARE

## 2022-02-18 VITALS
WEIGHT: 240 LBS | BODY MASS INDEX: 40.97 KG/M2 | HEIGHT: 64 IN | TEMPERATURE: 97.9 F | SYSTOLIC BLOOD PRESSURE: 120 MMHG | OXYGEN SATURATION: 92 % | HEART RATE: 113 BPM | DIASTOLIC BLOOD PRESSURE: 70 MMHG | RESPIRATION RATE: 14 BRPM

## 2022-02-18 DIAGNOSIS — E78.2 MIXED HYPERLIPIDEMIA: ICD-10-CM

## 2022-02-18 DIAGNOSIS — I10 ESSENTIAL HYPERTENSION, BENIGN: ICD-10-CM

## 2022-02-18 DIAGNOSIS — E11.9 TYPE 2 DIABETES MELLITUS WITHOUT COMPLICATION, WITHOUT LONG-TERM CURRENT USE OF INSULIN (HCC): ICD-10-CM

## 2022-02-18 DIAGNOSIS — G47.33 OSA TREATED WITH BIPAP: ICD-10-CM

## 2022-02-18 DIAGNOSIS — Z23 NEED FOR INFLUENZA VACCINATION: ICD-10-CM

## 2022-02-18 PROCEDURE — 99214 OFFICE O/P EST MOD 30 MIN: CPT | Mod: 25 | Performed by: FAMILY MEDICINE

## 2022-02-18 PROCEDURE — G0008 ADMIN INFLUENZA VIRUS VAC: HCPCS | Performed by: FAMILY MEDICINE

## 2022-02-18 PROCEDURE — 90662 IIV NO PRSV INCREASED AG IM: CPT | Performed by: FAMILY MEDICINE

## 2022-02-18 RX ORDER — OMEPRAZOLE 20 MG/1
CAPSULE, DELAYED RELEASE ORAL
COMMUNITY
Start: 2022-01-23

## 2022-02-18 RX ORDER — OLMESARTAN MEDOXOMIL 5 MG/1
5 TABLET ORAL DAILY
Qty: 90 TABLET | Refills: 3 | Status: SHIPPED | OUTPATIENT
Start: 2022-02-18 | End: 2022-05-27 | Stop reason: SDUPTHER

## 2022-02-18 ASSESSMENT — ENCOUNTER SYMPTOMS
CARDIOVASCULAR NEGATIVE: 1
COUGH: 1
CONSTITUTIONAL NEGATIVE: 1
NEUROLOGICAL NEGATIVE: 1

## 2022-02-18 ASSESSMENT — FIBROSIS 4 INDEX: FIB4 SCORE: 1.04

## 2022-02-18 NOTE — PROGRESS NOTES
Subjective:   Jonathan Slade is a 76 y.o. male here for the evaluation and management of Follow-Up (LAB RESULTS )    Sleep apnea-stable    Diabetes-stable, reviewed and discussed recent laboratory studies, he has no foot complaints    Hypertension-stable    Hyperlipidemia-stable    He reports a mild persistent cough and we discussed the possibility lisinopril could be causing this  Problem   Essential (Primary) Hypertension (Resolved)       Review of Systems   Constitutional: Negative.    Respiratory: Positive for cough.    Cardiovascular: Negative.    Neurological: Negative.        Current Outpatient Medications   Medication Sig Dispense Refill   • omeprazole (PRILOSEC) 20 MG delayed-release capsule TAKE 1 CAPSULE BY MOUTH IN THE MORNING ON AN EMPTY STOMACH 30 MINUTES PRIOR TO EATING AND DRINKING.     • olmesartan (BENICAR) 5 MG tablet Take 1 Tablet by mouth every day. 90 Tablet 3   • amLODIPine (NORVASC) 10 MG Tab Take 1 Tablet by mouth every day. 90 Tablet 3   • atorvastatin (LIPITOR) 40 MG Tab Take 1 Tablet by mouth every day. 90 Tablet 3   • finasteride (PROSCAR) 5 MG Tab Take 1 Tablet by mouth every day. 90 Tablet 3   • glipizide-metformin (METAGLIP) 5-500 MG per tablet Take 1 Tablet by mouth 2 times daily, before breakfast and dinner. 180 Tablet 3   • vitamin D (CHOLECALCIFEROL) 1000 UNIT Tab Take 1,000 Units by mouth every day.     • Omega-3 Fatty Acids (FISH OIL) 1000 MG Cap capsule Take 1,000 mg by mouth every day.       No current facility-administered medications for this visit.     Allergies  Patient has no known allergies.    Past Medical History:   Diagnosis Date   • Asthma    • Back pain    • Diabetes    • ONI treated with BiPAP 5/10/2016   • Sleep apnea      Patient Active Problem List    Diagnosis Date Noted   • Essential hypertension, benign 10/02/2020   • Mixed hyperlipidemia 10/02/2020   • Abnormal EKG 10/02/2020   • RBBB 05/31/2019   • LAFB (left anterior fascicular block) 05/31/2019   •  "DM (diabetes mellitus) (HCC) 04/21/2019   • BPH (benign prostatic hyperplasia) 04/21/2019   • Obesity 04/21/2019   • ONI treated with BiPAP 05/10/2016       Past Surgical History  Past Surgical History:   Procedure Laterality Date   • GASTROSCOPY-ENDO N/A 7/23/2019    Procedure: GASTROSCOPY;  Surgeon: Tyler Rothman M.D.;  Location: ENDOSCOPY [facility];  Service: Gastroenterology   • FULL THICKNESS BLOCK RESECTION  2/19/2013    Performed by Greyson Lopez M.D. at SURGERY [facility]   • BIOPSY GENERAL  2/19/2013    Performed by Greyson Lopez M.D. at [facility]   • CATARACT EXTRACTION WITH IOL          Objective:     Vitals:    02/18/22 1041   BP: 120/70   BP Location: Left arm   Patient Position: Sitting   BP Cuff Size: Adult   Pulse: (!) 113   Resp: 14   Temp: 36.6 °C (97.9 °F)   TempSrc: Temporal   SpO2: 92%   Weight: 109 kg (240 lb)   Height: 1.626 m (5' 4\")     Body mass index is 41.2 kg/m².     Physical Exam  Constitutional:       Appearance: Normal appearance.   HENT:      Head: Normocephalic.   Eyes:      Extraocular Movements: Extraocular movements intact.      Conjunctiva/sclera: Conjunctivae normal.   Cardiovascular:      Rate and Rhythm: Normal rate and regular rhythm.      Heart sounds: Normal heart sounds.   Pulmonary:      Effort: Pulmonary effort is normal.      Breath sounds: Normal breath sounds.   Skin:     General: Skin is warm and dry.   Neurological:      Mental Status: He is alert and oriented to person, place, and time. Mental status is at baseline.   Psychiatric:         Mood and Affect: Mood normal.         Behavior: Behavior normal.       Foot exam-normal inspection, intact sensation to light touch, he reports regular visits to have his feet evaluated  Assessment and Plan:   Jonathan Slade is a 76 y.o. male with a Follow-Up (LAB RESULTS )     The following was discussed with the patient today.    Problem List Items Addressed This Visit     ONI treated " with BiPAP    DM (diabetes mellitus) (HCC)    Essential hypertension, benign    Relevant Medications    olmesartan (BENICAR) 5 MG tablet    Mixed hyperlipidemia    Relevant Medications    olmesartan (BENICAR) 5 MG tablet        Medications reviewed and refills provided, elected to switch from lisinopril to Benicar based on his description of a cough, stable with other chronic medical problems and recommended follow-up in approximately 3 months with repeat labs  Followup: No follow-ups on file.    Angel Guerin M.D.    Please note that this dictation was created using voice recognition software. I have made every reasonable attempt to correct obvious errors, but I expect that there are errors of grammar and possibly content that I did not discover before finalizing the note.

## 2022-03-04 ENCOUNTER — TELEPHONE (OUTPATIENT)
Dept: MEDICAL GROUP | Facility: OTHER | Age: 77
End: 2022-03-04
Payer: MEDICARE

## 2022-03-04 NOTE — TELEPHONE ENCOUNTER
NEED TO FIND OUT WHAT IMMUNIZATIONS HE GOT CAUSE HE WANTS TO GO TO [facility] AND GET IMMUNIZATIONS BUT DOESN'T WANT TO DOUBLE UP

## 2022-03-07 NOTE — TELEPHONE ENCOUNTER
Phone Number Called: [phone]    Call outcome: Spoke to patient regarding message below.    Message: I have return the patient call back he needs Zoster Vacc and TDap. He will schedule those appt at his Pharmacy.

## 2022-05-19 ENCOUNTER — APPOINTMENT (OUTPATIENT)
Dept: RADIOLOGY | Facility: OTHER | Age: 77
End: 2022-05-19
Attending: FAMILY MEDICINE
Payer: MEDICARE

## 2022-05-19 ENCOUNTER — OFFICE VISIT (OUTPATIENT)
Dept: MEDICAL GROUP | Facility: OTHER | Age: 77
End: 2022-05-19
Payer: MEDICARE

## 2022-05-19 VITALS
SYSTOLIC BLOOD PRESSURE: 162 MMHG | RESPIRATION RATE: 16 BRPM | HEIGHT: 64 IN | TEMPERATURE: 97.7 F | WEIGHT: 242.2 LBS | DIASTOLIC BLOOD PRESSURE: 78 MMHG | OXYGEN SATURATION: 94 % | HEART RATE: 88 BPM | BODY MASS INDEX: 41.35 KG/M2

## 2022-05-19 DIAGNOSIS — I44.4 LAFB (LEFT ANTERIOR FASCICULAR BLOCK): ICD-10-CM

## 2022-05-19 DIAGNOSIS — E66.9 CLASS 2 OBESITY WITHOUT SERIOUS COMORBIDITY WITH BODY MASS INDEX (BMI) OF 38.0 TO 38.9 IN ADULT, UNSPECIFIED OBESITY TYPE: ICD-10-CM

## 2022-05-19 DIAGNOSIS — I10 ESSENTIAL HYPERTENSION, BENIGN: ICD-10-CM

## 2022-05-19 DIAGNOSIS — E11.9 TYPE 2 DIABETES MELLITUS WITHOUT COMPLICATION, WITHOUT LONG-TERM CURRENT USE OF INSULIN (HCC): ICD-10-CM

## 2022-05-19 DIAGNOSIS — R05.1 ACUTE COUGH: ICD-10-CM

## 2022-05-19 DIAGNOSIS — E78.2 MIXED HYPERLIPIDEMIA: ICD-10-CM

## 2022-05-19 PROCEDURE — 99214 OFFICE O/P EST MOD 30 MIN: CPT | Performed by: FAMILY MEDICINE

## 2022-05-19 PROCEDURE — 71046 X-RAY EXAM CHEST 2 VIEWS: CPT | Mod: TC,FY | Performed by: FAMILY MEDICINE

## 2022-05-19 RX ORDER — FUROSEMIDE 20 MG/1
20 TABLET ORAL 2 TIMES DAILY
Qty: 60 TABLET | Refills: 1 | Status: SHIPPED | OUTPATIENT
Start: 2022-05-19 | End: 2022-09-07

## 2022-05-19 ASSESSMENT — ENCOUNTER SYMPTOMS
COUGH: 1
CONSTITUTIONAL NEGATIVE: 1

## 2022-05-19 ASSESSMENT — FIBROSIS 4 INDEX: FIB4 SCORE: 1.05

## 2022-05-19 ASSESSMENT — PATIENT HEALTH QUESTIONNAIRE - PHQ9: CLINICAL INTERPRETATION OF PHQ2 SCORE: 0

## 2022-05-19 NOTE — PROGRESS NOTES
Subjective:   Jonathan Slade is a 77 y.o. male here for the evaluation and management of Follow-Up      Persistent cough-patient reports since her last visit that his cough has persisted and he describes it as dry, when asked pacifically about shortness of breath he does report some exertional shortness of breath but no associated chest discomfort.    Hypertension-noted blood pressure elevation today    History of abnormal EKG with left anterior fascicular block-clinical exam demonstrates normal heart rhythm but he has some leg swelling on review of systems    Diabetes-stable    Obesity-stable and unchanged    No problems updated.    Review of Systems   Constitutional: Negative.    Respiratory: Positive for cough.    Cardiovascular: Positive for leg swelling.       Current Outpatient Medications   Medication Sig Dispense Refill   • furosemide (LASIX) 20 MG Tab Take 1 Tablet by mouth 2 times a day. 60 Tablet 1   • omeprazole (PRILOSEC) 20 MG delayed-release capsule TAKE 1 CAPSULE BY MOUTH IN THE MORNING ON AN EMPTY STOMACH 30 MINUTES PRIOR TO EATING AND DRINKING.     • olmesartan (BENICAR) 5 MG tablet Take 1 Tablet by mouth every day. 90 Tablet 3   • amLODIPine (NORVASC) 10 MG Tab Take 1 Tablet by mouth every day. 90 Tablet 3   • atorvastatin (LIPITOR) 40 MG Tab Take 1 Tablet by mouth every day. 90 Tablet 3   • finasteride (PROSCAR) 5 MG Tab Take 1 Tablet by mouth every day. 90 Tablet 3   • glipizide-metformin (METAGLIP) 5-500 MG per tablet Take 1 Tablet by mouth 2 times daily, before breakfast and dinner. 180 Tablet 3   • vitamin D (CHOLECALCIFEROL) 1000 UNIT Tab Take 1,000 Units by mouth every day.     • Omega-3 Fatty Acids (FISH OIL) 1000 MG Cap capsule Take 1,000 mg by mouth every day.       No current facility-administered medications for this visit.     Allergies  Patient has no known allergies.    Past Medical History:   Diagnosis Date   • Asthma    • Back pain    • Diabetes    • ONI treated with BiPAP  "5/10/2016   • Sleep apnea      Patient Active Problem List    Diagnosis Date Noted   • Essential hypertension, benign 10/02/2020   • Mixed hyperlipidemia 10/02/2020   • Abnormal EKG 10/02/2020   • RBBB 05/31/2019   • LAFB (left anterior fascicular block) 05/31/2019   • DM (diabetes mellitus) (HCC) 04/21/2019   • BPH (benign prostatic hyperplasia) 04/21/2019   • Obesity 04/21/2019   • ONI treated with BiPAP 05/10/2016       Past Surgical History  Past Surgical History:   Procedure Laterality Date   • GASTROSCOPY-ENDO N/A 7/23/2019    Procedure: GASTROSCOPY;  Surgeon: Tyler Rothman M.D.;  Location: ENDOSCOPY [facility];  Service: Gastroenterology   • FULL THICKNESS BLOCK RESECTION  2/19/2013    Performed by Greyson Lopez M.D. at SURGERY [facility] ORS   • BIOPSY GENERAL  2/19/2013    Performed by Greyson Lopez M.D. at SURGERY [facility] ORS   • CATARACT EXTRACTION WITH IOL          Objective:     Vitals:    05/19/22 1339   BP: (!) 162/78   BP Location: Right arm   Patient Position: Sitting   BP Cuff Size: Adult   Pulse: 88   Resp: 16   Temp: 36.5 °C (97.7 °F)   TempSrc: Temporal   SpO2: 94%   Weight: 110 kg (242 lb 3.2 oz)   Height: 1.626 m (5' 4\")     Body mass index is 41.57 kg/m².     Physical Exam  Constitutional:       Appearance: Normal appearance.   HENT:      Head: Normocephalic.   Eyes:      Extraocular Movements: Extraocular movements intact.      Conjunctiva/sclera: Conjunctivae normal.   Cardiovascular:      Rate and Rhythm: Normal rate and regular rhythm.      Heart sounds: Normal heart sounds.   Pulmonary:      Effort: Pulmonary effort is normal.      Breath sounds: Rales present.   Musculoskeletal:         General: Swelling present.   Skin:     General: Skin is warm and dry.   Neurological:      Mental Status: He is alert and oriented to person, place, and time. Mental status is at baseline.   Psychiatric:         Mood and Affect: Mood normal.         Behavior: Behavior normal. "       +1 pedal edema about both ankles with no significant calf tenderness  Assessment and Plan:   Jonathan Slade is a 77 y.o. male with a Follow-Up     The following was discussed with the patient today.    Problem List Items Addressed This Visit     DM (diabetes mellitus) (HCC)    Relevant Orders    CBC WITHOUT DIFFERENTIAL    HEMOGLOBIN A1C    Comp Metabolic Panel    Lipid Profile    proBrain Natriuretic Peptide, NT    Obesity    LAFB (left anterior fascicular block)    Relevant Medications    furosemide (LASIX) 20 MG Tab    Other Relevant Orders    EC-ECHOCARDIOGRAM COMPLETE W/ CONT    CBC WITHOUT DIFFERENTIAL    HEMOGLOBIN A1C    Comp Metabolic Panel    Lipid Profile    proBrain Natriuretic Peptide, NT    Essential hypertension, benign    Relevant Medications    furosemide (LASIX) 20 MG Tab    Other Relevant Orders    EC-ECHOCARDIOGRAM COMPLETE W/ CONT    CBC WITHOUT DIFFERENTIAL    HEMOGLOBIN A1C    Comp Metabolic Panel    Lipid Profile    proBrain Natriuretic Peptide, NT    Mixed hyperlipidemia    Relevant Medications    furosemide (LASIX) 20 MG Tab        Medications reviewed, recommendation for addition of diuretic given mild crackles on examination with review of chest x-ray demonstrating some mild congestion in the lung bases.  EKG and echocardiogram were reviewed previously with echocardiogram completed in 2019 with relatively benign findings.  Certainly the clinical exam today does indicate some degree of volume overload based on abnormal pulmonary exam and some mild pedal edema.  Recommended the addition of a diuretic and discussed common side effects including increased urinary frequency.  Recommended laboratory studies including BMP and repeat echocardiogram with close follow-up in 1 month.  Followup: No follow-ups on file.    Angel Guerin M.D.    Please note that this dictation was created using voice recognition software. I have made every reasonable attempt to correct obvious errors, but I  expect that there are errors of grammar and possibly content that I did not discover before finalizing the note.

## 2022-05-27 DIAGNOSIS — I10 ESSENTIAL HYPERTENSION, BENIGN: ICD-10-CM

## 2022-05-31 RX ORDER — OLMESARTAN MEDOXOMIL 5 MG/1
5 TABLET ORAL DAILY
Qty: 100 TABLET | Refills: 3 | Status: SHIPPED | OUTPATIENT
Start: 2022-05-31 | End: 2022-09-07 | Stop reason: SDUPTHER

## 2022-06-07 ENCOUNTER — HOSPITAL ENCOUNTER (OUTPATIENT)
Dept: CARDIOLOGY | Facility: MEDICAL CENTER | Age: 77
End: 2022-06-07
Attending: FAMILY MEDICINE
Payer: MEDICARE

## 2022-06-07 DIAGNOSIS — I10 ESSENTIAL HYPERTENSION, BENIGN: ICD-10-CM

## 2022-06-07 DIAGNOSIS — I44.4 LAFB (LEFT ANTERIOR FASCICULAR BLOCK): ICD-10-CM

## 2022-06-07 LAB
LV EJECT FRACT  99904: 65
LV EJECT FRACT MOD 2C 99903: 75.32
LV EJECT FRACT MOD 4C 99902: 72.93
LV EJECT FRACT MOD BP 99901: 73.96

## 2022-06-07 PROCEDURE — 93306 TTE W/DOPPLER COMPLETE: CPT | Mod: 26 | Performed by: INTERNAL MEDICINE

## 2022-06-07 PROCEDURE — 93306 TTE W/DOPPLER COMPLETE: CPT

## 2022-06-14 ENCOUNTER — HOSPITAL ENCOUNTER (OUTPATIENT)
Dept: LAB | Facility: MEDICAL CENTER | Age: 77
End: 2022-06-14
Attending: FAMILY MEDICINE
Payer: MEDICARE

## 2022-06-14 DIAGNOSIS — I44.4 LAFB (LEFT ANTERIOR FASCICULAR BLOCK): ICD-10-CM

## 2022-06-14 DIAGNOSIS — I10 ESSENTIAL HYPERTENSION, BENIGN: ICD-10-CM

## 2022-06-14 DIAGNOSIS — E11.9 TYPE 2 DIABETES MELLITUS WITHOUT COMPLICATION, WITHOUT LONG-TERM CURRENT USE OF INSULIN (HCC): ICD-10-CM

## 2022-06-14 LAB
ALBUMIN SERPL BCP-MCNC: 4.2 G/DL (ref 3.2–4.9)
ALBUMIN/GLOB SERPL: 1.2 G/DL
ALP SERPL-CCNC: 60 U/L (ref 30–99)
ALT SERPL-CCNC: 19 U/L (ref 2–50)
ANION GAP SERPL CALC-SCNC: 15 MMOL/L (ref 7–16)
AST SERPL-CCNC: 15 U/L (ref 12–45)
BILIRUB SERPL-MCNC: 0.3 MG/DL (ref 0.1–1.5)
BUN SERPL-MCNC: 17 MG/DL (ref 8–22)
CALCIUM SERPL-MCNC: 9.3 MG/DL (ref 8.5–10.5)
CHLORIDE SERPL-SCNC: 97 MMOL/L (ref 96–112)
CHOLEST SERPL-MCNC: 127 MG/DL (ref 100–199)
CO2 SERPL-SCNC: 26 MMOL/L (ref 20–33)
CREAT SERPL-MCNC: 0.85 MG/DL (ref 0.5–1.4)
ERYTHROCYTE [DISTWIDTH] IN BLOOD BY AUTOMATED COUNT: 47.4 FL (ref 35.9–50)
EST. AVERAGE GLUCOSE BLD GHB EST-MCNC: 163 MG/DL
FASTING STATUS PATIENT QL REPORTED: NORMAL
GFR SERPLBLD CREATININE-BSD FMLA CKD-EPI: 88 ML/MIN/1.73 M 2
GLOBULIN SER CALC-MCNC: 3.4 G/DL (ref 1.9–3.5)
GLUCOSE SERPL-MCNC: 108 MG/DL (ref 65–99)
HBA1C MFR BLD: 7.3 % (ref 4–5.6)
HCT VFR BLD AUTO: 45.6 % (ref 42–52)
HDLC SERPL-MCNC: 31 MG/DL
HGB BLD-MCNC: 14.8 G/DL (ref 14–18)
LDLC SERPL CALC-MCNC: 57 MG/DL
MCH RBC QN AUTO: 29.7 PG (ref 27–33)
MCHC RBC AUTO-ENTMCNC: 32.5 G/DL (ref 33.7–35.3)
MCV RBC AUTO: 91.6 FL (ref 81.4–97.8)
NT-PROBNP SERPL IA-MCNC: 172 PG/ML (ref 0–125)
PLATELET # BLD AUTO: 264 K/UL (ref 164–446)
PMV BLD AUTO: 9.4 FL (ref 9–12.9)
POTASSIUM SERPL-SCNC: 3.9 MMOL/L (ref 3.6–5.5)
PROT SERPL-MCNC: 7.6 G/DL (ref 6–8.2)
RBC # BLD AUTO: 4.98 M/UL (ref 4.7–6.1)
SODIUM SERPL-SCNC: 138 MMOL/L (ref 135–145)
TRIGL SERPL-MCNC: 193 MG/DL (ref 0–149)
WBC # BLD AUTO: 8.3 K/UL (ref 4.8–10.8)

## 2022-06-14 PROCEDURE — 85027 COMPLETE CBC AUTOMATED: CPT

## 2022-06-14 PROCEDURE — 80053 COMPREHEN METABOLIC PANEL: CPT

## 2022-06-14 PROCEDURE — 83036 HEMOGLOBIN GLYCOSYLATED A1C: CPT

## 2022-06-14 PROCEDURE — 36415 COLL VENOUS BLD VENIPUNCTURE: CPT

## 2022-06-14 PROCEDURE — 83880 ASSAY OF NATRIURETIC PEPTIDE: CPT

## 2022-06-14 PROCEDURE — 80061 LIPID PANEL: CPT

## 2022-06-20 ENCOUNTER — OFFICE VISIT (OUTPATIENT)
Dept: MEDICAL GROUP | Facility: OTHER | Age: 77
End: 2022-06-20
Payer: MEDICARE

## 2022-06-20 VITALS
TEMPERATURE: 97.2 F | RESPIRATION RATE: 15 BRPM | HEART RATE: 92 BPM | SYSTOLIC BLOOD PRESSURE: 122 MMHG | BODY MASS INDEX: 41.57 KG/M2 | HEIGHT: 64 IN | DIASTOLIC BLOOD PRESSURE: 75 MMHG | OXYGEN SATURATION: 94 %

## 2022-06-20 DIAGNOSIS — I10 ESSENTIAL HYPERTENSION, BENIGN: ICD-10-CM

## 2022-06-20 DIAGNOSIS — L98.9 SKIN LESION OF LEFT LOWER EXTREMITY: ICD-10-CM

## 2022-06-20 PROCEDURE — 11104 PUNCH BX SKIN SINGLE LESION: CPT | Performed by: FAMILY MEDICINE

## 2022-06-20 PROCEDURE — 99213 OFFICE O/P EST LOW 20 MIN: CPT | Mod: 25 | Performed by: FAMILY MEDICINE

## 2022-06-20 PROCEDURE — 11105 PUNCH BX SKIN EA SEP/ADDL: CPT | Performed by: FAMILY MEDICINE

## 2022-06-20 ASSESSMENT — ENCOUNTER SYMPTOMS
RESPIRATORY NEGATIVE: 1
CARDIOVASCULAR NEGATIVE: 1
CONSTITUTIONAL NEGATIVE: 1

## 2022-06-20 NOTE — PROGRESS NOTES
Subjective:   Jonathan Slade is a 77 y.o. male here for the evaluation and management of Follow-Up (RESULTS X-RAYS )    Diabetes-stable with current treatment plan    Hypertension-stable    Hyperlipidemia-stable    Bilateral lower extremity edema with modest improvement after addition of hydrochlorothiazide with review of recent laboratory studies and echocardiogram-patient reports swelling demonstrates modest improvement    Skin lesions on the left lower leg which have been there for some time with report of previous dermatology evaluation  No problems updated.    Review of Systems   Constitutional: Negative.    Respiratory: Negative.    Cardiovascular: Negative.        Current Outpatient Medications   Medication Sig Dispense Refill   • olmesartan (BENICAR) 5 MG tablet Take 1 Tablet by mouth every day. 100 Tablet 3   • furosemide (LASIX) 20 MG Tab Take 1 Tablet by mouth 2 times a day. 60 Tablet 1   • omeprazole (PRILOSEC) 20 MG delayed-release capsule TAKE 1 CAPSULE BY MOUTH IN THE MORNING ON AN EMPTY STOMACH 30 MINUTES PRIOR TO EATING AND DRINKING.     • amLODIPine (NORVASC) 10 MG Tab Take 1 Tablet by mouth every day. 90 Tablet 3   • atorvastatin (LIPITOR) 40 MG Tab Take 1 Tablet by mouth every day. 90 Tablet 3   • finasteride (PROSCAR) 5 MG Tab Take 1 Tablet by mouth every day. 90 Tablet 3   • glipizide-metformin (METAGLIP) 5-500 MG per tablet Take 1 Tablet by mouth 2 times daily, before breakfast and dinner. 180 Tablet 3   • vitamin D (CHOLECALCIFEROL) 1000 UNIT Tab Take 1,000 Units by mouth every day.     • Omega-3 Fatty Acids (FISH OIL) 1000 MG Cap capsule Take 1,000 mg by mouth every day.       No current facility-administered medications for this visit.     Allergies  Patient has no known allergies.    Past Medical History:   Diagnosis Date   • Asthma    • Back pain    • Diabetes    • ONI treated with BiPAP 5/10/2016   • Sleep apnea      Patient Active Problem List    Diagnosis Date Noted   • Essential  "hypertension, benign 10/02/2020   • Mixed hyperlipidemia 10/02/2020   • Abnormal EKG 10/02/2020   • RBBB 05/31/2019   • LAFB (left anterior fascicular block) 05/31/2019   • DM (diabetes mellitus) (HCC) 04/21/2019   • BPH (benign prostatic hyperplasia) 04/21/2019   • Obesity 04/21/2019   • ONI treated with BiPAP 05/10/2016       Past Surgical History  Past Surgical History:   Procedure Laterality Date   • GASTROSCOPY-ENDO N/A 7/23/2019    Procedure: GASTROSCOPY;  Surgeon: Tyler Rothman M.D.;  Location: ENDOSCOPY [facility];  Service: Gastroenterology   • FULL THICKNESS BLOCK RESECTION  2/19/2013    Performed by Greyson Lopez M.D. at SURGERY [facility] ORS   • BIOPSY GENERAL  2/19/2013    Performed by Greyson Lopez M.D. at SURGERY [facility] ORS   • CATARACT EXTRACTION WITH IOL          Objective:     Vitals:    06/20/22 1017   BP: 122/75   BP Location: Right arm   Patient Position: Sitting   BP Cuff Size: Adult   Pulse: 92   Resp: 15   Temp: 36.2 °C (97.2 °F)   TempSrc: Temporal   SpO2: 94%   Height: 1.626 m (5' 4\")     Body mass index is 41.57 kg/m².     Physical Exam  Constitutional:       Appearance: Normal appearance.   HENT:      Head: Normocephalic.   Eyes:      Extraocular Movements: Extraocular movements intact.      Conjunctiva/sclera: Conjunctivae normal.   Cardiovascular:      Rate and Rhythm: Normal rate and regular rhythm.      Heart sounds: Normal heart sounds.   Pulmonary:      Effort: Pulmonary effort is normal.      Breath sounds: Normal breath sounds.   Skin:     General: Skin is warm and dry.   Neurological:      Mental Status: He is alert and oriented to person, place, and time. Mental status is at baseline.   Psychiatric:         Mood and Affect: Mood normal.         Behavior: Behavior normal.     Skin exam of the left lower leg demonstrates a hyperpigmented papule located of the anterior aspect of the lower leg measuring 4 mm in diameter with hyperkeratosis " apparent    Posterior lateral aspect of the left lower leg demonstrates a firm papular lesion with hyperkeratosis measuring approximately 6 x 5 mm in diameter with normal pigmentation    Assessment and Plan:   Jonathan Slade is a 77 y.o. male with a Follow-Up (RESULTS X-RAYS )     The following was discussed with the patient today.    Problem List Items Addressed This Visit    None     Visit Diagnoses     Skin lesion of left lower extremity        Relevant Orders    Consent for all Surgical, Special Diagnostic or Therapeutic Procedures        Reviewed and discussed recent echocardiogram with benign findings and ejection fraction of 65%, laboratory studies reviewed and discussed including very mild elevation in BNP, discussed treatment options for skin lesions in question elected excisional biopsy  Followup: No follow-ups on file.    Angel Guerin M.D.    Please note that this dictation was created using voice recognition software. I have made every reasonable attempt to correct obvious errors, but I expect that there are errors of grammar and possibly content that I did not discover before finalizing the note.

## 2022-06-20 NOTE — PROCEDURES
Skin Excision    Date/Time: 6/20/2022 2:47 PM  Performed by: Angel Guerin M.D.  Authorized by: Angel Guerin M.D.     Number of Lesions: 2  Lesion 1:     Body area: lower extremity    Lower extremity location: L lower leg    Initial size (mm): 6    Final defect size (mm): 6    Repair type: linear closure    Closure complexity: simple      Surgical defect: 6    Repair size (cm): 0.6  Lesion 2:     Body area: lower extremity    Lower extremity location: L lower leg    Initial size (mm): 5    Final defect size (mm): 5    Malignancy: malignancy unknown      Closure complexity: simple            Informed written consent was obtained for excisional biopsy, sterile preparation with injection of 1% lidocaine with epinephrine to each skin lesion to achieve local anesthesia, 5 mm punch biopsy was used to mobilize both skin lesions which were removed and placed for pathology with wound closure accomplished by sutures, wounds were dressed and covered with antibiotic ointment and an adhesive bandage with follow-up recommended in 1 week

## 2022-06-27 ENCOUNTER — OFFICE VISIT (OUTPATIENT)
Dept: MEDICAL GROUP | Facility: OTHER | Age: 77
End: 2022-06-27
Payer: MEDICARE

## 2022-06-27 VITALS
WEIGHT: 242 LBS | OXYGEN SATURATION: 92 % | SYSTOLIC BLOOD PRESSURE: 148 MMHG | RESPIRATION RATE: 16 BRPM | DIASTOLIC BLOOD PRESSURE: 74 MMHG | HEART RATE: 86 BPM | HEIGHT: 64 IN | TEMPERATURE: 97.8 F | BODY MASS INDEX: 41.32 KG/M2

## 2022-06-27 DIAGNOSIS — L98.9 SKIN LESION OF LEFT LOWER EXTREMITY: ICD-10-CM

## 2022-06-27 PROCEDURE — 99212 OFFICE O/P EST SF 10 MIN: CPT | Performed by: FAMILY MEDICINE

## 2022-06-27 ASSESSMENT — FIBROSIS 4 INDEX: FIB4 SCORE: 1.03

## 2022-06-27 ASSESSMENT — ENCOUNTER SYMPTOMS: CONSTITUTIONAL NEGATIVE: 1

## 2022-06-28 NOTE — PROGRESS NOTES
Subjective:   Jonathan Slade is a 77 y.o. male here for the evaluation and management of Suture / Staple Removal (Pt is here for suture removal)    Skin lesion on the lateral aspect of the left lower leg- sutures removed and pathology was discussed with benign findings  No problems updated.    Review of Systems   Constitutional: Negative.        Current Outpatient Medications   Medication Sig Dispense Refill   • olmesartan (BENICAR) 5 MG tablet Take 1 Tablet by mouth every day. 100 Tablet 3   • furosemide (LASIX) 20 MG Tab Take 1 Tablet by mouth 2 times a day. 60 Tablet 1   • omeprazole (PRILOSEC) 20 MG delayed-release capsule TAKE 1 CAPSULE BY MOUTH IN THE MORNING ON AN EMPTY STOMACH 30 MINUTES PRIOR TO EATING AND DRINKING.     • amLODIPine (NORVASC) 10 MG Tab Take 1 Tablet by mouth every day. 90 Tablet 3   • atorvastatin (LIPITOR) 40 MG Tab Take 1 Tablet by mouth every day. 90 Tablet 3   • finasteride (PROSCAR) 5 MG Tab Take 1 Tablet by mouth every day. 90 Tablet 3   • glipizide-metformin (METAGLIP) 5-500 MG per tablet Take 1 Tablet by mouth 2 times daily, before breakfast and dinner. 180 Tablet 3   • vitamin D (CHOLECALCIFEROL) 1000 UNIT Tab Take 1,000 Units by mouth every day.     • Omega-3 Fatty Acids (FISH OIL) 1000 MG Cap capsule Take 1,000 mg by mouth every day.       No current facility-administered medications for this visit.     Allergies  Patient has no known allergies.    Past Medical History:   Diagnosis Date   • Asthma    • Back pain    • Diabetes    • ONI treated with BiPAP 5/10/2016   • Sleep apnea      Patient Active Problem List    Diagnosis Date Noted   • Essential hypertension, benign 10/02/2020   • Mixed hyperlipidemia 10/02/2020   • Abnormal EKG 10/02/2020   • RBBB 05/31/2019   • LAFB (left anterior fascicular block) 05/31/2019   • DM (diabetes mellitus) (HCC) 04/21/2019   • BPH (benign prostatic hyperplasia) 04/21/2019   • Obesity 04/21/2019   • ONI treated with BiPAP 05/10/2016  "      Past Surgical History  Past Surgical History:   Procedure Laterality Date   • GASTROSCOPY-ENDO N/A 7/23/2019    Procedure: GASTROSCOPY;  Surgeon: Tyler Rothman M.D.;  Location: [facility];  Service: Gastroenterology   • FULL THICKNESS BLOCK RESECTION  2/19/2013    Performed by Greyson Lopez M.D. at SURGERY [facility]   • BIOPSY GENERAL  2/19/2013    Performed by Greyson Lopez M.D. at SURGERY [facility]   • CATARACT EXTRACTION WITH IOL          Objective:     Vitals:    06/27/22 0936   BP: (!) 148/74   BP Location: Right arm   Patient Position: Sitting   BP Cuff Size: Large adult   Pulse: 86   Resp: 16   Temp: 36.6 °C (97.8 °F)   TempSrc: Temporal   SpO2: 92%   Weight: 110 kg (242 lb)   Height: 1.626 m (5' 4\")     Body mass index is 41.54 kg/m².     Physical Exam  Constitutional:       Appearance: Normal appearance.   HENT:      Head: Normocephalic.   Eyes:      Extraocular Movements: Extraocular movements intact.      Conjunctiva/sclera: Conjunctivae normal.   Neurological:      Mental Status: He is alert. Mental status is at baseline.   Psychiatric:         Mood and Affect: Mood normal.         Behavior: Behavior normal.     Left lower leg-surgical incisions located laterally and posterior laterally were healing without complication with sutures in place with no surrounding erythema or drainage    Assessment and Plan:   Jonathan Slade is a 77 y.o. male with a Suture / Staple Removal (Pt is here for suture removal)     The following was discussed with the patient today.    Problem List Items Addressed This Visit    None       Sutures were removed, wound care instructions provided, pathology reviewed with benign findings, follow-up as planned  Followup: No follow-ups on file.    Angel Guerin M.D.    Please note that this dictation was created using voice recognition software. I have made every reasonable attempt to correct obvious errors, but I expect that there are " errors of grammar and possibly content that I did not discover before finalizing the note.

## 2022-07-05 ENCOUNTER — OFFICE VISIT (OUTPATIENT)
Dept: SLEEP MEDICINE | Facility: MEDICAL CENTER | Age: 77
End: 2022-07-05
Payer: MEDICARE

## 2022-07-05 VITALS
HEIGHT: 64 IN | WEIGHT: 245 LBS | BODY MASS INDEX: 41.83 KG/M2 | DIASTOLIC BLOOD PRESSURE: 84 MMHG | OXYGEN SATURATION: 94 % | SYSTOLIC BLOOD PRESSURE: 130 MMHG | HEART RATE: 90 BPM | RESPIRATION RATE: 16 BRPM

## 2022-07-05 DIAGNOSIS — L81.9 DISCOLORATION OF SKIN OF MULTIPLE SITES OF LOWER EXTREMITY: ICD-10-CM

## 2022-07-05 DIAGNOSIS — G47.33 OSA TREATED WITH BIPAP: ICD-10-CM

## 2022-07-05 DIAGNOSIS — I87.2 VENOUS INSUFFICIENCY: ICD-10-CM

## 2022-07-05 DIAGNOSIS — Z23 NEED FOR VACCINATION: ICD-10-CM

## 2022-07-05 DIAGNOSIS — Z87.891 FORMER SMOKER: ICD-10-CM

## 2022-07-05 DIAGNOSIS — I10 ESSENTIAL HYPERTENSION, BENIGN: ICD-10-CM

## 2022-07-05 PROCEDURE — 99214 OFFICE O/P EST MOD 30 MIN: CPT | Performed by: NURSE PRACTITIONER

## 2022-07-05 ASSESSMENT — FIBROSIS 4 INDEX: FIB4 SCORE: 1.03

## 2022-07-05 NOTE — PROGRESS NOTES
Chief Complaint   Patient presents with   • Follow-Up   • Apnea       HPI:  Jonathan Slade is a 77 y.o. year old male here today for follow-up on ONI.  Last OV 7/23/21 Khari LEWIS     Currently using BIPAP @ 18/14cm H20 nightly; [employer]; device obtained 8/2019.  Patient received new motor from [employer] due to recall. He notes device working well.  Compliance report 6/5/2022 through 7/4/2020 tunic is 100% compliance, average nightly 6 hours 17 minutes, no significant mask leak using nasal mask and a reduced AHI of 0.9/h.  Reviewed with patient.    He feels overall he sleeps on therapy.  He tolerates mask and pressure well.  He denies morning headaches.  He will have occasional mouth dryness of his humidifier and dry.  He goes to bed and falls asleep quickly.  He does not take naps during the day but will occasionally sit in his chair and fall asleep for 5 to 10 minutes.  He denies any cardiac or respiratory symptoms.  No significant changes in health over the last year.  His PCP is currently working up his bilateral lower extremity discoloration and edema.  He had a recent biopsy that was negative.  Recent chest x-ray and echo were nonrevealing.  We discussed venous insufficiency.    Sleep hx:  Polysomnogram indicated AHI 77.3 and minimum saturation 49%.      ROS: As per HPI and otherwise negative if not stated.    Past Medical History:   Diagnosis Date   • Asthma    • Back pain    • Diabetes    • ONI treated with BiPAP 5/10/2016   • Sleep apnea        Past Surgical History:   Procedure Laterality Date   • GASTROSCOPY-ENDO N/A 7/23/2019    Procedure: GASTROSCOPY;  Surgeon: Tyler Rothman M.D.;  Location: ENDOSCOPY [facility];  Service: Gastroenterology   • FULL THICKNESS BLOCK RESECTION  2/19/2013    Performed by Greyson Lopez M.D. at SURGERY [facility]   • BIOPSY GENERAL  2/19/2013    Performed by Greyson Lopez M.D. at SURGERY [facility]S   • CATARACT EXTRACTION WITH IOL    "      Family History   Problem Relation Age of Onset   • Heart Attack Brother    • Cancer Neg Hx        Social History     Socioeconomic History   • Marital status:      Spouse name: Not on file   • Number of children: Not on file   • Years of education: Not on file   • Highest education level: Not on file   Occupational History   • Not on file   Tobacco Use   • Smoking status: Former Smoker     Packs/day: 1.00     Years: 10.00     Pack years: 10.00     Types: Cigarettes     Start date: 1985     Quit date: 1995     Years since quittin.5   • Smokeless tobacco: Never Used   Vaping Use   • Vaping Use: Unknown   Substance and Sexual Activity   • Alcohol use: Not Currently     Alcohol/week: 2.4 - 3.6 oz     Types: 4 - 6 Cans of beer per week   • Drug use: No   • Sexual activity: Not on file   Other Topics Concern   • Not on file   Social History Narrative    ** Merged History Encounter **          Social Determinants of Health     Financial Resource Strain: Not on file   Food Insecurity: Not on file   Transportation Needs: Not on file   Physical Activity: Not on file   Stress: Not on file   Social Connections: Not on file   Intimate Partner Violence: Not on file   Housing Stability: Not on file       Allergies as of 2022   • (No Known Allergies)        Vitals:  /84   Pulse 90   Resp 16   Ht 1.626 m (5' 4\")   Wt 111 kg (245 lb)   SpO2 94%     Current medications as of today   Current Outpatient Medications   Medication Sig Dispense Refill   • olmesartan (BENICAR) 5 MG tablet Take 1 Tablet by mouth every day. 100 Tablet 3   • furosemide (LASIX) 20 MG Tab Take 1 Tablet by mouth 2 times a day. 60 Tablet 1   • omeprazole (PRILOSEC) 20 MG delayed-release capsule TAKE 1 CAPSULE BY MOUTH IN THE MORNING ON AN EMPTY STOMACH 30 MINUTES PRIOR TO EATING AND DRINKING.     • amLODIPine (NORVASC) 10 MG Tab Take 1 Tablet by mouth every day. 90 Tablet 3   • atorvastatin (LIPITOR) 40 MG Tab Take 1 " Tablet by mouth every day. 90 Tablet 3   • finasteride (PROSCAR) 5 MG Tab Take 1 Tablet by mouth every day. 90 Tablet 3   • glipizide-metformin (METAGLIP) 5-500 MG per tablet Take 1 Tablet by mouth 2 times daily, before breakfast and dinner. 180 Tablet 3   • vitamin D (CHOLECALCIFEROL) 1000 UNIT Tab Take 1,000 Units by mouth every day.     • Omega-3 Fatty Acids (FISH OIL) 1000 MG Cap capsule Take 1,000 mg by mouth every day.       No current facility-administered medications for this visit.         Physical Exam:   Gen:           Alert and oriented, No apparent distress. Mood and affect appropriate, normal interaction with examiner.  Eyes:          PERRL, EOM intact, sclere white, conjunctive moist.  Ears:          Not examined.   Hearing:     Grossly intact.  Nose:          Normal, no lesions or deformities.  Dentition:    mask  Oropharynx:   mask  Mallampati Classification: mask  Neck:        Supple, trachea midline, no masses.  Respiratory Effort: No intercostal retractions or use of accessory muscles.   Lung Auscultation:      Clear to auscultation bilaterally; no rales, rhonchi or wheezing.  CV:            Regular rate and rhythm. No murmurs, rubs or gallops.  Abd:           Not examined.   Lymphadenopathy: Not examined.  Gait and Station: Normal.  Digits and Nails: No clubbing, cyanosis, petechiae, or nodes.   Cranial Nerves: II-XII grossly intact.  Skin:        No rashes, lesions or ulcers noted.               Ext:           BLE extremities with brown discoloration and L>R 1+ pitting edema.      Assessment:  1. ONI treated with BiPAP  DME Mask and Supplies   2. Need for vaccination  Pneumococcal Conjugate Vaccine 13-Valent (6 mos-18 yrs)   3. Essential hypertension, benign     4. Discoloration of skin of multiple sites of lower extremity     5. BMI 40.0-44.9, adult ([facility])     6. Former smoker         Immunizations:    Flu:2/18/22  Pneumovax 23:10/5/18, 2012  Prevnar 13:given today  COVID-19: 10/16/21,  9/18/21    Plan:  1.  ONI is well treated with BiPAP therapy.  He will continue his current BiPAP is set pressures.  DME mask/supplies   2.  Prevnar 13 vaccine updated today  3.  Follow-up primary care for ongoing management of hypertension and discoloration of lower extremities.  Discussed the possibly venous insufficiency and sent message to his PCP regarding referral to vein clinic for work-up of venous insufficiency  4.  Encourage weight loss through dietary changes and regular exercise  5.  Discussed sleep hygiene and the avoidance of naps  6.  Follow-up in 1 year's compliance report, sooner if needed.    Please note that this dictation was created using voice recognition software. I have made every reasonable attempt to correct obvious errors, but it is possible there are errors of grammar and possibly content that I did not discover before finalizing the note.

## 2022-07-05 NOTE — PATIENT INSTRUCTIONS
Consider referral to Vein Clinic to evaluate for venous insufficiency, peripheral vascular disease due to color changes/edema - sent message to Dr. Guerin to place referral

## 2022-08-10 ENCOUNTER — TELEPHONE (OUTPATIENT)
Dept: MEDICAL GROUP | Facility: OTHER | Age: 77
End: 2022-08-10
Payer: MEDICARE

## 2022-08-10 NOTE — TELEPHONE ENCOUNTER
Finafiasteride    Patient says he is out of the medication and is calling to see if he can get a refill called in. [address] [facility]  [phone]

## 2022-08-16 DIAGNOSIS — N40.0 BENIGN PROSTATIC HYPERPLASIA WITHOUT LOWER URINARY TRACT SYMPTOMS: ICD-10-CM

## 2022-08-16 RX ORDER — FINASTERIDE 5 MG/1
5 TABLET, FILM COATED ORAL DAILY
Qty: 90 TABLET | Refills: 3 | Status: SHIPPED | OUTPATIENT
Start: 2022-08-16 | End: 2022-09-07 | Stop reason: SDUPTHER

## 2022-08-22 DIAGNOSIS — E78.2 MIXED HYPERLIPIDEMIA: ICD-10-CM

## 2022-08-23 RX ORDER — ATORVASTATIN CALCIUM 40 MG/1
TABLET, FILM COATED ORAL
Qty: 100 TABLET | Refills: 0 | Status: SHIPPED | OUTPATIENT
Start: 2022-08-23 | End: 2022-09-07 | Stop reason: SDUPTHER

## 2022-09-07 ENCOUNTER — OFFICE VISIT (OUTPATIENT)
Dept: MEDICAL GROUP | Facility: MEDICAL CENTER | Age: 77
End: 2022-09-07
Payer: MEDICARE

## 2022-09-07 VITALS
DIASTOLIC BLOOD PRESSURE: 72 MMHG | WEIGHT: 243 LBS | RESPIRATION RATE: 16 BRPM | HEART RATE: 100 BPM | TEMPERATURE: 97.2 F | BODY MASS INDEX: 41.48 KG/M2 | HEIGHT: 64 IN | SYSTOLIC BLOOD PRESSURE: 134 MMHG | OXYGEN SATURATION: 95 %

## 2022-09-07 DIAGNOSIS — I10 ESSENTIAL HYPERTENSION, BENIGN: ICD-10-CM

## 2022-09-07 DIAGNOSIS — R42 DIZZINESS: ICD-10-CM

## 2022-09-07 DIAGNOSIS — E78.2 MIXED HYPERLIPIDEMIA: ICD-10-CM

## 2022-09-07 DIAGNOSIS — E11.41 TYPE 2 DIABETES MELLITUS WITH DIABETIC MONONEUROPATHY, WITHOUT LONG-TERM CURRENT USE OF INSULIN (HCC): ICD-10-CM

## 2022-09-07 DIAGNOSIS — N40.0 BENIGN PROSTATIC HYPERPLASIA WITHOUT LOWER URINARY TRACT SYMPTOMS: ICD-10-CM

## 2022-09-07 DIAGNOSIS — I73.9 PVD (PERIPHERAL VASCULAR DISEASE) (HCC): ICD-10-CM

## 2022-09-07 DIAGNOSIS — I10 ESSENTIAL HYPERTENSION: ICD-10-CM

## 2022-09-07 PROCEDURE — 99214 OFFICE O/P EST MOD 30 MIN: CPT | Performed by: STUDENT IN AN ORGANIZED HEALTH CARE EDUCATION/TRAINING PROGRAM

## 2022-09-07 RX ORDER — FINASTERIDE 5 MG/1
5 TABLET, FILM COATED ORAL DAILY
Qty: 90 TABLET | Refills: 3 | Status: SHIPPED | OUTPATIENT
Start: 2022-09-07 | End: 2023-11-14

## 2022-09-07 RX ORDER — GLIPIZIDE AND METFORMIN HCL 5; 500 MG/1; MG/1
1 TABLET, FILM COATED ORAL
Qty: 180 TABLET | Refills: 3 | Status: SHIPPED | OUTPATIENT
Start: 2022-09-07 | End: 2022-10-03 | Stop reason: SDUPTHER

## 2022-09-07 RX ORDER — OLMESARTAN MEDOXOMIL 5 MG/1
5 TABLET ORAL DAILY
Qty: 100 TABLET | Refills: 3 | Status: SHIPPED | OUTPATIENT
Start: 2022-09-07 | End: 2023-10-24

## 2022-09-07 RX ORDER — ATORVASTATIN CALCIUM 40 MG/1
40 TABLET, FILM COATED ORAL DAILY
Qty: 100 TABLET | Refills: 3 | Status: SHIPPED | OUTPATIENT
Start: 2022-09-07 | End: 2023-06-16 | Stop reason: SDUPTHER

## 2022-09-07 RX ORDER — AMLODIPINE BESYLATE 10 MG/1
10 TABLET ORAL DAILY
Qty: 90 TABLET | Refills: 3 | Status: SHIPPED | OUTPATIENT
Start: 2022-09-07 | End: 2023-11-14

## 2022-09-07 ASSESSMENT — FIBROSIS 4 INDEX: FIB4 SCORE: 1.03

## 2022-09-07 NOTE — PROGRESS NOTES
"Subjective:     Chief Complaint   Patient presents with    Establish Care         HPI:   Jonathan presents today to establish care.  Is a previous patient of Dr. Angel Guerin.  Visit conducted without the help of translation services.  Patient declined translation and stated that he was okay without it.    Hypertension  Blood pressure controlled today 134/72.  Patient continues on amlodipine 10 mg and olmesartan 5 mg.    Diabetes  Patient's last A1c at 7.3%, able to do A1c today due to being too early by 7 days.  Patient continues on glipizide-metformin for diabetes control.    Obesity  Patient's BMI at 41.71.  Weight remains stable.  Patient encouraged to continue working on diet and exercise.    Hyperlipidemia  Patient continues on atorvastatin 40 mg.    Sleep apnea  Patient continues on CPAP nightly.    Dizziness  Patient presents today for concerns about dizziness.  Patient notes that he has been having episodes of dizziness when he goes from sitting to standing.  Patient notes that this has been increasing in frequency over the last few weeks.  Patient does have peripheral vascular disease.  Patient with recent echo with no significant findings.  Patient denies feeling short of breath or any changes in exertion.  Patient denies chest pain.            ROS:  Gen: no fevers/chills  Pulm: no sob, no cough  CV: no chest pain, no palpitations  GI: no nausea/vomiting, no diarrhea        Objective:     Exam:  /72 (BP Location: Right arm, Patient Position: Sitting, BP Cuff Size: Adult)   Pulse 100   Temp 36.2 °C (97.2 °F) (Temporal)   Resp 16   Ht 1.626 m (5' 4\")   Wt 110 kg (243 lb)   SpO2 95%   BMI 41.71 kg/m²  Body mass index is 41.71 kg/m².    Gen: Alert and oriented, No apparent distress.  Neck: Neck is supple without lymphadenopathy.  Lungs: Normal effort, CTA bilaterally, no wheezes, rhonchi, or rales  CV: Regular rate and rhythm. No murmurs, rubs, or gallops.  Ext: No clubbing, cyanosis, edema.  Neuro: " CN II-XII intact, strength 5/5 in all muscle groups, sensation intact bilaterally to light touch, coordination intact bilaterally, Romberg negative      Monofilament testing with a 10 gram force: sensation intact: decreased bilaterally  Visual Inspection: Feet without maceration, ulcers, fissures.  Pedal pulses: decreased bilaterally    Assessment & Plan:     77 y.o. male with the following -     1. Mixed hyperlipidemia  - atorvastatin (LIPITOR) 40 MG Tab; Take 1 Tablet by mouth every day.  Dispense: 100 Tablet; Refill: 3    2. 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  Last A1c stable at 7.3%.  Patient encouraged to continue work on diet and exercise.  We will follow-up in 3 months.  - Diabetic Monofilament LE Exam  - glipizide-metformin (METAGLIP) 5-500 MG per tablet; Take 1 Tablet by mouth 2 times daily, before breakfast and dinner.  Dispense: 180 Tablet; Refill: 3    3. Essential hypertension  Chronic, stable.  Controlled today.  - amLODIPine (NORVASC) 10 MG Tab; Take 1 Tablet by mouth every day.  Dispense: 90 Tablet; Refill: 3    4. Essential hypertension, benign  - amLODIPine (NORVASC) 10 MG Tab; Take 1 Tablet by mouth every day.  Dispense: 90 Tablet; Refill: 3  - olmesartan (BENICAR) 5 MG tablet; Take 1 Tablet by mouth every day.  Dispense: 100 Tablet; Refill: 3    5. Benign prostatic hyperplasia without lower urinary tract symptoms  Chronic, stable.  Patient notes no new concerns.  - finasteride (PROSCAR) 5 MG Tab; Take 1 Tablet by mouth every day.  Dispense: 90 Tablet; Refill: 3    6. PVD (peripheral vascular disease) ([facility])  Chronic, stable.  Patient with skin changes to bilateral lower extremities.      7. Dizziness  Acute, stable.  Patient with dizziness in change of positions for the last few days.  Discussed with patient possible causes including orthostatic hypotension.  Patient encouraged to drink more water, add in  electrolytes, make slow transitions, wear compression socks and elevate feet as needed.  Discussed signs and symptoms that would warrant emergent evaluation.  Patient advised to follow-up if no improvement.    Return in about 3 months (around 12/7/2022).    Please note that this dictation was created using voice recognition software. I have made every reasonable attempt to correct obvious errors, but I expect that there are errors of grammar and possibly content that I did not discover before finalizing the note.

## 2022-09-19 ENCOUNTER — TELEPHONE (OUTPATIENT)
Dept: HEALTH INFORMATION MANAGEMENT | Facility: OTHER | Age: 77
End: 2022-09-19
Payer: MEDICARE

## 2022-10-03 DIAGNOSIS — E11.41 TYPE 2 DIABETES MELLITUS WITH DIABETIC MONONEUROPATHY, WITHOUT LONG-TERM CURRENT USE OF INSULIN (HCC): ICD-10-CM

## 2022-10-03 RX ORDER — GLIPIZIDE AND METFORMIN HCL 5; 500 MG/1; MG/1
1 TABLET, FILM COATED ORAL
Qty: 180 TABLET | Refills: 3 | Status: SHIPPED | OUTPATIENT
Start: 2022-10-03 | End: 2023-11-06

## 2022-10-21 PROBLEM — E11.29 MICROALBUMINURIA DUE TO TYPE 2 DIABETES MELLITUS (HCC): Status: ACTIVE | Noted: 2022-10-21

## 2022-10-21 PROBLEM — R80.9 MICROALBUMINURIA DUE TO TYPE 2 DIABETES MELLITUS (HCC): Status: ACTIVE | Noted: 2022-10-21

## 2022-10-21 PROBLEM — E11.65 TYPE 2 DIABETES MELLITUS WITH HYPERGLYCEMIA (HCC): Status: ACTIVE | Noted: 2022-10-21

## 2022-10-21 PROBLEM — E11.69 HYPERLIPIDEMIA ASSOCIATED WITH TYPE 2 DIABETES MELLITUS (HCC): Status: ACTIVE | Noted: 2020-10-02

## 2022-10-21 PROBLEM — E78.5 HYPERLIPIDEMIA ASSOCIATED WITH TYPE 2 DIABETES MELLITUS (HCC): Status: ACTIVE | Noted: 2020-10-02

## 2022-10-21 PROBLEM — I73.9 PVD (PERIPHERAL VASCULAR DISEASE) (HCC): Status: RESOLVED | Noted: 2022-09-07 | Resolved: 2022-10-21

## 2022-10-21 PROBLEM — I10 DIABETES MELLITUS WITH COINCIDENT HYPERTENSION (HCC): Status: ACTIVE | Noted: 2019-04-21

## 2022-10-21 PROBLEM — I70.0 ATHEROSCLEROSIS OF AORTA (HCC): Status: ACTIVE | Noted: 2022-10-21

## 2022-11-07 ENCOUNTER — OFFICE VISIT (OUTPATIENT)
Dept: CARDIOLOGY | Facility: MEDICAL CENTER | Age: 77
End: 2022-11-07
Payer: MEDICARE

## 2022-11-07 ENCOUNTER — TELEPHONE (OUTPATIENT)
Dept: CARDIOLOGY | Facility: MEDICAL CENTER | Age: 77
End: 2022-11-07

## 2022-11-07 VITALS
WEIGHT: 244 LBS | SYSTOLIC BLOOD PRESSURE: 156 MMHG | OXYGEN SATURATION: 94 % | BODY MASS INDEX: 41.66 KG/M2 | HEART RATE: 98 BPM | HEIGHT: 64 IN | RESPIRATION RATE: 12 BRPM | DIASTOLIC BLOOD PRESSURE: 80 MMHG

## 2022-11-07 DIAGNOSIS — R94.31 ABNORMAL EKG: ICD-10-CM

## 2022-11-07 DIAGNOSIS — E78.5 HYPERLIPIDEMIA ASSOCIATED WITH TYPE 2 DIABETES MELLITUS (HCC): ICD-10-CM

## 2022-11-07 DIAGNOSIS — I70.0 ATHEROSCLEROSIS OF AORTA (HCC): ICD-10-CM

## 2022-11-07 DIAGNOSIS — E11.69 HYPERLIPIDEMIA ASSOCIATED WITH TYPE 2 DIABETES MELLITUS (HCC): ICD-10-CM

## 2022-11-07 DIAGNOSIS — I10 ESSENTIAL HYPERTENSION, BENIGN: ICD-10-CM

## 2022-11-07 DIAGNOSIS — I44.4 LAFB (LEFT ANTERIOR FASCICULAR BLOCK): ICD-10-CM

## 2022-11-07 DIAGNOSIS — I45.10 RIGHT BUNDLE BRANCH BLOCK: ICD-10-CM

## 2022-11-07 PROBLEM — E66.9 OBESITY: Status: RESOLVED | Noted: 2019-04-21 | Resolved: 2022-11-07

## 2022-11-07 PROCEDURE — 99214 OFFICE O/P EST MOD 30 MIN: CPT | Performed by: INTERNAL MEDICINE

## 2022-11-07 RX ORDER — CARVEDILOL 12.5 MG/1
12.5 TABLET ORAL 2 TIMES DAILY WITH MEALS
Qty: 180 TABLET | Refills: 3 | Status: SHIPPED | OUTPATIENT
Start: 2022-11-07 | End: 2024-02-05

## 2022-11-07 ASSESSMENT — FIBROSIS 4 INDEX: FIB4 SCORE: 1.03

## 2022-11-07 NOTE — PROGRESS NOTES
"Chief Complaint   Patient presents with   • Hypertension       Subjective     Jonathan Slade is a 77 y.o. male who presents today for follow-up cardiac care.    The patient has medical problems including vasovagal syncope, abnormal EKG with a history of hypertension, dyslipidemia, diabetes mellitus, ONI, COPD, obesity.    Since 11/1/2021 appointment the patient has had no cardiac symptoms including chest pain, palpitations, shortness of breath. Activity limited due to knee arthritis requiring a cane for ambulation assistance.  Prior PCP Angel Guerin MD left and has been reassigned to Juliane Ames PA-C.  Continues to check and record daily BP which runs \"144/80 sometimes 150\".  Previously was a heavy smoker and alcohol drinker but has been abstinent for many years    Since 10/2/2020 appointment the patient has had no further episodes of syncope.  No chest pain or FAYE.  Closely followed by his PCP Dr. Angel Guerin.  Checks BP daily rounds \"140s\".  Did not follow-up last year when referred to [facility] program for weight loss.  States that his son-in-law is a certified nutritionist and is managing his diet.  Past Medical History:   Diagnosis Date   • Asthma    • Back pain    • Diabetes    • ONI treated with BiPAP 5/10/2016   • Sleep apnea      Past Surgical History:   Procedure Laterality Date   • GASTROSCOPY-ENDO N/A 7/23/2019    Procedure: GASTROSCOPY;  Surgeon: Tyler Rothman M.D.;  Location: ENDOSCOPY [facility];  Service: Gastroenterology   • FULL THICKNESS BLOCK RESECTION  2/19/2013    Performed by Greyson Lopez M.D. at SURGERY [facility] ORS   • BIOPSY GENERAL  2/19/2013    Performed by Greyson Lopez M.D. at SURGERY [facility] ORS   • CATARACT EXTRACTION WITH IOL       Family History   Problem Relation Age of Onset   • Heart Attack Brother    • Cancer Neg Hx      Social History     Socioeconomic History   • Marital status:      Spouse name: Not on file   • Number of " children: Not on file   • Years of education: Not on file   • Highest education level: Not on file   Occupational History   • Not on file   Tobacco Use   • Smoking status: Former     Packs/day: 1.00     Years: 10.00     Pack years: 10.00     Types: Cigarettes     Start date: 1985     Quit date: 1995     Years since quittin.8   • Smokeless tobacco: Never   Vaping Use   • Vaping Use: Unknown   Substance and Sexual Activity   • Alcohol use: Not Currently     Alcohol/week: 2.4 - 3.6 oz     Types: 4 - 6 Cans of beer per week   • Drug use: No   • Sexual activity: Not on file   Other Topics Concern   • Not on file   Social History Narrative    ** Merged History Encounter **          Social Determinants of Health     Financial Resource Strain: Not on file   Food Insecurity: Not on file   Transportation Needs: Not on file   Physical Activity: Not on file   Stress: Not on file   Social Connections: Not on file   Intimate Partner Violence: Not on file   Housing Stability: Not on file     No Known Allergies  Outpatient Encounter Medications as of 2022   Medication Sig Dispense Refill   • carvedilol (COREG) 12.5 MG Tab Take 1 Tablet by mouth 2 times a day with meals. 180 Tablet 3   • glipizide-metformin (METAGLIP) 5-500 MG per tablet Take 1 Tablet by mouth 2 times daily, before breakfast and dinner. 180 Tablet 3   • atorvastatin (LIPITOR) 40 MG Tab Take 1 Tablet by mouth every day. 100 Tablet 3   • amLODIPine (NORVASC) 10 MG Tab Take 1 Tablet by mouth every day. 90 Tablet 3   • finasteride (PROSCAR) 5 MG Tab Take 1 Tablet by mouth every day. 90 Tablet 3   • olmesartan (BENICAR) 5 MG tablet Take 1 Tablet by mouth every day. 100 Tablet 3   • omeprazole (PRILOSEC) 20 MG delayed-release capsule TAKE 1 CAPSULE BY MOUTH IN THE MORNING ON AN EMPTY STOMACH 30 MINUTES PRIOR TO EATING AND DRINKING.     • vitamin D (CHOLECALCIFEROL) 1000 UNIT Tab Take 1 Tablet by mouth every day.     • Omega-3 Fatty Acids (FISH OIL) 1000  "MG Cap capsule Take 1 Capsule by mouth every day.       No facility-administered encounter medications on file as of 11/7/2022.     Review of Systems   Respiratory:  Negative for cough and shortness of breath.    Cardiovascular:  Negative for chest pain and palpitations.   Musculoskeletal:  Negative for myalgias.   Neurological:  Negative for dizziness and loss of consciousness.            Objective     BP (!) 156/80 (BP Location: Left arm, Patient Position: Sitting, BP Cuff Size: Adult)   Pulse 98   Resp 12   Ht 1.626 m (5' 4\")   Wt 111 kg (244 lb)   SpO2 94%   BMI 41.88 kg/m²     Physical Exam  Constitutional:       Appearance: He is well-developed.   Eyes:      Conjunctiva/sclera: Conjunctivae normal.      Pupils: Pupils are equal, round, and reactive to light.   Neck:      Vascular: No JVD.   Cardiovascular:      Rate and Rhythm: Normal rate and regular rhythm.      Heart sounds: Normal heart sounds.   Pulmonary:      Effort: Pulmonary effort is normal. No accessory muscle usage or respiratory distress.      Breath sounds: Normal breath sounds. No wheezing or rales.   Chest:      Comments: Increased AP diameter  Abdominal:      Comments: Markedly protuberant   Musculoskeletal:      Cervical back: Normal range of motion and neck supple.   Skin:     General: Skin is warm and dry.      Findings: No rash.      Nails: There is no clubbing.   Neurological:      Mental Status: He is alert and oriented to person, place, and time.   Psychiatric:         Behavior: Behavior normal.               ECHOCARDIOGRAM 04/29/2019  Normal left ventricular systolic function. Left ventricular ejection   fraction is visually estimated to be 60%.  Normal diastolic function.  Estimated right ventricular systolic pressure  is 34 mmHg + JVP.  Aortic sclerosis without stenosis.     MPI 3/19/2012  1. THE GATED STUDY REVEALS A NORMAL EJECTION FRACTION WITHOUT SEGMENTAL   WALL MOTION ABNORMALITY.   2. SPECT IMAGES ARE FELT TO BE NORMAL " WITHOUT EVIDENCE OF SIGNIFICANT   ISCHEMIA OR INFARCTION.  Assessment & Plan     1. Essential hypertension, benign        2. Abnormal EKG        3. LAFB (left anterior fascicular block)        4. RBBB        5. Hyperlipidemia associated with type 2 diabetes mellitus (HCC)        6. Atherosclerosis of aorta (HCC)            Medical Decision Making: Today's Assessment/Status/Plan:   Assessment  1.  Conduction system disease LAFB, RBBB.  2.  Hypertension.  3.  Dyslipidemia.  4.  ONI on BiPAP.  5.  Morbid obesity.  6.  History of vasovagal/orthostatic syncope.     Recommendation Discussion  1.  The patient is stable from a cardiac standpoint regarding his conduction system disease with no recurrent episode of syncope and dyslipidemia with LDL at goal at 57 3..  2.  BP elevated, reviewed medications, taking amlodipine but does not recognize olmesartan, will start carvedilol 12.5 mg twice daily, follow-up in 1 month to review BP log  3.  Continued to encourage weight loss.  4.  Continue amlodipine, atorvastatin,  5.  RTC 1 month.

## 2022-11-07 NOTE — TELEPHONE ENCOUNTER
MONICA        Caller: Jonathan Slade      Topic/issue: Patient was calling about his carvedilol (COREG) 12.5 MG Tab medication and was asking if it was ok for him to take with another medication that was discussed with him at his appointment this after      Callback Number: [phone]      Thank you    -Rashel CASTANO

## 2022-11-11 NOTE — TELEPHONE ENCOUNTER
I would continue his current therapy and see if his blood pressure continues to stay below 130 after starting the carvedilol 12.5 mg twice daily, continue olmesartan and amlodipine.  Thank you

## 2022-11-11 NOTE — TELEPHONE ENCOUNTER
To MONICA---Please advise, are you ok with pt taking all three BP meds daily.    S/W pt, he states that at 11/7 appt with SW he was unsure if he is taking the olmesartan and I see this noted by MONICA on OV note. Pt confirms he is taking olmesartan 5mg one tab every evening, also taking the amlodipine 10mg tablets every evening, along with the new carvedill 12.5mg BID.     He has taken all three meds since 11/8.  11/7 /81 P:84  11/8 /76 P:74  11/9 /76 P:68  11/10 /74 P:73    Pt will continue to monitor BP and report any symptoms of hypertension or hypotension, we discussed these.

## 2022-11-14 NOTE — TELEPHONE ENCOUNTER
S/w Pt, informed as stated by SW. Pt will continue med regimen and monitoring bp. He will reach out if any questions or concerns.

## 2022-11-30 ENCOUNTER — HOSPITAL ENCOUNTER (OUTPATIENT)
Dept: RADIOLOGY | Facility: MEDICAL CENTER | Age: 77
End: 2022-11-30
Attending: STUDENT IN AN ORGANIZED HEALTH CARE EDUCATION/TRAINING PROGRAM
Payer: MEDICARE

## 2022-11-30 ENCOUNTER — OFFICE VISIT (OUTPATIENT)
Dept: MEDICAL GROUP | Facility: MEDICAL CENTER | Age: 77
End: 2022-11-30
Payer: MEDICARE

## 2022-11-30 ENCOUNTER — HOSPITAL ENCOUNTER (OUTPATIENT)
Facility: MEDICAL CENTER | Age: 77
End: 2022-11-30
Attending: STUDENT IN AN ORGANIZED HEALTH CARE EDUCATION/TRAINING PROGRAM
Payer: MEDICARE

## 2022-11-30 VITALS
RESPIRATION RATE: 16 BRPM | HEART RATE: 80 BPM | HEIGHT: 64 IN | SYSTOLIC BLOOD PRESSURE: 160 MMHG | TEMPERATURE: 97.4 F | OXYGEN SATURATION: 93 % | BODY MASS INDEX: 41.48 KG/M2 | WEIGHT: 243 LBS | DIASTOLIC BLOOD PRESSURE: 80 MMHG

## 2022-11-30 DIAGNOSIS — I10 ESSENTIAL HYPERTENSION, BENIGN: ICD-10-CM

## 2022-11-30 DIAGNOSIS — J98.8 RESPIRATORY INFECTION: ICD-10-CM

## 2022-11-30 DIAGNOSIS — E11.41 TYPE 2 DIABETES MELLITUS WITH DIABETIC MONONEUROPATHY, WITHOUT LONG-TERM CURRENT USE OF INSULIN (HCC): ICD-10-CM

## 2022-11-30 LAB
HBA1C MFR BLD: 7.3 % (ref 0–5.6)
INT CON NEG: NEGATIVE
INT CON POS: POSITIVE

## 2022-11-30 PROCEDURE — 99214 OFFICE O/P EST MOD 30 MIN: CPT | Mod: 25 | Performed by: STUDENT IN AN ORGANIZED HEALTH CARE EDUCATION/TRAINING PROGRAM

## 2022-11-30 PROCEDURE — 82570 ASSAY OF URINE CREATININE: CPT

## 2022-11-30 PROCEDURE — 71046 X-RAY EXAM CHEST 2 VIEWS: CPT

## 2022-11-30 PROCEDURE — 83036 HEMOGLOBIN GLYCOSYLATED A1C: CPT | Performed by: STUDENT IN AN ORGANIZED HEALTH CARE EDUCATION/TRAINING PROGRAM

## 2022-11-30 PROCEDURE — 82043 UR ALBUMIN QUANTITATIVE: CPT

## 2022-11-30 RX ORDER — IPRATROPIUM BROMIDE AND ALBUTEROL SULFATE 2.5; .5 MG/3ML; MG/3ML
3 SOLUTION RESPIRATORY (INHALATION) ONCE
Status: COMPLETED | OUTPATIENT
Start: 2022-11-30 | End: 2022-12-01

## 2022-11-30 RX ORDER — AZITHROMYCIN 250 MG/1
TABLET, FILM COATED ORAL
Qty: 6 TABLET | Refills: 0 | Status: SHIPPED | OUTPATIENT
Start: 2022-11-30 | End: 2022-12-06

## 2022-11-30 RX ORDER — METHYLPREDNISOLONE 4 MG/1
TABLET ORAL
Qty: 21 TABLET | Refills: 0 | Status: SHIPPED | OUTPATIENT
Start: 2022-11-30 | End: 2022-12-08

## 2022-11-30 ASSESSMENT — FIBROSIS 4 INDEX: FIB4 SCORE: 1.03

## 2022-11-30 NOTE — PROGRESS NOTES
"Subjective:     Chief Complaint   Patient presents with    Follow-Up     3 month         HPI:   Jonathan presents today with     Cough  Patient presents today for concern about cough and shortness of breath that has been occurring for 2-week.  Patient notes that he saw his cardiologist, started on a new medication and believes the cough was secondary to the medication.  Patient with significant wheezing and crackles, visible shortness of breath and increased respiratory rate.    Hypertension  Blood pressure controlled today 134/72.  Patient continues on amlodipine 10 mg, olmesartan 5 mg and carvedilol 12.5 mg twice daily.  Patient was just recently started on carvedilol by cardiologist due to uncontrolled blood pressure.  Patient brings with him his medications, is taking all 3 medications.  Blood pressure is elevated at 160/80.      Diabetes  Patient's last A1c at 7.3%, today at 7.3% . Patient continues on glipizide-metformin for diabetes control.  Continue to recommend diet and exercise.     Obesity  Patient's BMI at 41.71.  Weight remains stable.  Patient encouraged to continue working on diet and exercise.     Hyperlipidemia  Patient continues on atorvastatin 40 mg.     Sleep apnea  Patient continues on CPAP nightly.      ROS:  Gen: no fevers/chills  Pulm: no sob, no cough  CV: no chest pain, no palpitations  GI: no nausea/vomiting, no diarrhea      Objective:     Exam:  BP (!) 160/80 (BP Location: Right arm, Patient Position: Sitting, BP Cuff Size: Adult)   Pulse 80   Temp 36.3 °C (97.4 °F) (Temporal)   Resp 16   Ht 1.626 m (5' 4\")   Wt 110 kg (243 lb)   SpO2 93%   BMI 41.71 kg/m²  Body mass index is 41.71 kg/m².    Gen: Alert and oriented, No apparent distress.  Neck: Neck is supple without lymphadenopathy.  Lungs: Normal effort, CTA bilaterally, no wheezes, rhonchi, or rales  CV: Regular rate and rhythm. No murmurs, rubs, or gallops.  Ext: No clubbing, cyanosis, edema.      Assessment & Plan:     77 y.o. " male with the following -     1. Respiratory infection  Patient presents today with cough and significant crackles/wheezings on physical exam.  Patient appears to be slightly short of breath and in distress from upper respiratory illness.  Patient's vital signs are stable.  Breathing treatment provided in office.  Chest x-ray ordered shows no acute cardiopulmonary disease.  We will start patient on Z-Jose and Medrol Dosepak.  Patient advised to follow-up if no improvement.  Chelsey signs and symptoms that would warrant emergent evaluation in the ED.  - DX-CHEST-2 VIEWS; Future  - ipratropium-albuterol (DUONEB) nebulizer solution  - azithromycin (ZITHROMAX Z-JOSE) 250 MG Tab; Take 2 tabs (500 mg) on day one, then 1 tab (250 mg) on days 2-5  Dispense: 6 Tablet; Refill: 0  - methylPREDNISolone (MEDROL DOSEPAK) 4 MG Tablet Therapy Pack; Per  directions on package  Dispense: 21 Tablet; Refill: 0    2. Type 2 diabetes mellitus with diabetic mononeuropathy, without long-term current use of insulin ([facility])  Chronic, stable.  Patient's A1c stable at 7.3%.  - POCT  A1C  - MICROALBUMIN CREAT RATIO URINE; Future    3. Essential hypertension, benign  Chronic, uncontrolled.  Patient's blood pressure elevated today.  Patient has been following up with cardiology for treatment of blood pressure.  Patient recently started on carvedilol 12.5 mg twice daily.  Patient advised that he needs to be taking her medications as prescribed.  Patient encouraged to continue monitoring blood pressure.  We will recheck once patient is longer feeling ill.    No follow-ups on file.    Please note that this dictation was created using voice recognition software. I have made every reasonable attempt to correct obvious errors, but I expect that there are errors of grammar and possibly content that I did not discover before finalizing the note.

## 2022-12-01 LAB
CREAT UR-MCNC: 117.92 MG/DL
MICROALBUMIN UR-MCNC: 30.6 MG/DL
MICROALBUMIN/CREAT UR: 259 MG/G (ref 0–30)

## 2022-12-01 PROCEDURE — 94640 AIRWAY INHALATION TREATMENT: CPT | Performed by: STUDENT IN AN ORGANIZED HEALTH CARE EDUCATION/TRAINING PROGRAM

## 2022-12-01 RX ADMIN — IPRATROPIUM BROMIDE AND ALBUTEROL SULFATE 3 ML: 2.5; .5 SOLUTION RESPIRATORY (INHALATION) at 07:26

## 2022-12-01 NOTE — RESULT ENCOUNTER NOTE
Results were discussed in clinic.  X-ray results discussed with son on phone.  Medication sent to pharmacy.

## 2022-12-05 ENCOUNTER — TELEPHONE (OUTPATIENT)
Dept: MEDICAL GROUP | Facility: MEDICAL CENTER | Age: 77
End: 2022-12-05
Payer: MEDICARE

## 2022-12-05 NOTE — TELEPHONE ENCOUNTER
Patient called to let you know how he is doing after his appt on the 30th..... patient states he feel better but the cough is still there and is hard to sleep. He also said he has wheezing and would like to know what he should do?

## 2022-12-08 ENCOUNTER — OFFICE VISIT (OUTPATIENT)
Dept: CARDIOLOGY | Facility: MEDICAL CENTER | Age: 77
End: 2022-12-08
Payer: MEDICARE

## 2022-12-08 VITALS
OXYGEN SATURATION: 95 % | DIASTOLIC BLOOD PRESSURE: 78 MMHG | RESPIRATION RATE: 24 BRPM | WEIGHT: 240 LBS | HEIGHT: 64 IN | HEART RATE: 69 BPM | BODY MASS INDEX: 40.97 KG/M2 | SYSTOLIC BLOOD PRESSURE: 132 MMHG

## 2022-12-08 DIAGNOSIS — E78.5 HYPERLIPIDEMIA ASSOCIATED WITH TYPE 2 DIABETES MELLITUS (HCC): ICD-10-CM

## 2022-12-08 DIAGNOSIS — I10 ESSENTIAL HYPERTENSION, BENIGN: ICD-10-CM

## 2022-12-08 DIAGNOSIS — I45.10 RIGHT BUNDLE BRANCH BLOCK: ICD-10-CM

## 2022-12-08 DIAGNOSIS — I44.4 LAFB (LEFT ANTERIOR FASCICULAR BLOCK): ICD-10-CM

## 2022-12-08 DIAGNOSIS — I70.0 ATHEROSCLEROSIS OF AORTA (HCC): ICD-10-CM

## 2022-12-08 DIAGNOSIS — E11.69 HYPERLIPIDEMIA ASSOCIATED WITH TYPE 2 DIABETES MELLITUS (HCC): ICD-10-CM

## 2022-12-08 DIAGNOSIS — R94.31 ABNORMAL EKG: ICD-10-CM

## 2022-12-08 PROCEDURE — 99214 OFFICE O/P EST MOD 30 MIN: CPT | Performed by: INTERNAL MEDICINE

## 2022-12-08 ASSESSMENT — ENCOUNTER SYMPTOMS
MYALGIAS: 0
MYALGIAS: 0
DIZZINESS: 0
LOSS OF CONSCIOUSNESS: 0
COUGH: 0
PALPITATIONS: 0
COUGH: 0
LOSS OF CONSCIOUSNESS: 0
SHORTNESS OF BREATH: 0
DIZZINESS: 0
PALPITATIONS: 0
SHORTNESS OF BREATH: 0

## 2022-12-08 ASSESSMENT — FIBROSIS 4 INDEX: FIB4 SCORE: 1.03

## 2022-12-08 NOTE — PROGRESS NOTES
"Chief Complaint   Patient presents with    HTN (Controlled)    Abnormal EKG         Subjective     Jonathan Slade is a 77 y.o. male who presents today for follow-up cardiac care.    The patient has medical problems including vasovagal syncope, abnormal EKG, f hypertension, dyslipidemia, diabetes mellitus, ONI, COPD, obesity.    Since 11/7/2022 appointment the patient has had no cardiac symptoms including chest pain, palpitations, shortness of breath.  Carvedilol 12.5 mg twice daily was started and brings in a meticulous BP log.    Since 11/1/2021 appointment the patient has had no cardiac symptoms including chest pain, palpitations, shortness of breath. Activity limited due to knee arthritis requiring a cane for ambulation assistance.  Prior PCP Angel Guerin MD left and has been reassigned to Juliane Ames PA-C.  Continues to check and record daily BP which runs \"144/80 sometimes 150\".  Previously was a heavy smoker and alcohol drinker but has been abstinent for many years    Since 10/2/2020 appointment the patient has had no further episodes of syncope.  No chest pain or FAYE.  Closely followed by his PCP Dr. Angel Guerin.  Checks BP daily rounds \"140s\".  Did not follow-up last year when referred to [facility] program for weight loss.  States that his son-in-law is a certified nutritionist and is managing his diet.  Past Medical History:   Diagnosis Date    Asthma     Back pain     Diabetes     ONI treated with BiPAP 5/10/2016    Sleep apnea      Past Surgical History:   Procedure Laterality Date    GASTROSCOPY-ENDO N/A 7/23/2019    Procedure: GASTROSCOPY;  Surgeon: Tyler Rothman M.D.;  Location: ENDOSCOPY [facility], [place];  Service: Gastroenterology    FULL THICKNESS BLOCK RESECTION  2/19/2013    Performed by Greyson Lopez M.D. at SURGERY [facility]    BIOPSY GENERAL  2/19/2013    Performed by Greyson Lopez M.D. at SURGERY [facility]    CATARACT EXTRACTION WITH IOL       Family " History   Problem Relation Age of Onset    Heart Attack Brother     Cancer Neg Hx      Social History     Socioeconomic History    Marital status:      Spouse name: Not on file    Number of children: Not on file    Years of education: Not on file    Highest education level: Not on file   Occupational History    Not on file   Tobacco Use    Smoking status: Former     Packs/day: 1.00     Years: 10.00     Pack years: 10.00     Types: Cigarettes     Start date: 1985     Quit date: 1995     Years since quittin.9    Smokeless tobacco: Never   Vaping Use    Vaping Use: Never used   Substance and Sexual Activity    Alcohol use: Not Currently     Alcohol/week: 2.4 - 3.6 oz     Types: 4 - 6 Cans of beer per week    Drug use: No    Sexual activity: Not on file   Other Topics Concern    Not on file   Social History Narrative    ** Merged History Encounter **          Social Determinants of Health     Financial Resource Strain: Not on file   Food Insecurity: Not on file   Transportation Needs: Not on file   Physical Activity: Not on file   Stress: Not on file   Social Connections: Not on file   Intimate Partner Violence: Not on file   Housing Stability: Not on file     No Known Allergies  Outpatient Encounter Medications as of 2022   Medication Sig Dispense Refill    carvedilol (COREG) 12.5 MG Tab Take 1 Tablet by mouth 2 times a day with meals. 180 Tablet 3    glipizide-metformin (METAGLIP) 5-500 MG per tablet Take 1 Tablet by mouth 2 times daily, before breakfast and dinner. 180 Tablet 3    atorvastatin (LIPITOR) 40 MG Tab Take 1 Tablet by mouth every day. 100 Tablet 3    amLODIPine (NORVASC) 10 MG Tab Take 1 Tablet by mouth every day. 90 Tablet 3    finasteride (PROSCAR) 5 MG Tab Take 1 Tablet by mouth every day. 90 Tablet 3    olmesartan (BENICAR) 5 MG tablet Take 1 Tablet by mouth every day. 100 Tablet 3    omeprazole (PRILOSEC) 20 MG delayed-release capsule TAKE 1 CAPSULE BY MOUTH IN THE MORNING ON  "AN EMPTY STOMACH 30 MINUTES PRIOR TO EATING AND DRINKING.      vitamin D (CHOLECALCIFEROL) 1000 UNIT Tab Take 1 Tablet by mouth every day.      Omega-3 Fatty Acids (FISH OIL) 1000 MG Cap capsule Take 1 Capsule by mouth every day.      [DISCONTINUED] methylPREDNISolone (MEDROL DOSEPAK) 4 MG Tablet Therapy Pack Per  directions on package (Patient not taking: Reported on 12/8/2022) 21 Tablet 0     No facility-administered encounter medications on file as of 12/8/2022.     Review of Systems   Respiratory:  Negative for cough and shortness of breath.    Cardiovascular:  Negative for chest pain and palpitations.   Musculoskeletal:  Negative for myalgias.   Neurological:  Negative for dizziness and loss of consciousness.            Objective     /78 (BP Location: Left arm, Patient Position: Sitting, BP Cuff Size: Adult)   Pulse 69   Resp (!) 24   Ht 1.626 m (5' 4\")   Wt 109 kg (240 lb)   SpO2 95%   BMI 41.20 kg/m²     Physical Exam  Constitutional:       Appearance: He is well-developed.   Eyes:      Conjunctiva/sclera: Conjunctivae normal.      Pupils: Pupils are equal, round, and reactive to light.   Neck:      Vascular: No JVD.   Cardiovascular:      Rate and Rhythm: Normal rate and regular rhythm.      Heart sounds: Normal heart sounds.   Pulmonary:      Effort: Pulmonary effort is normal. No accessory muscle usage or respiratory distress.      Breath sounds: Normal breath sounds. No wheezing or rales.   Abdominal:      Comments: Markedly protuberant   Musculoskeletal:      Cervical back: Normal range of motion and neck supple.      Right lower leg: Edema present.      Left lower leg: Edema present.      Comments: Chronic dermatologic changes, mild edema.   Skin:     General: Skin is warm and dry.      Findings: No rash.      Nails: There is no clubbing.   Neurological:      Mental Status: He is alert and oriented to person, place, and time.   Psychiatric:         Behavior: Behavior normal. "               ECHOCARDIOGRAM 04/29/2019  Normal left ventricular systolic function. Left ventricular ejection   fraction is visually estimated to be 60%.  Normal diastolic function.  Estimated right ventricular systolic pressure  is 34 mmHg + JVP.  Aortic sclerosis without stenosis.     MPI 3/19/2012  1. THE GATED STUDY REVEALS A NORMAL EJECTION FRACTION WITHOUT SEGMENTAL   WALL MOTION ABNORMALITY.   2. SPECT IMAGES ARE FELT TO BE NORMAL WITHOUT EVIDENCE OF SIGNIFICANT   ISCHEMIA OR INFARCTION.  Assessment & Plan     1. Abnormal EKG        2. LAFB (left anterior fascicular block)        3. RBBB        4. Essential hypertension, benign        5. Hyperlipidemia associated with type 2 diabetes mellitus (HCC)        6. Atherosclerosis of aorta (HCC)            Medical Decision Making: Today's Assessment/Status/Plan:   Assessment  1.  Conduction system disease LAFB, RBBB.  2.  Hypertension.  3.  Dyslipidemia.  4.  ONI on BiPAP.  5.  Morbid obesity.  6.  DM  7.  History of vasovagal/orthostatic syncope.     Recommendation Discussion  1.  The patient is stable from a cardiac standpoint concerning his HTN, dyslipidemia with LDL 57 and conduction system disease, no recurrent syncope.  2.  Reviewed BP log sheet, BP much improved, continue to monitor, would consider addition of HCTZ 12.5 mg if BP P remains elevated.  3.  Continued to encourage weight loss.  4.  Continue amlodipine, atorvastatin, olmesartan and carvedilol  5.  A1c continues to be greater than 7 % with his morbid obesity should be a good candidate for GP 1 agonist tirzepatide (Mounjaro), semaglutide (Ozempic, Rybelsus), dulaglutide (Tuclicity), liraglutide (Victoza), will defer to PCP Diana Ames PA-C  6.  RTC 1 month.

## 2023-03-10 ENCOUNTER — OFFICE VISIT (OUTPATIENT)
Dept: MEDICAL GROUP | Facility: MEDICAL CENTER | Age: 78
End: 2023-03-10
Payer: MEDICARE

## 2023-03-10 VITALS
WEIGHT: 244 LBS | DIASTOLIC BLOOD PRESSURE: 80 MMHG | RESPIRATION RATE: 16 BRPM | HEIGHT: 64 IN | BODY MASS INDEX: 41.66 KG/M2 | OXYGEN SATURATION: 91 % | HEART RATE: 85 BPM | SYSTOLIC BLOOD PRESSURE: 140 MMHG

## 2023-03-10 DIAGNOSIS — E11.69 HYPERLIPIDEMIA ASSOCIATED WITH TYPE 2 DIABETES MELLITUS (HCC): ICD-10-CM

## 2023-03-10 DIAGNOSIS — E66.01 CLASS 3 SEVERE OBESITY DUE TO EXCESS CALORIES WITHOUT SERIOUS COMORBIDITY WITH BODY MASS INDEX (BMI) OF 40.0 TO 44.9 IN ADULT (HCC): ICD-10-CM

## 2023-03-10 DIAGNOSIS — E78.5 HYPERLIPIDEMIA ASSOCIATED WITH TYPE 2 DIABETES MELLITUS (HCC): ICD-10-CM

## 2023-03-10 DIAGNOSIS — Z12.5 PROSTATE CANCER SCREENING: ICD-10-CM

## 2023-03-10 DIAGNOSIS — E11.41 TYPE 2 DIABETES MELLITUS WITH DIABETIC MONONEUROPATHY, WITHOUT LONG-TERM CURRENT USE OF INSULIN (HCC): ICD-10-CM

## 2023-03-10 DIAGNOSIS — I10 ESSENTIAL HYPERTENSION, BENIGN: ICD-10-CM

## 2023-03-10 DIAGNOSIS — J45.40 MODERATE PERSISTENT ASTHMA, UNSPECIFIED WHETHER COMPLICATED: ICD-10-CM

## 2023-03-10 DIAGNOSIS — E66.01 MORBID OBESITY (HCC): ICD-10-CM

## 2023-03-10 LAB
HBA1C MFR BLD: 7.7 % (ref ?–5.8)
POCT INT CON NEG: NEGATIVE
POCT INT CON POS: POSITIVE

## 2023-03-10 PROCEDURE — 99214 OFFICE O/P EST MOD 30 MIN: CPT | Mod: 25 | Performed by: STUDENT IN AN ORGANIZED HEALTH CARE EDUCATION/TRAINING PROGRAM

## 2023-03-10 PROCEDURE — 83036 HEMOGLOBIN GLYCOSYLATED A1C: CPT | Performed by: STUDENT IN AN ORGANIZED HEALTH CARE EDUCATION/TRAINING PROGRAM

## 2023-03-10 PROCEDURE — 94640 AIRWAY INHALATION TREATMENT: CPT | Performed by: STUDENT IN AN ORGANIZED HEALTH CARE EDUCATION/TRAINING PROGRAM

## 2023-03-10 RX ORDER — BUDESONIDE AND FORMOTEROL FUMARATE DIHYDRATE 80; 4.5 UG/1; UG/1
2 AEROSOL RESPIRATORY (INHALATION) 2 TIMES DAILY
Qty: 10.2 G | Refills: 2 | Status: SHIPPED | OUTPATIENT
Start: 2023-03-10 | End: 2023-06-06

## 2023-03-10 RX ORDER — EMPAGLIFLOZIN 25 MG/1
25 TABLET, FILM COATED ORAL DAILY
Qty: 30 TABLET | Refills: 11 | Status: SHIPPED | OUTPATIENT
Start: 2023-03-10 | End: 2023-03-13 | Stop reason: SDUPTHER

## 2023-03-10 RX ORDER — ALBUTEROL SULFATE 90 UG/1
2 AEROSOL, METERED RESPIRATORY (INHALATION) EVERY 4 HOURS PRN
Qty: 1 EACH | Refills: 0 | Status: SHIPPED | OUTPATIENT
Start: 2023-03-10 | End: 2023-06-06

## 2023-03-10 RX ORDER — IPRATROPIUM BROMIDE AND ALBUTEROL SULFATE 2.5; .5 MG/3ML; MG/3ML
3 SOLUTION RESPIRATORY (INHALATION) ONCE
Status: COMPLETED | OUTPATIENT
Start: 2023-03-10 | End: 2023-03-10

## 2023-03-10 RX ORDER — METHYLPREDNISOLONE 4 MG/1
TABLET ORAL
Qty: 21 TABLET | Refills: 0 | Status: SHIPPED | OUTPATIENT
Start: 2023-03-10 | End: 2023-06-16

## 2023-03-10 RX ORDER — LORATADINE 10 MG/1
10 TABLET ORAL DAILY
Qty: 30 TABLET | Refills: 11 | Status: SHIPPED | OUTPATIENT
Start: 2023-03-10 | End: 2023-06-16

## 2023-03-10 RX ADMIN — IPRATROPIUM BROMIDE AND ALBUTEROL SULFATE 3 ML: 2.5; .5 SOLUTION RESPIRATORY (INHALATION) at 11:44

## 2023-03-10 ASSESSMENT — PATIENT HEALTH QUESTIONNAIRE - PHQ9: CLINICAL INTERPRETATION OF PHQ2 SCORE: 0

## 2023-03-10 ASSESSMENT — FIBROSIS 4 INDEX: FIB4 SCORE: 1.03

## 2023-03-10 NOTE — PROGRESS NOTES
"Subjective:     Chief Complaint   Patient presents with    Follow-Up     3 month     Cough         HPI:   Jonathan presents today with     Athma  Patient presented at last visit for concern about a cough.  Patient with significant wheezing and crackles, visible shortness of breath and increased respiratory rate at last visit.  Patient was treated for acute respiratory infection with azithromycin and Medrol Dosepak.  Noted significant relief for a few weeks after his treatment but symptoms returned.  Patient continues to have chronic cough and shortness of breath.  Patient provided DuoNeb in the office and significantly improved again, patient with wheezing and shortness of breath.  Patient with no congestion or rhinitis.  Patient notes previous diagnosis of asthma.  Concerned that asthma is because of cough and shortness of breath.  Patient provided DuoNeb treatment in office and significant improvement in symptoms, lungs clear.  Previous chest x-ray with no significant findings.    Hypertension  Patient continues on amlodipine 10 mg, olmesartan 5 mg and carvedilol 12.5 mg twice daily.    Diabetes  Patient's last A1c  7.3%, today at  7.7%. Patient continues on glipizide-metformin 5-500 for diabetes control.  Diabetes uncontrolled today at 7.7%, discussed with patient needing to increase medication, will start patient on Jardiance 25 mg.     Obesity  Patient's BMI at 41.71.  Weight remains stable.  Patient encouraged to continue working on diet and exercise.     Hyperlipidemia  Patient continues on atorvastatin 40 mg.  Patient due for repeat labs prior to next visit.     Sleep apnea  Patient continues on CPAP nightly.         ROS:  Gen: no fevers/chills  Pulm: no sob, no cough  CV: no chest pain, no palpitations  GI: no nausea/vomiting, no diarrhea      Objective:     Exam:  BP (!) 140/80 (BP Location: Left arm, Patient Position: Sitting, BP Cuff Size: Adult)   Pulse 85   Resp 16   Ht 1.626 m (5' 4\")   Wt 111 kg " (244 lb)   SpO2 91%   BMI 41.88 kg/m²  Body mass index is 41.88 kg/m².    Gen: Alert and oriented, No apparent distress.  Neck: Neck is supple without lymphadenopathy.  Lungs: Normal effort, CTA bilaterally, no wheezes, rhonchi, or rales  CV: Regular rate and rhythm. No murmurs, rubs, or gallops.  Ext: No clubbing, cyanosis, edema.    Assessment & Plan:     77 y.o. male with the following -     1. Moderate persistent asthma, unspecified whether complicated  Chronic, uncontrolled.  Patient has not been treated for asthma but the last 2 PSAs patient has had significant shortness of breath with severe wheezing.  Patient symptoms and wheezing improved after DuoNeb treatment in office.  Will start patient on prednisone Medrol Dosepak.  We will start patient on daily Symbicort, albuterol as needed and daily Claritin patient advised to follow-up in 1 month to further evaluate asthma.  - ipratropium-albuterol (DUONEB) nebulizer solution  - albuterol 108 (90 Base) MCG/ACT Aero Soln inhalation aerosol; Inhale 2 Puffs every four hours as needed for Shortness of Breath.  Dispense: 1 Each; Refill: 0  - loratadine (CLARITIN) 10 MG Tab; Take 1 Tablet by mouth every day.  Dispense: 30 Tablet; Refill: 11  - methylPREDNISolone (MEDROL DOSEPAK) 4 MG Tablet Therapy Pack; As directed on the packaging label.  Dispense: 21 Tablet; Refill: 0  - Referral to Pulmonary and Sleep Medicine  - budesonide-formoterol (SYMBICORT) 80-4.5 MCG/ACT Aerosol; Inhale 2 Puffs 2 times a day.  Dispense: 10.2 g; Refill: 2    2. Type 2 diabetes mellitus with diabetic mononeuropathy, without long-term current use of insulin (HCC)  Chronic, stable.  Uncontrolled A1c elevated at 7.7%.  We will add Jardiance to  Medication regiment.  3 months.  - POCT  A1C  - Empagliflozin (JARDIANCE) 25 MG Tab; Take 25 mg by mouth every day.  Dispense: 30 Tablet; Refill: 11  - Comp Metabolic Panel; Future  - HEMOGLOBIN A1C; Future  - Lipid Profile; Future    3. Prostate cancer  screening  - PROSTATE SPECIFIC AG SCREENING; Future    4. Class 3 severe obesity due to excess calories without serious comorbidity with body mass index (BMI) of 40.0 to 44.9 in adult (HCC)    5. Essential hypertension, benign  Chronic, stable.  Blood pressure elevated today 140/80.  Patient having shortness of breath. will continue to monitor.    6. Hyperlipidemia associated with type 2 diabetes mellitus (HCC)  Stable.  Due for repeat labs.    7. Morbid obesity (HCC)     No follow-ups on file.    Please note that this dictation was created using voice recognition software. I have made every reasonable attempt to correct obvious errors, but I expect that there are errors of grammar and possibly content that I did not discover before finalizing the note.

## 2023-03-13 DIAGNOSIS — E11.41 TYPE 2 DIABETES MELLITUS WITH DIABETIC MONONEUROPATHY, WITHOUT LONG-TERM CURRENT USE OF INSULIN (HCC): ICD-10-CM

## 2023-03-13 RX ORDER — EMPAGLIFLOZIN 25 MG/1
25 TABLET, FILM COATED ORAL DAILY
Qty: 100 TABLET | Refills: 6 | Status: SHIPPED | OUTPATIENT
Start: 2023-03-13 | End: 2024-03-07 | Stop reason: SDUPTHER

## 2023-03-13 NOTE — TELEPHONE ENCOUNTER
Received request via: Pharmacy    Was the patient seen in the last year in this department? Yes    Does the patient have an active prescription (recently filled or refills available) for medication(s) requested? No    Does the patient have FDC Plus and need 100 day supply (blood pressure, diabetes and cholesterol meds only)? Quantity updated

## 2023-03-17 ENCOUNTER — TELEPHONE (OUTPATIENT)
Dept: HEALTH INFORMATION MANAGEMENT | Facility: OTHER | Age: 78
End: 2023-03-17
Payer: MEDICARE

## 2023-05-03 NOTE — PROGRESS NOTES
[facility] Follow-up Visit    Date of Visit: 5/9/2023     CC:  Follow-up for ONI management      HPI:  Jonathan Slade is a very pleasant 78 y.o. year old male former smoker (10 pack-years, quit in 1995), with a PMHx of ONI, elevated BMI, T2DM, RBBB, B PAH, T2DM, HTN, HLD who presented to the [facility] for a regular follow up. Last seen in the office on 7/5/2022 with JOSHUA Gonzalez.     Patient presents for compliance.  Patient states that he received a replacement machine from [facility] about a year ago and within a couple of months using it the machine started leaking air around the tubing.  He has gone through multiple attempts to alleviate the situation through different tubing types.  He is also asked [facility] to help with servicing the machine.  Patient is currently using the machine with duct tape around it, which somewhat alleviates the problem.  [facility] apparently states that he did not receive the machine from them and they would not be able to help him fix his machine.  He also states that his humidity chamber is running out of water quicker than normal, despite not changing the humidity settings.  He states he is having occasional morning headaches and a dry mouth.  He denies any significant daytime/driving drowsy, issues by sleep, snoring, gasping, apneas, palpitations, aerophagia, sleep, or skin irritation.  Patient goes to bed between 10-11 PM and wakes up between 6-8 AM.  Patient awakens a couple times at night to use bathroom does not have any issues falling back asleep.  He will occasionally nap for 1-1.5 hours.  Patient is requesting to change DME companies, as he is frustrated with [facility].      DME provider: [facility]  Device:  [employer]  Settings: BiPAP 18/14  When: 8/2019 (original machine)  Mask: Nasal Pillows  Chin strap: No     Cleaning regimen: Couple times a week with dish soap and  water    Compliance:  Compliance data reviewed showing 90% usage > 4hours in last 30 days. Average AHI 2.0 events/hour.  Average time in large leak per day 0 seconds. Patient continues to use and benefit from machine.      Sleep History:  Not on file.  Per report, polysomnogram indicated AHI 77.3 and minimum saturation 49%.      Patient Active Problem List    Diagnosis Date Noted    ONI treated with BiPAP 05/10/2016    BMI 40.0-44.9, adult ([facility]) 10/21/2022    Type 2 diabetes mellitus with hyperglycemia ([facility]) 10/21/2022    Microalbuminuria due to type 2 diabetes mellitus ([facility]) 10/21/2022    Atherosclerosis of aorta ([facility]) 10/21/2022    Morbid obesity ([facility]) 10/02/2020    Essential hypertension, benign 10/02/2020    Hyperlipidemia associated with type 2 diabetes mellitus ([facility]) 10/02/2020    Abnormal EKG 10/02/2020    RBBB 05/31/2019    LAFB (left anterior fascicular block) 05/31/2019    Diabetes mellitus with coincident hypertension ([facility]) 04/21/2019    BPH (benign prostatic hyperplasia) 04/21/2019     Past Medical History:   Diagnosis Date    Asthma     Back pain     Diabetes     ONI treated with BiPAP 5/10/2016    Sleep apnea       Past Surgical History:   Procedure Laterality Date    GASTROSCOPY-ENDO N/A 7/23/2019    Procedure: GASTROSCOPY;  Surgeon: Tyler Rothman M.D.;  Location: [facility];  Service: Gastroenterology    FULL THICKNESS BLOCK RESECTION  2/19/2013    Performed by Greyson Lopez M.D. at [facility]    BIOPSY GENERAL  2/19/2013    Performed by Greyson Lopez M.D. at [facility]    CATARACT EXTRACTION WITH IOL       Family History   Problem Relation Age of Onset    Heart Attack Brother     Cancer Neg Hx      Social History     Socioeconomic History    Marital status:      Spouse name: Not on file    Number of children: Not on file    Years of education: Not on file    Highest education level: Not on file   Occupational History    Not on file    Tobacco Use    Smoking status: Former     Packs/day: 1.00     Years: 10.00     Pack years: 10.00     Types: Cigarettes     Start date: 1985     Quit date: 1995     Years since quittin.3    Smokeless tobacco: Never   Vaping Use    Vaping Use: Never used   Substance and Sexual Activity    Alcohol use: Not Currently     Alcohol/week: 2.4 - 3.6 oz     Types: 4 - 6 Cans of beer per week    Drug use: No    Sexual activity: Not on file   Other Topics Concern    Not on file   Social History Narrative    ** Merged History Encounter **          Social Determinants of Health     Financial Resource Strain: Not on file   Food Insecurity: Not on file   Transportation Needs: Not on file   Physical Activity: Not on file   Stress: Not on file   Social Connections: Not on file   Intimate Partner Violence: Not on file   Housing Stability: Not on file     Current Outpatient Medications   Medication Sig Dispense Refill    Empagliflozin (JARDIANCE) 25 MG Tab Take 25 mg by mouth every day. 100 Tablet 6    albuterol 108 (90 Base) MCG/ACT Aero Soln inhalation aerosol Inhale 2 Puffs every four hours as needed for Shortness of Breath. 1 Each 0    loratadine (CLARITIN) 10 MG Tab Take 1 Tablet by mouth every day. 30 Tablet 11    methylPREDNISolone (MEDROL DOSEPAK) 4 MG Tablet Therapy Pack As directed on the packaging label. 21 Tablet 0    budesonide-formoterol (SYMBICORT) 80-4.5 MCG/ACT Aerosol Inhale 2 Puffs 2 times a day. 10.2 g 2    carvedilol (COREG) 12.5 MG Tab Take 1 Tablet by mouth 2 times a day with meals. 180 Tablet 3    glipizide-metformin (METAGLIP) 5-500 MG per tablet Take 1 Tablet by mouth 2 times daily, before breakfast and dinner. 180 Tablet 3    atorvastatin (LIPITOR) 40 MG Tab Take 1 Tablet by mouth every day. 100 Tablet 3    amLODIPine (NORVASC) 10 MG Tab Take 1 Tablet by mouth every day. 90 Tablet 3    olmesartan (BENICAR) 5 MG tablet Take 1 Tablet by mouth every day. 100 Tablet 3    omeprazole (PRILOSEC) 20 MG  "delayed-release capsule TAKE 1 CAPSULE BY MOUTH IN THE MORNING ON AN EMPTY STOMACH 30 MINUTES PRIOR TO EATING AND DRINKING.      vitamin D (CHOLECALCIFEROL) 1000 UNIT Tab Take 1 Tablet by mouth every day.      Omega-3 Fatty Acids (FISH OIL) 1000 MG Cap capsule Take 1 Capsule by mouth every day.      finasteride (PROSCAR) 5 MG Tab Take 1 Tablet by mouth every day. 90 Tablet 3     No current facility-administered medications for this visit.      ALLERGIES: Patient has no known allergies.    ROS:  Constitutional: Denies fever, chills, sweats,  weight loss, fatigue  Cardiovascular: Denies chest pain, tightness, palpitations, swelling in legs/feet  Respiratory: Denies shortness of breath, cough, sputum, wheezing, painful breathing   Sleep: per HPI  Gastrointestinal: Denies  difficulty swallowing, nausea, abdominal pain, diarrhea, constipation, heartburn.  Musculoskeletal: Denies painful joints, sore muscles,       PHYSICAL EXAM:  /82 (BP Location: Right arm, Patient Position: Sitting, BP Cuff Size: Adult)   Pulse 88   Resp 16   Ht 1.626 m (5' 4\")   Wt 111 kg (244 lb)   SpO2 92% Comment: room air at rest  BMI 41.88 kg/m²   Appearance: Well-nourished, well-developed, no acute distress  Eyes:  No scleral icterus , EOMI  ENMT: No redness of the oropharynx  Lung auscultation:  No wheezes rhonchi rubs or rales  Cardiac: No murmurs, rubs, or gallops; regular rhythm, normal rate; no edema  Musculoskeletal:  Grossly normal; gait and station normal; digits and nails normal  Skin:  No rashes, petechiae, cyanosis  Neurologic: without focal signs; oriented to person, time, place, and purpose; judgement intact  Psychiatric:  No depression, anxiety, agitation  Mallampati score: Class IV    Assessment and Plan:    The medical record was reviewed.    Diagnostic and titration nocturnal polysomnogram's, home sleep apnea tests, continuous nocturnal oximetry results, multiple sleep latency tests, and compliance reports " reviewed.    Problem List Items Addressed This Visit       ONI treated with BiPAP     Sleep Apnea:    The pathophysiology of sleep anea and the increased risk of cardiovascular morbidity from untreated sleep apnea is discussed in detail with the patient.  Urged to avoid supine sleep, weight gain and alcoholic beverages since all of these can worsen sleep apnea. Cautioned against drowsy driving. If feeling sleepy while driving, pull over for a break/nap, rather than persist on the road, in the interest of own safety and that of others on the road.    Compliance download was reviewed and discussed with the patient.     - Order placed for BiPAP 18/14 CWP to [employer]  - Compliance was reinforced  - Recommended the patient against the use of Ozone , such as SoClean  - Clean supplies a couple times a week with dish soap and water and air dry  - Recommended the patient change out supplies as recommended for best mask fit and usage of the machine  - Advised patient to reach out via [employer] if any questions or concerns should arise.   - Equipment replacement schedule:  Mask cushion every month  Nasal pillows 2 times per month  Mask every 6 months  Head gear every 6 months  Tubing every 3 months  Ultra-fine filters 2 times per month  Foam filter every 6 months  Humidifier chamber every 6 months    Has been advised to continue the current BiPAP until new unit can be obtained, clean equipment frequently, and get new mask and supplies as allowed by insurance and DME. Recommend an earlier appointment, if significant treatment barriers develop.    The risks of untreated sleep apnea were discussed with the patient at length. Patients with sleep apnea are at increased risk of cardiovascular disease including coronary artery disease, systemic arterial hypertension, pulmonary arterial hypertension, cardiac arrythmias, and stroke. The patient was advised to avoid driving a motor vehicle when drowsy.    Positive airway  pressure will favorably impact many of the adverse conditions and effects provoked by sleep apnea.         Relevant Orders    DME BiPAP     Have advised the patient to follow up with the appropriate healthcare practitioners for all other medical problems and issues.    Return in about 3 months (around 8/9/2023), or if symptoms worsen or fail to improve, for 1st compliance .    Please note portions of this record was created using voice recognition software. I have made every reasonable attempt to correct obvious errors, but I expect that there are errors of grammar and possibly content I did not discover before finalizing the note.    Time spent in record review prior to patient arrival, reviewing results, and in face-to-face encounter totaled 26 min.  __________  JOSHUA Whittington  Pulmonary & Sleep Medicine  [facility]

## 2023-05-09 ENCOUNTER — OFFICE VISIT (OUTPATIENT)
Dept: SLEEP MEDICINE | Facility: MEDICAL CENTER | Age: 78
End: 2023-05-09
Payer: MEDICARE

## 2023-05-09 VITALS
HEIGHT: 64 IN | BODY MASS INDEX: 41.66 KG/M2 | WEIGHT: 244 LBS | RESPIRATION RATE: 16 BRPM | DIASTOLIC BLOOD PRESSURE: 82 MMHG | OXYGEN SATURATION: 92 % | HEART RATE: 88 BPM | SYSTOLIC BLOOD PRESSURE: 134 MMHG

## 2023-05-09 DIAGNOSIS — G47.33 OSA TREATED WITH BIPAP: ICD-10-CM

## 2023-05-09 PROCEDURE — 99213 OFFICE O/P EST LOW 20 MIN: CPT

## 2023-05-09 ASSESSMENT — FIBROSIS 4 INDEX: FIB4 SCORE: 1.04

## 2023-05-09 NOTE — ASSESSMENT & PLAN NOTE
Sleep Apnea:    The pathophysiology of sleep anea and the increased risk of cardiovascular morbidity from untreated sleep apnea is discussed in detail with the patient.  Urged to avoid supine sleep, weight gain and alcoholic beverages since all of these can worsen sleep apnea. Cautioned against drowsy driving. If feeling sleepy while driving, pull over for a break/nap, rather than persist on the road, in the interest of own safety and that of others on the road.    Compliance download was reviewed and discussed with the patient.     - Order placed for BiPAP 18/14 CWP to [employer]  - Compliance was reinforced  - Recommended the patient against the use of Ozone , such as SoClean  - Clean supplies a couple times a week with dish soap and water and air dry  - Recommended the patient change out supplies as recommended for best mask fit and usage of the machine  - Advised patient to reach out via [employer] if any questions or concerns should arise.   - Equipment replacement schedule:  Mask cushion every month  Nasal pillows 2 times per month  Mask every 6 months  Head gear every 6 months  Tubing every 3 months  Ultra-fine filters 2 times per month  Foam filter every 6 months  Humidifier chamber every 6 months    Has been advised to continue the current BiPAP until new unit can be obtained, clean equipment frequently, and get new mask and supplies as allowed by insurance and DME. Recommend an earlier appointment, if significant treatment barriers develop.    The risks of untreated sleep apnea were discussed with the patient at length. Patients with sleep apnea are at increased risk of cardiovascular disease including coronary artery disease, systemic arterial hypertension, pulmonary arterial hypertension, cardiac arrythmias, and stroke. The patient was advised to avoid driving a motor vehicle when drowsy.    Positive airway pressure will favorably impact many of the adverse conditions and effects provoked by sleep  apnea.

## 2023-05-09 NOTE — PATIENT INSTRUCTIONS
Please make sure that you are seen within 90 days of receiving your machine, with at least 30 days of use.  Please call scheduling at [phone] if your appointment needs to be moved to meet these parameters.     To meet compliance requirements for insurance please ensure that you use the machine at least 6/7 days a week for at least 4 or more hours a night.    I advise patients to research different mask options before picking up the new machine.  You should also ask what the DME company's return policy is for the mask because if you do not like the mask and don't return it within that time frame, you will have to wait 6 months for insurance to cover another mask.     Please bring your entire machine and chip to your first appointment, regardless if the machine is set up for wireless access.

## 2023-05-15 ENCOUNTER — TELEPHONE (OUTPATIENT)
Dept: HEALTH INFORMATION MANAGEMENT | Facility: OTHER | Age: 78
End: 2023-05-15
Payer: MEDICARE

## 2023-05-15 NOTE — TELEPHONE ENCOUNTER
Pt requested a home visit. I called [facility], spoke with Zarina, pt was added to their Home visit waiting list.

## 2023-06-01 ENCOUNTER — HOSPITAL ENCOUNTER (OUTPATIENT)
Dept: LAB | Facility: MEDICAL CENTER | Age: 78
End: 2023-06-01
Attending: STUDENT IN AN ORGANIZED HEALTH CARE EDUCATION/TRAINING PROGRAM
Payer: MEDICARE

## 2023-06-01 DIAGNOSIS — E11.41 TYPE 2 DIABETES MELLITUS WITH DIABETIC MONONEUROPATHY, WITHOUT LONG-TERM CURRENT USE OF INSULIN (HCC): ICD-10-CM

## 2023-06-01 DIAGNOSIS — Z12.5 PROSTATE CANCER SCREENING: ICD-10-CM

## 2023-06-01 LAB
ALBUMIN SERPL BCP-MCNC: 4.4 G/DL (ref 3.2–4.9)
ALBUMIN/GLOB SERPL: 1.8 G/DL
ALP SERPL-CCNC: 61 U/L (ref 30–99)
ALT SERPL-CCNC: 18 U/L (ref 2–50)
ANION GAP SERPL CALC-SCNC: 13 MMOL/L (ref 7–16)
AST SERPL-CCNC: 11 U/L (ref 12–45)
BILIRUB SERPL-MCNC: 0.3 MG/DL (ref 0.1–1.5)
BUN SERPL-MCNC: 25 MG/DL (ref 8–22)
CALCIUM ALBUM COR SERPL-MCNC: 8.9 MG/DL (ref 8.5–10.5)
CALCIUM SERPL-MCNC: 9.2 MG/DL (ref 8.5–10.5)
CHLORIDE SERPL-SCNC: 99 MMOL/L (ref 96–112)
CHOLEST SERPL-MCNC: 112 MG/DL (ref 100–199)
CO2 SERPL-SCNC: 27 MMOL/L (ref 20–33)
CREAT SERPL-MCNC: 0.91 MG/DL (ref 0.5–1.4)
GFR SERPLBLD CREATININE-BSD FMLA CKD-EPI: 85 ML/MIN/1.73 M 2
GLOBULIN SER CALC-MCNC: 2.5 G/DL (ref 1.9–3.5)
GLUCOSE SERPL-MCNC: 118 MG/DL (ref 65–99)
HDLC SERPL-MCNC: 30 MG/DL
LDLC SERPL CALC-MCNC: 43 MG/DL
POTASSIUM SERPL-SCNC: 3.8 MMOL/L (ref 3.6–5.5)
PROT SERPL-MCNC: 6.9 G/DL (ref 6–8.2)
PSA SERPL-MCNC: 2.79 NG/ML (ref 0–4)
SODIUM SERPL-SCNC: 139 MMOL/L (ref 135–145)
TRIGL SERPL-MCNC: 196 MG/DL (ref 0–149)

## 2023-06-01 PROCEDURE — 80053 COMPREHEN METABOLIC PANEL: CPT

## 2023-06-01 PROCEDURE — 84153 ASSAY OF PSA TOTAL: CPT

## 2023-06-01 PROCEDURE — 36415 COLL VENOUS BLD VENIPUNCTURE: CPT

## 2023-06-01 PROCEDURE — 80061 LIPID PANEL: CPT

## 2023-06-01 PROCEDURE — 83036 HEMOGLOBIN GLYCOSYLATED A1C: CPT

## 2023-06-01 NOTE — LETTER
June 5, 2023         Jonathan Slade  [address] [place] [address]        Dear Jonathan:      Below are the results from your recent visit:    Resulted Orders   PROSTATE SPECIFIC AG SCREENING   Result Value Ref Range    Prostatic Specific Antigen Tot 2.79 0.00 - 4.00 ng/mL      Comment:      Performed using Roche eriberto e immunoassay analyzer. tPSA values determined  on patient samples by different testing procedures cannot be directly  compared with one another and could be the cause of erroneous medical  interpretations. If there is a change in the tPSA assay procedure used while  monitoring therapy, then new baselines may need to be established when  comparing previous results.      Narrative    Request patient fasting?->Yes  Fasting Instructions:->Fast 8-10 hours, OK to drink water as  needed during fast, take medications per your provider's  instruction.   Lipid Profile   Result Value Ref Range    Cholesterol,Tot 112 100 - 199 mg/dL    Triglycerides 196 (H) 0 - 149 mg/dL    HDL 30 (A) >=40 mg/dL    LDL 43 <100 mg/dL    Narrative    Request patient fasting?->Yes  Fasting Instructions:->Fast 8-10 hours, OK to drink water as  needed during fast, take medications per your provider's  instruction.   HEMOGLOBIN A1C   Result Value Ref Range    Glycohemoglobin 7.1 (H) 4.0 - 5.6 %      Comment:      Increased risk for diabetes:  5.7 -6.4%  Diabetes:  >6.4%  Glycemic control for adults with diabetes:  <7.0%    The above interpretations are per ADA guidelines.  Diagnosis  of diabetes mellitus on the basis of elevated Hemoglobin A1c  should be confirmed by repeating the Hb A1c test.      Est Avg Glucose 157 mg/dL      Comment:      The eAG calculation is based on the A1c-Derived Daily Glucose  (ADAG) study.  See the ADA's website for additional information.      Narrative    Request patient fasting?->Yes  Fasting Instructions:->Fast 8-10 hours, OK to drink water as  needed during fast, take medications per your  provider's  instruction.   Comp Metabolic Panel   Result Value Ref Range    Sodium 139 135 - 145 mmol/L    Potassium 3.8 3.6 - 5.5 mmol/L    Chloride 99 96 - 112 mmol/L    Co2 27 20 - 33 mmol/L    Anion Gap 13.0 7.0 - 16.0    Glucose 118 (H) 65 - 99 mg/dL    Bun 25 (H) 8 - 22 mg/dL    Creatinine 0.91 0.50 - 1.40 mg/dL    Calcium 9.2 8.5 - 10.5 mg/dL    AST(SGOT) 11 (L) 12 - 45 U/L    ALT(SGPT) 18 2 - 50 U/L    Alkaline Phosphatase 61 30 - 99 U/L    Total Bilirubin 0.3 0.1 - 1.5 mg/dL    Albumin 4.4 3.2 - 4.9 g/dL    Total Protein 6.9 6.0 - 8.2 g/dL    Globulin 2.5 1.9 - 3.5 g/dL    A-G Ratio 1.8 g/dL    Narrative    Request patient fasting?->Yes  Fasting Instructions:->Fast 8-10 hours, OK to drink water as  needed during fast, take medications per your provider's  instruction.   CORRECTED CALCIUM   Result Value Ref Range    Correct Calcium 8.9 8.5 - 10.5 mg/dL    Narrative    Request patient fasting?->Yes  Fasting Instructions:->Fast 8-10 hours, OK to drink water as  needed during fast, take medications per your provider's  instruction.   ESTIMATED GFR   Result Value Ref Range    GFR (CKD-EPI) 85 >60 mL/min/1.73 m 2      Comment:      Estimated Glomerular Filtration Rate is calculated using  race neutral CKD-EPI 2021 equation per NKF-ASN recommendations.      Narrative    Request patient fasting?->Yes  Fasting Instructions:->Fast 8-10 hours, OK to drink water as  needed during fast, take medications per your provider's  instruction.     The test results show that A1c improved at 7.1%.  Lipid profile continues to show elevated triglycerides and low HDL cholesterol.  Metabolic panel shows elevated fasting glucose 118, elevated BUN, kidney enzyme at 25 and low AST, liver enzymes at 11.  Overall, no concerns about kidney or liver function.  Prostate-specific antigen within normal limits.    If you have any questions or concerns, please don't hesitate to call.        Sincerely,    Juliane Ames  Electronically Signed

## 2023-06-02 LAB
EST. AVERAGE GLUCOSE BLD GHB EST-MCNC: 157 MG/DL
HBA1C MFR BLD: 7.1 % (ref 4–5.6)

## 2023-06-05 NOTE — RESULT ENCOUNTER NOTE
Completed please mail patient a copy of lab results with my note attached.  Patient has follow-up 6/16, will discuss further at that time.    A1c improved at 7.1%.  Lipid profile continues to show elevated triglycerides and low HDL cholesterol.  Metabolic panel shows elevated fasting glucose 118, elevated BUN, kidney enzyme at 25 and low AST, liver enzymes at 11.  Overall, no concerns about kidney or liver function.  Prostate-specific antigen within normal limits.

## 2023-06-06 ENCOUNTER — OFFICE VISIT (OUTPATIENT)
Dept: SLEEP MEDICINE | Facility: MEDICAL CENTER | Age: 78
End: 2023-06-06
Attending: STUDENT IN AN ORGANIZED HEALTH CARE EDUCATION/TRAINING PROGRAM
Payer: MEDICARE

## 2023-06-06 VITALS
OXYGEN SATURATION: 94 % | HEART RATE: 74 BPM | WEIGHT: 242 LBS | SYSTOLIC BLOOD PRESSURE: 132 MMHG | DIASTOLIC BLOOD PRESSURE: 82 MMHG | BODY MASS INDEX: 41.32 KG/M2 | HEIGHT: 64 IN

## 2023-06-06 DIAGNOSIS — K21.9 GASTROESOPHAGEAL REFLUX DISEASE, UNSPECIFIED WHETHER ESOPHAGITIS PRESENT: ICD-10-CM

## 2023-06-06 DIAGNOSIS — R26.81 UNSTEADY GAIT: ICD-10-CM

## 2023-06-06 DIAGNOSIS — I15.9 SECONDARY HYPERTENSION: ICD-10-CM

## 2023-06-06 DIAGNOSIS — J45.40 MODERATE PERSISTENT ASTHMA WITHOUT COMPLICATION: ICD-10-CM

## 2023-06-06 DIAGNOSIS — G47.33 OSA (OBSTRUCTIVE SLEEP APNEA): ICD-10-CM

## 2023-06-06 PROCEDURE — 99214 OFFICE O/P EST MOD 30 MIN: CPT | Performed by: INTERNAL MEDICINE

## 2023-06-06 PROCEDURE — 3079F DIAST BP 80-89 MM HG: CPT | Performed by: INTERNAL MEDICINE

## 2023-06-06 PROCEDURE — 3075F SYST BP GE 130 - 139MM HG: CPT | Performed by: INTERNAL MEDICINE

## 2023-06-06 PROCEDURE — 99213 OFFICE O/P EST LOW 20 MIN: CPT | Performed by: INTERNAL MEDICINE

## 2023-06-06 RX ORDER — ALBUTEROL SULFATE 90 UG/1
2 AEROSOL, METERED RESPIRATORY (INHALATION) EVERY 4 HOURS PRN
Qty: 1 EACH | Refills: 11 | Status: SHIPPED | OUTPATIENT
Start: 2023-06-06 | End: 2023-06-06

## 2023-06-06 RX ORDER — BUDESONIDE AND FORMOTEROL FUMARATE DIHYDRATE 80; 4.5 UG/1; UG/1
2 AEROSOL RESPIRATORY (INHALATION) 2 TIMES DAILY
Qty: 10.2 G | Refills: 11 | Status: SHIPPED | OUTPATIENT
Start: 2023-06-06 | End: 2023-06-06

## 2023-06-06 RX ORDER — BUDESONIDE AND FORMOTEROL FUMARATE DIHYDRATE 80; 4.5 UG/1; UG/1
2 AEROSOL RESPIRATORY (INHALATION) 2 TIMES DAILY
Qty: 10.2 G | Refills: 11 | Status: SHIPPED | OUTPATIENT
Start: 2023-06-06

## 2023-06-06 RX ORDER — ALBUTEROL SULFATE 90 UG/1
2 AEROSOL, METERED RESPIRATORY (INHALATION) EVERY 4 HOURS PRN
Qty: 1 EACH | Refills: 11 | Status: SHIPPED | OUTPATIENT
Start: 2023-06-06

## 2023-06-06 ASSESSMENT — ENCOUNTER SYMPTOMS
DIAPHORESIS: 0
WEAKNESS: 1
HEMOPTYSIS: 0
NAUSEA: 0
DIARRHEA: 0
WEIGHT LOSS: 0
CONSTIPATION: 0
SPUTUM PRODUCTION: 0
ABDOMINAL PAIN: 0
BACK PAIN: 0
CLAUDICATION: 0
PHOTOPHOBIA: 0
SORE THROAT: 0
PND: 0
TREMORS: 0
WHEEZING: 0
DOUBLE VISION: 0
CHILLS: 0
EYE PAIN: 0
COUGH: 1
FEVER: 0
HEARTBURN: 0
ORTHOPNEA: 0
BLURRED VISION: 0
PALPITATIONS: 0
SHORTNESS OF BREATH: 1
FALLS: 0
EYE DISCHARGE: 0
NECK PAIN: 0
STRIDOR: 0
MYALGIAS: 0
DIZZINESS: 0
SINUS PAIN: 0
FOCAL WEAKNESS: 0
EYE REDNESS: 0
DEPRESSION: 0
HEADACHES: 0
SPEECH CHANGE: 0
VOMITING: 0

## 2023-06-06 ASSESSMENT — FIBROSIS 4 INDEX: FIB4 SCORE: 0.79

## 2023-06-06 NOTE — PROGRESS NOTES
Chief Complaint   Patient presents with    Establish Care     Referred by Juliane Ames P.A.-C. For Moderate persistent asthma, unspecified whether complicated       HPI: This patient is a 78 y.o. male presenting for evaluation of asthma. PMHx is significant for DMII, ONI On Bipap and followed in our sleep medicine clinic, GERD, dyslipidemia and PVD. He smoked 1 ppd for roughly 10 years and quit in 1995. No known occupational exposures. He was born in [place] where he was dx with asthma around age 4. No hospitalizations. He moved to [facility] at age 14 and states that asthma sxs essentially resolved. Around age 28 he moved back to [place] and experienced sxs off and on but later in adult life, he was asx until the past year. He had no sxs prior to November when he developed cough and SOB. He had been started on carvedilol by cardiology just prior to onset of sxs based on chart notes but pt does not attribute worsening of sxs to this medication. CXR was clear.  He was tx with steroids and abx with temporary resolution of sxs which then slowly returned. He was seen again in March by PCP and started on symbicort 80 which has controlled his sxs. He does use albuterol typically once daily. He was also prescribed claritin which he did not tolerate and was not helpful. He cannot identify any specific triggers to asthma and denies allergy sxs. He has had issues with the humidification on his CPAP machine for the past several months and feels breathing has been worse since being off CPAP starting 5/9/23. He is also concerned about being unsteady on his feet and his reaction time which has resulted in several near falls recently and limiting his activity.     Past Medical History:   Diagnosis Date    Asthma     Back pain     Cough     Diabetes     ONI treated with BiPAP 05/10/2016    Painful breathing     Shortness of breath     Sleep apnea     Sputum production     Wheezing        Social History     Socioeconomic  History    Marital status:      Spouse name: Not on file    Number of children: Not on file    Years of education: Not on file    Highest education level: Not on file   Occupational History    Not on file   Tobacco Use    Smoking status: Former     Packs/day: 1.00     Years: 10.00     Pack years: 10.00     Types: Cigarettes     Start date: 1985     Quit date: 1995     Years since quittin.4    Smokeless tobacco: Never   Vaping Use    Vaping Use: Never used   Substance and Sexual Activity    Alcohol use: Not Currently     Alcohol/week: 2.4 - 3.6 oz     Types: 4 - 6 Cans of beer per week    Drug use: No    Sexual activity: Not on file   Other Topics Concern    Not on file   Social History Narrative    ** Merged History Encounter **          Social Determinants of Health     Financial Resource Strain: Not on file   Food Insecurity: Not on file   Transportation Needs: Not on file   Physical Activity: Not on file   Stress: Not on file   Social Connections: Not on file   Intimate Partner Violence: Not on file   Housing Stability: Not on file       Family History   Problem Relation Age of Onset    Heart Attack Brother     Cancer Neg Hx        Current Outpatient Medications on File Prior to Visit   Medication Sig Dispense Refill    Empagliflozin (JARDIANCE) 25 MG Tab Take 25 mg by mouth every day. 100 Tablet 6    loratadine (CLARITIN) 10 MG Tab Take 1 Tablet by mouth every day. 30 Tablet 11    methylPREDNISolone (MEDROL DOSEPAK) 4 MG Tablet Therapy Pack As directed on the packaging label. 21 Tablet 0    carvedilol (COREG) 12.5 MG Tab Take 1 Tablet by mouth 2 times a day with meals. 180 Tablet 3    glipizide-metformin (METAGLIP) 5-500 MG per tablet Take 1 Tablet by mouth 2 times daily, before breakfast and dinner. 180 Tablet 3    atorvastatin (LIPITOR) 40 MG Tab Take 1 Tablet by mouth every day. 100 Tablet 3    amLODIPine (NORVASC) 10 MG Tab Take 1 Tablet by mouth every day. 90 Tablet 3    finasteride  "(PROSCAR) 5 MG Tab Take 1 Tablet by mouth every day. 90 Tablet 3    olmesartan (BENICAR) 5 MG tablet Take 1 Tablet by mouth every day. 100 Tablet 3    omeprazole (PRILOSEC) 20 MG delayed-release capsule TAKE 1 CAPSULE BY MOUTH IN THE MORNING ON AN EMPTY STOMACH 30 MINUTES PRIOR TO EATING AND DRINKING.      vitamin D (CHOLECALCIFEROL) 1000 UNIT Tab Take 1 Tablet by mouth every day.      Omega-3 Fatty Acids (FISH OIL) 1000 MG Cap capsule Take 1 Capsule by mouth every day.       No current facility-administered medications on file prior to visit.       Allergies: Patient has no known allergies.    ROS:   Review of Systems   Constitutional:  Negative for chills, diaphoresis, fever, malaise/fatigue and weight loss.   HENT:  Negative for congestion, ear discharge, ear pain, hearing loss, nosebleeds, sinus pain, sore throat and tinnitus.    Eyes:  Negative for blurred vision, double vision, photophobia, pain, discharge and redness.   Respiratory:  Positive for cough and shortness of breath. Negative for hemoptysis, sputum production, wheezing and stridor.    Cardiovascular:  Negative for chest pain, palpitations, orthopnea, claudication, leg swelling and PND.   Gastrointestinal:  Negative for abdominal pain, constipation, diarrhea, heartburn, nausea and vomiting.   Genitourinary:  Negative for dysuria and urgency.   Musculoskeletal:  Negative for back pain, falls, joint pain, myalgias and neck pain.   Skin:  Negative for itching and rash.   Neurological:  Positive for weakness. Negative for dizziness, tremors, speech change, focal weakness and headaches.   Endo/Heme/Allergies:  Negative for environmental allergies.   Psychiatric/Behavioral:  Negative for depression.        /82 (BP Location: Right arm, Patient Position: Sitting, BP Cuff Size: Large adult)   Pulse 74   Ht 1.626 m (5' 4\")   Wt 110 kg (242 lb)   SpO2 94%     Physical Exam:  Physical Exam  Vitals reviewed.   Constitutional:       General: He is not in " acute distress.     Appearance: Normal appearance. He is obese.   HENT:      Head: Normocephalic and atraumatic.      Right Ear: External ear normal.      Left Ear: External ear normal.      Nose: Nose normal. No congestion.      Mouth/Throat:      Mouth: Mucous membranes are moist.      Pharynx: Oropharynx is clear. No oropharyngeal exudate.   Eyes:      General: No scleral icterus.     Extraocular Movements: Extraocular movements intact.      Conjunctiva/sclera: Conjunctivae normal.      Pupils: Pupils are equal, round, and reactive to light.   Cardiovascular:      Rate and Rhythm: Normal rate and regular rhythm.      Heart sounds: Normal heart sounds. No murmur heard.     No gallop.   Pulmonary:      Effort: Pulmonary effort is normal. No respiratory distress.      Breath sounds: Normal breath sounds. No wheezing or rales.   Abdominal:      General: There is no distension.      Palpations: Abdomen is soft.   Musculoskeletal:         General: Normal range of motion.      Cervical back: Normal range of motion and neck supple.      Right lower leg: No edema.      Left lower leg: No edema.   Skin:     General: Skin is warm and dry.      Findings: No rash.   Neurological:      Mental Status: He is alert and oriented to person, place, and time.      Cranial Nerves: No cranial nerve deficit.   Psychiatric:         Mood and Affect: Mood normal.         Behavior: Behavior normal.       PFTs as reviewed by me personally: none    Imaging as reviewed by me personally: as per hPI    Assessment:  1. Moderate persistent asthma without complication  PULMONARY FUNCTION TESTS -Test requested: Complete Pulmonary Function Test    budesonide-formoterol (SYMBICORT) 80-4.5 MCG/ACT Aerosol    albuterol 108 (90 Base) MCG/ACT Aero Soln inhalation aerosol      2. Secondary hypertension        3. Unsteady gait  Referral to Physical Therapy      4. ONI (obstructive sleep apnea)        5. Gastroesophageal reflux disease, unspecified whether  esophagitis present            Plan:  Chronic but new to me and only recent worsening of sxs per pt. ? Whether this is related to initiating beta blocker therapy. Given sxs are improved on symbicort, we will continue this, obtain full PFT and f/u in the fall. If sxs worsen, may want to consider trial off coreg.  The issue here is his beta blocker therapy. Currently good BP control and asthma sxs improved with ICS. See discussion above.  Pt at risk for fall; PT referral placed  Chronic; followed by sleep medicine; will f/u on new BIPAP given pt correlates worsening asthmas with inability to use bipap  Chronic. Per pt controlled however can contribute to RAD. On PPI  Return in about 4 months (around 10/6/2023) for pFTs as soon as can be scheduled .

## 2023-06-06 NOTE — Clinical Note
Lonny Sutton, I know you put a new order in for BIPAP but it sounds like they want his original study from 2007 which is in media. You probably already know but I told pt I would reach out to you. Thanks! Kristi

## 2023-06-16 ENCOUNTER — OFFICE VISIT (OUTPATIENT)
Dept: MEDICAL GROUP | Facility: MEDICAL CENTER | Age: 78
End: 2023-06-16
Payer: MEDICARE

## 2023-06-16 VITALS
OXYGEN SATURATION: 95 % | BODY MASS INDEX: 40.63 KG/M2 | DIASTOLIC BLOOD PRESSURE: 70 MMHG | WEIGHT: 238 LBS | HEART RATE: 80 BPM | TEMPERATURE: 98.2 F | HEIGHT: 64 IN | RESPIRATION RATE: 16 BRPM | SYSTOLIC BLOOD PRESSURE: 134 MMHG

## 2023-06-16 DIAGNOSIS — I10 DIABETES MELLITUS WITH COINCIDENT HYPERTENSION (HCC): ICD-10-CM

## 2023-06-16 DIAGNOSIS — I10 ESSENTIAL HYPERTENSION, BENIGN: ICD-10-CM

## 2023-06-16 DIAGNOSIS — E78.2 MIXED HYPERLIPIDEMIA: ICD-10-CM

## 2023-06-16 DIAGNOSIS — E11.69 HYPERLIPIDEMIA ASSOCIATED WITH TYPE 2 DIABETES MELLITUS (HCC): ICD-10-CM

## 2023-06-16 DIAGNOSIS — R80.9 MICROALBUMINURIA DUE TO TYPE 2 DIABETES MELLITUS (HCC): ICD-10-CM

## 2023-06-16 DIAGNOSIS — E11.29 MICROALBUMINURIA DUE TO TYPE 2 DIABETES MELLITUS (HCC): ICD-10-CM

## 2023-06-16 DIAGNOSIS — E78.5 HYPERLIPIDEMIA ASSOCIATED WITH TYPE 2 DIABETES MELLITUS (HCC): ICD-10-CM

## 2023-06-16 DIAGNOSIS — E11.9 DIABETES MELLITUS WITH COINCIDENT HYPERTENSION (HCC): ICD-10-CM

## 2023-06-16 DIAGNOSIS — F34.1 DYSTHYMIA: ICD-10-CM

## 2023-06-16 DIAGNOSIS — E66.01 MORBID OBESITY (HCC): ICD-10-CM

## 2023-06-16 DIAGNOSIS — G47.33 OSA TREATED WITH BIPAP: ICD-10-CM

## 2023-06-16 DIAGNOSIS — I70.0 ATHEROSCLEROSIS OF AORTA (HCC): ICD-10-CM

## 2023-06-16 PROCEDURE — 3078F DIAST BP <80 MM HG: CPT | Performed by: STUDENT IN AN ORGANIZED HEALTH CARE EDUCATION/TRAINING PROGRAM

## 2023-06-16 PROCEDURE — 99214 OFFICE O/P EST MOD 30 MIN: CPT | Performed by: STUDENT IN AN ORGANIZED HEALTH CARE EDUCATION/TRAINING PROGRAM

## 2023-06-16 PROCEDURE — 3075F SYST BP GE 130 - 139MM HG: CPT | Performed by: STUDENT IN AN ORGANIZED HEALTH CARE EDUCATION/TRAINING PROGRAM

## 2023-06-16 RX ORDER — AMOXICILLIN 500 MG/1
CAPSULE ORAL
COMMUNITY
Start: 2023-06-06 | End: 2023-06-16

## 2023-06-16 RX ORDER — ATORVASTATIN CALCIUM 40 MG/1
40 TABLET, FILM COATED ORAL DAILY
Qty: 100 TABLET | Refills: 3 | Status: SHIPPED | OUTPATIENT
Start: 2023-06-16

## 2023-06-16 ASSESSMENT — FIBROSIS 4 INDEX: FIB4 SCORE: 0.79

## 2023-06-16 NOTE — PROGRESS NOTES
"Subjective:     Chief Complaint   Patient presents with    Follow-Up     3 month          HPI:   Jonathan presents today with       Hypertension  Patient continues on amlodipine 10 mg, olmesartan 5 mg and carvedilol 12.5 mg twice daily.     Diabetes  Patient's last A1c  7.7%. Patient continues on glipizide- metformin 5-500 for diabetes control.  Jardiance 25 mg was added at last visit.  A1c improved from 7.7% to 7.1%, will continue to monitor.  Patient advised to continue working on diet and exercise.     Obesity  Patient's BMI at 40.  Weight remains stable.  Patient encouraged to continue working on diet and exercise.     Hyperlipidemia  Patient continues on atorvastatin 40 mg.  Recent lipid profile continues to show elevated triglycerides but controlled LDL.     Sleep apnea  Patient continues on CPAP nightly.    Asthma  Patient has been evaluated by pulmonology on 6/6/2023.  Patient had no symptoms prior to November when he developed a cough and shortness of breath.  At that time patient was started on carvedilol.  Patient was started on Symbicort at last visit and does note improvement.    Depression   Patient and wife both present today for concerns about depression.  Wife  Notes that she is very depressed but notes that  has been tearful.  On further evaluation, patient is sad about his sister.  Patient notes that she lives in [place] and is the last of his living siblings.  Patient notes that he does not feel down overall just worries and has grief.  We will continue to monitor.    ROS:  Gen: no fevers/chills,  Pulm: no sob, no cough  CV: no chest pain, no palpitations  GI: no nausea/vomiting, no diarrhea        Objective:     Exam:  /70 (BP Location: Left arm, Patient Position: Sitting, BP Cuff Size: Adult)   Pulse 80   Temp 36.8 °C (98.2 °F) (Temporal)   Resp 16   Ht 1.626 m (5' 4\")   Wt 108 kg (238 lb)   SpO2 95%   BMI 40.85 kg/m²  Body mass index is 40.85 kg/m².    Gen: Alert and " oriented, No apparent distress.  Neck: Neck is supple without lymphadenopathy.  Lungs: Normal effort, CTA bilaterally, no wheezes, rhonchi, or rales  CV: Regular rate and rhythm. No murmurs, rubs, or gallops.  Ext: No clubbing, cyanosis, edema.        Assessment & Plan:     78 y.o. male with the following -     1. Atherosclerosis of aorta (HCC)  Chronic, stable.  Patient continues on atorvastatin and aspirin daily.    2. Essential hypertension, benign  Chronic, stable.  Blood pressure controlled.    3. Diabetes mellitus with coincident hypertension (HCC)  Chronic, stable.  A1c improved to 7.1%.  Continue on current regiment.    4. Hyperlipidemia associated with type 2 diabetes mellitus (HCC)  Chronic, stable.  Lipid profile well controlled.  Continue on atorvastatin 40 mg.    5. Microalbuminuria due to type 2 diabetes mellitus (HCC)  Chronic, stable.  Continue on current regiment for diabetes.      6. Morbid obesity (HCC)  Chronic, stable.  BMI at 40.  Patient encouraged to increase exercise.    7. ONI treated with BiPAP  Chronic, stable.  Patient working with pulmonology.  Patient working with sleep medicine to get new BiPAP machine.  No new concerns.    8. Dysthymia  Acute, stable.  Patient mentions dysthymia today secondary to worry and grief.  We will continue to monitor for more chronic depression.    No follow-ups on file.    Please note that this dictation was created using voice recognition software. I have made every reasonable attempt to correct obvious errors, but I expect that there are errors of grammar and possibly content that I did not discover before finalizing the note.

## 2023-07-05 ENCOUNTER — OFFICE VISIT (OUTPATIENT)
Dept: SLEEP MEDICINE | Facility: MEDICAL CENTER | Age: 78
End: 2023-07-05
Attending: NURSE PRACTITIONER
Payer: MEDICARE

## 2023-07-05 VITALS
HEIGHT: 64 IN | DIASTOLIC BLOOD PRESSURE: 74 MMHG | SYSTOLIC BLOOD PRESSURE: 122 MMHG | WEIGHT: 242 LBS | BODY MASS INDEX: 41.32 KG/M2 | OXYGEN SATURATION: 95 % | HEART RATE: 82 BPM | RESPIRATION RATE: 16 BRPM

## 2023-07-05 DIAGNOSIS — J45.40 MODERATE PERSISTENT ASTHMA, UNSPECIFIED WHETHER COMPLICATED: ICD-10-CM

## 2023-07-05 DIAGNOSIS — I10 ESSENTIAL HYPERTENSION, BENIGN: ICD-10-CM

## 2023-07-05 DIAGNOSIS — Z87.891 FORMER SMOKER: ICD-10-CM

## 2023-07-05 DIAGNOSIS — G47.33 OSA TREATED WITH BIPAP: ICD-10-CM

## 2023-07-05 PROCEDURE — 99213 OFFICE O/P EST LOW 20 MIN: CPT | Performed by: NURSE PRACTITIONER

## 2023-07-05 PROCEDURE — 99211 OFF/OP EST MAY X REQ PHY/QHP: CPT | Performed by: NURSE PRACTITIONER

## 2023-07-05 PROCEDURE — 3078F DIAST BP <80 MM HG: CPT | Performed by: NURSE PRACTITIONER

## 2023-07-05 PROCEDURE — 3074F SYST BP LT 130 MM HG: CPT | Performed by: NURSE PRACTITIONER

## 2023-07-05 ASSESSMENT — FIBROSIS 4 INDEX: FIB4 SCORE: 0.79

## 2023-07-05 NOTE — PROGRESS NOTES
Chief Complaint   Patient presents with    Follow-Up     Asthma / Apnea // Last seen 6/6/2023 (pulm) 5/9/2023 (sleep)       HPI:  Jonathan Slade is a 78 y.o. year old male here today for follow-up on ONI and asthma  Last OV 6/6/23 with Dr. Bridges for asthma   Last OV 5/9/23 with Denis LEWIS for sleep     MMRC ndGndrndanddndend:nd nd2nd Exacerbations this year: 0    He returns today noting asthma to be clinically stable.  Remains on Symbicort 80 mg 2 puffs twice daily and albuterol HFA inhaler as needed 0-2 times per day.  He notes no exacerbations since last office visit.  No significant allergies at this time.    His main concern is not receiving a new BiPAP device.  He notes receiving the recall from [employer] for the front motor piece but the humidifier is broken and he is having to physically tape his device together so is able to use it.  Orders were placed in May for replacement device but he has not obtained it.  We contact the medical equipment company today and it was approved by insurance and they will call him for set up.  Currently using BiPAP 18/14 cm; [employer] device obtained 2019.  Motor device replaced 2022 through recall.  Compliance report 100% compliance, average nightly 7 hours 6 minutes, no significant mask leak with reduced AHI of 1.5/h.  Reviewed with patient.  He tolerates mask and pressure well if he is able to seal the machine for use at night.  He denies morning headaches.  He goes to bed between 10 to 11 PM and falls asleep generally under 30 minutes.  He wakes 1 time at night to use the restroom and refill his machine.  He wakes between 6:30 AM and 7 AM.  He is noting dry mouth but no headaches.  He will nap 1-2 times a week in his chair in front of the TV for up to an hour.  We reviewed sleep hygiene.    Pulmonary & sleep history:  Former smoker, quit 1995 with roughly 10-pack-year history.    Prior PSG noted severe sleep apnea with AHI 77.3/h and O2 edin 49%.        ROS: As per HPI and  Subjective:      Kodak Valentine is a 50 y.o. male with known COPD who presents  Last visit was 05/31/2019  Here to review Download. Also need new CPAP supplies  And refill on meds  COPD test score 25  Lost weight : acute colitis: from 233lb to 154lb  No cough, No wheezing No SOb  No interval exacebations  Immunizations are up-to-date  Has chronic dyspnea. mRC grade 1  Medications.BREO ELLIPTA ProAir HFA and Spiriva.  He has a nighttime concentrator he uses and benefits  He has a BiPAP machine   Download shows adherence and benefits  Still smoking    Nicotine vaping was discouraged  Encourage him to continue to exercise as much as he can    Acute colitis      The following portions of the patient's history were reviewed and updated as appropriate:   He  has a past medical history of Anxiety, CAD (coronary artery disease), Chest pain syndrome (10/2/2015), COPD (chronic obstructive pulmonary disease), CPAP (continuous positive airway pressure) dependence, Diabetes mellitus type 2, uncontrolled (4/13/2016), History of duodenal ulcer, Hypertension, Mixed hyperlipidemia, JUDI (obstructive sleep apnea), S/P PTCA (percutaneous transluminal coronary angioplasty) (3/11/2015), Thrush (8/14/2019), and Vitamin D deficiency.  He does not have any pertinent problems on file.  He  has a past surgical history that includes Nissen fundoplication; Hemorrhoid surgery; Coronary stent placement (2012); Skin lesion excision (Right); Appendectomy; Cataract extraction w/  intraocular lens implant (Right, 4-22-15); Cardiac catheterization; Back surgery (06/2019); Esophagogastroduodenoscopy (N/A, 3/11/2020); and Colonoscopy (N/A, 3/11/2020).  His family history includes COPD in his maternal grandfather and maternal grandmother; Diabetes in his maternal grandfather; Heart block in his father; Heart disease in his mother and sister.  He  reports that he has been smoking cigarettes. He has a 20.00 pack-year smoking history. He has never used  otherwise negative if not stated.    Past Medical History:   Diagnosis Date    Asthma     Back pain     Cough     Diabetes     ONI treated with BiPAP 05/10/2016    Painful breathing     Shortness of breath     Sleep apnea     Sputum production     Wheezing        Past Surgical History:   Procedure Laterality Date    GASTROSCOPY-ENDO N/A 2019    Procedure: GASTROSCOPY;  Surgeon: Tyler Rothman M.D.;  Location: ENDOSCOPY [facility];  Service: Gastroenterology    FULL THICKNESS BLOCK RESECTION  2013    Performed by Greyson Lopez M.D. at [facility]    BIOPSY GENERAL  2013    Performed by Greyson Lopez M.D. at [facility]    CATARACT EXTRACTION WITH IOL         Family History   Problem Relation Age of Onset    Heart Attack Brother     Cancer Neg Hx        Social History     Socioeconomic History    Marital status:      Spouse name: Not on file    Number of children: Not on file    Years of education: Not on file    Highest education level: Not on file   Occupational History    Not on file   Tobacco Use    Smoking status: Former     Packs/day: 1.00     Years: 10.00     Pack years: 10.00     Types: Cigarettes     Start date: 1985     Quit date: 1995     Years since quittin.5    Smokeless tobacco: Never   Vaping Use    Vaping Use: Never used   Substance and Sexual Activity    Alcohol use: Not Currently     Alcohol/week: 2.4 - 3.6 oz     Types: 4 - 6 Cans of beer per week    Drug use: No    Sexual activity: Not on file   Other Topics Concern    Not on file   Social History Narrative    ** Merged History Encounter **          Social Determinants of Health     Financial Resource Strain: Not on file   Food Insecurity: Not on file   Transportation Needs: Not on file   Physical Activity: Not on file   Stress: Not on file   Social Connections: Not on file   Intimate Partner Violence: Not on file   Housing Stability: Not on file       Allergies as of  smokeless tobacco. He reports that he drinks about 6.0 standard drinks of alcohol per week. He reports that he does not use drugs.  He has a current medication list which includes the following prescription(s): albuterol, alprazolam, amlodipine, aspirin, atorvastatin, cholecalciferol (vitamin d3), cyanocobalamin (vitamin b-12), duloxetine, fenofibrate, fluticasone furoate-vilanterol, folic acid, lisinopril, metoprolol tartrate, oxycodone-acetaminophen, pantoprazole, prednisone, ranolazine, sildenafil, tiotropium bromide, and trazodone.  Current Outpatient Medications on File Prior to Visit   Medication Sig Dispense Refill    ALPRAZolam (XANAX) 0.25 MG tablet TAKE ONE TABLET BY MOUTH 3 TIMES DAILY AS NEEDED FOR ANXIETY 90 tablet 1    amLODIPine (NORVASC) 2.5 MG tablet TAKE 1 TABLET BY MOUTH ONCE A DAY 30 tablet 6    aspirin (ECOTRIN) 81 MG EC tablet Take 1 tablet (81 mg total) by mouth once daily.  0    atorvastatin (LIPITOR) 20 MG tablet TAKE 1 TABLET BY MOUTH ONCE A DAY IN THE EVENING FOR CHOLESTEROL 30 tablet 11    cholecalciferol, vitamin D3, (VITAMIN D3) 2,000 unit Cap Take 1 capsule (2,000 Units total) by mouth once daily. 100 capsule 6    cyanocobalamin, vitamin B-12, 1,000 mcg Subl Place 1,000 mcg under the tongue once daily. 90 tablet 3    DULoxetine (CYMBALTA) 60 MG capsule TAKE 1 CAPSULE BY MOUTH ONCE A DAY 30 capsule 11    fenofibrate 160 MG Tab TAKE 1 TABLET BY MOUTH ONCE A DAY 30 tablet 6    folic acid (FOLVITE) 1 MG tablet Take 1 tablet (1 mg total) by mouth once daily. 90 tablet 3    lisinopril (PRINIVIL,ZESTRIL) 20 MG tablet Take 0.5 tablets (10 mg total) by mouth once daily. 30 tablet 11    metoprolol tartrate (LOPRESSOR) 50 MG tablet Take 0.5 tablets (25 mg total) by mouth every 12 (twelve) hours. 60 tablet 11    oxyCODONE-acetaminophen (PERCOCET)  mg per tablet Take 1 tablet by mouth 4 (four) times daily as needed.  0    pantoprazole (PROTONIX) 40 MG tablet TAKE 1 TABLET BY  "07/05/2023    (No Known Allergies)        Vitals:  /74 (BP Location: Left arm, Patient Position: Sitting, BP Cuff Size: Adult)   Pulse 82   Resp 16   Ht 1.626 m (5' 4\")   Wt 110 kg (242 lb)   SpO2 95%     Current medications as of today   Current Outpatient Medications   Medication Sig Dispense Refill    atorvastatin (LIPITOR) 40 MG Tab Take 1 Tablet by mouth every day. 100 Tablet 3    budesonide-formoterol (SYMBICORT) 80-4.5 MCG/ACT Aerosol Inhale 2 Puffs 2 times a day. 10.2 g 11    albuterol 108 (90 Base) MCG/ACT Aero Soln inhalation aerosol Inhale 2 Puffs every four hours as needed for Shortness of Breath. 1 Each 11    Empagliflozin (JARDIANCE) 25 MG Tab Take 25 mg by mouth every day. 100 Tablet 6    carvedilol (COREG) 12.5 MG Tab Take 1 Tablet by mouth 2 times a day with meals. 180 Tablet 3    glipizide-metformin (METAGLIP) 5-500 MG per tablet Take 1 Tablet by mouth 2 times daily, before breakfast and dinner. 180 Tablet 3    amLODIPine (NORVASC) 10 MG Tab Take 1 Tablet by mouth every day. 90 Tablet 3    finasteride (PROSCAR) 5 MG Tab Take 1 Tablet by mouth every day. 90 Tablet 3    olmesartan (BENICAR) 5 MG tablet Take 1 Tablet by mouth every day. 100 Tablet 3    omeprazole (PRILOSEC) 20 MG delayed-release capsule TAKE 1 CAPSULE BY MOUTH IN THE MORNING ON AN EMPTY STOMACH 30 MINUTES PRIOR TO EATING AND DRINKING.      vitamin D (CHOLECALCIFEROL) 1000 UNIT Tab Take 1 Tablet by mouth every day.      Omega-3 Fatty Acids (FISH OIL) 1000 MG Cap capsule Take 1 Capsule by mouth every day.       No current facility-administered medications for this visit.         Physical Exam:   Gen:           Alert and oriented, No apparent distress. Mood and affect appropriate, normal interaction with examiner.  Eyes:          PERRL, EOM intact, sclere white, conjunctive moist.  Ears:          Not examined.   Hearing:     Grossly intact.  Nose:          Normal, no lesions or deformities.  Dentition:    Not " "MOUTH TWICE A DAY 60 tablet 11    predniSONE (DELTASONE) 20 MG tablet TAKE 3 TABLETS FOR 3 DAYS; THEN 2 FOR 3 DAYS; THEN 1 FOR 3 DAYS; THEN 1/2 FOR 3 DAYS 26 tablet 0    ranolazine (RANEXA) 1,000 mg Tb12 Take 1 tablet (1,000 mg total) by mouth 2 (two) times daily. 180 tablet 3    sildenafil (VIAGRA) 100 MG tablet TAKE AS DIRECTED 30 tablet 6    traZODone (DESYREL) 100 MG tablet TAKE 1 OR 2 TABLETS BY MOUTH NIGHTLY AS NEEDED FOR INSOMNIA 60 tablet 11    [DISCONTINUED] albuterol (PROAIR HFA) 90 mcg/actuation inhaler Inhale 2 puffs into the lungs every 4 (four) hours as needed. Rescue 18 g 3    [DISCONTINUED] BREO ELLIPTA 200-25 mcg/dose DsDv diskus inhaler inhale 1 puff into the lungs once daily 60 each 0    [DISCONTINUED] tiotropium bromide (SPIRIVA RESPIMAT) 2.5 mcg/actuation Mist INHALE 2 PUFFS INTO THE LUNGS ONCE DAILY 4 g 11     No current facility-administered medications on file prior to visit.      He has No Known Allergies..    Review of Systems    Review of Systems   Constitutional: Positive for malaise/fatigue and weight loss.   Gastrointestinal: Positive for diarrhea.   All other systems reviewed and are negative.      Objective:      /86   Pulse 107   Resp 16   Ht 5' 10" (1.778 m)   Wt 70.2 kg (154 lb 12.2 oz)   SpO2 100%   BMI 22.21 kg/m²     Physical Exam   Constitutional: He is oriented to person, place, and time. He appears well-developed and well-nourished. He appears ill.   HENT:   Head: Normocephalic and atraumatic.   Nose: Nose normal.   Mouth/Throat: Oropharynx is clear and moist.   Eyes: Pupils are equal, round, and reactive to light. EOM are normal.   Neck: Normal range of motion. Neck supple.   Cardiovascular: Normal rate, regular rhythm and normal heart sounds.   Abdominal: Soft.   Musculoskeletal: Normal range of motion. He exhibits no edema.   Neurological: He is alert and oriented to person, place, and time. No cranial nerve deficit.   Skin: Skin is warm and dry. " Capillary refill takes 2 to 3 seconds.   Psychiatric: He has a normal mood and affect.   Nursing note and vitals reviewed.          CXR       DOWNLOAD  5/13/2020 - 6/11/2020  YOB: 1970  Mask:  Compliance Summary  5/13/2020 - 6/11/2020 (30 days)  Days with Device Usage 27 days  Days without Device Usage 3 days  Percent Days with Device Usage 90.0%  Cumulative Usage 4 days 8 hrs. 5 mins. 46 secs.  Maximum Usage (1 Day) 8 hrs. 12 mins. 46 secs.  Average Usage (All Days) 3 hrs. 28 mins. 11 secs.  Average Usage (Days Used) 3 hrs. 51 mins. 19 secs.  Minimum Usage (1 Day) 1 mins. 39 secs.  Percent of Days with Usage >= 4 Hours 46.7%  Percent of Days with Usage < 4 Hours 53.3%  Date Range  Total Blower Time 10 days 3 hrs. 34 mins. 34 secs.  Bi-Level Summary (Hapzing)  Average Time in Large Leak Per Day 2 hrs. 28 mins. 44 secs.  Average AHI 8.1  EPAP 14.0 cmH2O  IPAP 18.0 cmH2O      Spirometry was reviewed.  Moderate obstructive defect.  FEV1 2.72 (68% predicted) FEV1/FEC was 70.  FVC was 3.85 (76% predicted) FEV1 and FVC overall improved  .    Assessment:     Problem List Items Addressed This Visit     CAD (coronary artery disease)    JUDI treated with BiPAP (Chronic)     BIPAP 18/14 with Oxygen bled  [place] score 8  Need new supplies  Usage > 46.7%    Discussed therapeutic goals for positive airway pressure therapy(CPAP or BiPAP):   Ideal is usage 100% of nights for 6 - 8 hours per night. Minimum usage is 70% of night for at least 4 hours per night used. Pateint expressed understanding. All Questions answered.           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  Oropharynx:   Not examined.  Mallampati Classification: Not examined.  Neck:        Supple, trachea midline, no masses.  Respiratory Effort: No intercostal retractions or use of accessory muscles.   Lung Auscultation:      Clear to auscultation bilaterally; no rales, rhonchi or wheezing.  CV:            Regular rate and rhythm. No murmurs, rubs or gallops.  Abd:           Not examined.   Lymphadenopathy: Not examined.  Gait and Station: Normal.  Digits and Nails: No clubbing, cyanosis, petechiae, or nodes.   Cranial Nerves: II-XII grossly intact.  Skin:        No rashes, lesions or ulcers noted.               Ext:           No cyanosis or edema.      Assessment:  1. ONI treated with BiPAP        2. Moderate persistent asthma, unspecified whether complicated        3. BMI 40.0-44.9, adult (HCC)  HEIGHT AND WEIGHT      4. Essential hypertension, benign        5. Former smoker                 Immunizations:    Flu:recommend in the fall  Pneumovax 23:2018  Prevnar 13:2017  PCV 20: 7/2022  COVID-19: 4/16/22    Plan:  1.  ONI is well treated but his device humidifier is broken he is having to tape the device together in order to seal it so he can use it.  Call placed to DME and they will be calling the patient for set up on new device.  Reviewed sleep hygiene and the need to avoid naps.  2.  Continue current bronchodilators.  Asthma is well controlled.  3.  Encourage weight loss or healthy eating and regular exercise  4.  Follow-up with primary care for other health concerns including management hypertension  5.  Follow-up in 3 months for compliance check on new BiPAP device, sooner if needed.  Recommend overnight oximetry on new device after obtained due to daytime sleepiness and napping periodically.    Please note that this dictation was created using voice recognition software. I have made every reasonable attempt to correct obvious errors, but it is possible there are errors of grammar and possibly content  Relevant Medications    fluticasone furoate-vilanteroL (BREO ELLIPTA) 200-25 mcg/dose DsDv diskus inhaler    tiotropium bromide (SPIRIVA RESPIMAT) 2.5 mcg/actuation Mist    albuterol (PROAIR HFA) 90 mcg/actuation inhaler    Controlled type 2 diabetes mellitus with stage 2 chronic kidney disease, without long-term current use of insulin     STABLE: On Metformin         Tobacco dependence in remission     Currently smoking 1 PPD           Hyperlipidemia associated with type 2 diabetes mellitus     STABLE ON LIPITOR AND FENOFIBRATE         Acute colitis     Follow up with GI         Unintended weight loss     Lost weight from 233Lb to 154 Lb  Recent EGD and coloscopy  ? Colitis  Need to see GI  Message sent           Other Visit Diagnoses     COPD, severe    -  Primary    Relevant Medications    fluticasone furoate-vilanteroL (BREO ELLIPTA) 200-25 mcg/dose DsDv diskus inhaler    tiotropium bromide (SPIRIVA RESPIMAT) 2.5 mcg/actuation Mist    albuterol (PROAIR HFA) 90 mcg/actuation inhaler    Other Relevant Orders    X-Ray Chest PA And Lateral    Spirometry without Bronchodilator    COVID-19 Routine Screening    Stress test, pulmonary    COPD, severity to be determined               Plan:        Needs to follow up with GI regarding colitis and weight loss  Use BIPAP and benefits  Supplies ordered  Medications refilled    Requested Prescriptions     Signed Prescriptions Disp Refills    fluticasone furoate-vilanteroL (BREO ELLIPTA) 200-25 mcg/dose DsDv diskus inhaler 60 each 11     Sig: inhale 1 puff into the lungs once daily    tiotropium bromide (SPIRIVA RESPIMAT) 2.5 mcg/actuation Mist 4 g 11     Sig: INHALE 2 PUFFS INTO THE LUNGS ONCE DAILY    albuterol (PROAIR HFA) 90 mcg/actuation inhaler 18 g 3     Sig: Inhale 2 puffs into the lungs every 4 (four) hours as needed. Rescue         Follow up in about 3 months (around 9/12/2020), or Follow up with GI, see Nazia for supplies, for cxr, reece, 6mwd.    This note was  that I did not discover before finalizing the note.       prepared using voice recognition system and is likely to have sound alike errors that may have been overlooked even after proof reading.  Please call me with any questions    Discussed diagnosis, its evaluation, treatment and usual course. All questions answered.    Thank you for the courtesy of participating in the care of this patient    Isaac Polanco MD

## 2023-07-27 ENCOUNTER — TELEPHONE (OUTPATIENT)
Dept: MEDICAL GROUP | Facility: MEDICAL CENTER | Age: 78
End: 2023-07-27
Payer: MEDICARE

## 2023-07-27 NOTE — TELEPHONE ENCOUNTER
Patient stated he takes glipizide/metformin 5-500mg 2 in the morning 1 in the afternoon. Please advise, if this is ok please change rx

## 2023-07-28 ENCOUNTER — NON-PROVIDER VISIT (OUTPATIENT)
Dept: SLEEP MEDICINE | Facility: MEDICAL CENTER | Age: 78
End: 2023-07-28
Attending: INTERNAL MEDICINE
Payer: MEDICARE

## 2023-07-28 VITALS — HEIGHT: 64 IN | BODY MASS INDEX: 41.16 KG/M2 | WEIGHT: 241.1 LBS

## 2023-07-28 DIAGNOSIS — J45.40 MODERATE PERSISTENT ASTHMA WITHOUT COMPLICATION: ICD-10-CM

## 2023-07-28 PROCEDURE — 94726 PLETHYSMOGRAPHY LUNG VOLUMES: CPT | Mod: 26 | Performed by: STUDENT IN AN ORGANIZED HEALTH CARE EDUCATION/TRAINING PROGRAM

## 2023-07-28 PROCEDURE — 94729 DIFFUSING CAPACITY: CPT | Mod: 26 | Performed by: STUDENT IN AN ORGANIZED HEALTH CARE EDUCATION/TRAINING PROGRAM

## 2023-07-28 PROCEDURE — 94726 PLETHYSMOGRAPHY LUNG VOLUMES: CPT | Performed by: INTERNAL MEDICINE

## 2023-07-28 PROCEDURE — 94060 EVALUATION OF WHEEZING: CPT | Mod: 26 | Performed by: STUDENT IN AN ORGANIZED HEALTH CARE EDUCATION/TRAINING PROGRAM

## 2023-07-28 PROCEDURE — 94060 EVALUATION OF WHEEZING: CPT | Performed by: INTERNAL MEDICINE

## 2023-07-28 PROCEDURE — 94729 DIFFUSING CAPACITY: CPT | Performed by: INTERNAL MEDICINE

## 2023-07-28 ASSESSMENT — PULMONARY FUNCTION TESTS
FVC_LLN: 2.61
FEV1/FVC_PERCENT_PREDICTED: 98
FEV1_PERCENT_PREDICTED: 55
FEV1_PERCENT_CHANGE: 1
FEV1: 1.31
FEV1/FVC_PERCENT_CHANGE: 0
FEV1/FVC: 74
FEV1/FVC_PREDICTED: 76
FEV1_PERCENT_PREDICTED: 56
FEV1/FVC: 73.89
FVC_PERCENT_PREDICTED: 57
FVC: 1.8
FVC_PERCENT_PREDICTED: 56
FEV1: 1.33
FEV1/FVC: 74
FEV1/FVC_PERCENT_PREDICTED: 98
FEV1/FVC_PERCENT_CHANGE: 0
FEV1_PREDICTED: 2.35
FEV1_PERCENT_CHANGE: 0
FVC_PREDICTED: 3.13
FEV1/FVC: 75
FEV1/FVC_PERCENT_LLN: 63
FEV1/FVC_PERCENT_PREDICTED: 75
FEV1_LLN: 1.96
FVC: 1.76
FEV1/FVC_PERCENT_PREDICTED: 98
FEV1/FVC_PERCENT_PREDICTED: 97

## 2023-07-28 ASSESSMENT — FIBROSIS 4 INDEX: FIB4 SCORE: 0.79

## 2023-07-28 NOTE — PROCEDURES
Tech: Love Blevins, RT  Good patient effort & cooperation.  Test was performed on the [employer] Body Plethysmograph- Elite DX system.  The predicted sets used for Spirometry are GLI-2012, for Lung Volumes are ITS, and for DLCO is GLI 2017.  The results of this test meet the ATS standards for acceptability and repeatability.  The DLCO was uncorrected for Hb.  A bronchodilator of Ventolin HFA 2 puffs via spacer was administered.  DLCO was performed during dilation period.    Interpretation:     There is no significant obstruction or restriction. No significant bronchodilator response. Normal diffusion capacity. The MVV is reduced proportionally to the degree of reduction in FEV1.

## 2023-08-01 ENCOUNTER — PHYSICAL THERAPY (OUTPATIENT)
Dept: PHYSICAL THERAPY | Facility: REHABILITATION | Age: 78
End: 2023-08-01
Attending: INTERNAL MEDICINE
Payer: MEDICARE

## 2023-08-01 DIAGNOSIS — R26.81 UNSTEADY GAIT: ICD-10-CM

## 2023-08-01 PROCEDURE — 97163 PT EVAL HIGH COMPLEX 45 MIN: CPT

## 2023-08-01 ASSESSMENT — ENCOUNTER SYMPTOMS
QUALITY: STRETCHING SENSATION
PAIN SCALE AT HIGHEST: 2
PAIN TIMING: INTERMITTENT
PAIN SCALE: 2

## 2023-08-01 NOTE — OP THERAPY EVALUATION
Outpatient Physical Therapy  VESTIBULAR EVALUATION    [facility] Physical [facility]  [address]  [address]  [facility] [address]  Phone:  [phone]  Fax:  [phone]    Date of Evaluation: 2023    Patient: Jonathan Slade  YOB: 1945  MRN: [id number]     Referring Provider: Josefina Bridges M.D.  [address]  [place] [address]   Referring Diagnosis: Unsteady gait [R26.81]     Time Calculation    Start time: 900  Stop time: 945 Time Calculation (min): 45 minutes           Chief Complaint: Loss Of Balance    Visit Diagnoses     ICD-10-CM   1. Unsteady gait  R26.81         History of Present Illness:     Mechanism of injury:  Pt. is a 78 Y.O. M who reports recent onset of imbalance, room whirling and h/o falling. Pt. Had an MD visit in early  neurological review revealed weaknesses. Pt. States he also looses his balance with position changes like forward bending and turning around changing direction. In fact Pt. reports he had a ground level fall x 2 in last 3 months which has made him limit his activity level.      Pt. Wears reading glasses, denies a walking aid  PMHx: significant for :insignificant of vestibular issues or falls; GM: see PMHx below for details       Sleep disturbance:  Uses breathing assistive device at night  Symptoms:     Current symptom ratin    At worst symptom ratin    Location:  R inner thigh m.    Quality:  Stretching sensation    Symptom timing:  Intermittent    Progression:  Unchanged  Social Support:     Lives in:  One-story house    Lives with:  Spouse  Treatments:     No prior treatments received    Patient goals:     Patient goals for therapy:  Improved balance      Past Medical History:   Diagnosis Date    Asthma     Back pain     Cough     Diabetes     ONI treated with BiPAP 05/10/2016    Painful breathing     Shortness of breath     Sleep apnea     Sputum production     Wheezing      Past Surgical History:    Procedure Laterality Date    GASTROSCOPY-ENDO N/A 2019    Procedure: GASTROSCOPY;  Surgeon: Tyler Rothman M.D.;  Location: ENDOSCOPY [facility];  Service: Gastroenterology    FULL THICKNESS BLOCK RESECTION  2013    Performed by Greyson Lopez M.D. at SURGERY SURGICAL [facility]S    BIOPSY GENERAL  2013    Performed by Greyson Lopez M.D. at SURGERY SURGICAL [facility]    CATARACT EXTRACTION WITH IOL       Social History     Tobacco Use    Smoking status: Former     Packs/day: 1.00     Years: 10.00     Pack years: 10.00     Types: Cigarettes     Start date: 1985     Quit date: 1995     Years since quittin.6    Smokeless tobacco: Never   Substance Use Topics    Alcohol use: Not Currently     Alcohol/week: 2.4 - 3.6 oz     Types: 4 - 6 Cans of beer per week     Family and Occupational History     Socioeconomic History    Marital status:      Spouse name: Not on file    Number of children: Not on file    Years of education: Not on file    Highest education level: Not on file   Occupational History    Not on file       Objective:    Gait:     Assessment: steppage gait    Comments: R LE  Vestibulospinal Exam:     Single-Leg Stance:         Left (sec): 5        Right (sec): 3      Romberg:         Eyes open (sec): 30      Sharpened Romberg:         Eyes open (sec): 30    Cranial Nerve Exam:     IV. Trochlear: abnormal Trochlear test    VI. Abducens: abnormal Abducens test    VIII. Vestibulocochlear: abnormal Vestibulocochlear test  Oculomotor Exam:         Details: No spontaneous nystagmus central gaze      Saccadic eye movements        Details: saccades during fixation on target    Convergence:        Abnormal convergence  Vestibulo-Ocular Exam:     Normal head-shaking nystagmus test    Abnormal head thrust test        Findings: yes.  BPPV Exam:     Normal roll test    TU sec(age related norms 11 sec., range 10-12.7  Exercises/Treatment  Time-based  treatments/modalities:             Assessment:     Functional impairments:  Decreased gaze stabilization, decreased utilization of VIS/vest/somato and gait abnormality/instability    Assessment details:  Pt. Is a 78 y.o. M who presents with above impairments and positive findings of vestibular spinal, cranial nerves, and oculomotor tests. Pt. Is appropriate for skilled PT intervention. Pt. was educated in anatomical, pathoanatomical considerations of PT diagosis and treatment options to address it.  Pt. Also educated in  compliance with PT visits/HEP to improve functional limitations and reach goals.       Barriers to therapy:  Age and comorbidities  Goals:     Short term goals:  Initiate VRT  Pt. To return correct demo of HEP of gaze stabilization, head-motion, standing balance exercise  Assess risk of falling w/DGI     Long term goals:  Pt. To have less to no room 'whirling' with walking change in direction  Decrease score and risk of falling on the DGI  Pt. To follow/have a progressed HEP of gaze stabilization, head-motion, standing balance exercise    Plan:     Therapy options:  Physical therapy treatment to continue    Planned therapy interventions:  Neuromuscular Re-education (CPT 66745), Therapeutic Exercise (CPT 51884), Gait Training (CPT 87666) and Self Care ADL Training (CPT 52706)    Frequency:  2x week    Duration in visits:  12    Discussed with:  Patient     Plan details:  Administer DGI      Functional Assessment Used: The ABC scale: note: pt. Did incorrectly no score can be attained        Referring provider co-signature:  I have reviewed this plan of care and my co-signature certifies the need for services.    Certification Period: 08/01/2023 to  10/03/23    Physician Signature: ________________________________ Date: ______________

## 2023-08-01 NOTE — OP THERAPY EVALUATION
Outpatient Physical Therapy  INITIAL EVALUATION    [facility] [address]  [address]  [address]  [facility] [address]  Phone:  [phone]  Fax:  [phone]    Date of Evaluation: 08/01/2023    Patient: Jonathan Slade  YOB: 1945  MRN: [id number]     Referring Provider: Josefina Bridges M.D.  [address] [facility]  [place] [address]   Referring Diagnosis Unsteady gait [R26.81]     Time Calculation                 Chief Complaint: No chief complaint on file.    Visit Diagnoses     ICD-10-CM   1. Unsteady gait  R26.81       Date of onset of impairment: No data found    Subjective:   History of Present Illness:     Mechanism of injury:  Pt. is a 78 Y.O. M who reports concerned about being unsteady on his feet and his reaction time which has resulted in several near falls recently and limiting his activity. MD visit in early June neurological review revealed weaknesses. Pt. States he also looses his balance with position changes like bending and turning around changing direction. In fact a few months ago he had a ground level fall x 2 in last 3 months.  tPt. Also reports feeling the room whirrling around, he waits until it settles them get ups.   PLOF: ***  PMHx: significant for Asthma, Obesity         Past Medical History:   Diagnosis Date    Asthma     Back pain     Cough     Diabetes     ONI treated with BiPAP 05/10/2016    Painful breathing     Shortness of breath     Sleep apnea     Sputum production     Wheezing      Past Surgical History:   Procedure Laterality Date    GASTROSCOPY-ENDO N/A 7/23/2019    Procedure: GASTROSCOPY;  Surgeon: Tyler Rothman M.D.;  Location: ENDOSCOPY [facility];  Service: Gastroenterology    FULL THICKNESS BLOCK RESECTION  2/19/2013    Performed by Greyson Lopez M.D. at SURGERY [facility]    BIOPSY GENERAL  2/19/2013    Performed by Greyson Lopez M.D. at SURGERY [facility]    CATARACT EXTRACTION WITH IOL        Social History     Tobacco Use    Smoking status: Former     Packs/day: 1.00     Years: 10.00     Pack years: 10.00     Types: Cigarettes     Start date: 1985     Quit date: 1995     Years since quittin.6    Smokeless tobacco: Never   Substance Use Topics    Alcohol use: Not Currently     Alcohol/week: 2.4 - 3.6 oz     Types: 4 - 6 Cans of beer per week     Family and Occupational History     Socioeconomic History    Marital status:      Spouse name: Not on file    Number of children: Not on file    Years of education: Not on file    Highest education level: Not on file   Occupational History    Not on file       Objective    Exercises/Treatment  Time-based treatments/modalities:           Assessment/Response/Plan    Functional Assessment Used        Referring provider co-signature:  I have reviewed this plan of care and my co-signature certifies the need for services.    Certification Period: 2023 to  {Future Date:67004}    Physician Signature: ________________________________ Date: ______________

## 2023-08-03 NOTE — OP THERAPY DAILY TREATMENT
"Outpatient Physical Therapy  DAILY TREATMENT     [address]  [address]  [place] [address]  Phone:  [phone]  Fax:  [phone]    Date: 2023    Patient: Jonathan Slade  YOB: 1945  MRN: [id number]     Time Calculation    Start time: 907  Stop time: 45 Time Calculation (min): 38 minutes         Chief Complaint: No chief complaint on file.    Visit #: 2    SUBJECTIVE:  Symptoms are the same. Increases w/transitional movements or bending forward  Symptoms:     Current symptom ratin    Post Rx symptom ratin    OBJECTIVE:  Current objective measures:   M-CTSIB: 105\" Fall, w/recovery by PT          Therapeutic Exercises (CPT 21964):     1. Foam and dome, 1.30\" 2.30\" 3. 30\" 4. 1. 15\" 2. fall!, neurocom    2. standing balance/ trunk stability, rocker a-p ,laterals 2x30\"    3. gaze stab ex, side, up-down, diagnonals 1x30\"      Therapeutic Exercise Summary: STANDING BALANCE/VESTIBULAR  [[id number]]    COMMENT: the 3 exercises are working on your eye muscles. You can sit to do them facing a non-busy wall.  For more challenge try a standing up near a counter i.e you may wobble a little, but can maintain your balance.    Smooth Pursuit Horizontal -  Duration 30 Seconds, Complete 1 Set, Perform 3 Times a Week    Smooth Pursuit Vertical -  Duration 30 Seconds, Complete 1 Set, Perform 3 Times a Week    Smooth Pursuit Diagonal -  Duration 30 Seconds, Complete 1 Set, Perform 3 Times a Week  LE Stretches with Loop Strap [[id number]]        Time-based treatments/modalities:    Physical Therapy Timed Treatment Charges  Functional training, self care minutes (CPT 56728): 10 minutes  Neuromusc re-ed, balance, coor, post minutes (CPT 97189): 28 minutes  ASSESSMENT:   Response to treatment: +Ctsib- Falling during test, PT caught and righted pt. Pt. Raymond. The gaze stab. Ex, and returns correct sidney to do at home w/handout.  Pt. P/w Decreased gaze stabilization, " decreased utilization of VIS/vest/somato and gait abnormality/instability    PLAN/RECOMMENDATIONS:   Plan for treatment: therapy treatment to continue next visit.  Planned interventions for next visit:  head motion control, standing on compliant and continue with current treatment.

## 2023-08-04 ENCOUNTER — PHYSICAL THERAPY (OUTPATIENT)
Dept: PHYSICAL THERAPY | Facility: REHABILITATION | Age: 78
End: 2023-08-04
Attending: INTERNAL MEDICINE
Payer: MEDICARE

## 2023-08-04 DIAGNOSIS — R26.81 UNSTEADY GAIT: ICD-10-CM

## 2023-08-04 PROCEDURE — 97535 SELF CARE MNGMENT TRAINING: CPT

## 2023-08-04 PROCEDURE — 97112 NEUROMUSCULAR REEDUCATION: CPT

## 2023-08-04 NOTE — PROGRESS NOTES
Pulmonary Clinic Note    Date of Visit: 8/15/2023     Chief Complaint:  Chief Complaint   Patient presents with    Follow-Up     Last seen 6/6/23 Dr. Bridges     Results     PFT 7/28/23     HPI:   Jonathan Slade is a very pleasant 78 y.o. year old male former smoker (10 pack-years, quit in 1995), with a PMHx of ONI on BiPAP, asthma, GERD, elevated BMI, T2DM, RBBB, B PAH, T2DM, HTN, HLD who presented to the Pulmonary Clinic for a regular follow up. Last seen in the office on 6/6/2023 with myself. .    Patient is followed by the pulmonary office for asthma.  PFTs in 2023 showed FVC 1.76 L or 56%, FEV1 1.31 L or 55%, FEV1/FVC 75%, %, TLC 83%, and DLCO 100% predicted, without positive bronchodilator response.  CXR in 2022 was unremarkable.  Patient is currently on Symbicort 80 and albuterol as needed, which is at most once a week.  Symptomatically, patient states his breathing has been stable.  He is short of breath with mMRC is 0-1 has an occasional cough that will produce rare clear sputum and denies any wheezing.  He has not had any exacerbations.    Exacerbations this year:  0    Current medication regimen:  Symbicort 80 and albuterol    Oxygen use:  None    MMRC Grade: 0-1    Past Medical History:   Diagnosis Date    Asthma     Back pain     Cough     Diabetes     ONI treated with BiPAP 05/10/2016    Painful breathing     Shortness of breath     Sleep apnea     Sputum production     Wheezing      Past Surgical History:   Procedure Laterality Date    GASTROSCOPY-ENDO N/A 7/23/2019    Procedure: GASTROSCOPY;  Surgeon: Tyler Rothman M.D.;  Location: ENDOSCOPY [facility];  Service: Gastroenterology    FULL THICKNESS BLOCK RESECTION  2/19/2013    Performed by rGeyson Lopez M.D. at SURGERY [facility]    BIOPSY GENERAL  2/19/2013    Performed by Greyson Lopez M.D. at SURGERY [facility]    CATARACT EXTRACTION WITH IOL       Social History     Socioeconomic History    Marital status:       Spouse name: Not on file    Number of children: Not on file    Years of education: Not on file    Highest education level: Not on file   Occupational History    Not on file   Tobacco Use    Smoking status: Former     Packs/day: 1.00     Years: 10.00     Pack years: 10.00     Types: Cigarettes     Start date: 1985     Quit date: 1995     Years since quittin.6    Smokeless tobacco: Never   Vaping Use    Vaping Use: Never used   Substance and Sexual Activity    Alcohol use: Not Currently     Alcohol/week: 2.4 - 3.6 oz     Types: 4 - 6 Cans of beer per week    Drug use: No    Sexual activity: Not on file   Other Topics Concern    Not on file   Social History Narrative    ** Merged History Encounter **          Social Determinants of Health     Financial Resource Strain: Not on file   Food Insecurity: Not on file   Transportation Needs: Not on file   Physical Activity: Not on file   Stress: Not on file   Social Connections: Not on file   Intimate Partner Violence: Not on file   Housing Stability: Not on file        Family History   Problem Relation Age of Onset    Heart Attack Brother     Cancer Neg Hx      Current Outpatient Medications on File Prior to Visit   Medication Sig Dispense Refill    atorvastatin (LIPITOR) 40 MG Tab Take 1 Tablet by mouth every day. 100 Tablet 3    budesonide-formoterol (SYMBICORT) 80-4.5 MCG/ACT Aerosol Inhale 2 Puffs 2 times a day. 10.2 g 11    albuterol 108 (90 Base) MCG/ACT Aero Soln inhalation aerosol Inhale 2 Puffs every four hours as needed for Shortness of Breath. 1 Each 11    Empagliflozin (JARDIANCE) 25 MG Tab Take 25 mg by mouth every day. 100 Tablet 6    carvedilol (COREG) 12.5 MG Tab Take 1 Tablet by mouth 2 times a day with meals. 180 Tablet 3    glipizide-metformin (METAGLIP) 5-500 MG per tablet Take 1 Tablet by mouth 2 times daily, before breakfast and dinner. 180 Tablet 3    amLODIPine (NORVASC) 10 MG Tab Take 1 Tablet by mouth every day. 90 Tablet 3  "   finasteride (PROSCAR) 5 MG Tab Take 1 Tablet by mouth every day. 90 Tablet 3    olmesartan (BENICAR) 5 MG tablet Take 1 Tablet by mouth every day. 100 Tablet 3    omeprazole (PRILOSEC) 20 MG delayed-release capsule TAKE 1 CAPSULE BY MOUTH IN THE MORNING ON AN EMPTY STOMACH 30 MINUTES PRIOR TO EATING AND DRINKING.      vitamin D (CHOLECALCIFEROL) 1000 UNIT Tab Take 1 Tablet by mouth every day.      Omega-3 Fatty Acids (FISH OIL) 1000 MG Cap capsule Take 1 Capsule by mouth every day.       No current facility-administered medications on file prior to visit.     Allergies: Patient has no known allergies.    ROS:   Review of Systems   Constitutional:  Negative for chills, diaphoresis, fever and malaise/fatigue.   HENT:  Negative for congestion and sinus pain.    Respiratory:  Positive for cough (Occasional), sputum production (Rare, clear) and shortness of breath. Negative for hemoptysis and wheezing.    Cardiovascular:  Negative for chest pain, palpitations and leg swelling.   Gastrointestinal:  Negative for diarrhea, heartburn, nausea and vomiting.   Musculoskeletal:  Negative for falls and myalgias.   Neurological:  Negative for dizziness, weakness and headaches.     Vitals:  /74 (BP Location: Right arm, Patient Position: Sitting, BP Cuff Size: Adult)   Pulse 74   Resp 16   Ht 1.626 m (5' 4\")   Wt 108 kg (238 lb)   SpO2 94%     Physical Exam  Constitutional:       General: He is not in acute distress.     Appearance: Normal appearance. He is not ill-appearing, toxic-appearing or diaphoretic.   Cardiovascular:      Rate and Rhythm: Normal rate and regular rhythm.      Heart sounds: No murmur heard.     No friction rub. No gallop.   Pulmonary:      Effort: No respiratory distress.      Breath sounds: Normal breath sounds. No stridor. No wheezing, rhonchi or rales.   Musculoskeletal:         General: No swelling.      Right lower leg: No edema.      Left lower leg: No edema.   Skin:     General: Skin is " warm.   Neurological:      General: No focal deficit present.      Mental Status: He is alert and oriented to person, place, and time.   Psychiatric:         Mood and Affect: Mood normal.         Behavior: Behavior normal.         Thought Content: Thought content normal.         Judgment: Judgment normal.         Laboratory Data:  PFTs: (Date: 7/28/2023)-          CXR: (Date: 11/30/2022)-  Impression:  No acute cardiopulmonary disease evident.    ECHO: (Date: 6/7/2022)-  Impression:  Normal left ventricular chamber size.  The left ventricular ejection fraction is visually estimated to be 65%.  Indeterminate diastolic function.  Normal left ventricular wall thickness.  No significant valvular disease.       Assessment and Plan:    Problem List Items Addressed This Visit       ONI treated with BiPAP (Chronic)     Patient continues to use and benefit from BiPAP therapy         Mild intermittent asthma without complication     PFTs in 2023 showed FVC 1.76 L or 56%, FEV1 1.31 L or 55%, FEV1/FVC 75%, %, TLC 83%, and DLCO 100% predicted, without positive bronchodilator response.  CXR in 2022 was unremarkable.  Patient is currently on Symbicort 80 and albuterol as needed, which is at most once a week.  Symptomatically, patient states his breathing has been stable.  Patient has not had any exacerbations.  -- Continue Symbicort 80  -- Continue albuterol as needed  -- Advised patient to increase exercise as tolerated  -- Advised patient to remain vaccine gets influenza pneumococcal          Diagnostic studies have been reviewed with the patient.    Return in about 1 year (around 8/15/2024), or if symptoms worsen or fail to improve, for ASTHMA, with Denis.     This note was generated using voice recognition software which has a chance of producing errors of grammar and possibly content.  I have made every reasonable attempt to find and correct any obvious errors, but it should be expected that some may not be found prior  to finalization of this note.    Time spent in record review prior to patient arrival, reviewing results, and in face-to-face encounter totaled 21 min.  __________  JOSHUA Whittington  Pulmonary Medicine  [facility]

## 2023-08-08 ENCOUNTER — PHYSICAL THERAPY (OUTPATIENT)
Dept: PHYSICAL THERAPY | Facility: REHABILITATION | Age: 78
End: 2023-08-08
Attending: INTERNAL MEDICINE
Payer: MEDICARE

## 2023-08-08 DIAGNOSIS — R26.81 UNSTEADY GAIT: ICD-10-CM

## 2023-08-08 PROCEDURE — 97535 SELF CARE MNGMENT TRAINING: CPT

## 2023-08-08 PROCEDURE — 97112 NEUROMUSCULAR REEDUCATION: CPT

## 2023-08-08 NOTE — OP THERAPY DAILY TREATMENT
"Outpatient Physical Therapy  DAILY TREATMENT     [facility] Physical [address]  [address]  [place] [address]  Phone:  [phone]  Fax:  [phone]    Date: 2023    Patient: Jonathan Slade  YOB: 1945  MRN: [id number]     Time Calculation    Start time: 0900  Stop time: 0940 Time Calculation (min): 40 minutes         Chief Complaint: No chief complaint on file.    Visit #: 3    SUBJECTIVE:  Symptoms are the same. Increases w/transitional movements or bending forward  Symptoms:     Current symptom ratin    Post Rx symptom ratin    OBJECTIVE:  Current objective measures:           Therapeutic Exercises (CPT 92615):     1. Foam and dome, 1.\" 2.\" 3. \" 4. \", neurocom    2. seated balance/ trunk stability with head-motion control ex, ball side, up-down, diagnonals 1x30\" ea, Sx provoked mildly    3. convergence, target to nose x 6-8    4. standing unstable, rocker board/incline/decline/ a-ps 1x30\" ea    5. perturbations, rocker board ant., posterior    6. standing unstable w/task, foam/foam w/nobs playing darts    7. seated head & trunk-motion, heat do R knee x2, head to L knee x2, Sx provoked moderately w/return      Therapeutic Exercise Summary: STANDING BALANCE/VESTIBULAR  [[id number]]    COMMENT: the 3 exercises are working on your eye muscles. You can sit to do them facing a non-busy wall.  For more challenge try a standing up near a counter i.e you may wobble a little, but can maintain your balance.    Smooth Pursuit Horizontal -  Duration 30 Seconds, Complete 1 Set, Perform 3 Times a Week    Smooth Pursuit Vertical -  Duration 30 Seconds, Complete 1 Set, Perform 3 Times a Week    Smooth Pursuit Diagonal -  Duration 30 Seconds, Complete 1 Set, Perform 3 Times a Week  LE Stretches with Loop Strap [[id number]]        Time-based treatments/modalities:    Physical Therapy Timed Treatment Charges  Neuromusc re-ed, balance, coor, post minutes (CPT 26915): 40 " minutes  ASSESSMENT:   Response to treatment: +Ctsib- Falling during test, PT caught and righted pt. Pt. Raymond. The gaze stab. Ex, and returns correct sidney to do at home w/handout.  Pt. P/w Decreased gaze stabilization, decreased utilization of VIS/vest/somato and gait abnormality/instability    PLAN/RECOMMENDATIONS:   Plan for treatment: therapy treatment to continue next visit.  Planned interventions for next visit:  head motion control, standing on compliant and continue with current treatment.

## 2023-08-11 ENCOUNTER — PHYSICAL THERAPY (OUTPATIENT)
Dept: PHYSICAL THERAPY | Facility: REHABILITATION | Age: 78
End: 2023-08-11
Attending: INTERNAL MEDICINE
Payer: MEDICARE

## 2023-08-11 DIAGNOSIS — R26.81 UNSTEADY GAIT: ICD-10-CM

## 2023-08-11 PROCEDURE — 97112 NEUROMUSCULAR REEDUCATION: CPT

## 2023-08-11 PROCEDURE — 97535 SELF CARE MNGMENT TRAINING: CPT

## 2023-08-11 NOTE — OP THERAPY DAILY TREATMENT
"Outpatient Physical Therapy  DAILY TREATMENT     [facility] Physical [address]  [address]  [place] [address]  Phone:  [phone]  Fax:  [phone]    Date: 2023    Patient: Jonathan Slade  YOB: 1945  MRN: [id number]     Time Calculation    Start time: 0900  Stop time: 0945 Time Calculation (min): 45 minutes         Chief Complaint: Loss Of Balance    Visit #: 4    SUBJECTIVE:  Symptoms are the same. Increases w/transitional movements or bending forward  Symptoms:     Current symptom ratin    Post Rx symptom ratin    OBJECTIVE:  Current objective measures:           Therapeutic Exercises (CPT 31806):     1. Foam and dome, 1.\" 2.\" 3. \" 4. \", neurocom    2. seated balance/ trunk stability with head-motion control ex, ball side, up-down, diagnonals 1x30\" ea. w/inc. speed, Sx provoked very mild    3. convergence, target to nose 12\", 6\" 15x ea.    4. standing unstable, rocker board/incline/decline/ a-ps 1x30\" ea    5. perturbations, rocker board ant., posterior    6. standing unstable w/task, foam/foam w/nobs playing darts 5'    7. seated head & trunk-motion, head to R knee x2, head to L knee x2, Sx provoked moderately w/return      Therapeutic Exercise Summary: Home Exercise Program [[id number]]    COMMENT: GUIDELINES: TARGET IS ABOUT 1 FOOT FROM YOUR EYES. If the letter moves or becomes blurry slow down. Stop or slow down if you become dizzy or have neck pain.  Attempt to perform this activity for a full minute  1-2x times a day.    VOR x1 Gaze Stabilization -  Duration 30 Seconds, Complete 3 Sets, Perform 3 Times a Day    VOR X1 - VESTIBULAR OCULAR REFLEX -  Repeat 1 Time, Complete 1 Set, Perform 1 Times a Day    VOR X1 VERTICAL - VESTIBULAR OCULAR REFLEX -  Repeat 1 Time, Complete 1 Set, Perform 1 Times a Day    Therapeutic Treatments and Modalities:     1. Self Care ADL Training (CPT 54407), hep handout of VOR ex. (to replace gaze stab. ex)    Time-based " treatments/modalities:    Physical Therapy Timed Treatment Charges  Functional training, self care minutes (CPT 34966): 10 minutes  Neuromusc re-ed, balance, coor, post minutes (CPT 75952): 30 minutes    ASSESSMENT:   Response to treatment:  Pt. Raymond. The VOR Ex, w/very mild sx. and returns correct sidney to do at home w/handout. Also pt. able to do convergence on R better after training. Pt. P/w Decreased gaze stabilization, decreased utilization of VIS/vest/somato and gait abnormality/instability    PLAN/RECOMMENDATIONS:   Plan for treatment: therapy treatment to continue next visit.  Planned interventions for next visit:  convergence standing on compliant EC and continue with current treatment.

## 2023-08-15 ENCOUNTER — OFFICE VISIT (OUTPATIENT)
Dept: SLEEP MEDICINE | Facility: MEDICAL CENTER | Age: 78
End: 2023-08-15
Payer: MEDICARE

## 2023-08-15 VITALS
HEART RATE: 74 BPM | BODY MASS INDEX: 40.63 KG/M2 | HEIGHT: 64 IN | SYSTOLIC BLOOD PRESSURE: 122 MMHG | WEIGHT: 238 LBS | RESPIRATION RATE: 16 BRPM | OXYGEN SATURATION: 94 % | DIASTOLIC BLOOD PRESSURE: 74 MMHG

## 2023-08-15 DIAGNOSIS — G47.33 OSA TREATED WITH BIPAP: Chronic | ICD-10-CM

## 2023-08-15 DIAGNOSIS — J45.20 MILD INTERMITTENT ASTHMA WITHOUT COMPLICATION: ICD-10-CM

## 2023-08-15 PROCEDURE — 3074F SYST BP LT 130 MM HG: CPT

## 2023-08-15 PROCEDURE — 99213 OFFICE O/P EST LOW 20 MIN: CPT

## 2023-08-15 PROCEDURE — 3078F DIAST BP <80 MM HG: CPT

## 2023-08-15 ASSESSMENT — ENCOUNTER SYMPTOMS
FEVER: 0
CHILLS: 0
SINUS PAIN: 0
NAUSEA: 0
DIARRHEA: 0
DIZZINESS: 0
DIAPHORESIS: 0
HEARTBURN: 0
WEAKNESS: 0
COUGH: 1
PALPITATIONS: 0
MYALGIAS: 0
FALLS: 0
WHEEZING: 0
VOMITING: 0
SHORTNESS OF BREATH: 1
SPUTUM PRODUCTION: 1
HEADACHES: 0
HEMOPTYSIS: 0

## 2023-08-15 ASSESSMENT — FIBROSIS 4 INDEX: FIB4 SCORE: 0.79

## 2023-08-15 NOTE — ASSESSMENT & PLAN NOTE
PFTs in 2023 showed FVC 1.76 L or 56%, FEV1 1.31 L or 55%, FEV1/FVC 75%, %, TLC 83%, and DLCO 100% predicted, without positive bronchodilator response.  CXR in 2022 was unremarkable.  Patient is currently on Symbicort 80 and albuterol as needed, which is at most once a week.  Symptomatically, patient states his breathing has been stable.  Patient has not had any exacerbations.  -- Continue Symbicort 80  -- Continue albuterol as needed  -- Advised patient to increase exercise as tolerated  -- Advised patient to remain vaccine gets influenza pneumococcal

## 2023-08-16 ENCOUNTER — PHYSICAL THERAPY (OUTPATIENT)
Dept: PHYSICAL THERAPY | Facility: REHABILITATION | Age: 78
End: 2023-08-16
Attending: INTERNAL MEDICINE
Payer: MEDICARE

## 2023-08-16 DIAGNOSIS — R26.81 UNSTEADY GAIT: ICD-10-CM

## 2023-08-16 PROCEDURE — 97112 NEUROMUSCULAR REEDUCATION: CPT

## 2023-08-16 NOTE — OP THERAPY DAILY TREATMENT
"Outpatient Physical Therapy  DAILY TREATMENT     [facility] Physical [address].  [address]  [place] [address]  Phone:  [phone]  Fax:  [phone]    Date: 2023    Patient: Jonathan Slade  YOB: 1945  MRN: [id number]     Time Calculation    Start time: 0900  Stop time: 0945 Time Calculation (min): 45 minutes         Chief Complaint: Loss Of Balance    Visit #: 5    SUBJECTIVE:  Symptoms are mostly resolved, no visual distubances, or LOB, falls. Increases w/transitional movements or bending forward  Symptoms:     Current symptom ratin    Post Rx symptom ratin    OBJECTIVE:  Current objective measures:   Head to knee r, l (-)          Therapeutic Exercises (CPT 33382):     1. Foam and dome, 1.\" 2.\" 3. \" 4. \", neurocom    2. seated balance/ trunk stability with head-motion control ex, ball side, up-down, diagnonals 1x30\" ea. w/inc. speed, no Sx and normal eye tracking    3. convergence, target to nose 12\", 6\", normal convergence    4. standing unstable, rocker board/incline/decline/ a-ps, lat 1x30\" ea    5. perturbations, rocker board ant., posterior, lateral, presence of step strategystep strategy    6. standing unstable w/task , eyes closed, eye tracking w use of letters 2';3x30\"    7. seated head & trunk-motion, head to R knee x2, head to L knee x2, Sx provoked moderately w/return    8. obstacle course -, 2x- combine above in addition to tandem walk compliant and rung step overs      Therapeutic Exercise Summary: Home Exercise Program [[id number]]    COMMENT: GUIDELINES: TARGET IS ABOUT 1 FOOT FROM YOUR EYES. If the letter moves or becomes blurry slow down. Stop or slow down if you become dizzy or have neck pain.  Attempt to perform this activity for a full minute  1-2x times a day.    VOR x1 Gaze Stabilization -  Duration 30 Seconds, Complete 3 Sets, Perform 3 Times a Day    VOR X1 - VESTIBULAR OCULAR REFLEX -  Repeat 1 Time, Complete 1 Set, Perform 1 " Times a Day    VOR X1 VERTICAL - VESTIBULAR OCULAR REFLEX -  Repeat 1 Time, Complete 1 Set, Perform 1 Times a Day    Therapeutic Treatments and Modalities:     1. Self Care ADL Training (CPT 56037), hep handout of VOR ex. (to replace gaze stab. ex)    Time-based treatments/modalities:    Physical Therapy Timed Treatment Charges  Neuromusc re-ed, balance, coor, post minutes (CPT 01100): 40 minutes    ASSESSMENT:   Response to treatment:  No sx. Thru rx, normal eye tracking and head-motion movements.  Pt. Has good static/dynamic balance and a normal step stragegy.  P/w resolved  decreased utilization of VIS/vest/somato and gait abnormality/instability    PLAN/RECOMMENDATIONS:   Plan for treatment: therapy treatment to continue next visit.  Planned interventions for next visit:  likely d/c .

## 2023-08-18 ENCOUNTER — PHYSICAL THERAPY (OUTPATIENT)
Dept: PHYSICAL THERAPY | Facility: REHABILITATION | Age: 78
End: 2023-08-18
Attending: INTERNAL MEDICINE
Payer: MEDICARE

## 2023-08-18 DIAGNOSIS — R26.81 UNSTEADY GAIT: ICD-10-CM

## 2023-08-18 PROCEDURE — 97535 SELF CARE MNGMENT TRAINING: CPT

## 2023-08-18 PROCEDURE — 97112 NEUROMUSCULAR REEDUCATION: CPT

## 2023-08-18 NOTE — OP THERAPY DAILY TREATMENT
"  Outpatient Physical Therapy  DAILY TREATMENT     [facility] Physical [address].  [address]  [place] [address]  Phone:  [phone]  Fax:  [phone]    Date: 08/18/2023    Patient: Jonathan Slade  YOB: 1945  MRN: [id number]     Time Calculation    Start time: 0905  Stop time: 0935 Time Calculation (min): 30 minutes         Chief Complaint: No chief complaint on file.    Visit #: 6    SUBJECTIVE:  Pt. Happy with his progress. Ready for D/C  dizziness rating (1-10) before treatment:  0  dizziness rating (1-10) after treatment:  0    OBJECTIVE:  Current objective measures:   Tinetti AT score: 26/28          Therapeutic Exercises (CPT 58986):     1. Foam and dome, 1.\" 2.\" 3. \" 4. \", neurocom    2. seated balance/ trunk stability with head-motion control ex, ball side, up-down, diagnonals 1x30\" ea. w/inc. speed, no Sx and normal eye tracking    3. convergence, target to nose 12\", 6\", normal convergence    4. standing unstable, rocker board/incline/decline/ a-ps, lat 1x30\" ea    5. perturbations, rocker board ant., posterior, lateral, presence of step strategystep strategy    6. standing unstable w/task , eyes closed, eye tracking w use of letters 2';3x30\"    7. seated head & trunk-motion, head to R knee x2, head to L knee x2, Sx provoked moderately w/return    8. obstacle course -, 2x- combine above in addition to tandem walk compliant and rung step overs      Therapeutic Exercise Summary: Home Exercise Program [[id number]]    COMMENT: GUIDELINES: TARGET IS ABOUT 1 FOOT FROM YOUR EYES. If the letter moves or becomes blurry slow down. Stop or slow down if you become dizzy or have neck pain.  Attempt to perform this activity for a full minute  1-2x times a day.    VOR x1 Gaze Stabilization -  Duration 30 Seconds, Complete 3 Sets, Perform 3 Times a Day    VOR X1 - VESTIBULAR OCULAR REFLEX -  Repeat 1 Time, Complete 1 Set, Perform 1 Times a Day    VOR X1 VERTICAL - VESTIBULAR " OCULAR REFLEX -  Repeat 1 Time, Complete 1 Set, Perform 1 Times a Day    Therapeutic Treatments and Modalities:     1. Self Care ADL Training (CPT 92725), Pt. education on foot clearance practice on daily walks.  Use strategy such as his wife to make him accountable.    Time-based treatments/modalities:    Physical Therapy Timed Treatment Charges  Functional training, self care minutes (CPT 91745): 10 minutes  Neuromusc re-ed, balance, coor, post minutes (CPT 48061): 20 minutes  ASSESSMENT:     Short term goals:  Initiate VRT - MET  Pt. To return correct demo of HEP of gaze stabilization, head-motion, standing balance exercise - MET   Long term goals:  Pt. To have less to no room 'whirling' with walking change in direction: MET  Assess risk of falling w/DGI : MET: Low risk of falling.  PLAN/RECOMMENDATIONS:   Plan for treatment:  discharge patient due to accomplished goals.send note to Ref. Physician

## 2023-09-16 PROBLEM — E11.59 HIGH BLOOD PRESSURE ASSOCIATED WITH DIABETES (HCC): Status: ACTIVE | Noted: 2019-04-21

## 2023-09-16 PROBLEM — I15.2 HIGH BLOOD PRESSURE ASSOCIATED WITH DIABETES (HCC): Status: ACTIVE | Noted: 2019-04-21

## 2023-09-16 PROBLEM — G31.9 CEREBRAL ATROPHY (HCC): Status: ACTIVE | Noted: 2023-09-16

## 2023-09-27 ENCOUNTER — OFFICE VISIT (OUTPATIENT)
Dept: MEDICAL GROUP | Facility: MEDICAL CENTER | Age: 78
End: 2023-09-27
Payer: MEDICARE

## 2023-09-27 VITALS
OXYGEN SATURATION: 95 % | HEIGHT: 64 IN | BODY MASS INDEX: 40.95 KG/M2 | HEART RATE: 81 BPM | RESPIRATION RATE: 14 BRPM | WEIGHT: 239.86 LBS | SYSTOLIC BLOOD PRESSURE: 122 MMHG | DIASTOLIC BLOOD PRESSURE: 86 MMHG | TEMPERATURE: 97.1 F

## 2023-09-27 DIAGNOSIS — R39.15 URINARY URGENCY: ICD-10-CM

## 2023-09-27 DIAGNOSIS — N40.0 BENIGN PROSTATIC HYPERPLASIA, UNSPECIFIED WHETHER LOWER URINARY TRACT SYMPTOMS PRESENT: ICD-10-CM

## 2023-09-27 DIAGNOSIS — E66.01 MORBID OBESITY (HCC): ICD-10-CM

## 2023-09-27 DIAGNOSIS — E11.65 TYPE 2 DIABETES MELLITUS WITH HYPERGLYCEMIA, WITHOUT LONG-TERM CURRENT USE OF INSULIN (HCC): ICD-10-CM

## 2023-09-27 DIAGNOSIS — F34.1 DYSTHYMIA: ICD-10-CM

## 2023-09-27 LAB
HBA1C MFR BLD: 7 % (ref ?–5.8)
POCT INT CON NEG: NEGATIVE
POCT INT CON POS: POSITIVE

## 2023-09-27 PROCEDURE — 99214 OFFICE O/P EST MOD 30 MIN: CPT | Performed by: STUDENT IN AN ORGANIZED HEALTH CARE EDUCATION/TRAINING PROGRAM

## 2023-09-27 PROCEDURE — 83036 HEMOGLOBIN GLYCOSYLATED A1C: CPT | Performed by: STUDENT IN AN ORGANIZED HEALTH CARE EDUCATION/TRAINING PROGRAM

## 2023-09-27 PROCEDURE — 3074F SYST BP LT 130 MM HG: CPT | Performed by: STUDENT IN AN ORGANIZED HEALTH CARE EDUCATION/TRAINING PROGRAM

## 2023-09-27 PROCEDURE — 3079F DIAST BP 80-89 MM HG: CPT | Performed by: STUDENT IN AN ORGANIZED HEALTH CARE EDUCATION/TRAINING PROGRAM

## 2023-09-27 RX ORDER — FLUOXETINE HYDROCHLORIDE 20 MG/1
20 CAPSULE ORAL DAILY
Qty: 30 CAPSULE | Refills: 11 | Status: SHIPPED | OUTPATIENT
Start: 2023-09-27

## 2023-09-27 RX ORDER — TAMSULOSIN HYDROCHLORIDE 0.4 MG/1
0.4 CAPSULE ORAL
Qty: 30 CAPSULE | Refills: 11 | Status: SHIPPED | OUTPATIENT
Start: 2023-09-27

## 2023-09-27 ASSESSMENT — FIBROSIS 4 INDEX: FIB4 SCORE: 0.79

## 2023-09-27 NOTE — PROGRESS NOTES
Subjective:     Chief Complaint   Patient presents with    Follow-Up     3 month          HPI:   Jonathan presents today with   Hypertension  Patient continues on amlodipine 10 mg, olmesartan 5 mg and carvedilol 12.5 mg twice daily.     Diabetes  Patient's last A1c  7.1%. Patient continues on glipizide- metformin 5-500 for diabetes control and Jardiance 25 mg.  A1c today at 7.0%     Obesity  Patient's BMI at 40.  Weight remains stable.  Patient encouraged to continue working on diet and exercise.     Hyperlipidemia  Patient continues on atorvastatin 40 mg.  Recent lipid profile continues to show elevated triglycerides but controlled LDL.     Sleep apnea  Patient continues on CPAP nightly.     Asthma  Patient has been evaluated by pulmonology on 6/6/2023.  Patient had no symptoms prior to November when he developed a cough and shortness of breath.  At that time patient was started on carvedilol.  Patient was started on Symbicort at last visit and does note improvement.     Depression   Patient and wife continue to Have concerns about depression.  Patient is tearful today during visit.  Patient really wants to travel to [place] but his daughter and wife state that he cannot due to his medical conditions.  Patient has an older sister, the last of his siblings and wants to see her 1 more time before she passes.  This causes patient a lot of grief and distress.  Patient spends a lot of time worrying about this.  Patient is tearful in office visit today.  Started patient's wife on fluoxetine at last visit, patient is willing to try fluoxetine.    Urinary urgency  Patient notes that he has had difficulty with urinary urgency.  Patient notes that sometimes the sound of water or once he needs to pee will need to go immediately.  Patient notes that he is waking up to urinate 3-4 times per night.  Patient is already taking finasteride 5 mg, will trial patient on Flomax.  If no improvement, will refer to  "urology.        ROS:  Gen: no fevers/chills, no changes in weight  Eyes: no changes in vision  ENT: no sore throat, no hearing loss, no bloody nose  Pulm: no sob, no cough  CV: no chest pain, no palpitations  GI: no nausea/vomiting, no diarrhea      Objective:     Exam:  /86   Pulse 81   Temp 36.2 °C (97.1 °F) (Temporal)   Resp 14   Ht 1.626 m (5' 4\")   Wt 109 kg (239 lb 13.8 oz)   SpO2 95%   BMI 41.17 kg/m²  Body mass index is 41.17 kg/m².    Gen: Alert and oriented, No apparent distress.  Neck: Neck is supple without lymphadenopathy.  Lungs: Normal effort, CTA bilaterally, no wheezes, rhonchi, or rales  CV: Regular rate and rhythm. No murmurs, rubs, or gallops.  Ext: No clubbing, cyanosis, edema.      Assessment & Plan:     78 y.o. male with the following -     1. Type 2 diabetes mellitus with hyperglycemia, without long-term current use of insulin ([facility])  Chronic, stable.  Diabetes controlled A1c 7.0%, continue on current medications.  - POCT  A1C    2. Urinary urgency  Acute on chronic, stable.  Patient notes an increase in urinary urgency.  We will start patient on tamsulosin and follow-up in 3 months.  Patient advised to follow-up sooner if no relief, will refer to urology.  - tamsulosin (FLOMAX) 0.4 MG capsule; Take 1 Capsule by mouth 1/2 hour after breakfast.  Dispense: 30 Capsule; Refill: 11    3. Dysthymia  Chronic, stable.  Patient with dysthymia and grief.  Patient notes that he is often sad and depressed.  Wife was started on fluoxetine, patient willing to try fluoxetine. Discussed in depth with patient the pro's and con's of antidepressant therapy. I explained that it may take 2-4 weeks for the medication to take effect. Side effects discussed. Some people can have increased depression on these medications. If you should develope any homicidal or suicidal thoughts, you need to go to the emergency room immediatly for evaluation and possible admission. Otherwise I would like to see you back " in 4 weeks to evaluate treatment success.     4. Morbid obesity (HCC)  5. BMI 40.0-44.9, adult (HCC)  Chronic, stable.  Patient's BMI at 41.    6. Benign prostatic hyperplasia, unspecified whether lower urinary tract symptoms present  Chronic, stable.  Patient continues on finasteride.  We will start patient on tamsulosin.  Continue to monitor urinary symptoms.      No follow-ups on file.    Please note that this dictation was created using voice recognition software. I have made every reasonable attempt to correct obvious errors, but I expect that there are errors of grammar and possibly content that I did not discover before finalizing the note.

## 2023-10-04 NOTE — PROGRESS NOTES
[facility] Follow-up Visit    Date of Visit: 10/6/2023     CC: Follow-up for ONI management      HPI:  Jonathan Slade is a very pleasant 78 y.o. year old male former smoker (10 pack-years, quit in 1995), with a PMHx of ONI on BiPAP, asthma, GERD, elevated BMI, T2DM, RBBB, B PAH, T2DM, HTN, HLD who presented to the [facility] for a regular follow up. Last seen in the office on 7/5/2023 with JOSHUA Gonzalez.     Patient presents for first compliance.  Patient states his sleep is more restful on CPAP.  He states that he does have a dry mouth, which water alleviates.  He denies any significant morning headaches, daytime/driving drowsy, issues include, snoring, gasping of apneas, palpitations, aerophagia, sleep, or skin irritation.  Patient goes to bed between 9-10 PM and will occasionally have awakenings at night and will rarely have any issues going back to sleep.  He will wake up between 8-9 AM.  He will nap occasionally for 1-2 hours.    DME provider: [employer]  Device: [employer] air curve 10  Settings: BiPAP 18/14 CWP  When: July 2023  Mask: Nasal pillows  Chin strap: No     Cleaning regimen: Once a week with soap and water    Compliance:  Compliance data reviewed showing 100% usage > 4hours in last 30 days. Average AHI 0.2 events/hour. 95% leaks 23.5 L/min. Patient continues to use and benefit from machine.      Sleep History:  Prior PSG noted severe sleep apnea with AHI 77.3/h and O2 edin 49%.      Patient Active Problem List    Diagnosis Date Noted    Mild intermittent asthma without complication 07/05/2023    ONI on CPAP 05/10/2016    Cerebral atrophy (HCC) 09/16/2023    BMI 40.0-44.9, adult (HCC) 10/21/2022    Type 2 diabetes mellitus with hyperglycemia (HCC) 10/21/2022    Microalbuminuria due to type 2 diabetes mellitus (HCC) 10/21/2022    Atherosclerosis of aorta (HCC) 10/21/2022    Morbid obesity (HCC) 10/02/2020    Essential hypertension, benign 10/02/2020    Hyperlipidemia associated  with type 2 diabetes mellitus (HCC) 10/02/2020    Abnormal EKG 10/02/2020    RBBB 2019    LAFB (left anterior fascicular block) 2019    High blood pressure associated with diabetes (HCC) 2019    BPH (benign prostatic hyperplasia) 2019     Past Medical History:   Diagnosis Date    Asthma     Back pain     Cough     Diabetes     ONI treated with BiPAP 05/10/2016    Painful breathing     Shortness of breath     Sleep apnea     Sputum production     Wheezing       Past Surgical History:   Procedure Laterality Date    GASTROSCOPY-ENDO N/A 2019    Procedure: GASTROSCOPY;  Surgeon: Tyler Rothman M.D.;  Location: ENDOSCOPY [facility];  Service: Gastroenterology    FULL THICKNESS BLOCK RESECTION  2013    Performed by Greyson Lopez M.D. at [facility]    BIOPSY GENERAL  2013    Performed by Greyson Lopez M.D. at [facility]    CATARACT EXTRACTION WITH IOL       Family History   Problem Relation Age of Onset    Heart Attack Brother     Cancer Neg Hx      Social History     Socioeconomic History    Marital status:      Spouse name: Not on file    Number of children: Not on file    Years of education: Not on file    Highest education level: Not on file   Occupational History    Not on file   Tobacco Use    Smoking status: Former     Current packs/day: 0.00     Average packs/day: 1 pack/day for 10.0 years (10.0 ttl pk-yrs)     Types: Cigarettes     Start date: 1985     Quit date: 1995     Years since quittin.7    Smokeless tobacco: Never   Vaping Use    Vaping Use: Never used   Substance and Sexual Activity    Alcohol use: Not Currently     Alcohol/week: 2.4 - 3.6 oz     Types: 4 - 6 Cans of beer per week    Drug use: No    Sexual activity: Not on file   Other Topics Concern    Not on file   Social History Narrative    ** Merged History Encounter **          Social Determinants of Health     Financial Resource Strain: Not on file    Food Insecurity: Not on file   Transportation Needs: Not on file   Physical Activity: Not on file   Stress: Not on file   Social Connections: Not on file   Intimate Partner Violence: Not on file   Housing Stability: Not on file     Current Outpatient Medications   Medication Sig Dispense Refill    tamsulosin (FLOMAX) 0.4 MG capsule Take 1 Capsule by mouth 1/2 hour after breakfast. 30 Capsule 11    FLUoxetine (PROZAC) 20 MG Cap Take 1 Capsule by mouth every day. 30 Capsule 11    atorvastatin (LIPITOR) 40 MG Tab Take 1 Tablet by mouth every day. 100 Tablet 3    budesonide-formoterol (SYMBICORT) 80-4.5 MCG/ACT Aerosol Inhale 2 Puffs 2 times a day. 10.2 g 11    albuterol 108 (90 Base) MCG/ACT Aero Soln inhalation aerosol Inhale 2 Puffs every four hours as needed for Shortness of Breath. 1 Each 11    Empagliflozin (JARDIANCE) 25 MG Tab Take 25 mg by mouth every day. 100 Tablet 6    carvedilol (COREG) 12.5 MG Tab Take 1 Tablet by mouth 2 times a day with meals. 180 Tablet 3    glipizide-metformin (METAGLIP) 5-500 MG per tablet Take 1 Tablet by mouth 2 times daily, before breakfast and dinner. 180 Tablet 3    amLODIPine (NORVASC) 10 MG Tab Take 1 Tablet by mouth every day. 90 Tablet 3    finasteride (PROSCAR) 5 MG Tab Take 1 Tablet by mouth every day. 90 Tablet 3    olmesartan (BENICAR) 5 MG tablet Take 1 Tablet by mouth every day. 100 Tablet 3    omeprazole (PRILOSEC) 20 MG delayed-release capsule TAKE 1 CAPSULE BY MOUTH IN THE MORNING ON AN EMPTY STOMACH 30 MINUTES PRIOR TO EATING AND DRINKING.      vitamin D (CHOLECALCIFEROL) 1000 UNIT Tab Take 1 Tablet by mouth every day.      Omega-3 Fatty Acids (FISH OIL) 1000 MG Cap capsule Take 1 Capsule by mouth every day.       No current facility-administered medications for this visit.      ALLERGIES: Patient has no known allergies.    ROS:  Constitutional: Denies fever, chills, sweats,  weight loss, fatigue  Cardiovascular: Denies chest pain, tightness, palpitations,  "swelling in legs/feet  Respiratory: Denies shortness of breath, cough, sputum, wheezing, painful breathing   Sleep: per HPI  Gastrointestinal: Denies  difficulty swallowing, nausea, abdominal pain, diarrhea, constipation, heartburn.  Musculoskeletal: Denies painful joints, sore muscles,       PHYSICAL EXAM:  /74 (BP Location: Right arm, Patient Position: Sitting, BP Cuff Size: Adult)   Pulse 78   Resp 16   Ht 1.626 m (5' 4\")   Wt 109 kg (240 lb)   SpO2 96% Comment: room air at rest  BMI 41.20 kg/m²   Appearance: Well-nourished, well-developed, no acute distress  Eyes:  No scleral icterus , EOMI  ENMT: No redness of the oropharynx  Lung auscultation:  No wheezes rhonchi rubs or rales  Cardiac: No murmurs, rubs, or gallops; regular rhythm, normal rate; no edema  Musculoskeletal:  Grossly normal; gait and station normal; digits and nails normal  Skin:  No rashes, petechiae, cyanosis  Neurologic: without focal signs; oriented to person, time, place, and purpose; judgement intact  Psychiatric:  No depression, anxiety, agitation  Mallampati score: Class III    Assessment and Plan:    The medical record was reviewed.    Diagnostic and titration nocturnal polysomnogram's, home sleep apnea tests, continuous nocturnal oximetry results, multiple sleep latency tests, and compliance reports reviewed.    Problem List Items Addressed This Visit       ONI on CPAP     Sleep Apnea:    The pathophysiology of sleep anea and the increased risk of cardiovascular morbidity from untreated sleep apnea is discussed in detail with the patient.  Urged to avoid supine sleep, weight gain and alcoholic beverages since all of these can worsen sleep apnea. Cautioned against drowsy driving. If feeling sleepy while driving, pull over for a break/nap, rather than persist on the road, in the interest of own safety and that of others on the road.    Compliance download was reviewed and discussed with the patient.   Patient is compliant and " AHI is well controlled.    - Order placed for mask and supplies to [employer]   - Compliance was reinforced  - Recommended the patient against the use of Ozone , such as SoClean  - Clean supplies a couple times a week with dish soap and water and air dry  - Recommended the patient change out supplies as recommended for best mask fit and usage of the machine  - Advised patient to reach out via MyChart if any questions or concerns should arise.   - Equipment replacement schedule:  Mask cushion every month  Nasal pillows 2 times per month  Mask every 6 months  Head gear every 6 months  Tubing every 3 months  Ultra-fine filters 2 times per month  Foam filter every 6 months  Humidifier chamber every 6 months  Chin strap every 6 months    Has been advised to continue the current BiPAP, clean equipment frequently, and get new mask and supplies as allowed by insurance and DME. Recommend an earlier appointment, if significant treatment barriers develop.    The risks of untreated sleep apnea were discussed with the patient at length. Patients with sleep apnea are at increased risk of cardiovascular disease including coronary artery disease, systemic arterial hypertension, pulmonary arterial hypertension, cardiac arrythmias, and stroke. The patient was advised to avoid driving a motor vehicle when drowsy.    Positive airway pressure will favorably impact many of the adverse conditions and effects provoked by sleep apnea.         Relevant Orders    DME Mask and Supplies     Have advised the patient to follow up with the appropriate healthcare practitioners for all other medical problems and issues.    Return in about 1 year (around 10/6/2024), or if symptoms worsen or fail to improve, for compliance, with Denis.    Please note portions of this record was created using voice recognition software. I have made every reasonable attempt to correct obvious errors, but I expect that there are errors of grammar and possibly  content I did not discover before finalizing the note.    Time spent in record review prior to patient arrival, reviewing results, and in face-to-face encounter totaled 13 min.  __________  JOSHUA Whittington  Pulmonary & Sleep Medicine  [facility]

## 2023-10-06 ENCOUNTER — OFFICE VISIT (OUTPATIENT)
Dept: SLEEP MEDICINE | Facility: MEDICAL CENTER | Age: 78
End: 2023-10-06
Payer: MEDICARE

## 2023-10-06 VITALS
HEIGHT: 64 IN | HEART RATE: 78 BPM | BODY MASS INDEX: 40.97 KG/M2 | SYSTOLIC BLOOD PRESSURE: 120 MMHG | WEIGHT: 240 LBS | OXYGEN SATURATION: 96 % | DIASTOLIC BLOOD PRESSURE: 74 MMHG | RESPIRATION RATE: 16 BRPM

## 2023-10-06 DIAGNOSIS — G47.33 OSA TREATED WITH BIPAP: Chronic | ICD-10-CM

## 2023-10-06 DIAGNOSIS — G47.33 OSA ON CPAP: ICD-10-CM

## 2023-10-06 PROCEDURE — 99212 OFFICE O/P EST SF 10 MIN: CPT

## 2023-10-06 PROCEDURE — 99213 OFFICE O/P EST LOW 20 MIN: CPT

## 2023-10-06 PROCEDURE — 3074F SYST BP LT 130 MM HG: CPT

## 2023-10-06 PROCEDURE — 3078F DIAST BP <80 MM HG: CPT

## 2023-10-06 ASSESSMENT — FIBROSIS 4 INDEX: FIB4 SCORE: 0.79

## 2023-10-06 NOTE — ASSESSMENT & PLAN NOTE
Sleep Apnea:    The pathophysiology of sleep anea and the increased risk of cardiovascular morbidity from untreated sleep apnea is discussed in detail with the patient.  Urged to avoid supine sleep, weight gain and alcoholic beverages since all of these can worsen sleep apnea. Cautioned against drowsy driving. If feeling sleepy while driving, pull over for a break/nap, rather than persist on the road, in the interest of own safety and that of others on the road.    Compliance download was reviewed and discussed with the patient.   Patient is compliant and AHI is well controlled.    - Order placed for mask and supplies to [employer]   - Compliance was reinforced  - Recommended the patient against the use of Ozone , such as SoClean  - Clean supplies a couple times a week with dish soap and water and air dry  - Recommended the patient change out supplies as recommended for best mask fit and usage of the machine  - Advised patient to reach out via [employer] if any questions or concerns should arise.   - Equipment replacement schedule:  Mask cushion every month  Nasal pillows 2 times per month  Mask every 6 months  Head gear every 6 months  Tubing every 3 months  Ultra-fine filters 2 times per month  Foam filter every 6 months  Humidifier chamber every 6 months  Chin strap every 6 months    Has been advised to continue the current BiPAP, clean equipment frequently, and get new mask and supplies as allowed by insurance and DME. Recommend an earlier appointment, if significant treatment barriers develop.    The risks of untreated sleep apnea were discussed with the patient at length. Patients with sleep apnea are at increased risk of cardiovascular disease including coronary artery disease, systemic arterial hypertension, pulmonary arterial hypertension, cardiac arrythmias, and stroke. The patient was advised to avoid driving a motor vehicle when drowsy.    Positive airway pressure will favorably impact many of the  adverse conditions and effects provoked by sleep apnea.

## 2023-10-06 NOTE — PATIENT INSTRUCTIONS
Equipment replacement schedule:  Mask cushion every month  Nasal pillows 2 times per month  Mask every 6 months  Head gear every 6 months  Tubing every 3 months  Ultra-fine filters 2 times per month  Foam filter every 6 months  Humidifier chamber every 6 months   Well developed

## 2023-11-03 ENCOUNTER — HOSPITAL ENCOUNTER (EMERGENCY)
Facility: MEDICAL CENTER | Age: 78
End: 2023-11-04
Attending: EMERGENCY MEDICINE
Payer: MEDICARE

## 2023-11-03 DIAGNOSIS — R07.9 CHEST PAIN, UNSPECIFIED TYPE: ICD-10-CM

## 2023-11-03 LAB
ALBUMIN SERPL BCP-MCNC: 4.3 G/DL (ref 3.2–4.9)
ALBUMIN/GLOB SERPL: 1.4 G/DL
ALP SERPL-CCNC: 67 U/L (ref 30–99)
ALT SERPL-CCNC: 18 U/L (ref 2–50)
ANION GAP SERPL CALC-SCNC: 10 MMOL/L (ref 7–16)
AST SERPL-CCNC: 15 U/L (ref 12–45)
BASOPHILS # BLD AUTO: 0.5 % (ref 0–1.8)
BASOPHILS # BLD: 0.04 K/UL (ref 0–0.12)
BILIRUB SERPL-MCNC: 0.3 MG/DL (ref 0.1–1.5)
BUN SERPL-MCNC: 20 MG/DL (ref 8–22)
CALCIUM ALBUM COR SERPL-MCNC: 8.7 MG/DL (ref 8.5–10.5)
CALCIUM SERPL-MCNC: 8.9 MG/DL (ref 8.5–10.5)
CHLORIDE SERPL-SCNC: 103 MMOL/L (ref 96–112)
CO2 SERPL-SCNC: 27 MMOL/L (ref 20–33)
CREAT SERPL-MCNC: 0.93 MG/DL (ref 0.5–1.4)
EOSINOPHIL # BLD AUTO: 0.16 K/UL (ref 0–0.51)
EOSINOPHIL NFR BLD: 2.1 % (ref 0–6.9)
ERYTHROCYTE [DISTWIDTH] IN BLOOD BY AUTOMATED COUNT: 49 FL (ref 35.9–50)
GFR SERPLBLD CREATININE-BSD FMLA CKD-EPI: 83 ML/MIN/1.73 M 2
GLOBULIN SER CALC-MCNC: 3 G/DL (ref 1.9–3.5)
GLUCOSE SERPL-MCNC: 189 MG/DL (ref 65–99)
HCT VFR BLD AUTO: 45.7 % (ref 42–52)
HGB BLD-MCNC: 14.4 G/DL (ref 14–18)
IMM GRANULOCYTES # BLD AUTO: 0.03 K/UL (ref 0–0.11)
IMM GRANULOCYTES NFR BLD AUTO: 0.4 % (ref 0–0.9)
LYMPHOCYTES # BLD AUTO: 2.2 K/UL (ref 1–4.8)
LYMPHOCYTES NFR BLD: 28.7 % (ref 22–41)
MCH RBC QN AUTO: 27.9 PG (ref 27–33)
MCHC RBC AUTO-ENTMCNC: 31.5 G/DL (ref 32.3–36.5)
MCV RBC AUTO: 88.6 FL (ref 81.4–97.8)
MONOCYTES # BLD AUTO: 0.81 K/UL (ref 0–0.85)
MONOCYTES NFR BLD AUTO: 10.6 % (ref 0–13.4)
NEUTROPHILS # BLD AUTO: 4.43 K/UL (ref 1.82–7.42)
NEUTROPHILS NFR BLD: 57.7 % (ref 44–72)
NRBC # BLD AUTO: 0 K/UL
NRBC BLD-RTO: 0 /100 WBC (ref 0–0.2)
PLATELET # BLD AUTO: 215 K/UL (ref 164–446)
PMV BLD AUTO: 8.4 FL (ref 9–12.9)
POTASSIUM SERPL-SCNC: 3.8 MMOL/L (ref 3.6–5.5)
PROT SERPL-MCNC: 7.3 G/DL (ref 6–8.2)
RBC # BLD AUTO: 5.16 M/UL (ref 4.7–6.1)
SODIUM SERPL-SCNC: 140 MMOL/L (ref 135–145)
TROPONIN T SERPL-MCNC: 16 NG/L (ref 6–19)
WBC # BLD AUTO: 7.7 K/UL (ref 4.8–10.8)

## 2023-11-03 PROCEDURE — 85025 COMPLETE CBC W/AUTO DIFF WBC: CPT

## 2023-11-03 PROCEDURE — 36415 COLL VENOUS BLD VENIPUNCTURE: CPT

## 2023-11-03 PROCEDURE — 84484 ASSAY OF TROPONIN QUANT: CPT

## 2023-11-03 PROCEDURE — 80053 COMPREHEN METABOLIC PANEL: CPT

## 2023-11-03 PROCEDURE — 99284 EMERGENCY DEPT VISIT MOD MDM: CPT

## 2023-11-03 PROCEDURE — 93005 ELECTROCARDIOGRAM TRACING: CPT

## 2023-11-03 PROCEDURE — 93005 ELECTROCARDIOGRAM TRACING: CPT | Performed by: EMERGENCY MEDICINE

## 2023-11-03 ASSESSMENT — FIBROSIS 4 INDEX: FIB4 SCORE: 0.79

## 2023-11-04 ENCOUNTER — APPOINTMENT (OUTPATIENT)
Dept: RADIOLOGY | Facility: MEDICAL CENTER | Age: 78
End: 2023-11-04
Attending: EMERGENCY MEDICINE
Payer: MEDICARE

## 2023-11-04 VITALS
OXYGEN SATURATION: 94 % | TEMPERATURE: 98.2 F | HEIGHT: 64 IN | DIASTOLIC BLOOD PRESSURE: 80 MMHG | BODY MASS INDEX: 40.8 KG/M2 | RESPIRATION RATE: 17 BRPM | HEART RATE: 81 BPM | SYSTOLIC BLOOD PRESSURE: 148 MMHG | WEIGHT: 238.98 LBS

## 2023-11-04 LAB
EKG IMPRESSION: NORMAL
TROPONIN T SERPL-MCNC: 16 NG/L (ref 6–19)

## 2023-11-04 PROCEDURE — 71045 X-RAY EXAM CHEST 1 VIEW: CPT

## 2023-11-04 PROCEDURE — 84484 ASSAY OF TROPONIN QUANT: CPT

## 2023-11-04 ASSESSMENT — HEART SCORE
RISK FACTORS: >2 RISK FACTORS OR HX OF ATHEROSCLEROTIC DISEASE
HEART SCORE: 4
TROPONIN: LESS THAN OR EQUAL TO NORMAL LIMIT
ECG: NORMAL
AGE: 65+
HISTORY: SLIGHTLY SUSPICIOUS

## 2023-11-04 NOTE — ED PROVIDER NOTES
ED Provider Note    CHIEF COMPLAINT  Chief Complaint   Patient presents with    Shortness of Breath     Pt states he was watching TV when he had a sudden onset of left sided pain and SOB.   HX of asthma       EXTERNAL RECORDS REVIEWED  Outpatient Notes from pulmonary clinic 2023 noted nightly CPAP    HPI/ROS  LIMITATION TO HISTORY   Select: : None  OUTSIDE HISTORIAN(S):  Family report patient was complaining that when he would move his left arm the pain was worse    Jonathan Slade is a 78 y.o. male who presents with resolved left-sided chest/upper abdomen pain when shortness of breath.  Patient reports he was at rest earlier tonight when he had abrupt onset of the pain was sharp, no radiation, was worse with certain movements, and worse with moving his left arm.  The pain resolved on its own after 45 minutes.  He reports no ripping or tearing sensation, no radiation of the pain to his back neck arms or otherwise.  Shortness of breath is also resolved.  He reports no leg pain or swelling.  No fevers or chills or cough or congestion.  No focal weakness or numbness.    PAST MEDICAL HISTORY   has a past medical history of Asthma, Back pain, Cough, Diabetes, ONI treated with BiPAP (05/10/2016), Painful breathing, Shortness of breath, Sleep apnea, Sputum production, and Wheezing.    SURGICAL HISTORY   has a past surgical history that includes full thickness block resection (2013); biopsy general (2013); cataract extraction with iol; and gastroscopy-endo (N/A, 2019).    FAMILY HISTORY  Family History   Problem Relation Age of Onset    Heart Attack Brother     Cancer Neg Hx        SOCIAL HISTORY  Social History     Tobacco Use    Smoking status: Former     Current packs/day: 0.00     Average packs/day: 1 pack/day for 10.0 years (10.0 ttl pk-yrs)     Types: Cigarettes     Start date: 1985     Quit date: 1995     Years since quittin.8    Smokeless tobacco: Never   Vaping Use    Vaping Use:  "Never used   Substance and Sexual Activity    Alcohol use: Not Currently     Alcohol/week: 2.4 - 3.6 oz     Types: 4 - 6 Cans of beer per week    Drug use: No    Sexual activity: Not on file       CURRENT MEDICATIONS  Home Medications       Reviewed by Prema Blunt R.N. (Registered Nurse) on 11/03/23 at 2317  Med List Status: Not Addressed     Medication Last Dose Status   albuterol 108 (90 Base) MCG/ACT Aero Soln inhalation aerosol  Active   amLODIPine (NORVASC) 10 MG Tab  Active   atorvastatin (LIPITOR) 40 MG Tab  Active   budesonide-formoterol (SYMBICORT) 80-4.5 MCG/ACT Aerosol  Active   carvedilol (COREG) 12.5 MG Tab  Active   Empagliflozin (JARDIANCE) 25 MG Tab  Active   finasteride (PROSCAR) 5 MG Tab  Active   FLUoxetine (PROZAC) 20 MG Cap  Active   glipizide-metformin (METAGLIP) 5-500 MG per tablet  Active   olmesartan (BENICAR) 5 MG tablet  Active   Omega-3 Fatty Acids (FISH OIL) 1000 MG Cap capsule  Active   omeprazole (PRILOSEC) 20 MG delayed-release capsule  Active   tamsulosin (FLOMAX) 0.4 MG capsule  Active   vitamin D (CHOLECALCIFEROL) 1000 UNIT Tab  Active                    ALLERGIES  No Known Allergies    PHYSICAL EXAM  VITAL SIGNS: /74   Pulse 86   Temp 36.8 °C (98.2 °F)   Resp 17   Ht 1.626 m (5' 4\")   Wt 108 kg (238 lb 15.7 oz)   SpO2 94%   BMI 41.02 kg/m²      Pulse ox interpretation: I interpret this pulse ox as normal.  Constitutional: Alert in no apparent distress.  HENT: No signs of trauma, Bilateral external ears normal, Nose normal.   Eyes: Pupils are equal and reactive, Conjunctiva normal, Non-icteric.   Neck: Normal range of motion, No tenderness, Supple, No stridor.   Cardiovascular: Regular rate and rhythm, no murmurs.   Thorax & Lungs: Normal breath sounds, No respiratory distress, No wheezing, No chest tenderness.   Abdomen: Bowel sounds normal, Soft, No tenderness, No masses, No pulsatile masses. No peritoneal signs.  Skin: Warm, Dry, No erythema, No rash.   Back: " No bony tenderness, No CVA tenderness.   Extremities: Intact distal pulses, No edema, No tenderness, No cyanosis,  Negative Rudy's sign.   Musculoskeletal: Good range of motion in all major joints. No tenderness to palpation or major deformities noted.   Neurologic: Alert , Normal motor function, Normal sensory function, No focal deficits noted.   Psychiatric: Affect normal, Judgment normal, Mood normal.               DIAGNOSTIC STUDIES / PROCEDURES  Results for orders placed or performed during the hospital encounter of 11/03/23   CBC with Differential   Result Value Ref Range    WBC 7.7 4.8 - 10.8 K/uL    RBC 5.16 4.70 - 6.10 M/uL    Hemoglobin 14.4 14.0 - 18.0 g/dL    Hematocrit 45.7 42.0 - 52.0 %    MCV 88.6 81.4 - 97.8 fL    MCH 27.9 27.0 - 33.0 pg    MCHC 31.5 (L) 32.3 - 36.5 g/dL    RDW 49.0 35.9 - 50.0 fL    Platelet Count 215 164 - 446 K/uL    MPV 8.4 (L) 9.0 - 12.9 fL    Neutrophils-Polys 57.70 44.00 - 72.00 %    Lymphocytes 28.70 22.00 - 41.00 %    Monocytes 10.60 0.00 - 13.40 %    Eosinophils 2.10 0.00 - 6.90 %    Basophils 0.50 0.00 - 1.80 %    Immature Granulocytes 0.40 0.00 - 0.90 %    Nucleated RBC 0.00 0.00 - 0.20 /100 WBC    Neutrophils (Absolute) 4.43 1.82 - 7.42 K/uL    Lymphs (Absolute) 2.20 1.00 - 4.80 K/uL    Monos (Absolute) 0.81 0.00 - 0.85 K/uL    Eos (Absolute) 0.16 0.00 - 0.51 K/uL    Baso (Absolute) 0.04 0.00 - 0.12 K/uL    Immature Granulocytes (abs) 0.03 0.00 - 0.11 K/uL    NRBC (Absolute) 0.00 K/uL   Complete Metabolic Panel (CMP)   Result Value Ref Range    Sodium 140 135 - 145 mmol/L    Potassium 3.8 3.6 - 5.5 mmol/L    Chloride 103 96 - 112 mmol/L    Co2 27 20 - 33 mmol/L    Anion Gap 10.0 7.0 - 16.0    Glucose 189 (H) 65 - 99 mg/dL    Bun 20 8 - 22 mg/dL    Creatinine 0.93 0.50 - 1.40 mg/dL    Calcium 8.9 8.5 - 10.5 mg/dL    Correct Calcium 8.7 8.5 - 10.5 mg/dL    AST(SGOT) 15 12 - 45 U/L    ALT(SGPT) 18 2 - 50 U/L    Alkaline Phosphatase 67 30 - 99 U/L    Total Bilirubin 0.3 0.1  - 1.5 mg/dL    Albumin 4.3 3.2 - 4.9 g/dL    Total Protein 7.3 6.0 - 8.2 g/dL    Globulin 3.0 1.9 - 3.5 g/dL    A-G Ratio 1.4 g/dL   Troponins NOW   Result Value Ref Range    Troponin T 16 6 - 19 ng/L   Troponins in two (2) hours   Result Value Ref Range    Troponin T 16 6 - 19 ng/L   ESTIMATED GFR   Result Value Ref Range    GFR (CKD-EPI) 83 >60 mL/min/1.73 m 2   EKG   Result Value Ref Range    Report       [facility] Emergency Dept.    Test Date:  2023  Pt Name:    OLENA YORK           Department: ER  MRN:        [id number]                      Room:  Gender:     Male                         Technician: [id number]  :        [id number]                   Requested By:ER TRIAGE PROTOCOL  Order #:    [id number]                    Reading MD: ASHLEY VASQUEZ MD    Measurements  Intervals                                Axis  Rate:       83                           P:          60  NM:         182                          QRS:        -58  QRSD:       144                          T:          33  QT:         412  QTc:        485    Interpretive Statements  Sinus rhythm  Atrial premature complex  RBBB and LAFB  Compared to ECG 2019 09:03:58  Atrial premature complex(es) now present  Electronically Signed On 2023 00:47:55 PDT by ASHLEY VASQUEZ MD           RADIOLOGY  I have independently interpreted the diagnostic imaging associated with this visit and am waiting the final reading from the radiologist.   My preliminary interpretation is as follows: no edema  Radiologist interpretation:   DX-CHEST-PORTABLE (1 VIEW)   Final Result      1.  Minimal linear bibasilar opacity likely due to atelectasis.            COURSE & MEDICAL DECISION MAKING    INITIAL ASSESSMENT, COURSE AND PLAN  Care Narrative: 12:38 AM  Patient is evaluated at bedside, at this point differential includes ACS, arrhythmia, consideration for pneumothorax as well, also consideration for gastric causes.  Consideration  for musculoskeletal pain.  This seems unlikely to be pulmonary embolism as he is not hypoxemic or tachycardic and the pain is fully resolved as well as the nature of the pain, additionally seems unlikely to be dissection although this was considered as the pain has resolved as well as the nature of his symptoms.  I have ordered for ECG, diagnostic labs, x-ray    2:54 AM  Patient is reevaluated, he is comfortable, no return of symptoms, updated on all results and he is agreeable to discharge          PROBLEM LIST  #Chest pain.  At this point no findings of emergent pathology, no ischemia noted on his ECG, troponin negative x2 unlikely to be ACS.  Given he has not had any persistent pain and the nature of his pain this seems highly unlikely to be pulmonary embolism or dissection as above.  There is no electrolyte or metabolic derangement.  No infectious symptoms.  I discussed return precautions with patient and his family    ADDITIONAL PROBLEM LIST    #Diabetes, history of, continued outpatient management    #Asthma, history of, no findings of exacerbation at this point  DISPOSITION AND DISCUSSIONS      Barriers to care at this time, including but not limited to:  none .     Decision tools and prescription drugs considered including, but not limited to: HEART Score 4 .    FINAL DIAGNOSIS  1. Chest pain, unspecified type           Electronically signed by: Panfilo Mcdaniel M.D., 11/4/2023 12:38 AM

## 2023-11-04 NOTE — ED NOTES
Pt discharged to home. Discharge paperwork provided. Education provided by ERP. Reinforced discharge instructions.  Pt was given follow up instructions   Pt verbalized understanding of all instructions for discharge.   Patient went out of the ER ambulatory with steady gait., alert and oriented x 4, with all belongings.

## 2023-11-04 NOTE — ED TRIAGE NOTES
"Chief Complaint   Patient presents with    Shortness of Breath     Pt states he was watching TV when he had a sudden onset of left sided pain and SOB.   HX of asthma       77 yo M to triage for above complaint. Pt reports CP has resolved but is still complaining of SOB.     Protocol ordered, blood drawn and sent to lab.      Pt placed in lobby. Pt educated on triage process. Pt encouraged to alert staff for any changes.     Patient and staff wearing appropriate PPE    BP (!) 169/64   Pulse 88   Temp 36.3 °C (97.3 °F) (Temporal)   Resp 16   Ht 1.626 m (5' 4\")   Wt 108 kg (238 lb 15.7 oz)   SpO2 96%   BMI 41.02 kg/m²     "

## 2023-11-08 NOTE — PROGRESS NOTES
· 2 RN skin check complete Steven, RN  · Devices in place: Frequent turning and ambulation, nonskid socks.  · Skin assessed under devices: All extremities, bony prominences, sacrum.  · Confirmed pressure ulcers found on: Discoloration of bronze color found on bilateral lower extremities. Nonblanchable.  · New potential pressure ulcers noted on: N/A. Wound consult placed? N/A. Photo uploaded? N/A.   · The following interventions are in place: Frequent ambulation and turning.     No

## 2023-11-14 DIAGNOSIS — N40.0 BENIGN PROSTATIC HYPERPLASIA WITHOUT LOWER URINARY TRACT SYMPTOMS: ICD-10-CM

## 2023-11-14 DIAGNOSIS — I10 ESSENTIAL HYPERTENSION, BENIGN: ICD-10-CM

## 2023-11-14 DIAGNOSIS — I10 ESSENTIAL HYPERTENSION: ICD-10-CM

## 2023-11-14 RX ORDER — AMLODIPINE BESYLATE 10 MG/1
10 TABLET ORAL DAILY
Qty: 100 TABLET | Refills: 0 | Status: SHIPPED | OUTPATIENT
Start: 2023-11-14 | End: 2024-02-27 | Stop reason: SDUPTHER

## 2023-11-14 RX ORDER — FINASTERIDE 5 MG/1
5 TABLET, FILM COATED ORAL DAILY
Qty: 90 TABLET | Refills: 0 | Status: SHIPPED | OUTPATIENT
Start: 2023-11-14 | End: 2024-02-21 | Stop reason: SDUPTHER

## 2023-12-16 ENCOUNTER — OFFICE VISIT (OUTPATIENT)
Dept: URGENT CARE | Facility: PHYSICIAN GROUP | Age: 78
End: 2023-12-16
Payer: MEDICARE

## 2023-12-16 VITALS
BODY MASS INDEX: 40.63 KG/M2 | WEIGHT: 238 LBS | HEIGHT: 64 IN | TEMPERATURE: 96.9 F | RESPIRATION RATE: 14 BRPM | HEART RATE: 84 BPM | OXYGEN SATURATION: 92 % | DIASTOLIC BLOOD PRESSURE: 84 MMHG | SYSTOLIC BLOOD PRESSURE: 132 MMHG

## 2023-12-16 DIAGNOSIS — T23.321A FULL THICKNESS BURN OF FINGER OF RIGHT HAND, INITIAL ENCOUNTER: ICD-10-CM

## 2023-12-16 PROCEDURE — 99213 OFFICE O/P EST LOW 20 MIN: CPT | Performed by: NURSE PRACTITIONER

## 2023-12-16 PROCEDURE — 3075F SYST BP GE 130 - 139MM HG: CPT | Performed by: NURSE PRACTITIONER

## 2023-12-16 PROCEDURE — 3079F DIAST BP 80-89 MM HG: CPT | Performed by: NURSE PRACTITIONER

## 2023-12-16 RX ORDER — CEPHALEXIN 500 MG/1
500 CAPSULE ORAL 3 TIMES DAILY
Qty: 15 CAPSULE | Refills: 0 | Status: SHIPPED | OUTPATIENT
Start: 2023-12-16 | End: 2023-12-21

## 2023-12-16 ASSESSMENT — FIBROSIS 4 INDEX: FIB4 SCORE: 1.32

## 2023-12-17 ASSESSMENT — ENCOUNTER SYMPTOMS
FOCAL WEAKNESS: 0
TINGLING: 0
FEVER: 0
CHILLS: 0
SENSORY CHANGE: 0
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
## 2023-12-27 ENCOUNTER — HOSPITAL ENCOUNTER (OUTPATIENT)
Facility: MEDICAL CENTER | Age: 78
End: 2023-12-27
Attending: STUDENT IN AN ORGANIZED HEALTH CARE EDUCATION/TRAINING PROGRAM
Payer: MEDICARE

## 2023-12-27 ENCOUNTER — OFFICE VISIT (OUTPATIENT)
Dept: MEDICAL GROUP | Facility: MEDICAL CENTER | Age: 78
End: 2023-12-27
Payer: MEDICARE

## 2023-12-27 VITALS
WEIGHT: 235.89 LBS | DIASTOLIC BLOOD PRESSURE: 68 MMHG | BODY MASS INDEX: 40.27 KG/M2 | SYSTOLIC BLOOD PRESSURE: 122 MMHG | HEART RATE: 77 BPM | HEIGHT: 64 IN | TEMPERATURE: 98.1 F | OXYGEN SATURATION: 96 % | RESPIRATION RATE: 16 BRPM

## 2023-12-27 DIAGNOSIS — R80.9 MICROALBUMINURIA DUE TO TYPE 2 DIABETES MELLITUS (HCC): ICD-10-CM

## 2023-12-27 DIAGNOSIS — R42 DIZZINESS: ICD-10-CM

## 2023-12-27 DIAGNOSIS — F34.1 DYSTHYMIA: ICD-10-CM

## 2023-12-27 DIAGNOSIS — N40.0 BENIGN PROSTATIC HYPERPLASIA WITHOUT LOWER URINARY TRACT SYMPTOMS: ICD-10-CM

## 2023-12-27 DIAGNOSIS — I15.2 HIGH BLOOD PRESSURE ASSOCIATED WITH DIABETES (HCC): ICD-10-CM

## 2023-12-27 DIAGNOSIS — E11.29 MICROALBUMINURIA DUE TO TYPE 2 DIABETES MELLITUS (HCC): ICD-10-CM

## 2023-12-27 DIAGNOSIS — E66.01 MORBID OBESITY (HCC): ICD-10-CM

## 2023-12-27 DIAGNOSIS — E11.59 HIGH BLOOD PRESSURE ASSOCIATED WITH DIABETES (HCC): ICD-10-CM

## 2023-12-27 DIAGNOSIS — E11.65 TYPE 2 DIABETES MELLITUS WITH HYPERGLYCEMIA, WITHOUT LONG-TERM CURRENT USE OF INSULIN (HCC): ICD-10-CM

## 2023-12-27 LAB
CREAT UR-MCNC: 71.59 MG/DL
HBA1C MFR BLD: 6.6 % (ref ?–5.8)
MICROALBUMIN UR-MCNC: 15.5 MG/DL
MICROALBUMIN/CREAT UR: 217 MG/G (ref 0–30)
POCT INT CON NEG: NEGATIVE
POCT INT CON POS: POSITIVE

## 2023-12-27 PROCEDURE — 3078F DIAST BP <80 MM HG: CPT | Performed by: STUDENT IN AN ORGANIZED HEALTH CARE EDUCATION/TRAINING PROGRAM

## 2023-12-27 PROCEDURE — 99214 OFFICE O/P EST MOD 30 MIN: CPT | Performed by: STUDENT IN AN ORGANIZED HEALTH CARE EDUCATION/TRAINING PROGRAM

## 2023-12-27 PROCEDURE — 3074F SYST BP LT 130 MM HG: CPT | Performed by: STUDENT IN AN ORGANIZED HEALTH CARE EDUCATION/TRAINING PROGRAM

## 2023-12-27 PROCEDURE — 83036 HEMOGLOBIN GLYCOSYLATED A1C: CPT | Performed by: STUDENT IN AN ORGANIZED HEALTH CARE EDUCATION/TRAINING PROGRAM

## 2023-12-27 PROCEDURE — 82043 UR ALBUMIN QUANTITATIVE: CPT

## 2023-12-27 PROCEDURE — 82570 ASSAY OF URINE CREATININE: CPT

## 2023-12-27 ASSESSMENT — FIBROSIS 4 INDEX: FIB4 SCORE: 1.32

## 2023-12-27 NOTE — PROGRESS NOTES
Subjective:     Chief Complaint   Patient presents with    Follow-Up     3 months        HPI:   Jonathan presents today with     Hypertension  Patient continues on amlodipine 10 mg, olmesartan 5 mg and carvedilol 12.5 mg twice daily.  Controlled today 122/68     Diabetes  Patient's last A1c  7.0%. Patient continues on glipizide- metformin 5-500 for diabetes control and Jardiance 25 mg. A1c at 6.6%.      Obesity  Patient's BMI at 40.  Weight remains stable.  Patient encouraged to continue working on diet and exercise.     Hyperlipidemia  Patient continues on atorvastatin 40 mg.  Recent lipid profile continues to show elevated triglycerides but controlled LDL.     Sleep apnea  Patient continues on CPAP nightly.     Asthma  Patient has been evaluated by pulmonology on 6/6/2023. Patient was started on Symbicort, does note improvement.     Depression   Patient and wife continue to Have concerns about depression.  Over the last several visits patient became tearful when thinking about his family, especially his sister.  At last visit patient was started on fluoxetine 20 mg.  Patient has been taking this approximately 3 months and does note some relief.  Wife notes that he is less tearful/reserved.     Urinary urgency   At last visit patient noted concern about urinary urgency frequency and nocturia.  Patient was already taking finasteride 5 mg but started on Flomax 0.4 mg.  Patient notes relief since taking this medication.  But notes that over the last few months he has felt increased dizziness, specifically orthostatic dizziness with changing of position.  Discussed with patient that this is likely a side effect of the tamsulosin.  Patient notes that tamsulosin has been extremely helpful and is hesitant to stop it.  Patient will try slower transitions, increasing hydration and electrolytes and if no improvement in dizziness, recommend discontinuing tamsulosin.         ROS:  Gen: no fevers/chills  Pulm: no sob, no  "cough  CV: no chest pain, no palpitations  GI: no nausea/vomiting, no diarrhea        Objective:     Exam:  /68   Pulse 77   Temp 36.7 °C (98.1 °F) (Temporal)   Resp 16   Ht 1.626 m (5' 4\")   Wt 107 kg (235 lb 14.3 oz)   SpO2 96%   BMI 40.49 kg/m²  Body mass index is 40.49 kg/m².    Gen: Alert and oriented, No apparent distress.  Neck: Neck is supple without lymphadenopathy.  Lungs: Normal effort, CTA bilaterally, no wheezes, rhonchi, or rales  CV: Regular rate and rhythm. No murmurs, rubs, or gallops.  Ext: No clubbing, cyanosis, edema.      Assessment & Plan:     78 y.o. male with the following -     1. Microalbuminuria due to type 2 diabetes mellitus (HCC)  - Microalbumin Creat Ratio Urine (Clinic Collect); Future  - POCT A1C    2. Morbid obesity (HCC)  Chronic, stable.  Weight remains stable with BMI of 40.  Patient encouraged to increase exercise.  Wife notes that she has been taking care of him, not getting as much movement or exercise.    3. Type 2 diabetes mellitus with hyperglycemia, without long-term current use of insulin (HCC)  Chronic, stable.  A1c controlled today at 6.6%.  Patient has been checking blood sugars daily.  Patient notes that they are improving.  Continues to monitor diet.    4. High blood pressure associated with diabetes (HCC)  Chronic, stable.  Blood pressure controlled.    5. Benign prostatic hyperplasia without lower urinary tract symptoms   6. Dizziness  chronic, stable.  Patient was started on tamsulosin 0.5 mg at last visit and does note improvement in urinary symptoms.  Patient has noticed a new onset of dizziness, concerned that it is related to tamsulosin.  Discussed increasing hydration, electrolytes and slower movement/transitions but if no improvement in dizziness recommend patient stop tamsulosin and follow-up with urology.    7. Dysthymia  Chronic, stable.  Patient was started on fluoxetine approximately 3 months ago and does note relief and tearfulness and " improvement in overall mood.  Will continue patient on fluoxetine.  Continue to monitor.      No follow-ups on file.    Please note that this dictation was created using voice recognition software. I have made every reasonable attempt to correct obvious errors, but I expect that there are errors of grammar and possibly content that I did not discover before finalizing the note.

## 2024-01-15 ENCOUNTER — DOCUMENTATION (OUTPATIENT)
Dept: HEALTH INFORMATION MANAGEMENT | Facility: OTHER | Age: 79
End: 2024-01-15
Payer: MEDICARE

## 2024-01-23 ENCOUNTER — OFFICE VISIT (OUTPATIENT)
Dept: URGENT CARE | Facility: PHYSICIAN GROUP | Age: 79
End: 2024-01-23
Payer: MEDICARE

## 2024-01-23 VITALS
BODY MASS INDEX: 40.12 KG/M2 | SYSTOLIC BLOOD PRESSURE: 130 MMHG | RESPIRATION RATE: 16 BRPM | DIASTOLIC BLOOD PRESSURE: 84 MMHG | HEART RATE: 79 BPM | TEMPERATURE: 97.2 F | OXYGEN SATURATION: 95 % | HEIGHT: 64 IN | WEIGHT: 235 LBS

## 2024-01-23 DIAGNOSIS — B02.9 HERPES ZOSTER WITHOUT COMPLICATION: ICD-10-CM

## 2024-01-23 PROCEDURE — 99214 OFFICE O/P EST MOD 30 MIN: CPT | Performed by: STUDENT IN AN ORGANIZED HEALTH CARE EDUCATION/TRAINING PROGRAM

## 2024-01-23 PROCEDURE — 3075F SYST BP GE 130 - 139MM HG: CPT | Performed by: STUDENT IN AN ORGANIZED HEALTH CARE EDUCATION/TRAINING PROGRAM

## 2024-01-23 PROCEDURE — 3079F DIAST BP 80-89 MM HG: CPT | Performed by: STUDENT IN AN ORGANIZED HEALTH CARE EDUCATION/TRAINING PROGRAM

## 2024-01-23 RX ORDER — VALACYCLOVIR HYDROCHLORIDE 1 G/1
1000 TABLET, FILM COATED ORAL 3 TIMES DAILY
Qty: 21 TABLET | Refills: 0 | Status: SHIPPED | OUTPATIENT
Start: 2024-01-23 | End: 2024-01-30

## 2024-01-23 RX ORDER — LIDOCAINE 50 MG/G
2 OINTMENT TOPICAL 3 TIMES DAILY PRN
Qty: 50 G | Refills: 0 | Status: SHIPPED | OUTPATIENT
Start: 2024-01-23

## 2024-01-23 ASSESSMENT — FIBROSIS 4 INDEX: FIB4 SCORE: 1.32

## 2024-01-23 NOTE — PROGRESS NOTES
Subjective:   Jonathan Slade is a 78 y.o. male who presents for Body Aches (Dizziness, issues breathing, short of breath, sharp cramping pain starting from R side, goes all the way to hip on front and back OTC medications doesn't help, has inhaler only helps shortly, cough), Rib Pain, and Shortness of Breath      HPI:  78-year-old male presents to the urgent care with his daughter for approximately 3 days of right upper quadrant abdominal pain that wraps to his back.  He states that he does have increased pain when he takes a deep breath.  The pain is located at the same right aspect of his rib cage.  Denies any shortness of breath but states that he does not want to take a deep breath because of the pain that occurs.  States the area is painful to the touch.  He has noticed a red rash that he thinks may be hives on the area.  He has never had a rash similar to this in the past.  No fever, nausea, vomiting, diarrhea, constipation, blood in stool, melena, chest pain at rest, palpitations, lower leg swelling, leg pain, wheezing, cough, or hemoptysis.  Denies any trauma or injury.        Medications:    albuterol Aers  amLODIPine Tabs  atorvastatin Tabs  budesonide-formoterol Aero  carvedilol Tabs  finasteride Tabs  fish oil Caps  FLUoxetine Caps  glipizide-metformin  Jardiance Tabs  lidocaine Oint  olmesartan  omeprazole  tamsulosin  valacyclovir Tabs  vitamin D Tabs    Allergies: Patient has no known allergies.    Problem List: Jonathan Slade does not have any pertinent problems on file.    Surgical History:  Past Surgical History:   Procedure Laterality Date    GASTROSCOPY-ENDO N/A 7/23/2019    Procedure: GASTROSCOPY;  Surgeon: Tyler Rothman M.D.;  Location: ENDOSCOPY [facility];  Service: Gastroenterology    FULL THICKNESS BLOCK RESECTION  2/19/2013    Performed by Greyson Lopez M.D. at SURGERY [facility]    BIOPSY GENERAL  2/19/2013    Performed by Greyson Lopez M.D. at SURGERY  [facility]    CATARACT EXTRACTION WITH IOL         Past Social Hx: Jonathan Slade  reports that he quit smoking about 29 years ago. His smoking use included cigarettes. He started smoking about 39 years ago. He has a 10.0 pack-year smoking history. He has never used smokeless tobacco. He reports that he does not currently use alcohol after a past usage of about 2.4 - 3.6 oz of alcohol per week. He reports that he does not use drugs.     Past Family Hx:  Jonathan Slade family history includes Heart Attack in his brother.     Problem list, medications, and allergies reviewed by myself today in Epic.     Objective:     /84   Pulse 79   Temp 36.2 °C (97.2 °F) (Temporal)   Resp 16   Ht 1.626 m (5' 4\")   Wt 107 kg (235 lb)   SpO2 95%   BMI 40.34 kg/m²     Physical Exam  Vitals reviewed.   Constitutional:       Appearance: Normal appearance.   HENT:      Right Ear: Tympanic membrane, ear canal and external ear normal.      Left Ear: Tympanic membrane, ear canal and external ear normal.      Nose: Nose normal.      Mouth/Throat:      Mouth: Mucous membranes are moist.   Eyes:      Conjunctiva/sclera: Conjunctivae normal.      Pupils: Pupils are equal, round, and reactive to light.   Cardiovascular:      Rate and Rhythm: Normal rate and regular rhythm.      Pulses: Normal pulses.      Heart sounds: Normal heart sounds. No murmur heard.  Pulmonary:      Effort: Pulmonary effort is normal. No respiratory distress.      Breath sounds: Normal breath sounds. No stridor. No wheezing, rhonchi or rales.   Abdominal:      General: Bowel sounds are normal. There is no distension.      Palpations: Abdomen is soft.      Tenderness: There is no right CVA tenderness, left CVA tenderness, guarding or rebound.   Skin:            Comments: Vesicular rash with erythematous base to the right rib cage, right flank, and right thoracic back.  All following the same nerve root distribution.  Several vesicles present.  " Rash is tender to touch.  No abdominal pain to palpation other than over this rash.   Neurological:      Mental Status: He is alert.         Assessment/Plan:     Diagnosis and associated orders:     1. Herpes zoster without complication  valacyclovir (VALTREX) 1 GM Tab    lidocaine (XYLOCAINE) 5 % Ointment         Comments/MDM:     Patient's presentation physical exam findings are consistent with shingles rash present to the right anterior rib cage, right flank, and right thoracic back.  Follows the same nerve root.  Does not cross the midline.  Rash is painful.  No rebound, guarding, rigidity.  I do believe this pain is due to shingles rather than intra-abdominal process.  He does not have any vomiting, nausea, or diarrhea.  Vitals are all stable.  Afebrile.  Pulmonary exam shows no diminished lung sounds, wheezing, rales, rhonchi.  Patient has not had any traumatic event raising suspicion for pneumothorax.  Pain is worse with deep breathing but only with pressure being on the patient's torso.  Do believe this pain is due to reassured causing friction against this shingles rash.  Patient does have active vesicles.  Valacyclovir sent to the pharmacy.  Topical lidocaine jelly also given.  May use Tylenol at home.  Discussed typical timeframe for resolution of symptoms.  ED precautions given including new/worsening symptoms such as diarrhea, nausea, vomiting, or fever.         Differential diagnosis, natural history, supportive care, and indications for immediate follow-up discussed.    Advised the patient to follow-up with the primary care physician for recheck, reevaluation, and consideration of further management.    Please note that this dictation was created using voice recognition software. I have made a reasonable attempt to correct obvious errors, but I expect that there are errors of grammar and possibly content that I did not discover before finalizing the note.    Electronically signed by Atilio Hall  [email or web address].

## 2024-01-25 ENCOUNTER — HOSPITAL ENCOUNTER (EMERGENCY)
Facility: MEDICAL CENTER | Age: 79
End: 2024-01-25
Attending: EMERGENCY MEDICINE
Payer: MEDICARE

## 2024-01-25 ENCOUNTER — APPOINTMENT (OUTPATIENT)
Dept: RADIOLOGY | Facility: MEDICAL CENTER | Age: 79
End: 2024-01-25
Attending: EMERGENCY MEDICINE
Payer: MEDICARE

## 2024-01-25 ENCOUNTER — APPOINTMENT (OUTPATIENT)
Dept: RADIOLOGY | Facility: MEDICAL CENTER | Age: 79
End: 2024-01-25
Payer: MEDICARE

## 2024-01-25 VITALS
RESPIRATION RATE: 20 BRPM | DIASTOLIC BLOOD PRESSURE: 92 MMHG | BODY MASS INDEX: 41.32 KG/M2 | TEMPERATURE: 98.6 F | HEART RATE: 71 BPM | SYSTOLIC BLOOD PRESSURE: 153 MMHG | WEIGHT: 242 LBS | HEIGHT: 64 IN | OXYGEN SATURATION: 97 %

## 2024-01-25 DIAGNOSIS — B02.9 HERPES ZOSTER WITHOUT COMPLICATION: ICD-10-CM

## 2024-01-25 LAB
ALBUMIN SERPL BCP-MCNC: 4.1 G/DL (ref 3.2–4.9)
ALBUMIN/GLOB SERPL: 1.5 G/DL
ALP SERPL-CCNC: 73 U/L (ref 30–99)
ALT SERPL-CCNC: 16 U/L (ref 2–50)
ANION GAP SERPL CALC-SCNC: 11 MMOL/L (ref 7–16)
AST SERPL-CCNC: 19 U/L (ref 12–45)
BASOPHILS # BLD AUTO: 0.4 % (ref 0–1.8)
BASOPHILS # BLD: 0.03 K/UL (ref 0–0.12)
BILIRUB SERPL-MCNC: 0.4 MG/DL (ref 0.1–1.5)
BUN SERPL-MCNC: 15 MG/DL (ref 8–22)
CALCIUM ALBUM COR SERPL-MCNC: 8.6 MG/DL (ref 8.5–10.5)
CALCIUM SERPL-MCNC: 8.7 MG/DL (ref 8.5–10.5)
CHLORIDE SERPL-SCNC: 99 MMOL/L (ref 96–112)
CO2 SERPL-SCNC: 26 MMOL/L (ref 20–33)
CREAT SERPL-MCNC: 0.73 MG/DL (ref 0.5–1.4)
EKG IMPRESSION: NORMAL
EOSINOPHIL # BLD AUTO: 0.11 K/UL (ref 0–0.51)
EOSINOPHIL NFR BLD: 1.5 % (ref 0–6.9)
ERYTHROCYTE [DISTWIDTH] IN BLOOD BY AUTOMATED COUNT: 45.7 FL (ref 35.9–50)
GFR SERPLBLD CREATININE-BSD FMLA CKD-EPI: 92 ML/MIN/1.73 M 2
GLOBULIN SER CALC-MCNC: 2.7 G/DL (ref 1.9–3.5)
GLUCOSE SERPL-MCNC: 135 MG/DL (ref 65–99)
HCT VFR BLD AUTO: 45.3 % (ref 42–52)
HGB BLD-MCNC: 15.3 G/DL (ref 14–18)
IMM GRANULOCYTES # BLD AUTO: 0.02 K/UL (ref 0–0.11)
IMM GRANULOCYTES NFR BLD AUTO: 0.3 % (ref 0–0.9)
LYMPHOCYTES # BLD AUTO: 1.41 K/UL (ref 1–4.8)
LYMPHOCYTES NFR BLD: 19.7 % (ref 22–41)
MCH RBC QN AUTO: 28.5 PG (ref 27–33)
MCHC RBC AUTO-ENTMCNC: 33.8 G/DL (ref 32.3–36.5)
MCV RBC AUTO: 84.5 FL (ref 81.4–97.8)
MONOCYTES # BLD AUTO: 0.97 K/UL (ref 0–0.85)
MONOCYTES NFR BLD AUTO: 13.6 % (ref 0–13.4)
NEUTROPHILS # BLD AUTO: 4.6 K/UL (ref 1.82–7.42)
NEUTROPHILS NFR BLD: 64.5 % (ref 44–72)
NRBC # BLD AUTO: 0 K/UL
NRBC BLD-RTO: 0 /100 WBC (ref 0–0.2)
PLATELET # BLD AUTO: 193 K/UL (ref 164–446)
PMV BLD AUTO: 8.5 FL (ref 9–12.9)
POTASSIUM SERPL-SCNC: 3.9 MMOL/L (ref 3.6–5.5)
PROT SERPL-MCNC: 6.8 G/DL (ref 6–8.2)
RBC # BLD AUTO: 5.36 M/UL (ref 4.7–6.1)
SODIUM SERPL-SCNC: 136 MMOL/L (ref 135–145)
TROPONIN T SERPL-MCNC: 21 NG/L (ref 6–19)
WBC # BLD AUTO: 7.1 K/UL (ref 4.8–10.8)

## 2024-01-25 PROCEDURE — 96374 THER/PROPH/DIAG INJ IV PUSH: CPT

## 2024-01-25 PROCEDURE — 36415 COLL VENOUS BLD VENIPUNCTURE: CPT

## 2024-01-25 PROCEDURE — 99285 EMERGENCY DEPT VISIT HI MDM: CPT

## 2024-01-25 PROCEDURE — 93005 ELECTROCARDIOGRAM TRACING: CPT | Performed by: EMERGENCY MEDICINE

## 2024-01-25 PROCEDURE — 85025 COMPLETE CBC W/AUTO DIFF WBC: CPT

## 2024-01-25 PROCEDURE — 80053 COMPREHEN METABOLIC PANEL: CPT

## 2024-01-25 PROCEDURE — 71045 X-RAY EXAM CHEST 1 VIEW: CPT

## 2024-01-25 PROCEDURE — 84484 ASSAY OF TROPONIN QUANT: CPT

## 2024-01-25 PROCEDURE — 700111 HCHG RX REV CODE 636 W/ 250 OVERRIDE (IP): Performed by: EMERGENCY MEDICINE

## 2024-01-25 RX ORDER — PREDNISONE 20 MG/1
60 TABLET ORAL DAILY
Qty: 12 TABLET | Refills: 0 | Status: SHIPPED | OUTPATIENT
Start: 2024-01-25 | End: 2024-01-25

## 2024-01-25 RX ORDER — PREDNISONE 20 MG/1
60 TABLET ORAL DAILY
Qty: 12 TABLET | Refills: 0 | Status: SHIPPED | OUTPATIENT
Start: 2024-01-25 | End: 2024-01-29

## 2024-01-25 RX ORDER — MORPHINE SULFATE 4 MG/ML
4 INJECTION INTRAVENOUS ONCE
Status: COMPLETED | OUTPATIENT
Start: 2024-01-25 | End: 2024-01-25

## 2024-01-25 RX ORDER — MORPHINE SULFATE 15 MG/1
7.5 TABLET ORAL EVERY 4 HOURS PRN
Qty: 9 TABLET | Refills: 0 | Status: SHIPPED | OUTPATIENT
Start: 2024-01-25 | End: 2024-01-28

## 2024-01-25 RX ORDER — PREDNISONE 20 MG/1
60 TABLET ORAL ONCE
Status: COMPLETED | OUTPATIENT
Start: 2024-01-25 | End: 2024-01-25

## 2024-01-25 RX ADMIN — MORPHINE SULFATE 4 MG: 4 INJECTION, SOLUTION INTRAMUSCULAR; INTRAVENOUS at 10:42

## 2024-01-25 RX ADMIN — PREDNISONE 60 MG: 20 TABLET ORAL at 10:42

## 2024-01-25 ASSESSMENT — PAIN DESCRIPTION - PAIN TYPE: TYPE: ACUTE PAIN

## 2024-01-25 ASSESSMENT — LIFESTYLE VARIABLES: DO YOU DRINK ALCOHOL: NO

## 2024-01-25 ASSESSMENT — FIBROSIS 4 INDEX: FIB4 SCORE: 1.32

## 2024-01-25 NOTE — ED NOTES
Pt. Medicated per MAR.  Pt. Has call light in reach and family at bedside for support.  Verbalized understanding to call for assistance.

## 2024-01-25 NOTE — ED TRIAGE NOTES
Chief Complaint   Patient presents with    Chest Pain     Starting at 0900 yesterday, states he was seen at urgent care yesterday for same.      Back Pain     States was diagnosed with Shingles at urgent care yesterday and has taken acyclovir for that since yesterday.       Cough     Also starting yesterday.     BIB EMS for above. Pt. States he was told he can not take asa per his MD.  Pt. States he might be on a blood thinner but is unsure.

## 2024-01-25 NOTE — ED PROVIDER NOTES
ED Provider Note    CHIEF COMPLAINT  Chief Complaint   Patient presents with    Chest Pain     Starting at 0900 yesterday, states he was seen at urgent care yesterday for same.      Back Pain     States was diagnosed with Shingles at urgent care yesterday and has taken acyclovir for that since yesterday.       Cough     Also starting yesterday.           HPI/ROS    OUTSIDE HISTORIAN(S):  Family patient's daughter stated the patient was recently diagnosed with shingles and now is having pain.    Jonathan Slade is a 78 y.o. male who presents with daughter and wife stating the patient was diagnosed with shingles at urgent care yesterday.  Patient states symptoms for 3 days with rash rates in his right side of his trunk around to his right chest.  Patient states that hurts significantly.  Patient received valacyclovir yesterday as well as lidocaine cream with a cannot afford the cream secondary to insurance.  He is here for increasing pain.  He states it hurts when he takes a big deep breath secondary to the pain on the skin.  This is chest pain.  Denies fever, shakes, chills, sweats, nausea, vomiting.  Patient's daughter is at bedside and history review of systems seeing the patient is having difficult time tolerating the pain.    PAST MEDICAL HISTORY   has a past medical history of Asthma, Back pain, Cough, Diabetes, ONI treated with BiPAP (05/10/2016), Painful breathing, Shortness of breath, Sleep apnea, Sputum production, and Wheezing.    SURGICAL HISTORY   has a past surgical history that includes full thickness block resection (2/19/2013); biopsy general (2/19/2013); cataract extraction with iol; and gastroscopy-endo (N/A, 7/23/2019).    FAMILY HISTORY  Family History   Problem Relation Age of Onset    Heart Attack Brother     Cancer Neg Hx        SOCIAL HISTORY  Social History     Tobacco Use    Smoking status: Former     Current packs/day: 0.00     Average packs/day: 1 pack/day for 10.0 years (10.0 ttl  "pk-yrs)     Types: Cigarettes     Start date: 1985     Quit date: 1995     Years since quittin.0    Smokeless tobacco: Never   Vaping Use    Vaping Use: Never used   Substance and Sexual Activity    Alcohol use: Not Currently     Alcohol/week: 2.4 - 3.6 oz     Types: 4 - 6 Cans of beer per week    Drug use: No    Sexual activity: Not on file       CURRENT MEDICATIONS  Home Medications    **Home medications have not yet been reviewed for this encounter**         ALLERGIES  No Known Allergies    PHYSICAL EXAM  VITAL SIGNS: BP (!) 153/92   Pulse 71   Temp 37 °C (98.6 °F) (Temporal)   Resp 20   Ht 1.626 m (5' 4\")   Wt 110 kg (242 lb)   SpO2 97%   BMI 41.54 kg/m²      Nursing notes and vitals reviewed.  Constitutional: Well developed, Well nourished, mild acute distress, Non-toxic appearance.   Eyes: PERRLA, EOMI, Conjunctiva normal, No discharge.   HENT: Normocephalic, Atraumatic, moist mucous membranes, no facial edema   Cardiovascular: Normal heart rate, Normal rhythm, No murmurs, No rubs, No gallops.   Thorax & Lungs: No respiratory distress, No rales, No rhonchi, No wheezing, No chest tenderness.   Skin: Slight vesicular erythematous rash pattern in a dermatome from the right ister chest wall and to the anterior chest wall, no purpura, please refer to picture below   extremities: No deformity, no edema, good range of motion range of motion upper lower extremes bilaterally  Neurologic: Alert & oriented x 3, no focal abnormalities noted, acting appropriately on examination  Psychiatric: Anxious affect      DIAGNOSTIC STUDIES / PROCEDURES  EKG  I have independently interpreted this EKG  Sinus rhythm on monitor    LABS    Results for orders placed or performed during the hospital encounter of 24   CBC with Differential   Result Value Ref Range    WBC 7.1 4.8 - 10.8 K/uL    RBC 5.36 4.70 - 6.10 M/uL    Hemoglobin 15.3 14.0 - 18.0 g/dL    Hematocrit 45.3 42.0 - 52.0 %    MCV 84.5 81.4 - 97.8 fL "    MCH 28.5 27.0 - 33.0 pg    MCHC 33.8 32.3 - 36.5 g/dL    RDW 45.7 35.9 - 50.0 fL    Platelet Count 193 164 - 446 K/uL    MPV 8.5 (L) 9.0 - 12.9 fL    Neutrophils-Polys 64.50 44.00 - 72.00 %    Lymphocytes 19.70 (L) 22.00 - 41.00 %    Monocytes 13.60 (H) 0.00 - 13.40 %    Eosinophils 1.50 0.00 - 6.90 %    Basophils 0.40 0.00 - 1.80 %    Immature Granulocytes 0.30 0.00 - 0.90 %    Nucleated RBC 0.00 0.00 - 0.20 /100 WBC    Neutrophils (Absolute) 4.60 1.82 - 7.42 K/uL    Lymphs (Absolute) 1.41 1.00 - 4.80 K/uL    Monos (Absolute) 0.97 (H) 0.00 - 0.85 K/uL    Eos (Absolute) 0.11 0.00 - 0.51 K/uL    Baso (Absolute) 0.03 0.00 - 0.12 K/uL    Immature Granulocytes (abs) 0.02 0.00 - 0.11 K/uL    NRBC (Absolute) 0.00 K/uL   Comp Metabolic Panel   Result Value Ref Range    Sodium 136 135 - 145 mmol/L    Potassium 3.9 3.6 - 5.5 mmol/L    Chloride 99 96 - 112 mmol/L    Co2 26 20 - 33 mmol/L    Anion Gap 11.0 7.0 - 16.0    Glucose 135 (H) 65 - 99 mg/dL    Bun 15 8 - 22 mg/dL    Creatinine 0.73 0.50 - 1.40 mg/dL    Calcium 8.7 8.5 - 10.5 mg/dL    Correct Calcium 8.6 8.5 - 10.5 mg/dL    AST(SGOT) 19 12 - 45 U/L    ALT(SGPT) 16 2 - 50 U/L    Alkaline Phosphatase 73 30 - 99 U/L    Total Bilirubin 0.4 0.1 - 1.5 mg/dL    Albumin 4.1 3.2 - 4.9 g/dL    Total Protein 6.8 6.0 - 8.2 g/dL    Globulin 2.7 1.9 - 3.5 g/dL    A-G Ratio 1.5 g/dL   TROPONIN   Result Value Ref Range    Troponin T 21 (H) 6 - 19 ng/L   ESTIMATED GFR   Result Value Ref Range    GFR (CKD-EPI) 92 >60 mL/min/1.73 m 2   EKG   Result Value Ref Range    Report       [facility] Emergency Dept.    Test Date:  2024  Pt Name:    OLENA YORK           Department: ER  MRN:        [id number]                      Room:       [facility]  Gender:     Male                         Technician: [id number]  :        1945                   Requested By:ER TRIAGE PROTOCOL  Order #:    [id number]                    Reading MD: ALANIS CORDOVA,  DO    Measurements  Intervals                                Axis  Rate:       82                           P:          -9  WV:         161                          QRS:        -63  QRSD:       139                          T:          25  QT:         417  QTc:        487    Interpretive Statements  Sinus rhythm  Atrial premature complexes  RBBB and LAFB  Compared to ECG 11/03/2023 23:06:37  No significant changes  Electronically Signed On 01- 10:05:50 PST by ALANIS CORDOVA, DO         RADIOLOGY  I have independently interpreted the diagnostic imaging associated with this visit and am waiting the final reading from the radiologist.   My preliminary interpretation is as follows:   Radiologist interpretation:   DX-CHEST-PORTABLE (1 VIEW)   Final Result      No radiographic evidence of acute cardiopulmonary disease.            COURSE & MEDICAL DECISION MAKING    ED Observation Status? No; Patient does not meet criteria for ED Observation.     INITIAL ASSESSMENT, COURSE AND PLAN  Care Narrative: This a pleasant 78-year-old male presents with shingles on the right chest wall.  For the chest pain, he has a heart score of 3 but here EKG shows no ST elevation myocardial infarction, troponin is negative and the patient has shingles.  He received morphine here, prednisone and prescription for prednisone as well as morphine IR.  He is to take it with me are sparingly.  Patient has no evidence of acute coronary syndrome, I do not believe is a pulmonary embolism secondary to no evidence of positive Wells criteria, he has no evidence of aortic dissection make aneurysm based clinically.  The patient will follow-up with primary care physician as needed for further evaluation and management.        ADDITIONAL PROBLEM LIST  Hypertension and diabetes, will follow-up with his primary care physician for further evaluation and management.  DISPOSITION AND DISCUSSIONS      Decision tools and prescription drugs considered  including, but not limited to: Negative with Wells criteria for pulmonary embolism, no evidence of acute coronary syndrome negative EKG and troponin.    FINAL DIAGNOSIS  1. Herpes zoster without complication Active     DISPOSITION:  Patient will be discharged home in stable condition.    FOLLOW UP:  [facility], Emergency Dept  [address]  [place] [id number] [phone]    If symptoms worsen    Juliane Ames P.A.-C.  [address]  [facility] [id number] [phone]    Schedule an appointment as soon as possible for a visit   As needed      OUTPATIENT MEDICATIONS:  Discharge Medication List as of 1/25/2024 11:48 AM        START taking these medications    Details   morphine (MS IR) 15 MG tablet Take 0.5 Tablets by mouth every four hours as needed for Severe Pain (pain) for up to 3 days., Disp-9 Tablet, R-0, Normal             Electronically signed by: Zenon Collier D.O., 1/25/2024 10:05 AM

## 2024-01-25 NOTE — ED NOTES
Pt. Family assisted pt. To dress and pt. Was wheeled out of ED via w/c.  Pt. And family verbalized understanding of all discharge instructions.

## 2024-01-25 NOTE — DISCHARGE INSTRUCTIONS
Medication as prescribed, return to the emerged part you have severe pain.  Please take ibuprofen and Tylenol for pain and take the morphine IR for breakthrough pain this may take several days not weeks to dissipate.  Please follow with your primary care physician for further evaluation and management.

## 2024-02-04 DIAGNOSIS — I10 ESSENTIAL HYPERTENSION, BENIGN: ICD-10-CM

## 2024-02-05 RX ORDER — CARVEDILOL 12.5 MG/1
12.5 TABLET ORAL 2 TIMES DAILY WITH MEALS
Qty: 180 TABLET | Refills: 0 | Status: SHIPPED | OUTPATIENT
Start: 2024-02-05

## 2024-02-05 NOTE — TELEPHONE ENCOUNTER
Is the patient due for a refill? Yes    Was the patient seen the past year? No    Date of last office visit: 12/08/2022    Does the patient have an upcoming appointment?  Yes   If yes, When? 03/14/2024    Provider to refill:MONICA    Does the patients insurance require a 100 day supply?  Yes

## 2024-02-21 DIAGNOSIS — N40.0 BENIGN PROSTATIC HYPERPLASIA WITHOUT LOWER URINARY TRACT SYMPTOMS: ICD-10-CM

## 2024-02-21 RX ORDER — FINASTERIDE 5 MG/1
5 TABLET, FILM COATED ORAL DAILY
Qty: 90 TABLET | Refills: 0 | Status: SHIPPED | OUTPATIENT
Start: 2024-02-21

## 2024-02-21 NOTE — TELEPHONE ENCOUNTER
Received request via: Pharmacy    Was the patient seen in the last year in this department? Yes    Does the patient have an active prescription (recently filled or refills available) for medication(s) requested? No    Pharmacy Name: [facility]    Does the patient have skilled nursing Plus and need 100 day supply (blood pressure, diabetes and cholesterol meds only)? Medication is not for cholesterol, blood pressure or diabetes

## 2024-02-27 DIAGNOSIS — I10 ESSENTIAL HYPERTENSION, BENIGN: ICD-10-CM

## 2024-02-27 DIAGNOSIS — I10 ESSENTIAL HYPERTENSION: ICD-10-CM

## 2024-02-27 RX ORDER — AMLODIPINE BESYLATE 10 MG/1
10 TABLET ORAL DAILY
Qty: 100 TABLET | Refills: 3 | Status: SHIPPED | OUTPATIENT
Start: 2024-02-27

## 2024-02-27 NOTE — TELEPHONE ENCOUNTER
Received request via: Pharmacy    Was the patient seen in the last year in this department? Yes    Does the patient have an active prescription (recently filled or refills available) for medication(s) requested? No    Pharmacy Name: [facility]    Does the patient have alf Plus and need 100 day supply (blood pressure, diabetes and cholesterol meds only)? Yes, quantity updated to 100 days   Wear brace anytime while out of bed, or when head of bed is > 30 degrees. Continue a bowel regimen of senna and Miralax daily, with dulcolax as needed. Gradually increase activities / walking. Do not lift anything heavier than a gallon of milk. Continue to use your incentive spirometer. Any new numbness / tingling / weakness, any change in character or intensity of pain, or any sign of infection - call Dr Corley's office or visit the [facility] emergency room

## 2024-03-07 ENCOUNTER — OFFICE VISIT (OUTPATIENT)
Dept: MEDICAL GROUP | Facility: MEDICAL CENTER | Age: 79
End: 2024-03-07
Payer: MEDICARE

## 2024-03-07 VITALS
HEIGHT: 64 IN | OXYGEN SATURATION: 95 % | WEIGHT: 232.8 LBS | BODY MASS INDEX: 39.75 KG/M2 | HEART RATE: 84 BPM | SYSTOLIC BLOOD PRESSURE: 130 MMHG | TEMPERATURE: 97.9 F | DIASTOLIC BLOOD PRESSURE: 72 MMHG

## 2024-03-07 DIAGNOSIS — L57.0 AK (ACTINIC KERATOSIS): ICD-10-CM

## 2024-03-07 DIAGNOSIS — E11.59 HIGH BLOOD PRESSURE ASSOCIATED WITH DIABETES (HCC): ICD-10-CM

## 2024-03-07 DIAGNOSIS — I70.0 ATHEROSCLEROSIS OF AORTA (HCC): ICD-10-CM

## 2024-03-07 DIAGNOSIS — E66.9 OBESITY (BMI 30-39.9): ICD-10-CM

## 2024-03-07 DIAGNOSIS — R80.9 MICROALBUMINURIA DUE TO TYPE 2 DIABETES MELLITUS (HCC): ICD-10-CM

## 2024-03-07 DIAGNOSIS — E78.5 HYPERLIPIDEMIA ASSOCIATED WITH TYPE 2 DIABETES MELLITUS (HCC): ICD-10-CM

## 2024-03-07 DIAGNOSIS — I15.2 HIGH BLOOD PRESSURE ASSOCIATED WITH DIABETES (HCC): ICD-10-CM

## 2024-03-07 DIAGNOSIS — E11.65 TYPE 2 DIABETES MELLITUS WITH HYPERGLYCEMIA, WITHOUT LONG-TERM CURRENT USE OF INSULIN (HCC): ICD-10-CM

## 2024-03-07 DIAGNOSIS — E11.69 HYPERLIPIDEMIA ASSOCIATED WITH TYPE 2 DIABETES MELLITUS (HCC): ICD-10-CM

## 2024-03-07 DIAGNOSIS — E11.29 MICROALBUMINURIA DUE TO TYPE 2 DIABETES MELLITUS (HCC): ICD-10-CM

## 2024-03-07 DIAGNOSIS — E11.41 TYPE 2 DIABETES MELLITUS WITH DIABETIC MONONEUROPATHY, WITHOUT LONG-TERM CURRENT USE OF INSULIN (HCC): ICD-10-CM

## 2024-03-07 DIAGNOSIS — G31.9 CEREBRAL ATROPHY (HCC): ICD-10-CM

## 2024-03-07 PROCEDURE — 3075F SYST BP GE 130 - 139MM HG: CPT

## 2024-03-07 PROCEDURE — 3078F DIAST BP <80 MM HG: CPT

## 2024-03-07 PROCEDURE — 99214 OFFICE O/P EST MOD 30 MIN: CPT

## 2024-03-07 RX ORDER — EMPAGLIFLOZIN 25 MG/1
25 TABLET, FILM COATED ORAL DAILY
Qty: 100 TABLET | Refills: 3 | Status: SHIPPED | OUTPATIENT
Start: 2024-03-07

## 2024-03-07 ASSESSMENT — ENCOUNTER SYMPTOMS
ORTHOPNEA: 0
PALPITATIONS: 0
COUGH: 0
CHILLS: 0
FEVER: 0
SHORTNESS OF BREATH: 0

## 2024-03-07 ASSESSMENT — PATIENT HEALTH QUESTIONNAIRE - PHQ9: CLINICAL INTERPRETATION OF PHQ2 SCORE: 0

## 2024-03-07 ASSESSMENT — FIBROSIS 4 INDEX: FIB4 SCORE: 1.97

## 2024-03-07 NOTE — PROGRESS NOTES
Subjective:     CC: Establish Care      HPI:   Jonathan is a 78 y.o. male who presents today for:    Diabetes: he is taking his medication. He does check his blood sugar at home, it was 112. A1C was 6.6.     HTN: he does check it at home. BP well controlled, today it is 130/72.     Hyperlipidemia: he tries to watch what he eats. His son in law helps him manage his diet, he is a dietitian. He does take his atorvastatin.    Lesion on right forearm: he has a lesion on his right fore arm that appeared a few weeks ago. It appears to be growing. It itches. He has had precancerous lesions on his arms before. He would like to see a new dermatologist.       Allergies: Patient has no known allergies.     Medications:   Current Outpatient Medications:     Empagliflozin (JARDIANCE) 25 MG Tab, Take 25 mg by mouth every day., Disp: 100 Tablet, Rfl: 3    amLODIPine (NORVASC) 10 MG Tab, Take 1 Tablet by mouth every day., Disp: 100 Tablet, Rfl: 3    finasteride (PROSCAR) 5 MG Tab, Take 1 Tablet by mouth every day., Disp: 90 Tablet, Rfl: 0    carvedilol (COREG) 12.5 MG Tab, TAKE 1 TABLET BY MOUTH TWICE DAILY WITH MEALS, Disp: 180 Tablet, Rfl: 0    lidocaine (XYLOCAINE) 5 % Ointment, Apply 2 g topically 3 times a day as needed (shingles)., Disp: 50 g, Rfl: 0    glipizide-metformin (METAGLIP) 5-500 MG per tablet, TAKE 1 TABLET BY MOUTH TWICE DAILY BEFORE BREAKFAST AND  DINNER, Disp: 200 Tablet, Rfl: 3    olmesartan (BENICAR) 5 MG tablet, TAKE 1 TABLET BY MOUTH ONCE DAILY *USE IN PLACE OF LISINOPRIL SECONDARY TO COUGH, Disp: 100 Tablet, Rfl: 3    tamsulosin (FLOMAX) 0.4 MG capsule, Take 1 Capsule by mouth 1/2 hour after breakfast., Disp: 30 Capsule, Rfl: 11    FLUoxetine (PROZAC) 20 MG Cap, Take 1 Capsule by mouth every day., Disp: 30 Capsule, Rfl: 11    atorvastatin (LIPITOR) 40 MG Tab, Take 1 Tablet by mouth every day., Disp: 100 Tablet, Rfl: 3    budesonide-formoterol (SYMBICORT) 80-4.5 MCG/ACT Aerosol, Inhale 2 Puffs 2 times a day.,  "Disp: 10.2 g, Rfl: 11    albuterol 108 (90 Base) MCG/ACT Aero Soln inhalation aerosol, Inhale 2 Puffs every four hours as needed for Shortness of Breath., Disp: 1 Each, Rfl: 11    omeprazole (PRILOSEC) 20 MG delayed-release capsule, TAKE 1 CAPSULE BY MOUTH IN THE MORNING ON AN EMPTY STOMACH 30 MINUTES PRIOR TO EATING AND DRINKING., Disp: , Rfl:     vitamin D (CHOLECALCIFEROL) 1000 UNIT Tab, Take 1 Tablet by mouth every day., Disp: , Rfl:     Omega-3 Fatty Acids (FISH OIL) 1000 MG Cap capsule, Take 1 Capsule by mouth every day., Disp: , Rfl:       ROS:  Review of Systems   Constitutional:  Negative for chills and fever.   Respiratory:  Negative for cough and shortness of breath.    Cardiovascular:  Negative for chest pain, palpitations, orthopnea and leg swelling.       Objective:     Exam:  /72   Pulse 84   Temp 36.6 °C (97.9 °F) (Temporal)   Ht 1.626 m (5' 4\")   Wt 106 kg (232 lb 12.8 oz)   SpO2 95%   BMI 39.96 kg/m²  Body mass index is 39.96 kg/m².    Physical Exam  Constitutional:       Appearance: He is normal weight.   Eyes:      Pupils: Pupils are equal, round, and reactive to light.   Cardiovascular:      Rate and Rhythm: Normal rate and regular rhythm.      Pulses: Normal pulses.      Heart sounds: Normal heart sounds.   Pulmonary:      Effort: Pulmonary effort is normal.      Breath sounds: Normal breath sounds.   Neurological:      Mental Status: He is alert and oriented to person, place, and time.   Psychiatric:         Mood and Affect: Mood normal.         Behavior: Behavior normal.           Assessment & Plan:     Elise 78 y.o. male with the following -     1. Type 2 diabetes mellitus with hyperglycemia, without long-term current use of insulin (HCC)  2. 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 Hyperlipidemia associated with type 2 diabetes mellitus (HCC)  Chronic, stable  Continue to work on diet and exercise to help with " blood sugar management as well as cholesterol management.  - Empagliflozin (JARDIANCE) 25 MG Tab; Take 25 mg by mouth every day.  Dispense: 100 Tablet; Refill: 3  -Continue glipizide-metformin (METAGLIP) 5-500 MG per tablet, TAKE 1 TABLET BY MOUTH TWICE DAILY BEFORE BREAKFAST AND  DINNER, Disp: 200 Tablet, Rfl: 3  - Lipid Profile; Future  -Continue atorvastatin (LIPITOR) 40 MG Tab, Take 1 Tablet by mouth every day., Disp: 100 Tablet, Rfl: 3    4. High blood pressure associated with diabetes (HCC)  Chronic, stable  -ContinueamLODIPine (NORVASC) 10 MG Tab, Take 1 Tablet by mouth every day., Disp: 100 Tablet, Rfl: 3  -Continue  carvedilol (COREG) 12.5 MG Tab, TAKE 1 TABLET BY MOUTH TWICE DAILY WITH MEALS, Disp: 180 Tablet, Rfl: 0  -Continue  olmesartan (BENICAR) 5 MG tablet, TAKE 1 TABLET BY MOUTH ONCE DAILY *USE IN PLACE OF LISINOPRIL SECONDARY TO COUGH, Disp: 100 Tablet, Rfl: 3    5. AK (actinic keratosis)  Chronic, stable  - Referral to Dermatology    6. Obesity (BMI 30-39.9)  7. Body mass index (BMI) 39.0-39.9, adult  Chronic, stable    8. Atherosclerosis of aorta (HCC)  9. Cerebral atrophy (HCC)  10. Microalbuminuria due to type 2 diabetes mellitus (HCC)  Please see HCC gap form    HCC Gap Form    Diagnosis to address: I70.0 - Atherosclerosis of aorta (HCC)  Assessment and plan: Chronic, stable, as based on today's assessment and impact on other conditions evaluated today. Continue with current treatment plan: Continue atorvastatin follow-up with cardiology.  Follow-up with specialist as directed, but at least annually.  Diagnosis: E11.29, R80.9 - Microalbuminuria due to type 2 diabetes mellitus (HCC)  Assessment and plan: Chronic, stable. Continue with current defined treatment plan: Continue to work on diet and exercise to help with blood sugar management.  Encourage patient to remain hydrated to help prevent further kidney damage.. Follow-up at least annually.  Diagnosis: G31.9 - Cerebral atrophy  (HCC)  Assessment and plan: Chronic, stable. Continue with current defined treatment plan: Continue to monitor for memory impairment.. Follow-up at least annually.  Last edited 03/07/24 10:04 PST by DEANDRE Zelaya.           Anticipatory guidance included the following: Patient counseled about skin care, diet, supplements, smoking, drugs/alcohol use, safe sex and exercise.     Return in about 3 months (around 6/7/2024) for F/U Diabetes.    Please note that this dictation was created using voice recognition software. I have made every reasonable attempt to correct obvious errors, but I expect that there are errors of grammar and possibly content that I did not discover before finalizing the note.

## 2024-03-14 ENCOUNTER — OFFICE VISIT (OUTPATIENT)
Dept: CARDIOLOGY | Facility: MEDICAL CENTER | Age: 79
End: 2024-03-14
Attending: INTERNAL MEDICINE
Payer: MEDICARE

## 2024-03-14 VITALS
HEART RATE: 80 BPM | OXYGEN SATURATION: 93 % | RESPIRATION RATE: 16 BRPM | HEIGHT: 64 IN | SYSTOLIC BLOOD PRESSURE: 130 MMHG | BODY MASS INDEX: 39.95 KG/M2 | DIASTOLIC BLOOD PRESSURE: 60 MMHG | WEIGHT: 234 LBS

## 2024-03-14 DIAGNOSIS — I45.10 RIGHT BUNDLE BRANCH BLOCK: ICD-10-CM

## 2024-03-14 DIAGNOSIS — E78.5 HYPERLIPIDEMIA ASSOCIATED WITH TYPE 2 DIABETES MELLITUS (HCC): ICD-10-CM

## 2024-03-14 DIAGNOSIS — I44.4 LAFB (LEFT ANTERIOR FASCICULAR BLOCK): ICD-10-CM

## 2024-03-14 DIAGNOSIS — R94.31 ABNORMAL EKG: ICD-10-CM

## 2024-03-14 DIAGNOSIS — E11.69 HYPERLIPIDEMIA ASSOCIATED WITH TYPE 2 DIABETES MELLITUS (HCC): ICD-10-CM

## 2024-03-14 PROCEDURE — 99213 OFFICE O/P EST LOW 20 MIN: CPT | Performed by: INTERNAL MEDICINE

## 2024-03-14 PROCEDURE — 99214 OFFICE O/P EST MOD 30 MIN: CPT | Performed by: INTERNAL MEDICINE

## 2024-03-14 PROCEDURE — 3078F DIAST BP <80 MM HG: CPT | Performed by: INTERNAL MEDICINE

## 2024-03-14 PROCEDURE — 3075F SYST BP GE 130 - 139MM HG: CPT | Performed by: INTERNAL MEDICINE

## 2024-03-14 ASSESSMENT — ENCOUNTER SYMPTOMS
PALPITATIONS: 0
COUGH: 0
MYALGIAS: 0
DIZZINESS: 0
LOSS OF CONSCIOUSNESS: 0
SHORTNESS OF BREATH: 0

## 2024-03-14 ASSESSMENT — FIBROSIS 4 INDEX: FIB4 SCORE: 1.97

## 2024-03-14 NOTE — PROGRESS NOTES
"Chief Complaint   Patient presents with    Abnormal EKG    Hypertension     F/V Dx: Essential hypertension, benign    Aortic Atherosclerosis         Subjective     Jonathan Slade is a 78 y.o. male who presents today for follow-up cardiac care.    The patient has medical problems including vasovagal syncope, abnormal EKG, hypertension, dyslipidemia, diabetes mellitus, ONI, COPD, obesity, shingles 1/2024, balance problems.    Since 12/8/2022 appointment the patient has had no cardiac symptoms including chest pain, palpitations, shortness of breath.  Seen and [facility] 1/25/2024 with extremely painful shingles right thorax.  Has had \"vertigo\" and positional balance problems when he bends over or stands up too quick for which he is undergone PT at [facility].  Walks daily for half an hour using a walker.    Since 11/7/2022 appointment the patient has had no cardiac symptoms including chest pain, palpitations, shortness of breath.  Carvedilol 12.5 mg twice daily was started and brings in a meticulous BP log.    Since 11/1/2021 appointment the patient has had no cardiac symptoms including chest pain, palpitations, shortness of breath. Activity limited due to knee arthritis requiring a cane for ambulation assistance.  Prior PCP Angel Guerin MD left and has been reassigned to Juliane Ames PA-C.  Continues to check and record daily BP which runs \"144/80 sometimes 150\".  Previously was a heavy smoker and alcohol drinker but has been abstinent for many years    Since 10/2/2020 appointment the patient has had no further episodes of syncope.  No chest pain or FAYE.  Closely followed by his PCP Dr. Angel Guerin.  Checks BP daily rounds \"140s\".  Did not follow-up last year when referred to [facility] program for weight loss.  States that his son-in-law is a certified nutritionist and is managing his diet.  Past Medical History:   Diagnosis Date    Asthma     Back pain     Cough     Diabetes     ONI treated with BiPAP " 05/10/2016    Painful breathing     Shortness of breath     Sleep apnea     Sputum production     Wheezing      Past Surgical History:   Procedure Laterality Date    GASTROSCOPY-ENDO N/A 2019    Procedure: GASTROSCOPY;  Surgeon: Tyler Rothman M.D.;  Location: ENDOSCOPY [facility];  Service: Gastroenterology    FULL THICKNESS BLOCK RESECTION  2013    Performed by Greyson Lopez M.D. at [facility]    BIOPSY GENERAL  2013    Performed by Greyson Lopez M.D. at [facility]    CATARACT EXTRACTION WITH IOL      KNEE REPLACEMENT, TOTAL       Family History   Problem Relation Age of Onset    Heart Attack Brother     Cancer Neg Hx      Social History     Socioeconomic History    Marital status:      Spouse name: Not on file    Number of children: Not on file    Years of education: Not on file    Highest education level: Not on file   Occupational History    Not on file   Tobacco Use    Smoking status: Former     Current packs/day: 0.00     Average packs/day: 1 pack/day for 10.0 years (10.0 ttl pk-yrs)     Types: Cigarettes     Start date: 1985     Quit date: 1995     Years since quittin.2    Smokeless tobacco: Never   Vaping Use    Vaping Use: Never used   Substance and Sexual Activity    Alcohol use: Not Currently     Alcohol/week: 2.4 - 3.6 oz     Types: 4 - 6 Cans of beer per week    Drug use: No    Sexual activity: Not on file   Other Topics Concern    Not on file   Social History Narrative    ** Merged History Encounter **          Social Determinants of Health     Financial Resource Strain: Not on file   Food Insecurity: Not on file   Transportation Needs: Not on file   Physical Activity: Not on file   Stress: Not on file   Social Connections: Not on file   Intimate Partner Violence: Not on file   Housing Stability: Not on file     No Known Allergies  Outpatient Encounter Medications as of 3/14/2024   Medication Sig Dispense Refill     Empagliflozin (JARDIANCE) 25 MG Tab Take 25 mg by mouth every day. 100 Tablet 3    amLODIPine (NORVASC) 10 MG Tab Take 1 Tablet by mouth every day. 100 Tablet 3    finasteride (PROSCAR) 5 MG Tab Take 1 Tablet by mouth every day. 90 Tablet 0    carvedilol (COREG) 12.5 MG Tab TAKE 1 TABLET BY MOUTH TWICE DAILY WITH MEALS 180 Tablet 0    lidocaine (XYLOCAINE) 5 % Ointment Apply 2 g topically 3 times a day as needed (shingles). 50 g 0    glipizide-metformin (METAGLIP) 5-500 MG per tablet TAKE 1 TABLET BY MOUTH TWICE DAILY BEFORE BREAKFAST AND  DINNER 200 Tablet 3    olmesartan (BENICAR) 5 MG tablet TAKE 1 TABLET BY MOUTH ONCE DAILY *USE IN PLACE OF LISINOPRIL SECONDARY TO COUGH 100 Tablet 3    tamsulosin (FLOMAX) 0.4 MG capsule Take 1 Capsule by mouth 1/2 hour after breakfast. 30 Capsule 11    FLUoxetine (PROZAC) 20 MG Cap Take 1 Capsule by mouth every day. 30 Capsule 11    atorvastatin (LIPITOR) 40 MG Tab Take 1 Tablet by mouth every day. 100 Tablet 3    budesonide-formoterol (SYMBICORT) 80-4.5 MCG/ACT Aerosol Inhale 2 Puffs 2 times a day. 10.2 g 11    albuterol 108 (90 Base) MCG/ACT Aero Soln inhalation aerosol Inhale 2 Puffs every four hours as needed for Shortness of Breath. 1 Each 11    omeprazole (PRILOSEC) 20 MG delayed-release capsule TAKE 1 CAPSULE BY MOUTH IN THE MORNING ON AN EMPTY STOMACH 30 MINUTES PRIOR TO EATING AND DRINKING.      vitamin D (CHOLECALCIFEROL) 1000 UNIT Tab Take 1 Tablet by mouth every day.      Omega-3 Fatty Acids (FISH OIL) 1000 MG Cap capsule Take 1 Capsule by mouth every day.       No facility-administered encounter medications on file as of 3/14/2024.     Review of Systems   Respiratory:  Negative for cough and shortness of breath.    Cardiovascular:  Negative for chest pain and palpitations.   Musculoskeletal:  Negative for myalgias.   Neurological:  Negative for dizziness and loss of consciousness.              Objective     /60 (BP Location: Left arm, Patient Position:  "Sitting, BP Cuff Size: Adult)   Pulse 80   Resp 16   Ht 1.626 m (5' 4\")   Wt 106 kg (234 lb)   SpO2 93%   BMI 40.17 kg/m²   BP Readings from Last 5 Encounters:   03/14/24 130/60   03/07/24 130/72   01/25/24 (!) 153/92   01/23/24 130/84   12/27/23 122/68      Pulse Readings from Last 5 Encounters:   03/14/24 80   03/07/24 84   01/25/24 71   01/23/24 79   12/27/23 77      Wt Readings from Last 5 Encounters:   03/14/24 106 kg (234 lb)   03/07/24 106 kg (232 lb 12.8 oz)   01/25/24 110 kg (242 lb)   01/23/24 107 kg (235 lb)   12/27/23 107 kg (235 lb 14.3 oz)       Physical Exam  Constitutional:       Appearance: He is well-developed.   Neck:      Vascular: No JVD.   Cardiovascular:      Rate and Rhythm: Normal rate and regular rhythm.      Heart sounds: Normal heart sounds.   Pulmonary:      Effort: Pulmonary effort is normal. No accessory muscle usage or respiratory distress.      Breath sounds: Normal breath sounds. No wheezing or rales.   Abdominal:      Comments: Markedly protuberant   Musculoskeletal:      Right lower leg: Edema present.      Left lower leg: Edema present.      Comments: Chronic dermatologic changes, mild edema.   Skin:     General: Skin is warm and dry.      Findings: No rash.      Nails: There is no clubbing.   Neurological:      Mental Status: He is alert and oriented to person, place, and time.   Psychiatric:         Behavior: Behavior normal.                 ECHOCARDIOGRAM 04/29/2019  Normal left ventricular systolic function. Left ventricular ejection   fraction is visually estimated to be 60%.  Normal diastolic function.  Estimated right ventricular systolic pressure  is 34 mmHg + JVP.  Aortic sclerosis without stenosis.     MPI 3/19/2012  1. THE GATED STUDY REVEALS A NORMAL EJECTION FRACTION WITHOUT SEGMENTAL   WALL MOTION ABNORMALITY.   2. SPECT IMAGES ARE FELT TO BE NORMAL WITHOUT EVIDENCE OF SIGNIFICANT   ISCHEMIA OR INFARCTION.      Assessment & Plan     1. Abnormal EKG        2. " LAFB (left anterior fascicular block)        3. RBBB        4. Hyperlipidemia associated with type 2 diabetes mellitus (HCC)            Medical Decision Making: Today's Assessment/Status/Plan:   Assessment  Abnormal EKG  Conduction system disease LAFB, RBBB.  Hypertension.  Dyslipidemia.  Diabetes mellitus  ONI on BiPAP.  Asthma  Morbid obesity.  Former smoker (10 pack years, quit 1995)  History of vasovagal/orthostatic syncope.     Recommendation Discussion  Stable from a cardiac standpoint.  No ischemic or heart failure symptoms.  ASCVD prevention is being addressed.  BP at goal, on carvedilol, olmesartan, amlodipine  LDL at goal at 41, on atorvastatin  A1c at goal at 6.6%, on empagliflozin.  Encouraged continued efforts for weight loss.  With diabetes would be a good candidate for semaglutide with additional cardioprotection  RTC 12 months

## 2024-03-25 ENCOUNTER — APPOINTMENT (RX ONLY)
Dept: URBAN - METROPOLITAN AREA CLINIC 4 | Facility: CLINIC | Age: 79
Setting detail: DERMATOLOGY
End: 2024-03-25

## 2024-03-25 DIAGNOSIS — D22 MELANOCYTIC NEVI: ICD-10-CM

## 2024-03-25 DIAGNOSIS — L85.3 XEROSIS CUTIS: ICD-10-CM

## 2024-03-25 DIAGNOSIS — D18.0 HEMANGIOMA: ICD-10-CM

## 2024-03-25 DIAGNOSIS — L81.4 OTHER MELANIN HYPERPIGMENTATION: ICD-10-CM

## 2024-03-25 DIAGNOSIS — L82.1 OTHER SEBORRHEIC KERATOSIS: ICD-10-CM

## 2024-03-25 PROBLEM — C44.622 SQUAMOUS CELL CARCINOMA OF SKIN OF RIGHT UPPER LIMB, INCLUDING SHOULDER: Status: ACTIVE | Noted: 2024-03-25

## 2024-03-25 PROBLEM — D22.5 MELANOCYTIC NEVI OF TRUNK: Status: ACTIVE | Noted: 2024-03-25

## 2024-03-25 PROBLEM — D18.01 HEMANGIOMA OF SKIN AND SUBCUTANEOUS TISSUE: Status: ACTIVE | Noted: 2024-03-25

## 2024-03-25 PROCEDURE — ? COUNSELING

## 2024-03-25 PROCEDURE — 99203 OFFICE O/P NEW LOW 30 MIN: CPT | Mod: 25

## 2024-03-25 PROCEDURE — 11102 TANGNTL BX SKIN SINGLE LES: CPT

## 2024-03-25 PROCEDURE — ? BIOPSY BY SHAVE METHOD

## 2024-03-25 ASSESSMENT — LOCATION SIMPLE DESCRIPTION DERM
LOCATION SIMPLE: LEFT FOREARM
LOCATION SIMPLE: RIGHT FOREARM
LOCATION SIMPLE: ABDOMEN
LOCATION SIMPLE: UPPER BACK

## 2024-03-25 ASSESSMENT — LOCATION ZONE DERM
LOCATION ZONE: ARM
LOCATION ZONE: TRUNK

## 2024-03-25 ASSESSMENT — LOCATION DETAILED DESCRIPTION DERM
LOCATION DETAILED: RIGHT PROXIMAL DORSAL FOREARM
LOCATION DETAILED: EPIGASTRIC SKIN
LOCATION DETAILED: RIGHT RIB CAGE
LOCATION DETAILED: SUPERIOR THORACIC SPINE
LOCATION DETAILED: LEFT PROXIMAL DORSAL FOREARM
LOCATION DETAILED: INFERIOR THORACIC SPINE

## 2024-03-25 NOTE — PROCEDURE: COUNSELING
Detail Level: Zone
Moisturizer Recommendations: Cerave Cream
Detail Level: Generalized
Sunscreen Recommendations: Recommend sunscreen daily, or sun protective clothing.  I recommend a zinc or titanium based sunscreen with a spf of 30 or greater.

## 2024-04-03 ENCOUNTER — TELEPHONE (OUTPATIENT)
Dept: HEALTH INFORMATION MANAGEMENT | Facility: OTHER | Age: 79
End: 2024-04-03
Payer: MEDICARE

## 2024-04-25 ENCOUNTER — APPOINTMENT (OUTPATIENT)
Dept: FAMILY PLANNING/WOMEN'S HEALTH CLINIC | Facility: PHYSICIAN GROUP | Age: 79
End: 2024-04-25
Payer: MEDICARE

## 2024-05-01 NOTE — ASSESSMENT & PLAN NOTE
Chronic, stable. Reviewed lipid profile from June 2023. Currently taking atorvastatin 40 mg daily. Reports that he walks for 30-45 minutes when the weather is good. Denies chest pain and claudication. Followed by cardiology, Dr. Serrano.

## 2024-05-01 NOTE — ASSESSMENT & PLAN NOTE
Chronic, ongoing. Reviewed most recent urine microalbumin with creatinine ratio from December 2023 with baseline trend of microalbuminuria. Routinely monitored by primary care provider.

## 2024-05-01 NOTE — ASSESSMENT & PLAN NOTE
Chronic, stable. Currently taking finasteride 5 mg and tamsulosin 0.4 mg daily. Denies obstructive symptoms, dysuria, and flank pain.

## 2024-05-01 NOTE — ASSESSMENT & PLAN NOTE
Chronic, stable. Reports that symptoms are well-controlled with symbicort and albuterol as needed. Denies recent exacerbation. Followed by CHARLES Whittington

## 2024-05-01 NOTE — ASSESSMENT & PLAN NOTE
Chronic, stable. Reviewed chest x-ray from November 2023. Denies chest pain, pain with ambulation, and weakness of extremities.

## 2024-05-01 NOTE — ASSESSMENT & PLAN NOTE
Chronic, stable. Reviewed CT of the head from April 2019. Denies visual disturbances and changes in mentation.

## 2024-05-01 NOTE — ASSESSMENT & PLAN NOTE
BMI is 40.85 kg/m2. Provided education on heart healthy diet with adequate intake of fruits, vegetables, and whole grains. Encourage 30 minutes of moderate exercise 3-4 times a week.

## 2024-05-01 NOTE — ASSESSMENT & PLAN NOTE
Chronic, stable. Currently taking amlodipine 10 mg daily, carvedilol 12.5 mg twice daily, and olmesartan 5 mg daily. In-office blood pressure is 132/80. Denies chest pain, headaches, and lower extremity edema. Followed by cardiology, Dr. Serrano.    Please let her know I re-submitted prescription for cymbalta 120mg/d to the [facility] in [place].

## 2024-05-01 NOTE — ASSESSMENT & PLAN NOTE
Chronic, stable. Currently on CPAP, reports an average of 8 hours nightly. Followed by pulmonology and sleep medicine, Ricarda SOTO

## 2024-05-01 NOTE — ASSESSMENT & PLAN NOTE
Chronic, stable. Most recent hemoglobin A1C was 6.6 in December 2023. Reports that blood sugars range from 110-140. Currently taking empagliflozin 25 mg and glipizide-metformin 5-500 mg daily. Routinely monitored by primary care provider.

## 2024-05-02 ENCOUNTER — APPOINTMENT (RX ONLY)
Dept: URBAN - METROPOLITAN AREA CLINIC 4 | Facility: CLINIC | Age: 79
Setting detail: DERMATOLOGY
End: 2024-05-02

## 2024-05-02 ENCOUNTER — TELEPHONE (OUTPATIENT)
Dept: FAMILY PLANNING/WOMEN'S HEALTH CLINIC | Facility: PHYSICIAN GROUP | Age: 79
End: 2024-05-02

## 2024-05-02 ENCOUNTER — APPOINTMENT (OUTPATIENT)
Dept: FAMILY PLANNING/WOMEN'S HEALTH CLINIC | Facility: PHYSICIAN GROUP | Age: 79
End: 2024-05-02
Payer: MEDICARE

## 2024-05-02 PROBLEM — C44.622 SQUAMOUS CELL CARCINOMA OF SKIN OF RIGHT UPPER LIMB, INCLUDING SHOULDER: Status: ACTIVE | Noted: 2024-05-02

## 2024-05-02 PROCEDURE — 13121 CMPLX RPR S/A/L 2.6-7.5 CM: CPT

## 2024-05-02 PROCEDURE — ? EXCISION

## 2024-05-02 PROCEDURE — 13122 CMPLX RPR S/A/L ADDL 5 CM/>: CPT

## 2024-05-02 PROCEDURE — 11604 EXC TR-EXT MAL+MARG 3.1-4 CM: CPT

## 2024-05-02 NOTE — TELEPHONE ENCOUNTER
Confirmed appointment with patient for the CHAP apt on 5/6/24 at 9:05. Provided patient [facility] [address] [place] [address] and provided stores in the area to help with navigation to clinic.

## 2024-05-02 NOTE — PROCEDURE: EXCISION
Surgeon (Optional): Ciera
Biopsy Photograph Reviewed: Yes
Previous Accession (Optional): [id number]
Size Of Lesion In Cm: 2.9
X Size Of Lesion In Cm (Optional): 0
Size Of Margin In Cm: 0.2
Anesthesia Volume In Cc: 12
Was An Eye Clamp Used?: No
Eye Clamp Note Details: An eye clamp was used during the procedure.
Excision Method: Elliptical
Saucerization Depth: dermis and superficial adipose tissue
Repair Type: Complex
Intermediate / Complex Repair - Final Wound Length In Cm: 7.7
Suturegard Retention Suture: 2-0 Nylon
Retention Suture Bite Size: 3 mm
Length To Time In Minutes Device Was In Place: 10
Number Of Hemigard Strips Per Side: 1
Width Of Defect Perpendicular To Closure In Cm (Required): 2.7
Distance Of Undermining In Cm (Required): 3.2
Undermining Type: Entire Wound
Debridement Text: The wound edges were debrided prior to proceeding with the closure to facilitate wound healing.
Helical Rim Text: The closure involved the helical rim.
Vermilion Border Text: The closure involved the vermilion border.
Nostril Rim Text: The closure involved the nostril rim.
Retention Suture Text: Retention sutures were placed to support the closure and prevent dehiscence.
Lab: [id number]
Lab Facility: 
Graft Donor Site Bandage (Optional-Leave Blank If You Don't Want In Note): Steri-strips and a pressure bandage were applied to the donor site.
Epidermal Closure Graft Donor Site (Optional): simple interrupted
Billing Type: Third-Party Bill
Excision Depth: adipose tissue
Scalpel Size: 15 blade
Anesthesia Type: 1% lidocaine with epinephrine
Additional Anesthesia Volume In Cc: 6
Hemostasis: Electrocautery
Estimated Blood Loss (Cc): minimal
Detail Level: Detailed
Repair Depth: use same depth as excision depth
Repair Anesthesia Method: local infiltration
Anesthesia Volume In Cc: 19.7
Deep Sutures: 2-0 Vicryl
Dermal Closure: buried vertical mattress
Epidermal Sutures: 4-0 Vicryl Rapide
Epidermal Closure: running subcuticular
Wound Care: Bacitracin
Dressing: dry sterile dressing
Suturegard Intro: Intraoperative tissue expansion was performed, utilizing the SUTUREGARD device, in order to reduce wound tension.
Suturegard Body: The suture ends were repeatedly re-tightened and re-clamped to achieve the desired tissue expansion.
Hemigard Intro: Due to skin fragility and wound tension, it was decided to use HEMIGARD adhesive retention suture devices to permit a linear closure. The skin was cleaned and dried for a 6cm distance away from the wound. Excessive hair, if present, was removed to allow for adhesion.
Hemigard Postcare Instructions: The HEMIGARD strips are to remain completely dry for at least 5-7 days.
Positioning (Leave Blank If You Do Not Want): The patient was placed in a comfortable position exposing the surgical site.
Pre-Excision Curettage Text (Leave Blank If You Do Not Want): Prior to drawing the surgical margin the visible lesion was removed with electrodesiccation and curettage to clearly define the lesion size.
Complex Repair Preamble Text (Leave Blank If You Do Not Want): Extensive wide undermining was performed.
Intermediate Repair Preamble Text (Leave Blank If You Do Not Want): Undermining was performed with blunt dissection.
Curvilinear Excision Additional Text (Leave Blank If You Do Not Want): The margin was drawn around the clinically apparent lesion.  A curvilinear shape was then drawn on the skin incorporating the lesion and margins.  Incisions were then made along these lines to the appropriate tissue plane and the lesion was extirpated.
Fusiform Excision Additional Text (Leave Blank If You Do Not Want): The margin was drawn around the clinically apparent lesion.  A fusiform shape was then drawn on the skin incorporating the lesion and margins.  Incisions were then made along these lines to the appropriate tissue plane and the lesion was extirpated.
Elliptical Excision Additional Text (Leave Blank If You Do Not Want): The margin was drawn around the clinically apparent lesion.  An elliptical shape was then drawn on the skin incorporating the lesion and margins.  Incisions were then made along these lines to the appropriate tissue plane and the lesion was extirpated.
Saucerization Excision Additional Text (Leave Blank If You Do Not Want): The margin was drawn around the clinically apparent lesion.  Incisions were then made along these lines, in a tangential fashion, to the appropriate tissue plane and the lesion was extirpated.
Slit Excision Additional Text (Leave Blank If You Do Not Want): A linear line was drawn on the skin overlying the lesion. An incision was made slowly until the lesion was visualized.  Once visualized, the lesion was removed with blunt dissection.
Excisional Biopsy Additional Text (Leave Blank If You Do Not Want): The margin was drawn around the clinically apparent lesion. An elliptical shape was then drawn on the skin incorporating the lesion and margins.  Incisions were then made along these lines to the appropriate tissue plane and the lesion was extirpated.
Perilesional Excision Additional Text (Leave Blank If You Do Not Want): The margin was drawn around the clinically apparent lesion. Incisions were then made along these lines to the appropriate tissue plane and the lesion was extirpated.
Repair Performed By Another Provider Text (Leave Blank If You Do Not Want): After the tissue was excised the defect was repaired by another provider.
No Repair - Repaired With Adjacent Surgical Defect Text (Leave Blank If You Do Not Want): After the excision the defect was repaired concurrently with another surgical defect which was in close approximation.
Adjacent Tissue Transfer Text: The defect edges were debeveled with a #15 scalpel blade. Given the location of the defect and the proximity to free margins an adjacent tissue transfer was deemed most appropriate. Using a sterile surgical marker, an appropriate flap was drawn incorporating the defect and placing the expected incisions within the relaxed skin tension lines where possible. The area thus outlined was incised deep to adipose tissue with a #15 scalpel blade. The skin margins were undermined to an appropriate distance in all directions utilizing iris scissors and carried over to close the primary defect.
Advancement Flap (Single) Text: The defect edges were debeveled with a #15 scalpel blade.  Given the location of the defect and the proximity to free margins a single advancement flap was deemed most appropriate.  Using a sterile surgical marker, an appropriate advancement flap was drawn incorporating the defect and placing the expected incisions within the relaxed skin tension lines where possible.    The area thus outlined was incised deep to adipose tissue with a #15 scalpel blade.  The skin margins were undermined to an appropriate distance in all directions utilizing iris scissors.
Advancement Flap (Double) Text: The defect edges were debeveled with a #15 scalpel blade.  Given the location of the defect and the proximity to free margins a double advancement flap was deemed most appropriate.  Using a sterile surgical marker, the appropriate advancement flaps were drawn incorporating the defect and placing the expected incisions within the relaxed skin tension lines where possible.    The area thus outlined was incised deep to adipose tissue with a #15 scalpel blade.  The skin margins were undermined to an appropriate distance in all directions utilizing iris scissors.
Burow's Advancement Flap Text: The defect edges were debeveled with a #15 scalpel blade.  Given the location of the defect and the proximity to free margins a Burow's advancement flap was deemed most appropriate.  Using a sterile surgical marker, the appropriate advancement flap was drawn incorporating the defect and placing the expected incisions within the relaxed skin tension lines where possible.    The area thus outlined was incised deep to adipose tissue with a #15 scalpel blade.  The skin margins were undermined to an appropriate distance in all directions utilizing iris scissors.
Chonodrocutaneous Helical Advancement Flap Text: The defect edges were debeveled with a #15 scalpel blade. Given the location of the defect and the proximity to free margins a chondrocutaneous helical advancement flap was deemed most appropriate. Using a sterile surgical marker, the appropriate advancement flap was drawn incorporating the defect and placing the expected incisions within the relaxed skin tension lines where possible. The area thus outlined was incised deep to adipose tissue with a #15 scalpel blade. The skin margins were undermined to an appropriate distance in all directions utilizing iris scissors. Following this, the designed flap was advanced and carried over into the primary defect and sutured into place.
Crescentic Advancement Flap Text: The defect edges were debeveled with a #15 scalpel blade.  Given the location of the defect and the proximity to free margins a crescentic advancement flap was deemed most appropriate.  Using a sterile surgical marker, the appropriate advancement flap was drawn incorporating the defect and placing the expected incisions within the relaxed skin tension lines where possible.    The area thus outlined was incised deep to adipose tissue with a #15 scalpel blade.  The skin margins were undermined to an appropriate distance in all directions utilizing iris scissors.
A-T Advancement Flap Text: The defect edges were debeveled with a #15 scalpel blade.  Given the location of the defect, shape of the defect and the proximity to free margins an A-T advancement flap was deemed most appropriate.  Using a sterile surgical marker, an appropriate advancement flap was drawn incorporating the defect and placing the expected incisions within the relaxed skin tension lines where possible.    The area thus outlined was incised deep to adipose tissue with a #15 scalpel blade.  The skin margins were undermined to an appropriate distance in all directions utilizing iris scissors.
O-T Advancement Flap Text: The defect edges were debeveled with a #15 scalpel blade.  Given the location of the defect, shape of the defect and the proximity to free margins an O-T advancement flap was deemed most appropriate.  Using a sterile surgical marker, an appropriate advancement flap was drawn incorporating the defect and placing the expected incisions within the relaxed skin tension lines where possible.    The area thus outlined was incised deep to adipose tissue with a #15 scalpel blade.  The skin margins were undermined to an appropriate distance in all directions utilizing iris scissors.
O-L Flap Text: The defect edges were debeveled with a #15 scalpel blade.  Given the location of the defect, shape of the defect and the proximity to free margins an O-L flap was deemed most appropriate.  Using a sterile surgical marker, an appropriate advancement flap was drawn incorporating the defect and placing the expected incisions within the relaxed skin tension lines where possible.    The area thus outlined was incised deep to adipose tissue with a #15 scalpel blade.  The skin margins were undermined to an appropriate distance in all directions utilizing iris scissors.
O-Z Flap Text: The defect edges were debeveled with a #15 scalpel blade. Given the location of the defect, shape of the defect and the proximity to free margins an O-Z flap was deemed most appropriate. Using a sterile surgical marker, an appropriate transposition flap was drawn incorporating the defect and placing the expected incisions within the relaxed skin tension lines where possible. The area thus outlined was incised deep to adipose tissue with a #15 scalpel blade. The skin margins were undermined to an appropriate distance in all directions utilizing iris scissors. Following this, the designed flap was carried over into the primary defect and sutured into place.
Double O-Z Flap Text: The defect edges were debeveled with a #15 scalpel blade. Given the location of the defect, shape of the defect and the proximity to free margins a Double O-Z flap was deemed most appropriate. Using a sterile surgical marker, an appropriate transposition flap was drawn incorporating the defect and placing the expected incisions within the relaxed skin tension lines where possible. The area thus outlined was incised deep to adipose tissue with a #15 scalpel blade. The skin margins were undermined to an appropriate distance in all directions utilizing iris scissors. Following this, the designed flap was carried over into the primary defect and sutured into place.
V-Y Flap Text: The defect edges were debeveled with a #15 scalpel blade.  Given the location of the defect, shape of the defect and the proximity to free margins a V-Y flap was deemed most appropriate.  Using a sterile surgical marker, an appropriate advancement flap was drawn incorporating the defect and placing the expected incisions within the relaxed skin tension lines where possible.    The area thus outlined was incised deep to adipose tissue with a #15 scalpel blade.  The skin margins were undermined to an appropriate distance in all directions utilizing iris scissors.
Advancement-Rotation Flap Text: The defect edges were debeveled with a #15 scalpel blade.  Given the location of the defect, shape of the defect and the proximity to free margins an advancement-rotation flap was deemed most appropriate.  Using a sterile surgical marker, an appropriate flap was drawn incorporating the defect and placing the expected incisions within the relaxed skin tension lines where possible. The area thus outlined was incised deep to adipose tissue with a #15 scalpel blade.  The skin margins were undermined to an appropriate distance in all directions utilizing iris scissors.
Mercedes Flap Text: The defect edges were debeveled with a #15 scalpel blade. Given the location of the defect, shape of the defect and the proximity to free margins a Mercedes flap was deemed most appropriate. Using a sterile surgical marker, an appropriate advancement flap was drawn incorporating the defect and placing the expected incisions within the relaxed skin tension lines where possible. The area thus outlined was incised deep to adipose tissue with a #15 scalpel blade. The skin margins were undermined to an appropriate distance in all directions utilizing iris scissors. Following this, the designed flap was advanced and carried over into the primary defect and sutured into place.
Modified Advancement Flap Text: The defect edges were debeveled with a #15 scalpel blade.  Given the location of the defect, shape of the defect and the proximity to free margins a modified advancement flap was deemed most appropriate.  Using a sterile surgical marker, an appropriate advancement flap was drawn incorporating the defect and placing the expected incisions within the relaxed skin tension lines where possible.    The area thus outlined was incised deep to adipose tissue with a #15 scalpel blade.  The skin margins were undermined to an appropriate distance in all directions utilizing iris scissors.
Mucosal Advancement Flap Text: Given the location of the defect, shape of the defect and the proximity to free margins a mucosal advancement flap was deemed most appropriate. Incisions were made with a 15 blade scalpel in the appropriate fashion along the cutaneous vermilion border and the mucosal lip. The remaining actinically damaged mucosal tissue was excised.  The mucosal advancement flap was then elevated to the gingival sulcus with care taken to preserve the neurovascular structures and advanced into the primary defect. Care was taken to ensure that precise realignment of the vermilion border was achieved.
Peng Advancement Flap Text: The defect edges were debeveled with a #15 scalpel blade. Given the location of the defect, shape of the defect and the proximity to free margins a Peng advancement flap was deemed most appropriate. Using a sterile surgical marker, an appropriate advancement flap was drawn incorporating the defect and placing the expected incisions within the relaxed skin tension lines where possible. The area thus outlined was incised deep to adipose tissue with a #15 scalpel blade. The skin margins were undermined to an appropriate distance in all directions utilizing iris scissors. Following this, the designed flap was advanced and carried over into the primary defect and sutured into place.
Hatchet Flap Text: The defect edges were debeveled with a #15 scalpel blade.  Given the location of the defect, shape of the defect and the proximity to free margins a hatchet flap was deemed most appropriate.  Using a sterile surgical marker, an appropriate hatchet flap was drawn incorporating the defect and placing the expected incisions within the relaxed skin tension lines where possible.    The area thus outlined was incised deep to adipose tissue with a #15 scalpel blade.  The skin margins were undermined to an appropriate distance in all directions utilizing iris scissors.
Rotation Flap Text: The defect edges were debeveled with a #15 scalpel blade.  Given the location of the defect, shape of the defect and the proximity to free margins a rotation flap was deemed most appropriate.  Using a sterile surgical marker, an appropriate rotation flap was drawn incorporating the defect and placing the expected incisions within the relaxed skin tension lines where possible.    The area thus outlined was incised deep to adipose tissue with a #15 scalpel blade.  The skin margins were undermined to an appropriate distance in all directions utilizing iris scissors.
Bilateral Rotation Flap Text: The defect edges were debeveled with a #15 scalpel blade. Given the location of the defect, shape of the defect and the proximity to free margins a bilateral rotation flap was deemed most appropriate. Using a sterile surgical marker, an appropriate rotation flap was drawn incorporating the defect and placing the expected incisions within the relaxed skin tension lines where possible. The area thus outlined was incised deep to adipose tissue with a #15 scalpel blade. The skin margins were undermined to an appropriate distance in all directions utilizing iris scissors. Following this, the designed flap was carried over into the primary defect and sutured into place.
Spiral Flap Text: The defect edges were debeveled with a #15 scalpel blade.  Given the location of the defect, shape of the defect and the proximity to free margins a spiral flap was deemed most appropriate.  Using a sterile surgical marker, an appropriate rotation flap was drawn incorporating the defect and placing the expected incisions within the relaxed skin tension lines where possible. The area thus outlined was incised deep to adipose tissue with a #15 scalpel blade.  The skin margins were undermined to an appropriate distance in all directions utilizing iris scissors.
Staged Advancement Flap Text: The defect edges were debeveled with a #15 scalpel blade. Given the location of the defect, shape of the defect and the proximity to free margins a staged advancement flap was deemed most appropriate. Using a sterile surgical marker, an appropriate advancement flap was drawn incorporating the defect and placing the expected incisions within the relaxed skin tension lines where possible. The area thus outlined was incised deep to adipose tissue with a #15 scalpel blade. The skin margins were undermined to an appropriate distance in all directions utilizing iris scissors. Following this, the designed flap was carried over into the primary defect and sutured into place.
Star Wedge Flap Text: The defect edges were debeveled with a #15 scalpel blade.  Given the location of the defect, shape of the defect and the proximity to free margins a star wedge flap was deemed most appropriate.  Using a sterile surgical marker, an appropriate rotation flap was drawn incorporating the defect and placing the expected incisions within the relaxed skin tension lines where possible. The area thus outlined was incised deep to adipose tissue with a #15 scalpel blade.  The skin margins were undermined to an appropriate distance in all directions utilizing iris scissors.
Transposition Flap Text: The defect edges were debeveled with a #15 scalpel blade.  Given the location of the defect and the proximity to free margins a transposition flap was deemed most appropriate.  Using a sterile surgical marker, an appropriate transposition flap was drawn incorporating the defect.    The area thus outlined was incised deep to adipose tissue with a #15 scalpel blade.  The skin margins were undermined to an appropriate distance in all directions utilizing iris scissors.
Muscle Hinge Flap Text: The defect edges were debeveled with a #15 scalpel blade.  Given the size, depth and location of the defect and the proximity to free margins a muscle hinge flap was deemed most appropriate.  Using a sterile surgical marker, an appropriate hinge flap was drawn incorporating the defect. The area thus outlined was incised with a #15 scalpel blade.  The skin margins were undermined to an appropriate distance in all directions utilizing iris scissors.
Mustarde Flap Text: The defect edges were debeveled with a #15 scalpel blade.  Given the size, depth and location of the defect and the proximity to free margins a Mustarde flap was deemed most appropriate. Using a sterile surgical marker, an appropriate flap was drawn incorporating the defect. The area thus outlined was incised with a #15 scalpel blade. The skin margins were undermined to an appropriate distance in all directions utilizing iris scissors. Following this, the designed flap was carried into the primary defect and sutured into place.
Nasal Turnover Hinge Flap Text: The defect edges were debeveled with a #15 scalpel blade.  Given the size, depth, location of the defect and the defect being full thickness a nasal turnover hinge flap was deemed most appropriate. Using a sterile surgical marker, an appropriate hinge flap was drawn incorporating the defect. The area thus outlined was incised with a #15 scalpel blade. The flap was designed to recreate the nasal mucosal lining and the alar rim. The skin margins were undermined to an appropriate distance in all directions utilizing iris scissors. Following this, the designed flap was carried over into the primary defect and sutured into place
Nasalis-Muscle-Based Myocutaneous Island Pedicle Flap Text: Using a #15 blade, an incision was made around the donor flap to the level of the nasalis muscle. Wide lateral undermining was then performed in both the subcutaneous plane above the nasalis muscle, and in a submuscular plane just above periosteum. This allowed the formation of a free nasalis muscle axial pedicle (based on the angular artery) which was still attached to the actual cutaneous flap, increasing its mobility and vascular viability. Hemostasis was obtained with pinpoint electrocoagulation. The flap was mobilized into position and the pivotal anchor points positioned and stabilized with buried interrupted sutures. Subcutaneous and dermal tissues were closed in a multilayered fashion with sutures. Tissue redundancies were excised, and the epidermal edges were apposed without significant tension and sutured with sutures.
Nasalis Myocutaneous Flap Text: Using a #15 blade, an incision was made around the donor flap to the level of the nasalis muscle. Wide lateral undermining was then performed in both the subcutaneous plane above the nasalis muscle, and in a submuscular plane just above periosteum. This allowed the formation of a free nasalis muscle axial pedicle which was still attached to the actual cutaneous flap, increasing its mobility and vascular viability. Hemostasis was obtained with pinpoint electrocoagulation. The flap was mobilized into position and the pivotal anchor points positioned and stabilized with buried interrupted sutures. Subcutaneous and dermal tissues were closed in a multilayered fashion with sutures. Tissue redundancies were excised, and the epidermal edges were apposed without significant tension and sutured with sutures.
Orbicularis Oris Muscle Flap Text: The defect edges were debeveled with a #15 scalpel blade.  Given that the defect affected the competency of the oral sphincter an orbicularis oris muscle flap was deemed most appropriate to restore this competency and normal muscle function.  Using a sterile surgical marker, an appropriate flap was drawn incorporating the defect. The area thus outlined was incised with a #15 scalpel blade. Following this, the designed flap was carried over into the primary defect and sutured into place.
Melolabial Transposition Flap Text: The defect edges were debeveled with a #15 scalpel blade.  Given the location of the defect and the proximity to free margins a melolabial flap was deemed most appropriate.  Using a sterile surgical marker, an appropriate melolabial transposition flap was drawn incorporating the defect.    The area thus outlined was incised deep to adipose tissue with a #15 scalpel blade.  The skin margins were undermined to an appropriate distance in all directions utilizing iris scissors.
Rectangular Flap Text: The defect edges were debeveled with a #15 scalpel blade. Given the location of the defect and the proximity to free margins a rectangular flap was deemed most appropriate. Using a sterile surgical marker, an appropriate rectangular flap was drawn incorporating the defect. The area thus outlined was incised deep to adipose tissue with a #15 scalpel blade. The skin margins were undermined to an appropriate distance in all directions utilizing iris scissors. Following this, the designed flap was carried over into the primary defect and sutured into place.
Rhombic Flap Text: The defect edges were debeveled with a #15 scalpel blade.  Given the location of the defect and the proximity to free margins a rhombic flap was deemed most appropriate.  Using a sterile surgical marker, an appropriate rhombic flap was drawn incorporating the defect.    The area thus outlined was incised deep to adipose tissue with a #15 scalpel blade.  The skin margins were undermined to an appropriate distance in all directions utilizing iris scissors.
Rhomboid Transposition Flap Text: The defect edges were debeveled with a #15 scalpel blade. Given the location of the defect and the proximity to free margins a rhomboid transposition flap was deemed most appropriate. Using a sterile surgical marker, an appropriate rhomboid flap was drawn incorporating the defect. The area thus outlined was incised deep to adipose tissue with a #15 scalpel blade. The skin margins were undermined to an appropriate distance in all directions utilizing iris scissors. Following this, the designed flap was carried over into the primary defect and sutured into place.
Bi-Rhombic Flap Text: The defect edges were debeveled with a #15 scalpel blade.  Given the location of the defect and the proximity to free margins a bi-rhombic flap was deemed most appropriate.  Using a sterile surgical marker, an appropriate rhombic flap was drawn incorporating the defect. The area thus outlined was incised deep to adipose tissue with a #15 scalpel blade.  The skin margins were undermined to an appropriate distance in all directions utilizing iris scissors.
Helical Rim Advancement Flap Text: The defect edges were debeveled with a #15 blade scalpel.  Given the location of the defect and the proximity to free margins (helical rim) a double helical rim advancement flap was deemed most appropriate.  Using a sterile surgical marker, the appropriate advancement flaps were drawn incorporating the defect and placing the expected incisions between the helical rim and antihelix where possible.  The area thus outlined was incised through and through with a #15 scalpel blade.  With a skin hook and iris scissors, the flaps were gently and sharply undermined and freed up.
Bilateral Helical Rim Advancement Flap Text: The defect edges were debeveled with a #15 blade scalpel.  Given the location of the defect and the proximity to free margins (helical rim) a bilateral helical rim advancement flap was deemed most appropriate.  Using a sterile surgical marker, the appropriate advancement flaps were drawn incorporating the defect and placing the expected incisions between the helical rim and antihelix where possible.  The area thus outlined was incised through and through with a #15 scalpel blade.  With a skin hook and iris scissors, the flaps were gently and sharply undermined and freed up.
Ear Star Wedge Flap Text: The defect edges were debeveled with a #15 blade scalpel.  Given the location of the defect and the proximity to free margins (helical rim) an ear star wedge flap was deemed most appropriate.  Using a sterile surgical marker, the appropriate flap was drawn incorporating the defect and placing the expected incisions between the helical rim and antihelix where possible.  The area thus outlined was incised through and through with a #15 scalpel blade.
Banner Transposition Flap Text: The defect edges were debeveled with a #15 scalpel blade. Given the location of the defect and the proximity to free margins a Banner transposition flap was deemed most appropriate. Using a sterile surgical marker, an appropriate flap was drawn around the defect. The area thus outlined was incised deep to adipose tissue with a #15 scalpel blade. The skin margins were undermined to an appropriate distance in all directions utilizing iris scissors. Following this, the designed flap was carried into the primary defect and sutured into place.
Bilobed Flap Text: The defect edges were debeveled with a #15 scalpel blade.  Given the location of the defect and the proximity to free margins a bilobe flap was deemed most appropriate.  Using a sterile surgical marker, an appropriate bilobe flap drawn around the defect.    The area thus outlined was incised deep to adipose tissue with a #15 scalpel blade.  The skin margins were undermined to an appropriate distance in all directions utilizing iris scissors.
Bilobed Transposition Flap Text: The defect edges were debeveled with a #15 scalpel blade.  Given the location of the defect and the proximity to free margins a bilobed transposition flap was deemed most appropriate.  Using a sterile surgical marker, an appropriate bilobe flap drawn around the defect.    The area thus outlined was incised deep to adipose tissue with a #15 scalpel blade.  The skin margins were undermined to an appropriate distance in all directions utilizing iris scissors.
Trilobed Flap Text: The defect edges were debeveled with a #15 scalpel blade.  Given the location of the defect and the proximity to free margins a trilobed flap was deemed most appropriate.  Using a sterile surgical marker, an appropriate trilobed flap drawn around the defect.    The area thus outlined was incised deep to adipose tissue with a #15 scalpel blade.  The skin margins were undermined to an appropriate distance in all directions utilizing iris scissors.
Dorsal Nasal Flap Text: The defect edges were debeveled with a #15 scalpel blade.  Given the location of the defect and the proximity to free margins a dorsal nasal flap was deemed most appropriate.  Using a sterile surgical marker, an appropriate dorsal nasal flap was drawn around the defect.    The area thus outlined was incised deep to adipose tissue with a #15 scalpel blade.  The skin margins were undermined to an appropriate distance in all directions utilizing iris scissors.
Island Pedicle Flap Text: The defect edges were debeveled with a #15 scalpel blade.  Given the location of the defect, shape of the defect and the proximity to free margins an island pedicle advancement flap was deemed most appropriate.  Using a sterile surgical marker, an appropriate advancement flap was drawn incorporating the defect, outlining the appropriate donor tissue and placing the expected incisions within the relaxed skin tension lines where possible.    The area thus outlined was incised deep to adipose tissue with a #15 scalpel blade.  The skin margins were undermined to an appropriate distance in all directions around the primary defect and laterally outward around the island pedicle utilizing iris scissors.  There was minimal undermining beneath the pedicle flap.
Island Pedicle Flap With Canthal Suspension Text: The defect edges were debeveled with a #15 scalpel blade.  Given the location of the defect, shape of the defect and the proximity to free margins an island pedicle advancement flap was deemed most appropriate.  Using a sterile surgical marker, an appropriate advancement flap was drawn incorporating the defect, outlining the appropriate donor tissue and placing the expected incisions within the relaxed skin tension lines where possible. The area thus outlined was incised deep to adipose tissue with a #15 scalpel blade.  The skin margins were undermined to an appropriate distance in all directions around the primary defect and laterally outward around the island pedicle utilizing iris scissors.  There was minimal undermining beneath the pedicle flap. A suspension suture was placed in the canthal tendon to prevent tension and prevent ectropion.
Alar Island Pedicle Flap Text: The defect edges were debeveled with a #15 scalpel blade.  Given the location of the defect, shape of the defect and the proximity to the alar rim an island pedicle advancement flap was deemed most appropriate.  Using a sterile surgical marker, an appropriate advancement flap was drawn incorporating the defect, outlining the appropriate donor tissue and placing the expected incisions within the nasal ala running parallel to the alar rim. The area thus outlined was incised with a #15 scalpel blade.  The skin margins were undermined minimally to an appropriate distance in all directions around the primary defect and laterally outward around the island pedicle utilizing iris scissors.  There was minimal undermining beneath the pedicle flap.
Double Island Pedicle Flap Text: The defect edges were debeveled with a #15 scalpel blade.  Given the location of the defect, shape of the defect and the proximity to free margins a double island pedicle advancement flap was deemed most appropriate.  Using a sterile surgical marker, an appropriate advancement flap was drawn incorporating the defect, outlining the appropriate donor tissue and placing the expected incisions within the relaxed skin tension lines where possible.    The area thus outlined was incised deep to adipose tissue with a #15 scalpel blade.  The skin margins were undermined to an appropriate distance in all directions around the primary defect and laterally outward around the island pedicle utilizing iris scissors.  There was minimal undermining beneath the pedicle flap.
Island Pedicle Flap-Requiring Vessel Identification Text: The defect edges were debeveled with a #15 scalpel blade.  Given the location of the defect, shape of the defect and the proximity to free margins an island pedicle advancement flap was deemed most appropriate.  Using a sterile surgical marker, an appropriate advancement flap was drawn, based on the axial vessel mentioned above, incorporating the defect, outlining the appropriate donor tissue and placing the expected incisions within the relaxed skin tension lines where possible.    The area thus outlined was incised deep to adipose tissue with a #15 scalpel blade.  The skin margins were undermined to an appropriate distance in all directions around the primary defect and laterally outward around the island pedicle utilizing iris scissors.  There was minimal undermining beneath the pedicle flap.
Keystone Flap Text: The defect edges were debeveled with a #15 scalpel blade.  Given the location of the defect, shape of the defect a keystone flap was deemed most appropriate.  Using a sterile surgical marker, an appropriate keystone flap was drawn incorporating the defect, outlining the appropriate donor tissue and placing the expected incisions within the relaxed skin tension lines where possible. The area thus outlined was incised deep to adipose tissue with a #15 scalpel blade.  The skin margins were undermined to an appropriate distance in all directions around the primary defect and laterally outward around the flap utilizing iris scissors.
O-T Plasty Text: The defect edges were debeveled with a #15 scalpel blade.  Given the location of the defect, shape of the defect and the proximity to free margins an O-T plasty was deemed most appropriate.  Using a sterile surgical marker, an appropriate O-T plasty was drawn incorporating the defect and placing the expected incisions within the relaxed skin tension lines where possible.    The area thus outlined was incised deep to adipose tissue with a #15 scalpel blade.  The skin margins were undermined to an appropriate distance in all directions utilizing iris scissors.
O-Z Plasty Text: The defect edges were debeveled with a #15 scalpel blade.  Given the location of the defect, shape of the defect and the proximity to free margins an O-Z plasty (double transposition flap) was deemed most appropriate.  Using a sterile surgical marker, the appropriate transposition flaps were drawn incorporating the defect and placing the expected incisions within the relaxed skin tension lines where possible.    The area thus outlined was incised deep to adipose tissue with a #15 scalpel blade.  The skin margins were undermined to an appropriate distance in all directions utilizing iris scissors.  Hemostasis was achieved with electrocautery.  The flaps were then transposed into place, one clockwise and the other counterclockwise, and anchored with interrupted buried subcutaneous sutures.
Double O-Z Plasty Text: The defect edges were debeveled with a #15 scalpel blade. Given the location of the defect, shape of the defect and the proximity to free margins a Double O-Z plasty (double transposition flap) was deemed most appropriate. Using a sterile surgical marker, the appropriate transposition flaps were drawn incorporating the defect and placing the expected incisions within the relaxed skin tension lines where possible. The area thus outlined was incised deep to adipose tissue with a #15 scalpel blade. The skin margins were undermined to an appropriate distance in all directions utilizing iris scissors. Hemostasis was achieved with electrocautery. The flaps were then transposed and carried over into place, one clockwise and the other counterclockwise, and anchored with interrupted buried subcutaneous sutures.
V-Y Plasty Text: The defect edges were debeveled with a #15 scalpel blade.  Given the location of the defect, shape of the defect and the proximity to free margins an V-Y advancement flap was deemed most appropriate.  Using a sterile surgical marker, an appropriate advancement flap was drawn incorporating the defect and placing the expected incisions within the relaxed skin tension lines where possible.    The area thus outlined was incised deep to adipose tissue with a #15 scalpel blade.  The skin margins were undermined to an appropriate distance in all directions utilizing iris scissors.
H Plasty Text: Given the location of the defect, shape of the defect and the proximity to free margins a H-plasty was deemed most appropriate for repair.  Using a sterile surgical marker, the appropriate advancement arms of the H-plasty were drawn incorporating the defect and placing the expected incisions within the relaxed skin tension lines where possible. The area thus outlined was incised deep to adipose tissue with a #15 scalpel blade. The skin margins were undermined to an appropriate distance in all directions utilizing iris scissors.  The opposing advancement arms were then advanced into place in opposite direction and anchored with interrupted buried subcutaneous sutures.
W Plasty Text: The lesion was extirpated to the level of the fat with a #15 scalpel blade.  Given the location of the defect, shape of the defect and the proximity to free margins a W-plasty was deemed most appropriate for repair.  Using a sterile surgical marker, the appropriate transposition arms of the W-plasty were drawn incorporating the defect and placing the expected incisions within the relaxed skin tension lines where possible.    The area thus outlined was incised deep to adipose tissue with a #15 scalpel blade.  The skin margins were undermined to an appropriate distance in all directions utilizing iris scissors.  The opposing transposition arms were then transposed into place in opposite direction and anchored with interrupted buried subcutaneous sutures.
Z Plasty Text: The lesion was extirpated to the level of the fat with a #15 scalpel blade.  Given the location of the defect, shape of the defect and the proximity to free margins a Z-plasty was deemed most appropriate for repair.  Using a sterile surgical marker, the appropriate transposition arms of the Z-plasty were drawn incorporating the defect and placing the expected incisions within the relaxed skin tension lines where possible.    The area thus outlined was incised deep to adipose tissue with a #15 scalpel blade.  The skin margins were undermined to an appropriate distance in all directions utilizing iris scissors.  The opposing transposition arms were then transposed into place in opposite direction and anchored with interrupted buried subcutaneous sutures.
Double Z Plasty Text: The lesion was extirpated to the level of the fat with a #15 scalpel blade. Given the location of the defect, shape of the defect and the proximity to free margins a double Z-plasty was deemed most appropriate for repair. Using a sterile surgical marker, the appropriate transposition arms of the double Z-plasty were drawn incorporating the defect and placing the expected incisions within the relaxed skin tension lines where possible. The area thus outlined was incised deep to adipose tissue with a #15 scalpel blade. The skin margins were undermined to an appropriate distance in all directions utilizing iris scissors. The opposing transposition arms were then transposed and carried over into place in opposite direction and anchored with interrupted buried subcutaneous sutures.
Zygomaticofacial Flap Text: Given the location of the defect, shape of the defect and the proximity to free margins a zygomaticofacial flap was deemed most appropriate for repair. Using a sterile surgical marker, the appropriate flap was drawn incorporating the defect and placing the expected incisions within the relaxed skin tension lines where possible. The area thus outlined was incised deep to adipose tissue with a #15 scalpel blade with preservation of a vascular pedicle.  The skin margins were undermined to an appropriate distance in all directions utilizing iris scissors. The flap was then carried over into the defect and anchored with interrupted buried subcutaneous sutures.
Cheek Interpolation Flap Text: A decision was made to reconstruct the defect utilizing an interpolation axial flap and a staged reconstruction.  A telfa template was made of the defect.  This telfa template was then used to outline the Cheek Interpolation flap.  The donor area for the pedicle flap was then injected with anesthesia.  The flap was excised through the skin and subcutaneous tissue down to the layer of the underlying musculature.  The interpolation flap was carefully excised within this deep plane to maintain its blood supply.  The edges of the donor site were undermined.   The donor site was closed in a primary fashion.  The pedicle was then rotated into position and sutured.  Once the tube was sutured into place, adequate blood supply was confirmed with blanching and refill.  The pedicle was then wrapped with xeroform gauze and dressed appropriately with a telfa and gauze bandage to ensure continued blood supply and protect the attached pedicle.
Cheek-To-Nose Interpolation Flap Text: A decision was made to reconstruct the defect utilizing an interpolation axial flap and a staged reconstruction.  A telfa template was made of the defect.  This telfa template was then used to outline the Cheek-To-Nose Interpolation flap.  The donor area for the pedicle flap was then injected with anesthesia.  The flap was excised through the skin and subcutaneous tissue down to the layer of the underlying musculature.  The interpolation flap was carefully excised within this deep plane to maintain its blood supply.  The edges of the donor site were undermined.   The donor site was closed in a primary fashion.  The pedicle was then rotated into position and sutured.  Once the tube was sutured into place, adequate blood supply was confirmed with blanching and refill.  The pedicle was then wrapped with xeroform gauze and dressed appropriately with a telfa and gauze bandage to ensure continued blood supply and protect the attached pedicle.
Interpolation Flap Text: A decision was made to reconstruct the defect utilizing an interpolation axial flap and a staged reconstruction.  A telfa template was made of the defect.  This telfa template was then used to outline the interpolation flap.  The donor area for the pedicle flap was then injected with anesthesia.  The flap was excised through the skin and subcutaneous tissue down to the layer of the underlying musculature.  The interpolation flap was carefully excised within this deep plane to maintain its blood supply.  The edges of the donor site were undermined.   The donor site was closed in a primary fashion.  The pedicle was then rotated into position and sutured.  Once the tube was sutured into place, adequate blood supply was confirmed with blanching and refill.  The pedicle was then wrapped with xeroform gauze and dressed appropriately with a telfa and gauze bandage to ensure continued blood supply and protect the attached pedicle.
Melolabial Interpolation Flap Text: A decision was made to reconstruct the defect utilizing an interpolation axial flap and a staged reconstruction.  A telfa template was made of the defect.  This telfa template was then used to outline the melolabial interpolation flap.  The donor area for the pedicle flap was then injected with anesthesia.  The flap was excised through the skin and subcutaneous tissue down to the layer of the underlying musculature.  The pedicle flap was carefully excised within this deep plane to maintain its blood supply.  The edges of the donor site were undermined.   The donor site was closed in a primary fashion.  The pedicle was then rotated into position and sutured.  Once the tube was sutured into place, adequate blood supply was confirmed with blanching and refill.  The pedicle was then wrapped with xeroform gauze and dressed appropriately with a telfa and gauze bandage to ensure continued blood supply and protect the attached pedicle.
Mastoid Interpolation Flap Text: A decision was made to reconstruct the defect utilizing an interpolation axial flap and a staged reconstruction.  A telfa template was made of the defect.  This telfa template was then used to outline the mastoid interpolation flap.  The donor area for the pedicle flap was then injected with anesthesia.  The flap was excised through the skin and subcutaneous tissue down to the layer of the underlying musculature.  The pedicle flap was carefully excised within this deep plane to maintain its blood supply.  The edges of the donor site were undermined.   The donor site was closed in a primary fashion.  The pedicle was then rotated into position and sutured.  Once the tube was sutured into place, adequate blood supply was confirmed with blanching and refill.  The pedicle was then wrapped with xeroform gauze and dressed appropriately with a telfa and gauze bandage to ensure continued blood supply and protect the attached pedicle.
Posterior Auricular Interpolation Flap Text: A decision was made to reconstruct the defect utilizing an interpolation axial flap and a staged reconstruction.  A telfa template was made of the defect.  This telfa template was then used to outline the posterior auricular interpolation flap.  The donor area for the pedicle flap was then injected with anesthesia.  The flap was excised through the skin and subcutaneous tissue down to the layer of the underlying musculature.  The pedicle flap was carefully excised within this deep plane to maintain its blood supply.  The edges of the donor site were undermined.   The donor site was closed in a primary fashion.  The pedicle was then rotated into position and sutured.  Once the tube was sutured into place, adequate blood supply was confirmed with blanching and refill.  The pedicle was then wrapped with xeroform gauze and dressed appropriately with a telfa and gauze bandage to ensure continued blood supply and protect the attached pedicle.
Paramedian Forehead Flap Text: A decision was made to reconstruct the defect utilizing an interpolation axial flap and a staged reconstruction.  A telfa template was made of the defect.  This telfa template was then used to outline the paramedian forehead pedicle flap.  The donor area for the pedicle flap was then injected with anesthesia.  The flap was excised through the skin and subcutaneous tissue down to the layer of the underlying musculature.  The pedicle flap was carefully excised within this deep plane to maintain its blood supply.  The edges of the donor site were undermined.   The donor site was closed in a primary fashion.  The pedicle was then rotated into position and sutured.  Once the tube was sutured into place, adequate blood supply was confirmed with blanching and refill.  The pedicle was then wrapped with xeroform gauze and dressed appropriately with a telfa and gauze bandage to ensure continued blood supply and protect the attached pedicle.
Abbe Flap (Upper To Lower Lip) Text: The defect of the lower lip was assessed and measured.  Given the location and size of the defect, an Abbe flap was deemed most appropriate. Using a sterile surgical marker, an appropriate Abbe flap was measured and drawn on the upper lip. Local anesthesia was then infiltrated.  A scalpel was then used to incise the upper lip through and through the skin, vermilion, muscle and mucosa, leaving the flap pedicled on the opposite side.  The flap was then rotated and transferred to the lower lip defect.  The flap was then sutured into place with a three layer technique, closing the orbicularis oris muscle layer with subcutaneous buried sutures, followed by a mucosal layer and an epidermal layer.
Abbe Flap (Lower To Upper Lip) Text: The defect of the upper lip was assessed and measured.  Given the location and size of the defect, an Abbe flap was deemed most appropriate. Using a sterile surgical marker, an appropriate Abbe flap was measured and drawn on the lower lip. Local anesthesia was then infiltrated. A scalpel was then used to incise the upper lip through and through the skin, vermilion, muscle and mucosa, leaving the flap pedicled on the opposite side.  The flap was then rotated and transferred to the lower lip defect.  The flap was then sutured into place with a three layer technique, closing the orbicularis oris muscle layer with subcutaneous buried sutures, followed by a mucosal layer and an epidermal layer.
Estlander Flap (Upper To Lower Lip) Text: The defect of the lower lip was assessed and measured.  Given the location and size of the defect, an Estlander flap was deemed most appropriate. Using a sterile surgical marker, an appropriate Estlander flap was measured and drawn on the upper lip. Local anesthesia was then infiltrated. A scalpel was then used to incise the lateral aspect of the flap, through skin, muscle and mucosa, leaving the flap pedicled medially.  The flap was then rotated and positioned to fill the lower lip defect.  The flap was then sutured into place with a three layer technique, closing the orbicularis oris muscle layer with subcutaneous buried sutures, followed by a mucosal layer and an epidermal layer.
Lip Wedge Excision Repair Text: Given the location of the defect and the proximity to free margins a full thickness wedge repair was deemed most appropriate.  Using a sterile surgical marker, the appropriate repair was drawn incorporating the defect and placing the expected incisions perpendicular to the vermilion border.  The vermilion border was also meticulously outlined to ensure appropriate reapproximation during the repair.  The area thus outlined was incised through and through with a #15 scalpel blade.  The muscularis and dermis were reaproximated with deep sutures following hemostasis. Care was taken to realign the vermilion border before proceeding with the superficial closure.  Once the vermilion was realigned the superfical and mucosal closure was finished.
Ftsg Text: The defect edges were debeveled with a #15 scalpel blade.  Given the location of the defect, shape of the defect and the proximity to free margins a full thickness skin graft was deemed most appropriate.  Using a sterile surgical marker, the primary defect shape was transferred to the donor site. The area thus outlined was incised deep to adipose tissue with a #15 scalpel blade.  The harvested graft was then trimmed of adipose tissue until only dermis and epidermis was left.  The skin margins of the secondary defect were undermined to an appropriate distance in all directions utilizing iris scissors.  The secondary defect was closed with interrupted buried subcutaneous sutures.  The skin edges were then re-apposed with running  sutures.  The skin graft was then placed in the primary defect and oriented appropriately.
Split-Thickness Skin Graft Text: The defect edges were debeveled with a #15 scalpel blade.  Given the location of the defect, shape of the defect and the proximity to free margins a split thickness skin graft was deemed most appropriate.  Using a sterile surgical marker, the primary defect shape was transferred to the donor site. The split thickness graft was then harvested.  The skin graft was then placed in the primary defect and oriented appropriately.
Pinch Graft Text: The defect edges were debeveled with a #15 scalpel blade. Given the location of the defect, shape of the defect and the proximity to free margins a pinch graft was deemed most appropriate. Using a sterile surgical marker, the primary defect shape was transferred to the donor site. The area thus outlined was incised deep to adipose tissue with a #15 scalpel blade.  The harvested graft was then trimmed of adipose tissue until only dermis and epidermis was left. The skin margins of the secondary defect were undermined to an appropriate distance in all directions utilizing iris scissors.  The secondary defect was closed with interrupted buried subcutaneous sutures.  The skin edges were then re-apposed with running  sutures.  The skin graft was then placed in the primary defect and oriented appropriately.
Burow's Graft Text: The defect edges were debeveled with a #15 scalpel blade. Given the location of the defect, shape of the defect, the proximity to free margins and the presence of a standing cone deformity a Burow's skin graft was deemed most appropriate. The standing cone was removed and this tissue was then trimmed to the shape of the primary defect. The adipose tissue was also removed until only dermis and epidermis were left.  The skin margins of the secondary defect were undermined to an appropriate distance in all directions utilizing iris scissors.  The secondary defect was closed with interrupted buried subcutaneous sutures.  The skin edges were then re-apposed with running  sutures.  The skin graft was then placed in the primary defect and oriented appropriately.
Cartilage Graft Text: The defect edges were debeveled with a #15 scalpel blade.  Given the location of the defect, shape of the defect, the fact the defect involved a full thickness cartilage defect a cartilage graft was deemed most appropriate.  An appropriate donor site was identified, cleansed, and anesthetized. The cartilage graft was then harvested and transferred to the recipient site, oriented appropriately and then sutured into place.  The secondary defect was then repaired using a primary closure.
Composite Graft Text: The defect edges were debeveled with a #15 scalpel blade.  Given the location of the defect, shape of the defect, the proximity to free margins and the fact the defect was full thickness a composite graft was deemed most appropriate.  The defect was outline and then transferred to the donor site.  A full thickness graft was then excised from the donor site. The graft was then placed in the primary defect, oriented appropriately and then sutured into place.  The secondary defect was then repaired using a primary closure.
Epidermal Autograft Text: The defect edges were debeveled with a #15 scalpel blade.  Given the location of the defect, shape of the defect and the proximity to free margins an epidermal autograft was deemed most appropriate.  Using a sterile surgical marker, the primary defect shape was transferred to the donor site. The epidermal graft was then harvested.  The skin graft was then placed in the primary defect and oriented appropriately.
Dermal Autograft Text: The defect edges were debeveled with a #15 scalpel blade.  Given the location of the defect, shape of the defect and the proximity to free margins a dermal autograft was deemed most appropriate.  Using a sterile surgical marker, the primary defect shape was transferred to the donor site. The area thus outlined was incised deep to adipose tissue with a #15 scalpel blade.  The harvested graft was then trimmed of adipose and epidermal tissue until only dermis was left.  The skin graft was then placed in the primary defect and oriented appropriately.
Skin Substitute Text: The defect edges were debeveled with a #15 scalpel blade.  Given the location of the defect, shape of the defect and the proximity to free margins a skin substitute graft was deemed most appropriate.  The graft material was trimmed to fit the size of the defect. The graft was then placed in the primary defect and oriented appropriately.
Tissue Cultured Epidermal Autograft Text: The defect edges were debeveled with a #15 scalpel blade.  Given the location of the defect, shape of the defect and the proximity to free margins a tissue cultured epidermal autograft was deemed most appropriate.  The graft was then trimmed to fit the size of the defect.  The graft was then placed in the primary defect and oriented appropriately.
Xenograft Text: The defect edges were debeveled with a #15 scalpel blade.  Given the location of the defect, shape of the defect and the proximity to free margins a xenograft was deemed most appropriate.  The graft was then trimmed to fit the size of the defect.  The graft was then placed in the primary defect and oriented appropriately.
Purse String (Intermediate) Text: Given the location of the defect and the characteristics of the surrounding skin a purse string intermediate closure was deemed most appropriate.  Undermining was performed circumfirentially around the surgical defect.  A purse string suture was then placed and tightened.
Purse String (Simple) Text: Given the location of the defect and the characteristics of the surrounding skin a purse string simple closure was deemed most appropriate.  Undermining was performed circumferentially around the surgical defect.  A purse string suture was then placed and tightened.
Partial Purse String (Intermediate) Text: Given the location of the defect and the characteristics of the surrounding skin an intermediate purse string closure was deemed most appropriate.  Undermining was performed circumferentially around the surgical defect.  A purse string suture was then placed and tightened. Wound tension of the circular defect prevented complete closure of the wound.
Partial Purse String (Simple) Text: Given the location of the defect and the characteristics of the surrounding skin a simple purse string closure was deemed most appropriate.  Undermining was performed circumferentially around the surgical defect.  A purse string suture was then placed and tightened. Wound tension of the circular defect prevented complete closure of the wound.
Complex Repair And Single Advancement Flap Text: The defect edges were debeveled with a #15 scalpel blade.  The primary defect was closed partially with a complex linear closure.  Given the location of the remaining defect, shape of the defect and the proximity to free margins a single advancement flap was deemed most appropriate for complete closure of the defect.  Using a sterile surgical marker, an appropriate advancement flap was drawn incorporating the defect and placing the expected incisions within the relaxed skin tension lines where possible.    The area thus outlined was incised deep to adipose tissue with a #15 scalpel blade.  The skin margins were undermined to an appropriate distance in all directions utilizing iris scissors.
Complex Repair And Double Advancement Flap Text: The defect edges were debeveled with a #15 scalpel blade.  The primary defect was closed partially with a complex linear closure.  Given the location of the remaining defect, shape of the defect and the proximity to free margins a double advancement flap was deemed most appropriate for complete closure of the defect.  Using a sterile surgical marker, an appropriate advancement flap was drawn incorporating the defect and placing the expected incisions within the relaxed skin tension lines where possible.    The area thus outlined was incised deep to adipose tissue with a #15 scalpel blade.  The skin margins were undermined to an appropriate distance in all directions utilizing iris scissors.
Complex Repair And Modified Advancement Flap Text: The defect edges were debeveled with a #15 scalpel blade.  The primary defect was closed partially with a complex linear closure.  Given the location of the remaining defect, shape of the defect and the proximity to free margins a modified advancement flap was deemed most appropriate for complete closure of the defect.  Using a sterile surgical marker, an appropriate advancement flap was drawn incorporating the defect and placing the expected incisions within the relaxed skin tension lines where possible.    The area thus outlined was incised deep to adipose tissue with a #15 scalpel blade.  The skin margins were undermined to an appropriate distance in all directions utilizing iris scissors.
Complex Repair And A-T Advancement Flap Text: The defect edges were debeveled with a #15 scalpel blade.  The primary defect was closed partially with a complex linear closure.  Given the location of the remaining defect, shape of the defect and the proximity to free margins an A-T advancement flap was deemed most appropriate for complete closure of the defect.  Using a sterile surgical marker, an appropriate advancement flap was drawn incorporating the defect and placing the expected incisions within the relaxed skin tension lines where possible.    The area thus outlined was incised deep to adipose tissue with a #15 scalpel blade.  The skin margins were undermined to an appropriate distance in all directions utilizing iris scissors.
Complex Repair And O-T Advancement Flap Text: The defect edges were debeveled with a #15 scalpel blade.  The primary defect was closed partially with a complex linear closure.  Given the location of the remaining defect, shape of the defect and the proximity to free margins an O-T advancement flap was deemed most appropriate for complete closure of the defect.  Using a sterile surgical marker, an appropriate advancement flap was drawn incorporating the defect and placing the expected incisions within the relaxed skin tension lines where possible.    The area thus outlined was incised deep to adipose tissue with a #15 scalpel blade.  The skin margins were undermined to an appropriate distance in all directions utilizing iris scissors.
Complex Repair And O-L Flap Text: The defect edges were debeveled with a #15 scalpel blade.  The primary defect was closed partially with a complex linear closure.  Given the location of the remaining defect, shape of the defect and the proximity to free margins an O-L flap was deemed most appropriate for complete closure of the defect.  Using a sterile surgical marker, an appropriate flap was drawn incorporating the defect and placing the expected incisions within the relaxed skin tension lines where possible.    The area thus outlined was incised deep to adipose tissue with a #15 scalpel blade.  The skin margins were undermined to an appropriate distance in all directions utilizing iris scissors.
Complex Repair And Bilobe Flap Text: The defect edges were debeveled with a #15 scalpel blade.  The primary defect was closed partially with a complex linear closure.  Given the location of the remaining defect, shape of the defect and the proximity to free margins a bilobe flap was deemed most appropriate for complete closure of the defect.  Using a sterile surgical marker, an appropriate advancement flap was drawn incorporating the defect and placing the expected incisions within the relaxed skin tension lines where possible.    The area thus outlined was incised deep to adipose tissue with a #15 scalpel blade.  The skin margins were undermined to an appropriate distance in all directions utilizing iris scissors.
Complex Repair And Melolabial Flap Text: The defect edges were debeveled with a #15 scalpel blade.  The primary defect was closed partially with a complex linear closure.  Given the location of the remaining defect, shape of the defect and the proximity to free margins a melolabial flap was deemed most appropriate for complete closure of the defect.  Using a sterile surgical marker, an appropriate advancement flap was drawn incorporating the defect and placing the expected incisions within the relaxed skin tension lines where possible.    The area thus outlined was incised deep to adipose tissue with a #15 scalpel blade.  The skin margins were undermined to an appropriate distance in all directions utilizing iris scissors.
Complex Repair And Rotation Flap Text: The defect edges were debeveled with a #15 scalpel blade.  The primary defect was closed partially with a complex linear closure.  Given the location of the remaining defect, shape of the defect and the proximity to free margins a rotation flap was deemed most appropriate for complete closure of the defect.  Using a sterile surgical marker, an appropriate advancement flap was drawn incorporating the defect and placing the expected incisions within the relaxed skin tension lines where possible.    The area thus outlined was incised deep to adipose tissue with a #15 scalpel blade.  The skin margins were undermined to an appropriate distance in all directions utilizing iris scissors.
Complex Repair And Rhombic Flap Text: The defect edges were debeveled with a #15 scalpel blade.  The primary defect was closed partially with a complex linear closure.  Given the location of the remaining defect, shape of the defect and the proximity to free margins a rhombic flap was deemed most appropriate for complete closure of the defect.  Using a sterile surgical marker, an appropriate advancement flap was drawn incorporating the defect and placing the expected incisions within the relaxed skin tension lines where possible.    The area thus outlined was incised deep to adipose tissue with a #15 scalpel blade.  The skin margins were undermined to an appropriate distance in all directions utilizing iris scissors.
Complex Repair And Transposition Flap Text: The defect edges were debeveled with a #15 scalpel blade.  The primary defect was closed partially with a complex linear closure.  Given the location of the remaining defect, shape of the defect and the proximity to free margins a transposition flap was deemed most appropriate for complete closure of the defect.  Using a sterile surgical marker, an appropriate advancement flap was drawn incorporating the defect and placing the expected incisions within the relaxed skin tension lines where possible.    The area thus outlined was incised deep to adipose tissue with a #15 scalpel blade.  The skin margins were undermined to an appropriate distance in all directions utilizing iris scissors.
Complex Repair And V-Y Plasty Text: The defect edges were debeveled with a #15 scalpel blade.  The primary defect was closed partially with a complex linear closure.  Given the location of the remaining defect, shape of the defect and the proximity to free margins a V-Y plasty was deemed most appropriate for complete closure of the defect.  Using a sterile surgical marker, an appropriate advancement flap was drawn incorporating the defect and placing the expected incisions within the relaxed skin tension lines where possible.    The area thus outlined was incised deep to adipose tissue with a #15 scalpel blade.  The skin margins were undermined to an appropriate distance in all directions utilizing iris scissors.
Complex Repair And M Plasty Text: The defect edges were debeveled with a #15 scalpel blade.  The primary defect was closed partially with a complex linear closure.  Given the location of the remaining defect, shape of the defect and the proximity to free margins an M plasty was deemed most appropriate for complete closure of the defect.  Using a sterile surgical marker, an appropriate advancement flap was drawn incorporating the defect and placing the expected incisions within the relaxed skin tension lines where possible.    The area thus outlined was incised deep to adipose tissue with a #15 scalpel blade.  The skin margins were undermined to an appropriate distance in all directions utilizing iris scissors.
Complex Repair And Double M Plasty Text: The defect edges were debeveled with a #15 scalpel blade.  The primary defect was closed partially with a complex linear closure.  Given the location of the remaining defect, shape of the defect and the proximity to free margins a double M plasty was deemed most appropriate for complete closure of the defect.  Using a sterile surgical marker, an appropriate advancement flap was drawn incorporating the defect and placing the expected incisions within the relaxed skin tension lines where possible.    The area thus outlined was incised deep to adipose tissue with a #15 scalpel blade.  The skin margins were undermined to an appropriate distance in all directions utilizing iris scissors.
Complex Repair And W Plasty Text: The defect edges were debeveled with a #15 scalpel blade.  The primary defect was closed partially with a complex linear closure.  Given the location of the remaining defect, shape of the defect and the proximity to free margins a W plasty was deemed most appropriate for complete closure of the defect.  Using a sterile surgical marker, an appropriate advancement flap was drawn incorporating the defect and placing the expected incisions within the relaxed skin tension lines where possible.    The area thus outlined was incised deep to adipose tissue with a #15 scalpel blade.  The skin margins were undermined to an appropriate distance in all directions utilizing iris scissors.
Complex Repair And Z Plasty Text: The defect edges were debeveled with a #15 scalpel blade.  The primary defect was closed partially with a complex linear closure.  Given the location of the remaining defect, shape of the defect and the proximity to free margins a Z plasty was deemed most appropriate for complete closure of the defect.  Using a sterile surgical marker, an appropriate advancement flap was drawn incorporating the defect and placing the expected incisions within the relaxed skin tension lines where possible.    The area thus outlined was incised deep to adipose tissue with a #15 scalpel blade.  The skin margins were undermined to an appropriate distance in all directions utilizing iris scissors.
Complex Repair And Dorsal Nasal Flap Text: The defect edges were debeveled with a #15 scalpel blade.  The primary defect was closed partially with a complex linear closure.  Given the location of the remaining defect, shape of the defect and the proximity to free margins a dorsal nasal flap was deemed most appropriate for complete closure of the defect.  Using a sterile surgical marker, an appropriate flap was drawn incorporating the defect and placing the expected incisions within the relaxed skin tension lines where possible.    The area thus outlined was incised deep to adipose tissue with a #15 scalpel blade.  The skin margins were undermined to an appropriate distance in all directions utilizing iris scissors.
Complex Repair And Ftsg Text: The defect edges were debeveled with a #15 scalpel blade.  The primary defect was closed partially with a complex linear closure.  Given the location of the defect, shape of the defect and the proximity to free margins a full thickness skin graft was deemed most appropriate to repair the remaining defect.  The graft was trimmed to fit the size of the remaining defect.  The graft was then placed in the primary defect, oriented appropriately, and sutured into place.
Complex Repair And Burow's Graft Text: The defect edges were debeveled with a #15 scalpel blade.  The primary defect was closed partially with a complex linear closure.  Given the location of the defect, shape of the defect, the proximity to free margins and the presence of a standing cone deformity a Burow's graft was deemed most appropriate to repair the remaining defect.  The graft was trimmed to fit the size of the remaining defect.  The graft was then placed in the primary defect, oriented appropriately, and sutured into place.
Complex Repair And Split-Thickness Skin Graft Text: The defect edges were debeveled with a #15 scalpel blade.  The primary defect was closed partially with a complex linear closure.  Given the location of the defect, shape of the defect and the proximity to free margins a split thickness skin graft was deemed most appropriate to repair the remaining defect.  The graft was trimmed to fit the size of the remaining defect.  The graft was then placed in the primary defect, oriented appropriately, and sutured into place.
Complex Repair And Epidermal Autograft Text: The defect edges were debeveled with a #15 scalpel blade.  The primary defect was closed partially with a complex linear closure.  Given the location of the defect, shape of the defect and the proximity to free margins an epidermal autograft was deemed most appropriate to repair the remaining defect.  The graft was trimmed to fit the size of the remaining defect.  The graft was then placed in the primary defect, oriented appropriately, and sutured into place.
Complex Repair And Dermal Autograft Text: The defect edges were debeveled with a #15 scalpel blade.  The primary defect was closed partially with a complex linear closure.  Given the location of the defect, shape of the defect and the proximity to free margins an dermal autograft was deemed most appropriate to repair the remaining defect.  The graft was trimmed to fit the size of the remaining defect.  The graft was then placed in the primary defect, oriented appropriately, and sutured into place.
Complex Repair And Tissue Cultured Epidermal Autograft Text: The defect edges were debeveled with a #15 scalpel blade.  The primary defect was closed partially with a complex linear closure.  Given the location of the defect, shape of the defect and the proximity to free margins an tissue cultured epidermal autograft was deemed most appropriate to repair the remaining defect.  The graft was trimmed to fit the size of the remaining defect.  The graft was then placed in the primary defect, oriented appropriately, and sutured into place.
Complex Repair And Xenograft Text: The defect edges were debeveled with a #15 scalpel blade.  The primary defect was closed partially with a complex linear closure.  Given the location of the defect, shape of the defect and the proximity to free margins a xenograft was deemed most appropriate to repair the remaining defect.  The graft was trimmed to fit the size of the remaining defect.  The graft was then placed in the primary defect, oriented appropriately, and sutured into place.
Complex Repair And Skin Substitute Graft Text: The defect edges were debeveled with a #15 scalpel blade.  The primary defect was closed partially with a complex linear closure.  Given the location of the remaining defect, shape of the defect and the proximity to free margins a skin substitute graft was deemed most appropriate to repair the remaining defect.  The graft was trimmed to fit the size of the remaining defect.  The graft was then placed in the primary defect, oriented appropriately, and sutured into place.
Path Notes (To The Dermatopathologist): Please check margins.
Consent was obtained from the patient. The risks and benefits to therapy were discussed in detail. Specifically, the risks of infection, scarring, bleeding, prolonged wound healing, incomplete removal, allergy to anesthesia, nerve injury and recurrence were addressed. Prior to the procedure, the treatment site was clearly identified and confirmed by the patient. All components of Universal Protocol/PAUSE Rule completed.
Post-Care Instructions: I reviewed with the patient in detail post-care instructions:\\n1. Apply bacitracin over the steri-strips.  \\n2. Cut non-stick pad (Telfa) to cover the steri-strips\\n3. Apply tape (hypafix) over the non-stick pad\\n4. Change once per day for 5 days\\n5. Shower with bandage on, change bandage after shower\\n\\nPatient is not to engage in any heavy lifting, exercise, hot tub, or swimming for the next 14 days. Should the patient develop any fevers, chills, bleeding, severe pain patient will contact the office immediately.
Home Suture Removal Text: Patient was provided a home suture removal kit and will remove their sutures at home.  If they have any questions or difficulties they will call the office.
Where Do You Want The Question To Include Opioid Counseling Located?: Case Summary Tab
Information: Selecting Yes will display possible errors in your note based on the variables you have selected. This validation is only offered as a suggestion for you. PLEASE NOTE THAT THE VALIDATION TEXT WILL BE REMOVED WHEN YOU FINALIZE YOUR NOTE. IF YOU WANT TO FAX A PRELIMINARY NOTE YOU WILL NEED TO TOGGLE THIS TO 'NO' IF YOU DO NOT WANT IT IN YOUR FAXED NOTE.

## 2024-05-06 ENCOUNTER — APPOINTMENT (OUTPATIENT)
Dept: FAMILY PLANNING/WOMEN'S HEALTH CLINIC | Facility: PHYSICIAN GROUP | Age: 79
End: 2024-05-06
Payer: MEDICARE

## 2024-05-06 VITALS
WEIGHT: 238 LBS | DIASTOLIC BLOOD PRESSURE: 80 MMHG | BODY MASS INDEX: 40.63 KG/M2 | HEIGHT: 64 IN | SYSTOLIC BLOOD PRESSURE: 132 MMHG

## 2024-05-06 DIAGNOSIS — I70.0 ATHEROSCLEROSIS OF AORTA (HCC): ICD-10-CM

## 2024-05-06 DIAGNOSIS — E11.29 MICROALBUMINURIA DUE TO TYPE 2 DIABETES MELLITUS (HCC): ICD-10-CM

## 2024-05-06 DIAGNOSIS — J45.20 MILD INTERMITTENT ASTHMA WITHOUT COMPLICATION: ICD-10-CM

## 2024-05-06 DIAGNOSIS — E66.01 MORBID OBESITY (HCC): ICD-10-CM

## 2024-05-06 DIAGNOSIS — I15.2 HIGH BLOOD PRESSURE ASSOCIATED WITH DIABETES (HCC): ICD-10-CM

## 2024-05-06 DIAGNOSIS — E11.65 TYPE 2 DIABETES MELLITUS WITH HYPERGLYCEMIA, WITHOUT LONG-TERM CURRENT USE OF INSULIN (HCC): ICD-10-CM

## 2024-05-06 DIAGNOSIS — E11.69 HYPERLIPIDEMIA ASSOCIATED WITH TYPE 2 DIABETES MELLITUS (HCC): ICD-10-CM

## 2024-05-06 DIAGNOSIS — N40.0 BENIGN PROSTATIC HYPERPLASIA, UNSPECIFIED WHETHER LOWER URINARY TRACT SYMPTOMS PRESENT: ICD-10-CM

## 2024-05-06 DIAGNOSIS — G31.9 CEREBRAL ATROPHY (HCC): ICD-10-CM

## 2024-05-06 DIAGNOSIS — G47.33 OSA ON CPAP: ICD-10-CM

## 2024-05-06 DIAGNOSIS — E11.59 HIGH BLOOD PRESSURE ASSOCIATED WITH DIABETES (HCC): ICD-10-CM

## 2024-05-06 DIAGNOSIS — E78.5 HYPERLIPIDEMIA ASSOCIATED WITH TYPE 2 DIABETES MELLITUS (HCC): ICD-10-CM

## 2024-05-06 DIAGNOSIS — R80.9 MICROALBUMINURIA DUE TO TYPE 2 DIABETES MELLITUS (HCC): ICD-10-CM

## 2024-05-06 DIAGNOSIS — I10 ESSENTIAL HYPERTENSION: ICD-10-CM

## 2024-05-06 PROCEDURE — 3079F DIAST BP 80-89 MM HG: CPT

## 2024-05-06 PROCEDURE — 3075F SYST BP GE 130 - 139MM HG: CPT

## 2024-05-06 PROCEDURE — G0439 PPPS, SUBSEQ VISIT: HCPCS

## 2024-05-06 PROCEDURE — 1126F AMNT PAIN NOTED NONE PRSNT: CPT

## 2024-05-06 SDOH — ECONOMIC STABILITY: INCOME INSECURITY: IN THE LAST 12 MONTHS, WAS THERE A TIME WHEN YOU WERE NOT ABLE TO PAY THE MORTGAGE OR RENT ON TIME?: NO

## 2024-05-06 SDOH — HEALTH STABILITY: PHYSICAL HEALTH: ON AVERAGE, HOW MANY DAYS PER WEEK DO YOU ENGAGE IN MODERATE TO STRENUOUS EXERCISE (LIKE A BRISK WALK)?: 7 DAYS

## 2024-05-06 SDOH — ECONOMIC STABILITY: HOUSING INSECURITY
IN THE LAST 12 MONTHS, WAS THERE A TIME WHEN YOU DID NOT HAVE A STEADY PLACE TO SLEEP OR SLEPT IN A SHELTER (INCLUDING NOW)?: NO

## 2024-05-06 SDOH — ECONOMIC STABILITY: TRANSPORTATION INSECURITY
IN THE PAST 12 MONTHS, HAS LACK OF TRANSPORTATION KEPT YOU FROM MEETINGS, WORK, OR FROM GETTING THINGS NEEDED FOR DAILY LIVING?: NO

## 2024-05-06 SDOH — ECONOMIC STABILITY: FOOD INSECURITY: WITHIN THE PAST 12 MONTHS, THE FOOD YOU BOUGHT JUST DIDN'T LAST AND YOU DIDN'T HAVE MONEY TO GET MORE.: NEVER TRUE

## 2024-05-06 SDOH — ECONOMIC STABILITY: FOOD INSECURITY: WITHIN THE PAST 12 MONTHS, YOU WORRIED THAT YOUR FOOD WOULD RUN OUT BEFORE YOU GOT MONEY TO BUY MORE.: NEVER TRUE

## 2024-05-06 SDOH — ECONOMIC STABILITY: INCOME INSECURITY: HOW HARD IS IT FOR YOU TO PAY FOR THE VERY BASICS LIKE FOOD, HOUSING, MEDICAL CARE, AND HEATING?: NOT HARD AT ALL

## 2024-05-06 SDOH — ECONOMIC STABILITY: TRANSPORTATION INSECURITY
IN THE PAST 12 MONTHS, HAS THE LACK OF TRANSPORTATION KEPT YOU FROM MEDICAL APPOINTMENTS OR FROM GETTING MEDICATIONS?: NO

## 2024-05-06 SDOH — HEALTH STABILITY: PHYSICAL HEALTH: ON AVERAGE, HOW MANY MINUTES DO YOU ENGAGE IN EXERCISE AT THIS LEVEL?: 30 MIN

## 2024-05-06 ASSESSMENT — PATIENT HEALTH QUESTIONNAIRE - PHQ9: CLINICAL INTERPRETATION OF PHQ2 SCORE: 0

## 2024-05-06 ASSESSMENT — ENCOUNTER SYMPTOMS: GENERAL WELL-BEING: GOOD

## 2024-05-06 ASSESSMENT — ACTIVITIES OF DAILY LIVING (ADL): BATHING_REQUIRES_ASSISTANCE: 0

## 2024-05-06 ASSESSMENT — FIBROSIS 4 INDEX: FIB4 SCORE: 1.99

## 2024-05-06 ASSESSMENT — PAIN SCALES - GENERAL: PAINLEVEL: NO PAIN

## 2024-05-06 NOTE — PROGRESS NOTES
Comprehensive Health Assessment Program     Jonathan Slade is a 79 y.o. here for his comprehensive health assessment.    Patient Active Problem List    Diagnosis Date Noted    Cerebral atrophy (formerly [facility]) 09/16/2023    Mild intermittent asthma without complication 07/05/2023    Type 2 diabetes mellitus with hyperglycemia (formerly [facility]) 10/21/2022    Microalbuminuria due to type 2 diabetes mellitus (formerly [facility]) 10/21/2022    Atherosclerosis of aorta (formerly [facility]) 10/21/2022    Hyperlipidemia associated with type 2 diabetes mellitus (formerly [facility]) 10/02/2020    Abnormal EKG 10/02/2020    BMI 40.0-44.9, adult (formerly [facility]) 10/02/2020    RBBB 05/31/2019    LAFB (left anterior fascicular block) 05/31/2019    High blood pressure associated with diabetes (formerly [facility]) 04/21/2019    BPH (benign prostatic hyperplasia) 04/21/2019    ONI on CPAP 05/10/2016       Current Outpatient Medications   Medication Sig Dispense Refill    Empagliflozin (JARDIANCE) 25 MG Tab Take 25 mg by mouth every day. 100 Tablet 3    amLODIPine (NORVASC) 10 MG Tab Take 1 Tablet by mouth every day. 100 Tablet 3    finasteride (PROSCAR) 5 MG Tab Take 1 Tablet by mouth every day. 90 Tablet 0    carvedilol (COREG) 12.5 MG Tab TAKE 1 TABLET BY MOUTH TWICE DAILY WITH MEALS 180 Tablet 0    lidocaine (XYLOCAINE) 5 % Ointment Apply 2 g topically 3 times a day as needed (shingles). 50 g 0    glipizide-metformin (METAGLIP) 5-500 MG per tablet TAKE 1 TABLET BY MOUTH TWICE DAILY BEFORE BREAKFAST AND  DINNER 200 Tablet 3    olmesartan (BENICAR) 5 MG tablet TAKE 1 TABLET BY MOUTH ONCE DAILY *USE IN PLACE OF LISINOPRIL SECONDARY TO COUGH 100 Tablet 3    tamsulosin (FLOMAX) 0.4 MG capsule Take 1 Capsule by mouth 1/2 hour after breakfast. 30 Capsule 11    FLUoxetine (PROZAC) 20 MG Cap Take 1 Capsule by mouth every day. 30 Capsule 11    atorvastatin (LIPITOR) 40 MG Tab Take 1 Tablet by mouth every day. 100 Tablet 3    budesonide-formoterol (SYMBICORT) 80-4.5 MCG/ACT Aerosol Inhale 2 Puffs 2 times a day. 10.2 g 11     albuterol 108 (90 Base) MCG/ACT Aero Soln inhalation aerosol Inhale 2 Puffs every four hours as needed for Shortness of Breath. 1 Each 11    omeprazole (PRILOSEC) 20 MG delayed-release capsule TAKE 1 CAPSULE BY MOUTH IN THE MORNING ON AN EMPTY STOMACH 30 MINUTES PRIOR TO EATING AND DRINKING.      vitamin D (CHOLECALCIFEROL) 1000 UNIT Tab Take 1 Tablet by mouth every day.      Omega-3 Fatty Acids (FISH OIL) 1000 MG Cap capsule Take 1 Capsule by mouth every day.       No current facility-administered medications for this visit.          Current supplements as per medication list.     Allergies:   Patient has no known allergies.  Social History     Tobacco Use    Smoking status: Former     Current packs/day: 0.00     Average packs/day: 1 pack/day for 10.0 years (10.0 ttl pk-yrs)     Types: Cigarettes     Start date: 1985     Quit date: 1995     Years since quittin.3    Smokeless tobacco: Never   Vaping Use    Vaping Use: Never used   Substance Use Topics    Alcohol use: Not Currently     Alcohol/week: 2.4 - 3.6 oz     Types: 4 - 6 Cans of beer per week    Drug use: No     Family History   Problem Relation Age of Onset    Heart Attack Brother     Cancer Neg Hx      Jonathan  has a past medical history of Asthma, Back pain, Cough, Diabetes, ONI treated with BiPAP (05/10/2016), Painful breathing, Shortness of breath, Sleep apnea, Sputum production, and Wheezing.    He has no past medical history of ASTHMA.   Past Surgical History:   Procedure Laterality Date    GASTROSCOPY-ENDO N/A 2019    Procedure: GASTROSCOPY;  Surgeon: Tyler Rothman M.D.;  Location: ENDOSCOPY [facility];  Service: Gastroenterology    FULL THICKNESS BLOCK RESECTION  2013    Performed by Greyson Lopez M.D. at SURGERY [facility]    BIOPSY GENERAL  2013    Performed by Greyson Lopez M.D. at SURGERY [facility]    CATARACT EXTRACTION WITH IOL      KNEE REPLACEMENT, TOTAL         Screening:  In the  last six months have you experienced any leakage of urine? No    Depression Screening  Little interest or pleasure in doing things?  0 - not at all  Feeling down, depressed , or hopeless? 0 - not at all  Patient Health Questionnaire Score: 0     If depressive symptoms identified deferred to follow up visit unless specifically addressed in assessment and plan.    Interpretation of PHQ-9 Total Score   Score Severity   1-4 No Depression   5-9 Mild Depression   10-14 Moderate Depression   15-19 Moderately Severe Depression   20-27 Severe Depression    Screening for Cognitive Impairment  Do you or any of your friends or family members have any concern about your memory? No  Three Minute Recall (Leader, Season, Table) 2/3    Daniel clock face with all 12 numbers and set the hands to show 10 minutes after 11.  Yes 5  Cognitive concerns identified deferred for follow up unless specifically addressed in assessment and plan.    Fall Risk Assessment  Has the patient had two or more falls in the last year or any fall with injury in the last year?  Yes    Safety Assessment  Do you always wear your seatbelt?  Yes  Any changes to home needed to function safely? No  Difficulty hearing.  No  Patient counseled about all safety risks that were identified.    Functional Assessment ADLs  Are there any barriers preventing you from cooking for yourself or meeting nutritional needs?  No.    Are there any barriers preventing you from driving safely or obtaining transportation?  No.    Are there any barriers preventing you from using a telephone or calling for help?  No    Are there any barriers preventing you from shopping?  No.    Are there any barriers preventing you from taking care of your own finances?  No    Are there any barriers preventing you from managing your medications?  No    Are there any barriers preventing you from showering, bathing or dressing yourself? No    Are there any barriers preventing you from doing housework or  laundry? No  Are there any barriers preventing you from using the toilet?No  Are you currently engaging in any exercise or physical activity?  Yes.      Self-Assessment of Health  What is your perception of your health? Good  Do you sleep more than six hours a night? Yes  In the past 7 days, how much did pain keep you from doing your normal work? None  Do you spend quality time with family or friends (virtually or in person)? Yes  Do you usually eat a heart healthy diet that constists of a variety of fruits, vegetables, whole grains and fiber? Yes  Do you eat foods high in fat and/or Fast Food more than three times per week? No    Advance Care Planning  Do you have an Advance Directive, Living Will, Durable Power of , or POLST? Yes                 Health Maintenance Summary            Overdue - COVID-19 Vaccine (4 - 2023-24 season) Overdue since 9/1/2023 04/16/2022  Imm Admin: MODERNA SARS-COV-2 VACCINE (12+)    10/16/2021  Imm Admin: MODERNA SARS-COV-2 VACCINE (12+)    09/18/2021  Imm Admin: MODERNA SARS-COV-2 VACCINE (12+)              Ordered - Fasting Lipid Profile (Yearly) Ordered on 3/7/2024      06/01/2023  Lipid Profile    06/14/2022  Lipid Profile    02/11/2022  Lipid Profile    11/19/2021  Lipid Profile    09/17/2021  Lipid Profile    Only the first 5 history entries have been loaded, but more history exists.              A1c Screening (Every 6 Months) Next due on 6/27/2024 12/27/2023  POCT A1C    09/27/2023  POCT  A1C    06/01/2023  HEMOGLOBIN A1C    03/10/2023  POCT  A1C    11/30/2022  POCT  A1C    Only the first 5 history entries have been loaded, but more history exists.              Diabetes: Retinopathy Screening (Yearly) Next due on 6/30/2024 06/30/2023  AMB EXTERNAL RETINAL SCREENING RESULTS    05/23/2022  REFERRAL FOR RETINAL SCREENING EXAM              Diabetes: Monofilament / LE Exam (Yearly) Next due on 9/18/2024 09/18/2023  Done    09/07/2022  Diabetic Monofilament  LE Exam    09/07/2022  [employer]: WORKFLOW - DIABETES - DIABETIC FOOT EXAM PERFORMED              Diabetes: Urine Protein Screening (Yearly) Next due on 12/27/2024 12/27/2023  Microalbumin Creat Ratio Urine (Clinic Collect)    11/30/2022  MICROALBUMIN CREAT RATIO URINE    11/19/2021  MICROALBUMIN CREAT RATIO URINE    03/19/2021  MICROALBUMIN CREAT RATIO URINE    06/18/2020  MICROALBUMIN CREAT RATIO URINE    Only the first 5 history entries have been loaded, but more history exists.              SERUM CREATININE (Yearly) Next due on 1/25/2025 01/25/2024  Comp Metabolic Panel    11/03/2023  Complete Metabolic Panel (CMP)    06/01/2023  Comp Metabolic Panel    06/14/2022  Comp Metabolic Panel    02/11/2022  Comp Metabolic Panel    Only the first 5 history entries have been loaded, but more history exists.              Annual Wellness Visit (Yearly) Next due on 5/6/2025 05/06/2024  Level of Service: UT ANNUAL WELLNESS VISIT-INCLUDES PPPS SUBSEQUE*    09/16/2023  Level of Service: UT ANNUAL WELLNESS VISIT-INCLUDES PPPS SUBSEQUE*    10/21/2022  Level of Service: UT ANNUAL WELLNESS VISIT-INCLUDES PPPS SUBSEQUE*              IMM DTaP/Tdap/Td Vaccine (2 - Td or Tdap) Next due on 4/2/2032 04/02/2022  Imm Admin: Tdap Vaccine              Zoster (Shingles) Vaccines (Series Information) Completed      07/17/2022  Imm Admin: Zoster Vaccine Recombinant (RZV) (SHINGRIX)    04/02/2022  Imm Admin: Zoster Vaccine Recombinant (RZV) (SHINGRIX)    10/08/2014  Imm Admin: Zoster Vaccine Live (ZVL) (Zostavax) - HISTORICAL DATA              Pneumococcal Vaccine: 65+ Years (Series Information) Completed      05/09/2023  Imm Admin: Pneumococcal Conjugate Vaccine (PCV20)    07/17/2022  Imm Admin: Pneumococcal Conjugate Vaccine (PCV20)    10/05/2018  Imm Admin: Pneumococcal polysaccharide vaccine (PPSV-23)    09/29/2017  Imm Admin: Pneumococcal Conjugate Vaccine (Prevnar/PCV-13)    12/07/2012  Imm Admin: Pneumococcal  polysaccharide vaccine (PPSV-23)    Only the first 5 history entries have been loaded, but more history exists.              Influenza Vaccine (Series Information) Completed      09/22/2023  Imm Admin: Influenza Vaccine Adult HD    02/18/2022  Imm Admin: Influenza Vaccine Adult HD    11/20/2020  Imm Admin: Influenza Vaccine Adult HD    09/27/2019  Imm Admin: Influenza Vaccine Adult HD    10/05/2018  Imm Admin: Influenza Vaccine Adult HD    Only the first 5 history entries have been loaded, but more history exists.              Hepatitis A Vaccine (Hep A) (Series Information) Aged Out      No completion history exists for this topic.              Hepatitis B Vaccine (Hep B) (Series Information) Aged Out      No completion history exists for this topic.              HPV Vaccines (Series Information) Aged Out      No completion history exists for this topic.              Polio Vaccine (Inactivated Polio) (Series Information) Aged Out      No completion history exists for this topic.              Meningococcal Immunization (Series Information) Aged Out      No completion history exists for this topic.              Discontinued - Colorectal Cancer Screening  Discontinued        Frequency changed to Never automatically (Topic No Longer Applies)    08/19/2021  Colon Cancer Screening Annual FIT (Done)    11/10/2015  OCCULT BLOOD FECES IMMUNOASSAY                    Patient Care Team:  CHARLES Zelaya as PCP - General (Nurse Practitioner Family)  [employer] as Respiratory Therapist (Durable Medical Equipment & Medical Supplies)  Bee Yarbrough PT as Physical Therapist (Physical Therapy)      Financial Resource Strain: Low Risk  (5/6/2024)    Overall Financial Resource Strain (CARDIA)     Difficulty of Paying Living Expenses: Not hard at all      Transportation Needs: No Transportation Needs (5/6/2024)    PRAPARE - Transportation     Lack of Transportation (Medical): No     Lack of Transportation  "(Non-Medical): No      Food Insecurity: No Food Insecurity (5/6/2024)    Hunger Vital Sign     Worried About Running Out of Food in the Last Year: Never true     Ran Out of Food in the Last Year: Never true        Encounter Vitals  Blood Pressure : 132/80  Weight: 108 kg (238 lb)  Height: 162.6 cm (5' 4\")  BMI (Calculated): 40.85  Pain Score: No pain     Alert, oriented in no acute distress.  Eye contact is good, speech goal directed, affect calm.    Assessment and Plan. The following treatment and monitoring plan is recommended:    Atherosclerosis of aorta (HCC)  Chronic, stable. Reviewed chest x-ray from November 2023. Denies chest pain, pain with ambulation, and weakness of extremities.    BPH (benign prostatic hyperplasia)  Chronic, stable. Currently taking finasteride 5 mg and tamsulosin 0.4 mg daily. Denies obstructive symptoms, dysuria, and flank pain.     BMI 40.0-44.9, adult (HCC)  BMI is 40.85 kg/m2. Provided education on heart healthy diet with adequate intake of fruits, vegetables, and whole grains. Encourage 30 minutes of moderate exercise 3-4 times a week.    Cerebral atrophy (HCC)  Chronic, stable. Reviewed CT of the head from April 2019. Denies visual disturbances and changes in cognition.    High blood pressure associated with diabetes (HCC)  Chronic, stable. Currently taking amlodipine 10 mg daily, carvedilol 12.5 mg twice daily, and olmesartan 5 mg daily. In-office blood pressure is 132/80. Denies chest pain, headaches, and lower extremity edema. Followed by cardiology, Dr. Serrano.     Hyperlipidemia associated with type 2 diabetes mellitus (HCC)  Chronic, stable. Reviewed lipid profile from June 2023. Currently taking atorvastatin 40 mg daily. Reports that he walks outside for 30-45 minutes when the weather is good. Denies chest pain and claudication. Followed by cardiology, Dr. Serrano.     Microalbuminuria due to type 2 diabetes mellitus (HCC)  Chronic, ongoing. Reviewed most recent " urine microalbumin with creatinine ratio from December 2023 with baseline trend of microalbuminuria. Routinely monitored by primary care provider.    Mild intermittent asthma without complication  Chronic, stable. Reports that symptoms are well-controlled with symbicort and albuterol as needed. Denies recent exacerbation. Followed by pulmonology, CHARLES Whittington     ONI on CPAP  Chronic, stable. Currently on CPAP, reports an average of 8 hours nightly. Followed by pulmonology and sleep medicine, Ricarda SOTO    Type 2 diabetes mellitus with hyperglycemia (HCC)  Chronic, stable. Most recent hemoglobin A1C was 6.6 in December 2023. Reports that blood sugars range from 110-140. Currently taking empagliflozin 25 mg and glipizide-metformin 5-500 mg daily. Routinely monitored by primary care provider.      Services suggested: No services needed at this time  Health Care Screening: Age-appropriate preventive services recommended by USPTF and ACIP covered by Medicare were discussed today. Services ordered if indicated and agreed upon by the patient.  Referrals offered: Community-based lifestyle interventions to reduce health risks and promote self-management and wellness, fall prevention, nutrition, physical activity, tobacco-use cessation, weight loss, and mental health services as per orders if indicated.    Discussion today about general wellness and lifestyle habits:    Prevent falls and reduce trip hazards; Cautioned about securing or removing rugs.  Have a working fire alarm and carbon monoxide detector.  Engage in regular physical activity and social activities.    Follow-up: Return for appointment with Primary Care Provider as needed.

## 2024-05-12 DIAGNOSIS — N40.0 BENIGN PROSTATIC HYPERPLASIA WITHOUT LOWER URINARY TRACT SYMPTOMS: ICD-10-CM

## 2024-05-13 NOTE — TELEPHONE ENCOUNTER
Received request via: Pharmacy    Was the patient seen in the last year in this department? Yes    Does the patient have an active prescription (recently filled or refills available) for medication(s) requested? No    Pharmacy Name: [facility]    Does the patient have [facility] and need 100 day supply (blood pressure, diabetes and cholesterol meds only)? Yes, quantity updated to 100 days

## 2024-05-14 RX ORDER — FINASTERIDE 5 MG/1
5 TABLET, FILM COATED ORAL DAILY
Qty: 90 TABLET | Refills: 3 | Status: SHIPPED | OUTPATIENT
Start: 2024-05-14

## 2024-05-18 ENCOUNTER — HOSPITAL ENCOUNTER (OUTPATIENT)
Dept: LAB | Facility: MEDICAL CENTER | Age: 79
End: 2024-05-18
Payer: MEDICARE

## 2024-05-18 DIAGNOSIS — E78.5 HYPERLIPIDEMIA ASSOCIATED WITH TYPE 2 DIABETES MELLITUS (HCC): ICD-10-CM

## 2024-05-18 DIAGNOSIS — E11.69 HYPERLIPIDEMIA ASSOCIATED WITH TYPE 2 DIABETES MELLITUS (HCC): ICD-10-CM

## 2024-05-18 DIAGNOSIS — E11.65 TYPE 2 DIABETES MELLITUS WITH HYPERGLYCEMIA, WITHOUT LONG-TERM CURRENT USE OF INSULIN (HCC): ICD-10-CM

## 2024-05-18 LAB
CHOLEST SERPL-MCNC: 116 MG/DL (ref 100–199)
FASTING STATUS PATIENT QL REPORTED: NORMAL
HDLC SERPL-MCNC: 31 MG/DL
LDLC SERPL CALC-MCNC: 28 MG/DL
TRIGL SERPL-MCNC: 286 MG/DL (ref 0–149)

## 2024-06-07 ENCOUNTER — APPOINTMENT (OUTPATIENT)
Dept: MEDICAL GROUP | Facility: MEDICAL CENTER | Age: 79
End: 2024-06-07
Payer: MEDICARE

## 2024-06-14 ENCOUNTER — OFFICE VISIT (OUTPATIENT)
Dept: MEDICAL GROUP | Facility: MEDICAL CENTER | Age: 79
End: 2024-06-14
Payer: MEDICARE

## 2024-06-14 VITALS
DIASTOLIC BLOOD PRESSURE: 76 MMHG | HEART RATE: 85 BPM | OXYGEN SATURATION: 95 % | TEMPERATURE: 97.4 F | WEIGHT: 232.2 LBS | SYSTOLIC BLOOD PRESSURE: 118 MMHG | HEIGHT: 64 IN | BODY MASS INDEX: 39.64 KG/M2

## 2024-06-14 DIAGNOSIS — R39.15 URINARY URGENCY: ICD-10-CM

## 2024-06-14 DIAGNOSIS — E78.5 HYPERLIPIDEMIA ASSOCIATED WITH TYPE 2 DIABETES MELLITUS (HCC): ICD-10-CM

## 2024-06-14 DIAGNOSIS — R42 VERTIGO: ICD-10-CM

## 2024-06-14 DIAGNOSIS — R05.1 ACUTE COUGH: ICD-10-CM

## 2024-06-14 DIAGNOSIS — J45.40 MODERATE PERSISTENT ASTHMA WITHOUT COMPLICATION: ICD-10-CM

## 2024-06-14 DIAGNOSIS — E11.69 HYPERLIPIDEMIA ASSOCIATED WITH TYPE 2 DIABETES MELLITUS (HCC): ICD-10-CM

## 2024-06-14 DIAGNOSIS — E11.41 TYPE 2 DIABETES MELLITUS WITH DIABETIC MONONEUROPATHY, WITHOUT LONG-TERM CURRENT USE OF INSULIN (HCC): ICD-10-CM

## 2024-06-14 DIAGNOSIS — E66.9 OBESITY (BMI 30-39.9): ICD-10-CM

## 2024-06-14 DIAGNOSIS — E11.65 TYPE 2 DIABETES MELLITUS WITH HYPERGLYCEMIA, WITHOUT LONG-TERM CURRENT USE OF INSULIN (HCC): ICD-10-CM

## 2024-06-14 LAB
HBA1C MFR BLD: 6.6 % (ref ?–5.8)
POCT INT CON NEG: NEGATIVE
POCT INT CON POS: POSITIVE

## 2024-06-14 PROCEDURE — 3078F DIAST BP <80 MM HG: CPT

## 2024-06-14 PROCEDURE — 99214 OFFICE O/P EST MOD 30 MIN: CPT

## 2024-06-14 PROCEDURE — 3074F SYST BP LT 130 MM HG: CPT

## 2024-06-14 PROCEDURE — 83036 HEMOGLOBIN GLYCOSYLATED A1C: CPT

## 2024-06-14 RX ORDER — FLUTICASONE FUROATE AND VILANTEROL 100; 25 UG/1; UG/1
1 POWDER RESPIRATORY (INHALATION) DAILY
Qty: 2 EACH | Refills: 2 | Status: SHIPPED | OUTPATIENT
Start: 2024-06-14 | End: 2025-06-14

## 2024-06-14 RX ORDER — AMOXICILLIN 500 MG/1
TABLET, FILM COATED ORAL
COMMUNITY
Start: 2024-04-24 | End: 2024-06-14

## 2024-06-14 RX ORDER — TAMSULOSIN HYDROCHLORIDE 0.4 MG/1
0.4 CAPSULE ORAL
Qty: 100 CAPSULE | Refills: 3 | Status: SHIPPED | OUTPATIENT
Start: 2024-06-14

## 2024-06-14 ASSESSMENT — ENCOUNTER SYMPTOMS
FEVER: 0
CHILLS: 0
SHORTNESS OF BREATH: 0
ORTHOPNEA: 0
PALPITATIONS: 0
COUGH: 0

## 2024-06-14 ASSESSMENT — FIBROSIS 4 INDEX: FIB4 SCORE: 1.99

## 2024-06-14 NOTE — PROGRESS NOTES
Subjective:     Verbal consent was acquired by the patient to use Videum ambient listening note generation during this visit Yes    CC: Follow-Up      HPI:   Jonathan is a 79 y.o. male who presents today for:    History of Present Illness  The patient presents for evaluation of multiple medical concerns. He is accompanied by a .    The patient, a former  for 26 years, reports experiencing vertigo and is seeking medication for this condition. Occasionally, He experiences difficulty rising from a seated position, necessitating support. This symptom is frequent, often resulting in a fall. He recounts an incident whise he bent over while changing his sprinkler , but was unable to rise immediately, and fell over due to dizziness.     His son, a nutritionist, monitors his blood pressure and blood sugar levels, with readings of 137/74 and 137/76 respectively. His blood sugar levels are well-managed with metformin and Jardiance, although He expresses concern about the cost of Jardiance, which is covered by his insurance.     The patient has been experiencing a persistent cough for the past month. Despite trying over-the-counter Coricidin, the cough persists for 2 to 3 weeks before recurring. He has a history of smoking and drinking, but quit a long time ago. He underwent a lung cancer screening due to a childhood asthma. He uses albuterol as needed and has not been using Symbicort daily due to its high cost. He denies experiencing shortness of breath.         Allergies: Patient has no known allergies.     Medications:   Current Outpatient Medications:     tamsulosin (FLOMAX) 0.4 MG capsule, Take 1 Capsule by mouth 1/2 hour after breakfast., Disp: 100 Capsule, Rfl: 3    fluticasone furoate-vilanterol (BREO ELLIPTA) 100-25 MCG/ACT AEROSOL POWDER, BREATH ACTIVATED, Inhale 1 Puff every day., Disp: 2 Each, Rfl: 2    finasteride (PROSCAR) 5 MG Tab, Take 1 tablet by mouth once daily, Disp: 90 Tablet, Rfl: 3    " Empagliflozin (JARDIANCE) 25 MG Tab, Take 25 mg by mouth every day., Disp: 100 Tablet, Rfl: 3    amLODIPine (NORVASC) 10 MG Tab, Take 1 Tablet by mouth every day., Disp: 100 Tablet, Rfl: 3    carvedilol (COREG) 12.5 MG Tab, TAKE 1 TABLET BY MOUTH TWICE DAILY WITH MEALS, Disp: 180 Tablet, Rfl: 0    lidocaine (XYLOCAINE) 5 % Ointment, Apply 2 g topically 3 times a day as needed (shingles)., Disp: 50 g, Rfl: 0    glipizide-metformin (METAGLIP) 5-500 MG per tablet, TAKE 1 TABLET BY MOUTH TWICE DAILY BEFORE BREAKFAST AND  DINNER, Disp: 200 Tablet, Rfl: 3    olmesartan (BENICAR) 5 MG tablet, TAKE 1 TABLET BY MOUTH ONCE DAILY *USE IN PLACE OF LISINOPRIL SECONDARY TO COUGH, Disp: 100 Tablet, Rfl: 3    FLUoxetine (PROZAC) 20 MG Cap, Take 1 Capsule by mouth every day., Disp: 30 Capsule, Rfl: 11    atorvastatin (LIPITOR) 40 MG Tab, Take 1 Tablet by mouth every day., Disp: 100 Tablet, Rfl: 3    albuterol 108 (90 Base) MCG/ACT Aero Soln inhalation aerosol, Inhale 2 Puffs every four hours as needed for Shortness of Breath., Disp: 1 Each, Rfl: 11    omeprazole (PRILOSEC) 20 MG delayed-release capsule, TAKE 1 CAPSULE BY MOUTH IN THE MORNING ON AN EMPTY STOMACH 30 MINUTES PRIOR TO EATING AND DRINKING., Disp: , Rfl:     vitamin D (CHOLECALCIFEROL) 1000 UNIT Tab, Take 1 Tablet by mouth every day., Disp: , Rfl:     Omega-3 Fatty Acids (FISH OIL) 1000 MG Cap capsule, Take 1 Capsule by mouth every day., Disp: , Rfl:       ROS:  Review of Systems   Constitutional:  Negative for chills and fever.   Respiratory:  Negative for cough and shortness of breath.    Cardiovascular:  Negative for chest pain, palpitations, orthopnea and leg swelling.       Objective:     Exam:  /76   Pulse 85   Temp 36.3 °C (97.4 °F) (Temporal)   Ht 1.626 m (5' 4\")   Wt 105 kg (232 lb 3.2 oz)   SpO2 95%   BMI 39.86 kg/m²  Body mass index is 39.86 kg/m².    Physical Exam  Constitutional:       Appearance: He is normal weight.   Eyes:      Pupils: Pupils " are equal, round, and reactive to light.   Cardiovascular:      Rate and Rhythm: Normal rate and regular rhythm.      Pulses: Normal pulses.      Heart sounds: Normal heart sounds.   Pulmonary:      Effort: Pulmonary effort is normal.      Breath sounds: Normal breath sounds.   Neurological:      Mental Status: He is alert and oriented to person, place, and time.   Psychiatric:         Mood and Affect: Mood normal.         Behavior: Behavior normal.           Assessment & Plan:     Jonathan de la vega 79 y.o. male with the following -     1. Vertigo  Chronic, stable  Recommend changing positions slowly to help prevent dizziness.  Will consider medication if symptoms do not improve.    2. Type 2 diabetes mellitus with hyperglycemia, without long-term current use of insulin (HCC)  3. 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  Continue to work on diet and exercise to help with blood sugar management.  - POCT Hemoglobin A1C  -  Empagliflozin (JARDIANCE) 25 MG Tab, Take 25 mg by mouth every day., Disp: 100 Tablet, Rfl: 3  - glipizide-metformin (METAGLIP) 5-500 MG per tablet, TAKE 1 TABLET BY MOUTH TWICE DAILY BEFORE BREAKFAST AND  DINNER, Disp: 200 Tablet, Rfl: 3    4. Hyperlipidemia associated with type 2 diabetes mellitus (HCC)  Chronic, stable  Continue to work on diet and exercise to help with cholesterol management.    5. Obesity (BMI 30-39.9)  Chronic, stable  - Patient identified as having weight management issue.  Appropriate orders and counseling given.    6. Acute cough  Acute, uncomplicated  Likely due to uncontrolled asthma.  Will change inhalers to 1 that is covered by insurance to hopefully improve cough.    7. Moderate persistent asthma without complication  Chronic, unstable  The patient's cough could potentially be  a manifestation of asthma flare-ups due to weathis changes. The patient's medication will be switched to potentially alleviate his cough.  Should the cough persist, a computed tomography (CT) scan of his chest may be considered to investigate othis potential issues.  - fluticasone furoate-vilanterol (BREO ELLIPTA) 100-25 MCG/ACT AEROSOL POWDER, BREATH ACTIVATED; Inhale 1 Puff every day.  Dispense: 2 Each; Refill: 2    8. Urinary urgency  Chronic, stable  - tamsulosin (FLOMAX) 0.4 MG capsule; Take 1 Capsule by mouth 1/2 hour after breakfast.  Dispense: 100 Capsule; Refill: 3        Anticipatory guidance included the following: Patient counseled about skin care, diet, supplements, smoking, drugs/alcohol use, safe sex and exercise.     Return in about 3 months (around 9/14/2024).    Please note that this dictation was created using voice recognition software. I have made every reasonable attempt to correct obvious errors, but I expect that thise are errors of grammar and possibly content that I did not discover before finalizing the note.      I have placed the below orders and discussed them with an approved delegating provider.  The MA is performing the below orders under the direction of Dr. Valencia.

## 2024-08-05 DIAGNOSIS — I10 ESSENTIAL HYPERTENSION, BENIGN: ICD-10-CM

## 2024-08-05 NOTE — TELEPHONE ENCOUNTER
Is the patient due for a refill? Yes    Was the patient seen the past year? Yes    Date of last office visit: 03/14/2024    Does the patient have an upcoming appointment?  No       Provider to refill:MONICA    Does the patients insurance require a 100 day supply?  Yes

## 2024-08-06 RX ORDER — CARVEDILOL 12.5 MG/1
12.5 TABLET ORAL 2 TIMES DAILY WITH MEALS
Qty: 200 TABLET | Refills: 3 | Status: SHIPPED | OUTPATIENT
Start: 2024-08-06

## 2024-08-15 ASSESSMENT — ENCOUNTER SYMPTOMS
MYALGIAS: 0
WHEEZING: 0
FEVER: 0
DIAPHORESIS: 0
VOMITING: 0
DIZZINESS: 0
HEADACHES: 0
CHILLS: 0
NAUSEA: 0
SINUS PAIN: 0
DIARRHEA: 0
FALLS: 0
WEAKNESS: 0
HEARTBURN: 0
HEMOPTYSIS: 0
PALPITATIONS: 0

## 2024-08-15 NOTE — PROGRESS NOTES
Pulmonary Clinic Note    Date of Visit: 8/16/2023     Chief Complaint:  Chief Complaint   Patient presents with    Follow-Up     Last seen 8/15/23 JOSHUA Pino       HPI:   Jonathan Slade is a very pleasant 78 y.o. year old male former smoker (10 pack-years, quit in 1995), with a PMHx of ONI on BiPAP, asthma, GERD, elevated BMI, T2DM, RBBB, B PAH, T2DM, HTN, HLD who presented to the Pulmonary Clinic for a regular follow up. Last seen in the office on 8/15/2023 with myself. .    Patient is followed by the pulmonary office for asthma.  PFTs in 2023 showed FVC 1.76 L or 56%, FEV1 1.31 L or 55%, FEV1/FVC 75%, %, TLC 83%, and DLCO 100% predicted, without positive bronchodilator response.  CXR in 2022 was unremarkable.  Patient is currently on Breo 100 and albuterol as needed.   Symptomatically, patient has been well-controlled.  He denies any significant shortness of breath, cough, sputum production, or wheezing.  He continues to use and benefit from BiPAP.  He has not had any exacerbations.    Exacerbations this year:  0    Current medication regimen:  Breo 100 and albuterol    Oxygen use:  None    MMRC Grade: 0- Breathless only during strenuous exercise      Past Medical History:   Diagnosis Date    Asthma     Back pain     Cough     Diabetes     ONI treated with BiPAP 05/10/2016    Painful breathing     Shortness of breath     Sleep apnea     Sputum production     Wheezing      Past Surgical History:   Procedure Laterality Date    GASTROSCOPY-ENDO N/A 07/23/2019    Procedure: GASTROSCOPY;  Surgeon: Tyler Rothman M.D.;  Location: ENDOSCOPY [facility];  Service: Gastroenterology    FULL THICKNESS BLOCK RESECTION  02/19/2013    Performed by Greyson Lopez M.D. at SURGERY [facility]    BIOPSY GENERAL  02/19/2013    Performed by Greyson Lopez M.D. at SURGERY SURGICAL [facility]    CATARACT EXTRACTION WITH IOL      KNEE REPLACEMENT, TOTAL       Social History     Socioeconomic History     Marital status:      Spouse name: Not on file    Number of children: Not on file    Years of education: Not on file    Highest education level: Not on file   Occupational History    Not on file   Tobacco Use    Smoking status: Former     Current packs/day: 0.00     Average packs/day: 1 pack/day for 10.0 years (10.0 ttl pk-yrs)     Types: Cigarettes     Start date: 1985     Quit date: 1995     Years since quittin.6    Smokeless tobacco: Never   Vaping Use    Vaping status: Never Used   Substance and Sexual Activity    Alcohol use: Not Currently     Alcohol/week: 2.4 - 3.6 oz     Types: 4 - 6 Cans of beer per week    Drug use: No    Sexual activity: Not on file   Other Topics Concern    Not on file   Social History Narrative    ** Merged History Encounter **          Social Determinants of Health     Financial Resource Strain: Low Risk  (2024)    Overall Financial Resource Strain (CARDIA)     Difficulty of Paying Living Expenses: Not hard at all   Food Insecurity: No Food Insecurity (2024)    Hunger Vital Sign     Worried About Running Out of Food in the Last Year: Never true     Ran Out of Food in the Last Year: Never true   Transportation Needs: No Transportation Needs (2024)    PRAPARE - Transportation     Lack of Transportation (Medical): No     Lack of Transportation (Non-Medical): No   Physical Activity: Sufficiently Active (2024)    Exercise Vital Sign     Days of Exercise per Week: 7 days     Minutes of Exercise per Session: 30 min   Stress: Not on file   Social Connections: Not on file   Intimate Partner Violence: Not on file   Housing Stability: Unknown (2024)    Housing Stability Vital Sign     Unable to Pay for Housing in the Last Year: No     Number of Places Lived in the Last Year: Not on file     Unstable Housing in the Last Year: No        Family History   Problem Relation Age of Onset    Heart Attack Brother     Cancer Neg Hx      Current Outpatient Medications  on File Prior to Visit   Medication Sig Dispense Refill    carvedilol (COREG) 12.5 MG Tab TAKE 1 TABLET BY MOUTH TWICE DAILY WITH MEALS 200 Tablet 3    tamsulosin (FLOMAX) 0.4 MG capsule Take 1 Capsule by mouth 1/2 hour after breakfast. 100 Capsule 3    finasteride (PROSCAR) 5 MG Tab Take 1 tablet by mouth once daily 90 Tablet 3    Empagliflozin (JARDIANCE) 25 MG Tab Take 25 mg by mouth every day. 100 Tablet 3    amLODIPine (NORVASC) 10 MG Tab Take 1 Tablet by mouth every day. 100 Tablet 3    lidocaine (XYLOCAINE) 5 % Ointment Apply 2 g topically 3 times a day as needed (shingles). 50 g 0    glipizide-metformin (METAGLIP) 5-500 MG per tablet TAKE 1 TABLET BY MOUTH TWICE DAILY BEFORE BREAKFAST AND  DINNER 200 Tablet 3    olmesartan (BENICAR) 5 MG tablet TAKE 1 TABLET BY MOUTH ONCE DAILY *USE IN PLACE OF LISINOPRIL SECONDARY TO COUGH 100 Tablet 3    FLUoxetine (PROZAC) 20 MG Cap Take 1 Capsule by mouth every day. 30 Capsule 11    atorvastatin (LIPITOR) 40 MG Tab Take 1 Tablet by mouth every day. 100 Tablet 3    omeprazole (PRILOSEC) 20 MG delayed-release capsule TAKE 1 CAPSULE BY MOUTH IN THE MORNING ON AN EMPTY STOMACH 30 MINUTES PRIOR TO EATING AND DRINKING.      vitamin D (CHOLECALCIFEROL) 1000 UNIT Tab Take 1 Tablet by mouth every day.      Omega-3 Fatty Acids (FISH OIL) 1000 MG Cap capsule Take 1 Capsule by mouth every day.       No current facility-administered medications on file prior to visit.     Allergies: Patient has no known allergies.    ROS:   Review of Systems   Constitutional:  Negative for chills, diaphoresis, fever and malaise/fatigue.   HENT:  Negative for congestion and sinus pain.    Respiratory:  Negative for cough, hemoptysis, sputum production, shortness of breath and wheezing.    Cardiovascular:  Negative for chest pain, palpitations and leg swelling.   Gastrointestinal:  Negative for diarrhea, heartburn, nausea and vomiting.   Musculoskeletal:  Negative for falls and myalgias.  "  Neurological:  Negative for dizziness, weakness and headaches.     Vitals:  /84 (BP Location: Right arm, Patient Position: Sitting, BP Cuff Size: Adult)   Pulse 87   Ht 1.626 m (5' 4\")   Wt 105 kg (232 lb)   SpO2 93%     Physical Exam  Constitutional:       General: He is not in acute distress.     Appearance: Normal appearance. He is not ill-appearing, toxic-appearing or diaphoretic.   Cardiovascular:      Rate and Rhythm: Normal rate and regular rhythm.      Heart sounds: No murmur heard.     No friction rub. No gallop.   Pulmonary:      Effort: No respiratory distress.      Breath sounds: Normal breath sounds. No stridor. No wheezing, rhonchi or rales.   Musculoskeletal:         General: No swelling.      Right lower leg: No edema.      Left lower leg: No edema.   Skin:     General: Skin is warm.   Neurological:      General: No focal deficit present.      Mental Status: He is alert and oriented to person, place, and time.   Psychiatric:         Mood and Affect: Mood normal.         Behavior: Behavior normal.         Thought Content: Thought content normal.         Judgment: Judgment normal.         Laboratory Data:  PFTs: (Date: 7/28/2023)-          CXR: (Date: 11/30/2022)-  Impression:  No acute cardiopulmonary disease evident.    ECHO: (Date: 6/7/2022)-  Impression:  Normal left ventricular chamber size.  The left ventricular ejection fraction is visually estimated to be 65%.  Indeterminate diastolic function.  Normal left ventricular wall thickness.  No significant valvular disease.       Assessment and Plan:    Problem List Items Addressed This Visit          Pulmonary/Sleep Medicine Problems    ONI on CPAP     Continues to use and benefit from therapy.    -- Mask and supply order  -- Follow up with Sleep Center          Relevant Orders    DME Mask and Supplies       Other    Mild intermittent asthma without complication     PFTs in 2023 showed FVC 1.76 L or 56%, FEV1 1.31 L or 55%, FEV1/FVC 75%, " %, TLC 83%, and DLCO 100% predicted, without positive bronchodilator response.  CXR in 2022 was unremarkable.  Patient is currently on Breo 100 and albuterol.  Symptoms are stable.  Patient has not had any exacerbations.  -- Continue Breo 100  -- Continue albuterol as needed  -- Advised patient to increase exercise as tolerated  -- Advised patient stay up-to-date on all vaccines         Relevant Medications    albuterol 108 (90 Base) MCG/ACT Aero Soln inhalation aerosol    fluticasone furoate-vilanterol (BREO ELLIPTA) 100-25 MCG/ACT AEROSOL POWDER, BREATH ACTIVATED     Diagnostic studies have been reviewed with the patient.    Return in about 1 year (around 8/16/2025), or if symptoms worsen or fail to improve, for ASTHMA, with Denis.     This note was generated using voice recognition software which has a chance of producing errors of grammar and possibly content.  I have made every reasonable attempt to find and correct any obvious errors, but it should be expected that some may not be found prior to finalization of this note.    Time spent in record review prior to patient arrival, reviewing results, and in face-to-face encounter totaled 20 min.  __________  JOSHUA Whittington  Pulmonary Medicine  [facility]

## 2024-08-16 ENCOUNTER — OFFICE VISIT (OUTPATIENT)
Dept: SLEEP MEDICINE | Facility: MEDICAL CENTER | Age: 79
End: 2024-08-16
Payer: MEDICARE

## 2024-08-16 VITALS
DIASTOLIC BLOOD PRESSURE: 84 MMHG | WEIGHT: 232 LBS | HEART RATE: 87 BPM | SYSTOLIC BLOOD PRESSURE: 122 MMHG | OXYGEN SATURATION: 93 % | HEIGHT: 64 IN | BODY MASS INDEX: 39.61 KG/M2

## 2024-08-16 DIAGNOSIS — J45.20 MILD INTERMITTENT ASTHMA WITHOUT COMPLICATION: ICD-10-CM

## 2024-08-16 DIAGNOSIS — G47.33 OSA ON CPAP: ICD-10-CM

## 2024-08-16 PROCEDURE — 3079F DIAST BP 80-89 MM HG: CPT

## 2024-08-16 PROCEDURE — 99213 OFFICE O/P EST LOW 20 MIN: CPT

## 2024-08-16 PROCEDURE — 3074F SYST BP LT 130 MM HG: CPT

## 2024-08-16 RX ORDER — ALBUTEROL SULFATE 90 UG/1
2 AEROSOL, METERED RESPIRATORY (INHALATION) EVERY 4 HOURS PRN
Qty: 1 EACH | Refills: 11 | Status: SHIPPED | OUTPATIENT
Start: 2024-08-16

## 2024-08-16 RX ORDER — FLUTICASONE FUROATE AND VILANTEROL 100; 25 UG/1; UG/1
1 POWDER RESPIRATORY (INHALATION) DAILY
Qty: 90 EACH | Refills: 3 | Status: SHIPPED | OUTPATIENT
Start: 2024-08-16 | End: 2025-08-16

## 2024-08-16 ASSESSMENT — ENCOUNTER SYMPTOMS
SHORTNESS OF BREATH: 0
SPUTUM PRODUCTION: 0
COUGH: 0

## 2024-08-16 ASSESSMENT — FIBROSIS 4 INDEX: FIB4 SCORE: 1.99

## 2024-08-16 NOTE — ASSESSMENT & PLAN NOTE
PFTs in 2023 showed FVC 1.76 L or 56%, FEV1 1.31 L or 55%, FEV1/FVC 75%, %, TLC 83%, and DLCO 100% predicted, without positive bronchodilator response.  CXR in 2022 was unremarkable.  Patient is currently on Breo 100 and albuterol.  Symptoms are stable.  Patient has not had any exacerbations.  -- Continue Breo 100  -- Continue albuterol as needed  -- Advised patient to increase exercise as tolerated  -- Advised patient stay up-to-date on all vaccines

## 2024-08-16 NOTE — ASSESSMENT & PLAN NOTE
Continues to use and benefit from therapy.    -- Mask and supply order  -- Follow up with Sleep Center

## 2024-09-16 ENCOUNTER — OFFICE VISIT (OUTPATIENT)
Dept: MEDICAL GROUP | Facility: MEDICAL CENTER | Age: 79
End: 2024-09-16
Payer: MEDICARE

## 2024-09-16 VITALS
BODY MASS INDEX: 39.57 KG/M2 | HEIGHT: 64 IN | SYSTOLIC BLOOD PRESSURE: 128 MMHG | WEIGHT: 231.8 LBS | TEMPERATURE: 97.1 F | HEART RATE: 78 BPM | OXYGEN SATURATION: 96 % | DIASTOLIC BLOOD PRESSURE: 80 MMHG

## 2024-09-16 DIAGNOSIS — R42 DIZZINESS: ICD-10-CM

## 2024-09-16 DIAGNOSIS — I49.9 IRREGULAR HEART RHYTHM: ICD-10-CM

## 2024-09-16 DIAGNOSIS — I15.2 HIGH BLOOD PRESSURE ASSOCIATED WITH DIABETES (HCC): ICD-10-CM

## 2024-09-16 DIAGNOSIS — I45.10 RIGHT BUNDLE BRANCH BLOCK: ICD-10-CM

## 2024-09-16 DIAGNOSIS — Z13.228 SCREENING FOR ENDOCRINE, METABOLIC AND IMMUNITY DISORDER: ICD-10-CM

## 2024-09-16 DIAGNOSIS — E11.59 HIGH BLOOD PRESSURE ASSOCIATED WITH DIABETES (HCC): ICD-10-CM

## 2024-09-16 DIAGNOSIS — Z13.29 SCREENING FOR ENDOCRINE, METABOLIC AND IMMUNITY DISORDER: ICD-10-CM

## 2024-09-16 DIAGNOSIS — E11.65 TYPE 2 DIABETES MELLITUS WITH HYPERGLYCEMIA, WITHOUT LONG-TERM CURRENT USE OF INSULIN (HCC): ICD-10-CM

## 2024-09-16 DIAGNOSIS — I95.1 ORTHOSTATIC HYPOTENSION: ICD-10-CM

## 2024-09-16 DIAGNOSIS — I49.1 PREMATURE ATRIAL CONTRACTIONS: ICD-10-CM

## 2024-09-16 DIAGNOSIS — Z13.0 SCREENING FOR ENDOCRINE, METABOLIC AND IMMUNITY DISORDER: ICD-10-CM

## 2024-09-16 DIAGNOSIS — E11.69 HYPERLIPIDEMIA ASSOCIATED WITH TYPE 2 DIABETES MELLITUS (HCC): ICD-10-CM

## 2024-09-16 DIAGNOSIS — E66.9 OBESITY (BMI 30-39.9): ICD-10-CM

## 2024-09-16 DIAGNOSIS — E78.5 HYPERLIPIDEMIA ASSOCIATED WITH TYPE 2 DIABETES MELLITUS (HCC): ICD-10-CM

## 2024-09-16 DIAGNOSIS — I44.4 LAFB (LEFT ANTERIOR FASCICULAR BLOCK): ICD-10-CM

## 2024-09-16 PROCEDURE — 3074F SYST BP LT 130 MM HG: CPT

## 2024-09-16 PROCEDURE — 99214 OFFICE O/P EST MOD 30 MIN: CPT

## 2024-09-16 PROCEDURE — 3079F DIAST BP 80-89 MM HG: CPT

## 2024-09-16 RX ORDER — SILVER SULFADIAZINE 1 %
CREAM (GRAM) TOPICAL
COMMUNITY
Start: 2024-09-04

## 2024-09-16 ASSESSMENT — FIBROSIS 4 INDEX: FIB4 SCORE: 1.99

## 2024-09-16 ASSESSMENT — ENCOUNTER SYMPTOMS
PALPITATIONS: 0
CHILLS: 0
ORTHOPNEA: 0
FEVER: 0
SHORTNESS OF BREATH: 0
COUGH: 0

## 2024-09-16 NOTE — LETTER
[facility]  CHARLES Zelaya  [address]  [facility] [address]  Fax: [phone]   Authorization for Release/Disclosure of   Protected Health Information   Name: JONATHAN YORK : 1945 SSN: [id number]   Address: [address] [place] [address] [facility] [address] Phone:    [phone] (home)    I authorize the entity listed below to release/disclose the PHI below to:   [facility]/CHARLES Zelaya and CHARLES Zelaya   Provider or Entity Name:  [facility]   Address   City, State, [facility]   Phone:      Fax:     Reason for request: continuity of care   Information to be released:    [  ] LAST COLONOSCOPY,  including any PATH REPORT and follow-up  [  ] LAST FIT/COLOGUARD RESULT [  ] LAST DEXA  [  ] LAST MAMMOGRAM  [  ] LAST PAP  [  ] LAST LABS [X  ] RETINA EXAM REPORT  [  ] IMMUNIZATION RECORDS  [  ] Release all info      [  ] Check here and initial the line next to each item to release ALL health information INCLUDING  _____ Care and treatment for drug and / or alcohol abuse  _____ HIV testing, infection status, or AIDS  _____ Genetic Testing    DATES OF SERVICE OR TIME PERIOD TO BE DISCLOSED: _____________  I understand and acknowledge that:  * This Authorization may be revoked at any time by you in writing, except if your health information has already been used or disclosed.  * Your health information that will be used or disclosed as a result of you signing this authorization could be re-disclosed by the recipient. If this occurs, your re-disclosed health information may no longer be protected by State or Federal laws.  * You may refuse to sign this Authorization. Your refusal will not affect your ability to obtain treatment.  * This Authorization becomes effective upon signing and will  on (date) __________.      If no date is indicated, this Authorization will  one (1) year from the signature date.    Name: Jonathan York  Signature: Date:    9/16/2024     PLEASE FAX REQUESTED RECORDS BACK TO: [phone]

## 2024-09-16 NOTE — PROGRESS NOTES
Subjective:     Verbal consent was acquired by the patient to use [employer] ambient listening note generation during this visit Yes    CC: Follow-Up      HPI:   Jonathan is a 79 y.o. male who presents today for:    History of Present Illness  The patient presents for evaluation of multiple medical concerns. He is accompanied by an adult female.    He reports satisfactory control of his diabetes, with a blood sugar reading of 136 yesterday. He has been mindful of his diet and has recently started some light exercising. His last eye examination was conducted in May 2023 at [facility]. He underwent surgery for ingrown toenails three months ago, which have since improved. He maintains good hydration and takes metformin in the morning.    He experiences occasional dizziness, particularly when bending over or standing up too quickly. This sometimes results in a brief sensation of spinning, which subsides when he sits down. The dizziness can occur at any time during the day. He monitors his blood pressure at home, which fluctuates between  and 150. He takes his blood pressure medication before bed. He reports no history of atrial fibrillation. He had a consultation with a cardiologist in March 2024 and is scheduled for a follow-up in a year.         Allergies: Patient has no known allergies.     Medications:   Current Outpatient Medications:     SSD 1 % Cream, APPLY TO SKIN FISSURE ONCE OR TWICE DAILY., Disp: , Rfl:     albuterol 108 (90 Base) MCG/ACT Aero Soln inhalation aerosol, Inhale 2 Puffs every four hours as needed for Shortness of Breath., Disp: 1 Each, Rfl: 11    fluticasone furoate-vilanterol (BREO ELLIPTA) 100-25 MCG/ACT AEROSOL POWDER, BREATH ACTIVATED, Inhale 1 Puff every day., Disp: 90 Each, Rfl: 3    carvedilol (COREG) 12.5 MG Tab, TAKE 1 TABLET BY MOUTH TWICE DAILY WITH MEALS, Disp: 200 Tablet, Rfl: 3    tamsulosin (FLOMAX) 0.4 MG capsule, Take 1 Capsule by mouth 1/2 hour after breakfast.,  "Disp: 100 Capsule, Rfl: 3    finasteride (PROSCAR) 5 MG Tab, Take 1 tablet by mouth once daily, Disp: 90 Tablet, Rfl: 3    Empagliflozin (JARDIANCE) 25 MG Tab, Take 25 mg by mouth every day., Disp: 100 Tablet, Rfl: 3    amLODIPine (NORVASC) 10 MG Tab, Take 1 Tablet by mouth every day., Disp: 100 Tablet, Rfl: 3    lidocaine (XYLOCAINE) 5 % Ointment, Apply 2 g topically 3 times a day as needed (shingles)., Disp: 50 g, Rfl: 0    glipizide-metformin (METAGLIP) 5-500 MG per tablet, TAKE 1 TABLET BY MOUTH TWICE DAILY BEFORE BREAKFAST AND  DINNER, Disp: 200 Tablet, Rfl: 3    olmesartan (BENICAR) 5 MG tablet, TAKE 1 TABLET BY MOUTH ONCE DAILY *USE IN PLACE OF LISINOPRIL SECONDARY TO COUGH, Disp: 100 Tablet, Rfl: 3    FLUoxetine (PROZAC) 20 MG Cap, Take 1 Capsule by mouth every day., Disp: 30 Capsule, Rfl: 11    atorvastatin (LIPITOR) 40 MG Tab, Take 1 Tablet by mouth every day., Disp: 100 Tablet, Rfl: 3    omeprazole (PRILOSEC) 20 MG delayed-release capsule, TAKE 1 CAPSULE BY MOUTH IN THE MORNING ON AN EMPTY STOMACH 30 MINUTES PRIOR TO EATING AND DRINKING., Disp: , Rfl:     vitamin D (CHOLECALCIFEROL) 1000 UNIT Tab, Take 1 Tablet by mouth every day., Disp: , Rfl:     Omega-3 Fatty Acids (FISH OIL) 1000 MG Cap capsule, Take 1 Capsule by mouth every day., Disp: , Rfl:       ROS:  Review of Systems   Constitutional:  Negative for chills and fever.   Respiratory:  Negative for cough and shortness of breath.    Cardiovascular:  Negative for chest pain, palpitations, orthopnea and leg swelling.       Objective:     Exam:  /80   Pulse 78   Temp 36.2 °C (97.1 °F) (Temporal)   Ht 1.626 m (5' 4\")   Wt 105 kg (231 lb 12.8 oz)   SpO2 96%   BMI 39.79 kg/m²  Body mass index is 39.79 kg/m².    Physical Exam  Constitutional:       Appearance: He is normal weight.   Eyes:      Pupils: Pupils are equal, round, and reactive to light.   Cardiovascular:      Rate and Rhythm: Normal rate and regular rhythm.      Pulses: Normal " pulses.      Heart sounds: Normal heart sounds.   Pulmonary:      Effort: Pulmonary effort is normal.      Breath sounds: Normal breath sounds.   Feet:      Right foot:      Protective Sensation: 5 sites tested.  5 sites sensed.      Skin integrity: Dry skin present.      Toenail Condition: Fungal disease present.     Left foot:      Protective Sensation: 5 sites tested.  5 sites sensed.      Skin integrity: Dry skin present.      Toenail Condition: Fungal disease present.  Neurological:      Mental Status: He is alert and oriented to person, place, and time.   Psychiatric:         Mood and Affect: Mood normal.         Behavior: Behavior normal.           Assessment & Plan:     Jonathan de la vega 79 y.o. male with the following -     1. Type 2 diabetes mellitus with hyperglycemia, without long-term current use of insulin ([facility])  Chronic,stable  He reports checking his blood sugar yesterday, with readings around 136-138. He is advised to continue monitoring his blood sugar levels and maintain a balanced diet. A foot exam was conducted today. He is also reminded to stay hydrated and monitor his blood pressure regularly.  - Diabetic Monofilament LE Exam  -  glipizide-metformin (METAGLIP) 5-500 MG per tablet, TAKE 1 TABLET BY MOUTH TWICE DAILY BEFORE BREAKFAST AND  DINNER, Disp: 200 Tablet, Rfl: 3  - Empagliflozin (JARDIANCE) 25 MG Tab, Take 25 mg by mouth every day., Disp: 100 Tablet, Rfl: 3    2. Obesity (BMI 30-39.9)  Chronic, stable  Continue to work on diet exercise to help with weight management.    3. Orthostatic hypotension  4. Dizziness  Acute, uncomplicated  Patient demonstrated orthostatic hypotension when evaluated in office, likely causing the dizziness. An EKG will be performed today to check for any abnormalities in heart rhythm. Blood work will be repeated to rule out other potential causes of dizziness. He is advised to increase his water intake and add a small amount of salt to his water to slightly elevate his  blood pressure. If symptoms persist, he should contact his cardiologist's office for a possible earlier appointment.  - EKG - Clinic Performed  - Comp Metabolic Panel; Future  - CBC WITHOUT DIFFERENTIAL; Future    5. Irregular heart rhythm  6.  Premature atrial contractions  7.  RBBB  8.  LAFB (left anterior fascicular block)  Chronic, stable  EKG in office showed normal sinus rhythm with right bundle branch block, left anterior fascicular block, and premature atrial contractions.  - EKG - Clinic Performed  - TSH WITH REFLEX TO FT4; Future    9. High blood pressure associated with diabetes (HCC)  Chronic, stable  Blood pressure well-controlled, 128/80.  Recommend continuing current dose of medication and following up with his cardiologist as directed.  - carvedilol (COREG) 12.5 MG Tab, TAKE 1 TABLET BY MOUTH TWICE DAILY WITH MEALS, Disp: 200 Tablet, Rfl: 3  -  amLODIPine (NORVASC) 10 MG Tab, Take 1 Tablet by mouth every day., Disp: 100 Tablet, Rfl: 3  -  olmesartan (BENICAR) 5 MG tablet, TAKE 1 TABLET BY MOUTH ONCE DAILY *USE IN PLACE OF LISINOPRIL SECONDARY TO COUGH, Disp: 100 Tablet, Rfl: 3    10. Hyperlipidemia associated with type 2 diabetes mellitus (HCC)  Chronic, stable  Continue to work on diet and exercise to help with cholesterol management.  - Lipid Profile; Future  - Omega-3 Fatty Acids (FISH OIL) 1000 MG Cap capsule, Take 1 Capsule by mouth every day., Disp: , Rfl:     11. Screening for endocrine, metabolic and immunity disorder  - TSH WITH REFLEX TO FT4; Future  - Comp Metabolic Panel; Future  - CBC WITHOUT DIFFERENTIAL; Future    Other orders  - SSD 1 % Cream; APPLY TO SKIN FISSURE ONCE OR TWICE DAILY.      EKG Interpretation   Ordered and interpreted by JOSHUA Serrato  Rhythm: normal sinus   Rate: normal   Axis: normal   Ectopy: PACs, right bundle branch block  Conduction:normal   ST Segments: no acute change   T Waves: no acute change   Q Waves: none   Clinical Impression: no acute changes  and normal EKG    Anticipatory guidance included the following: Patient counseled about skin care, diet, supplements, smoking, drugs/alcohol use, safe sex and exercise.     Return in about 3 months (around 12/16/2024) for F/U Diabetes.    Please note that this dictation was created using voice recognition software. I have made every reasonable attempt to correct obvious errors, but I expect that there are errors of grammar and possibly content that I did not discover before finalizing the note.      I have placed the below orders and discussed them with an approved delegating provider.  The MA is performing the below orders under the direction of Dr. Pacheco

## 2024-09-30 DIAGNOSIS — E78.2 MIXED HYPERLIPIDEMIA: ICD-10-CM

## 2024-09-30 RX ORDER — ATORVASTATIN CALCIUM 40 MG/1
40 TABLET, FILM COATED ORAL DAILY
Qty: 100 TABLET | Refills: 3 | Status: SHIPPED | OUTPATIENT
Start: 2024-09-30

## 2024-09-30 NOTE — TELEPHONE ENCOUNTER
Received request via: Pharmacy    Was the patient seen in the last year in this department? Yes    Does the patient have an active prescription (recently filled or refills available) for medication(s) requested? No    Pharmacy Name:   [facility] [address] [facility], [place] - [address] [facility] [address]  Phone: [phone] Fax: [phone]       Does the patient have care home Plus and need 100-day supply? (This applies to ALL medications) Yes, quantity updated to 100 days

## 2024-10-09 ENCOUNTER — APPOINTMENT (OUTPATIENT)
Dept: SLEEP MEDICINE | Facility: MEDICAL CENTER | Age: 79
End: 2024-10-09
Payer: MEDICARE

## 2024-10-15 ENCOUNTER — OFFICE VISIT (OUTPATIENT)
Dept: SLEEP MEDICINE | Facility: MEDICAL CENTER | Age: 79
End: 2024-10-15
Attending: NURSE PRACTITIONER
Payer: MEDICARE

## 2024-10-15 VITALS
BODY MASS INDEX: 39.95 KG/M2 | DIASTOLIC BLOOD PRESSURE: 70 MMHG | WEIGHT: 234 LBS | SYSTOLIC BLOOD PRESSURE: 122 MMHG | HEART RATE: 89 BPM | OXYGEN SATURATION: 92 % | HEIGHT: 64 IN

## 2024-10-15 DIAGNOSIS — G47.33 OSA TREATED WITH BIPAP: ICD-10-CM

## 2024-10-15 DIAGNOSIS — G47.33 OSA ON CPAP: ICD-10-CM

## 2024-10-15 PROCEDURE — 99214 OFFICE O/P EST MOD 30 MIN: CPT | Performed by: NURSE PRACTITIONER

## 2024-10-15 PROCEDURE — 3074F SYST BP LT 130 MM HG: CPT | Performed by: NURSE PRACTITIONER

## 2024-10-15 PROCEDURE — 99213 OFFICE O/P EST LOW 20 MIN: CPT | Performed by: NURSE PRACTITIONER

## 2024-10-15 PROCEDURE — 3078F DIAST BP <80 MM HG: CPT | Performed by: NURSE PRACTITIONER

## 2024-10-15 ASSESSMENT — FIBROSIS 4 INDEX: FIB4 SCORE: 1.99

## 2024-10-22 DIAGNOSIS — E11.41 TYPE 2 DIABETES MELLITUS WITH DIABETIC MONONEUROPATHY, WITHOUT LONG-TERM CURRENT USE OF INSULIN (HCC): ICD-10-CM

## 2024-10-23 RX ORDER — GLIPIZIDE AND METFORMIN HCL 5; 500 MG/1; MG/1
1 TABLET, FILM COATED ORAL
Qty: 200 TABLET | Refills: 3 | Status: SHIPPED | OUTPATIENT
Start: 2024-10-23

## 2024-10-31 ENCOUNTER — APPOINTMENT (RX ONLY)
Dept: URBAN - METROPOLITAN AREA CLINIC 4 | Facility: CLINIC | Age: 79
Setting detail: DERMATOLOGY
End: 2024-10-31

## 2024-10-31 DIAGNOSIS — L81.4 OTHER MELANIN HYPERPIGMENTATION: ICD-10-CM

## 2024-10-31 DIAGNOSIS — L82.1 OTHER SEBORRHEIC KERATOSIS: ICD-10-CM

## 2024-10-31 DIAGNOSIS — Z85.828 PERSONAL HISTORY OF OTHER MALIGNANT NEOPLASM OF SKIN: ICD-10-CM

## 2024-10-31 DIAGNOSIS — Z71.89 OTHER SPECIFIED COUNSELING: ICD-10-CM

## 2024-10-31 DIAGNOSIS — D22 MELANOCYTIC NEVI: ICD-10-CM

## 2024-10-31 DIAGNOSIS — D18.0 HEMANGIOMA: ICD-10-CM

## 2024-10-31 PROBLEM — D22.5 MELANOCYTIC NEVI OF TRUNK: Status: ACTIVE | Noted: 2024-10-31

## 2024-10-31 PROBLEM — D48.5 NEOPLASM OF UNCERTAIN BEHAVIOR OF SKIN: Status: ACTIVE | Noted: 2024-10-31

## 2024-10-31 PROBLEM — D18.01 HEMANGIOMA OF SKIN AND SUBCUTANEOUS TISSUE: Status: ACTIVE | Noted: 2024-10-31

## 2024-10-31 PROCEDURE — ? COUNSELING

## 2024-10-31 PROCEDURE — 11102 TANGNTL BX SKIN SINGLE LES: CPT

## 2024-10-31 PROCEDURE — ? BIOPSY BY SHAVE METHOD

## 2024-10-31 PROCEDURE — 11103 TANGNTL BX SKIN EA SEP/ADDL: CPT

## 2024-10-31 PROCEDURE — 99213 OFFICE O/P EST LOW 20 MIN: CPT | Mod: 25

## 2024-10-31 ASSESSMENT — LOCATION SIMPLE DESCRIPTION DERM
LOCATION SIMPLE: CHEST
LOCATION SIMPLE: RIGHT FOREARM
LOCATION SIMPLE: LEFT HAND
LOCATION SIMPLE: RIGHT UPPER BACK

## 2024-10-31 ASSESSMENT — LOCATION DETAILED DESCRIPTION DERM
LOCATION DETAILED: UPPER STERNUM
LOCATION DETAILED: RIGHT MEDIAL UPPER BACK
LOCATION DETAILED: RIGHT PROXIMAL DORSAL FOREARM
LOCATION DETAILED: LEFT MEDIAL SUPERIOR CHEST
LOCATION DETAILED: LEFT ULNAR DORSAL HAND
LOCATION DETAILED: RIGHT SUPERIOR MEDIAL UPPER BACK

## 2024-10-31 ASSESSMENT — LOCATION ZONE DERM
LOCATION ZONE: TRUNK
LOCATION ZONE: ARM
LOCATION ZONE: HAND

## 2024-11-13 ENCOUNTER — HOSPITAL ENCOUNTER (OUTPATIENT)
Dept: LAB | Facility: MEDICAL CENTER | Age: 79
End: 2024-11-13
Payer: MEDICARE

## 2024-11-13 DIAGNOSIS — E11.69 HYPERLIPIDEMIA ASSOCIATED WITH TYPE 2 DIABETES MELLITUS (HCC): ICD-10-CM

## 2024-11-13 DIAGNOSIS — E78.5 HYPERLIPIDEMIA ASSOCIATED WITH TYPE 2 DIABETES MELLITUS (HCC): ICD-10-CM

## 2024-11-13 DIAGNOSIS — Z13.228 SCREENING FOR ENDOCRINE, METABOLIC AND IMMUNITY DISORDER: ICD-10-CM

## 2024-11-13 DIAGNOSIS — I95.1 ORTHOSTATIC HYPOTENSION: ICD-10-CM

## 2024-11-13 DIAGNOSIS — Z13.29 SCREENING FOR ENDOCRINE, METABOLIC AND IMMUNITY DISORDER: ICD-10-CM

## 2024-11-13 DIAGNOSIS — Z13.0 SCREENING FOR ENDOCRINE, METABOLIC AND IMMUNITY DISORDER: ICD-10-CM

## 2024-11-13 DIAGNOSIS — I49.9 IRREGULAR HEART RHYTHM: ICD-10-CM

## 2024-11-13 LAB
ALBUMIN SERPL BCP-MCNC: 4.1 G/DL (ref 3.2–4.9)
ALBUMIN/GLOB SERPL: 1.4 G/DL
ALP SERPL-CCNC: 70 U/L (ref 30–99)
ALT SERPL-CCNC: 22 U/L (ref 2–50)
ANION GAP SERPL CALC-SCNC: 10 MMOL/L (ref 7–16)
AST SERPL-CCNC: 21 U/L (ref 12–45)
BILIRUB SERPL-MCNC: 0.3 MG/DL (ref 0.1–1.5)
BUN SERPL-MCNC: 23 MG/DL (ref 8–22)
CALCIUM ALBUM COR SERPL-MCNC: 9.4 MG/DL (ref 8.5–10.5)
CALCIUM SERPL-MCNC: 9.5 MG/DL (ref 8.5–10.5)
CHLORIDE SERPL-SCNC: 101 MMOL/L (ref 96–112)
CHOLEST SERPL-MCNC: 107 MG/DL (ref 100–199)
CO2 SERPL-SCNC: 27 MMOL/L (ref 20–33)
CREAT SERPL-MCNC: 0.89 MG/DL (ref 0.5–1.4)
ERYTHROCYTE [DISTWIDTH] IN BLOOD BY AUTOMATED COUNT: 49.1 FL (ref 35.9–50)
FASTING STATUS PATIENT QL REPORTED: NORMAL
GFR SERPLBLD CREATININE-BSD FMLA CKD-EPI: 86 ML/MIN/1.73 M 2
GLOBULIN SER CALC-MCNC: 3 G/DL (ref 1.9–3.5)
GLUCOSE SERPL-MCNC: 137 MG/DL (ref 65–99)
HCT VFR BLD AUTO: 46 % (ref 42–52)
HDLC SERPL-MCNC: 30 MG/DL
HGB BLD-MCNC: 14.8 G/DL (ref 14–18)
LDLC SERPL CALC-MCNC: 37 MG/DL
MCH RBC QN AUTO: 28 PG (ref 27–33)
MCHC RBC AUTO-ENTMCNC: 32.2 G/DL (ref 32.3–36.5)
MCV RBC AUTO: 87.1 FL (ref 81.4–97.8)
PLATELET # BLD AUTO: 238 K/UL (ref 164–446)
PMV BLD AUTO: 9 FL (ref 9–12.9)
POTASSIUM SERPL-SCNC: 4 MMOL/L (ref 3.6–5.5)
PROT SERPL-MCNC: 7.1 G/DL (ref 6–8.2)
RBC # BLD AUTO: 5.28 M/UL (ref 4.7–6.1)
SODIUM SERPL-SCNC: 138 MMOL/L (ref 135–145)
TRIGL SERPL-MCNC: 200 MG/DL (ref 0–149)
TSH SERPL DL<=0.005 MIU/L-ACNC: 4.08 UIU/ML (ref 0.38–5.33)
WBC # BLD AUTO: 8.4 K/UL (ref 4.8–10.8)

## 2024-11-13 PROCEDURE — 80061 LIPID PANEL: CPT

## 2024-11-13 PROCEDURE — 84443 ASSAY THYROID STIM HORMONE: CPT

## 2024-11-13 PROCEDURE — 36415 COLL VENOUS BLD VENIPUNCTURE: CPT

## 2024-11-13 PROCEDURE — 80053 COMPREHEN METABOLIC PANEL: CPT

## 2024-11-13 PROCEDURE — 85027 COMPLETE CBC AUTOMATED: CPT

## 2024-11-22 DIAGNOSIS — I10 ESSENTIAL HYPERTENSION, BENIGN: ICD-10-CM

## 2024-11-22 NOTE — TELEPHONE ENCOUNTER
Received request via: Pharmacy    Was the patient seen in the last year in this department? Yes    Does the patient have an active prescription (recently filled or refills available) for medication(s) requested? No    Pharmacy Name:   [facility] [address] [facility], [place] - [address] [facility] [address]  Phone: [phone] Fax: [phone]       Does the patient have skilled nursing Plus and need 100-day supply? (This applies to ALL medications) Yes, quantity updated to 100 days

## 2024-11-25 RX ORDER — OLMESARTAN MEDOXOMIL 5 MG/1
5 TABLET ORAL DAILY
Qty: 100 TABLET | Refills: 3 | Status: SHIPPED | OUTPATIENT
Start: 2024-11-25

## 2024-12-18 ENCOUNTER — HOSPITAL ENCOUNTER (OUTPATIENT)
Facility: MEDICAL CENTER | Age: 79
End: 2024-12-18
Payer: MEDICARE

## 2024-12-18 ENCOUNTER — HOSPITAL ENCOUNTER (OUTPATIENT)
Dept: RADIOLOGY | Facility: MEDICAL CENTER | Age: 79
End: 2024-12-18
Payer: MEDICARE

## 2024-12-18 ENCOUNTER — OFFICE VISIT (OUTPATIENT)
Dept: MEDICAL GROUP | Facility: MEDICAL CENTER | Age: 79
End: 2024-12-18
Payer: MEDICARE

## 2024-12-18 VITALS
HEART RATE: 85 BPM | SYSTOLIC BLOOD PRESSURE: 119 MMHG | DIASTOLIC BLOOD PRESSURE: 78 MMHG | WEIGHT: 228.6 LBS | BODY MASS INDEX: 39.24 KG/M2 | TEMPERATURE: 97.1 F | OXYGEN SATURATION: 96 %

## 2024-12-18 DIAGNOSIS — G47.33 OSA ON CPAP: ICD-10-CM

## 2024-12-18 DIAGNOSIS — R09.89 SCATTERED RHONCHI OF RIGHT LUNG: ICD-10-CM

## 2024-12-18 DIAGNOSIS — E11.69 HYPERLIPIDEMIA ASSOCIATED WITH TYPE 2 DIABETES MELLITUS (HCC): ICD-10-CM

## 2024-12-18 DIAGNOSIS — E78.5 HYPERLIPIDEMIA ASSOCIATED WITH TYPE 2 DIABETES MELLITUS (HCC): ICD-10-CM

## 2024-12-18 DIAGNOSIS — R05.2 SUBACUTE COUGH: ICD-10-CM

## 2024-12-18 DIAGNOSIS — E11.41 TYPE 2 DIABETES MELLITUS WITH DIABETIC MONONEUROPATHY, WITHOUT LONG-TERM CURRENT USE OF INSULIN (HCC): ICD-10-CM

## 2024-12-18 DIAGNOSIS — I15.2 HIGH BLOOD PRESSURE ASSOCIATED WITH DIABETES (HCC): ICD-10-CM

## 2024-12-18 DIAGNOSIS — E11.59 HIGH BLOOD PRESSURE ASSOCIATED WITH DIABETES (HCC): ICD-10-CM

## 2024-12-18 DIAGNOSIS — R06.2 WHEEZING: ICD-10-CM

## 2024-12-18 LAB
CREAT UR-MCNC: 58.02 MG/DL
HBA1C MFR BLD: 6.6 % (ref ?–5.8)
MICROALBUMIN UR-MCNC: 12.6 MG/DL
MICROALBUMIN/CREAT UR: 217 MG/G (ref 0–30)
POCT INT CON NEG: NEGATIVE
POCT INT CON POS: POSITIVE

## 2024-12-18 PROCEDURE — 71046 X-RAY EXAM CHEST 2 VIEWS: CPT

## 2024-12-18 PROCEDURE — 3078F DIAST BP <80 MM HG: CPT

## 2024-12-18 PROCEDURE — 92250 FUNDUS PHOTOGRAPHY W/I&R: CPT | Mod: 26

## 2024-12-18 PROCEDURE — 82043 UR ALBUMIN QUANTITATIVE: CPT

## 2024-12-18 PROCEDURE — 83036 HEMOGLOBIN GLYCOSYLATED A1C: CPT

## 2024-12-18 PROCEDURE — 3074F SYST BP LT 130 MM HG: CPT

## 2024-12-18 PROCEDURE — 82570 ASSAY OF URINE CREATININE: CPT

## 2024-12-18 PROCEDURE — 99214 OFFICE O/P EST MOD 30 MIN: CPT

## 2024-12-18 ASSESSMENT — ENCOUNTER SYMPTOMS
WHEEZING: 1
SHORTNESS OF BREATH: 1
COUGH: 1
CHILLS: 0
PALPITATIONS: 0
FEVER: 0
ORTHOPNEA: 0

## 2024-12-18 ASSESSMENT — FIBROSIS 4 INDEX: FIB4 SCORE: 1.52

## 2024-12-18 NOTE — PROGRESS NOTES
Subjective:     Verbal consent was acquired by the patient to use [employer] ambient listening note generation during this visit Yes    CC: Follow-Up      HPI:   Jonathan is a 79 y.o. male who presents today for:    History of Present Illness  The patient presents for evaluation of diabetes, cough, and elevated blood pressure.    He has been monitoring his blood glucose levels at home, although he did not check it today due to work commitments. He has completed his microalbumin urine test.    He reports a persistent non-productive cough, which he attributes to a recent vaccinations. He also experiences intermittent pain on both sides, the cause of which is uncertain, but he speculates it may be related to a previous shingles infection or from coughing so often. He occasionally experiences shortness of breath and wheezing, particularly when lying on his right side, but not when lying on his left side. He reports no recent illnesses or leg swelling.    He uses a CPAP machine for sleep. He tolerates using it well.     Blood pressure well controlled today.            Allergies: Patient has no known allergies.     Medications:   Current Outpatient Medications:     olmesartan (BENICAR) 5 MG tablet, Take 1 Tablet by mouth every day., Disp: 100 Tablet, Rfl: 3    glipizide-metformin (METAGLIP) 5-500 MG per tablet, Take 1 Tablet by mouth 2 times daily, before breakfast and dinner., Disp: 200 Tablet, Rfl: 3    FLUoxetine (PROZAC) 20 MG Cap, Take 1 Capsule by mouth every day., Disp: 100 Capsule, Rfl: 3    atorvastatin (LIPITOR) 40 MG Tab, Take 1 Tablet by mouth every day., Disp: 100 Tablet, Rfl: 3    SSD 1 % Cream, APPLY TO SKIN FISSURE ONCE OR TWICE DAILY., Disp: , Rfl:     albuterol 108 (90 Base) MCG/ACT Aero Soln inhalation aerosol, Inhale 2 Puffs every four hours as needed for Shortness of Breath., Disp: 1 Each, Rfl: 11    fluticasone furoate-vilanterol (BREO ELLIPTA) 100-25 MCG/ACT AEROSOL POWDER, BREATH ACTIVATED, Inhale 1  Puff every day., Disp: 90 Each, Rfl: 3    carvedilol (COREG) 12.5 MG Tab, TAKE 1 TABLET BY MOUTH TWICE DAILY WITH MEALS, Disp: 200 Tablet, Rfl: 3    tamsulosin (FLOMAX) 0.4 MG capsule, Take 1 Capsule by mouth 1/2 hour after breakfast., Disp: 100 Capsule, Rfl: 3    finasteride (PROSCAR) 5 MG Tab, Take 1 tablet by mouth once daily, Disp: 90 Tablet, Rfl: 3    Empagliflozin (JARDIANCE) 25 MG Tab, Take 25 mg by mouth every day., Disp: 100 Tablet, Rfl: 3    amLODIPine (NORVASC) 10 MG Tab, Take 1 Tablet by mouth every day., Disp: 100 Tablet, Rfl: 3    lidocaine (XYLOCAINE) 5 % Ointment, Apply 2 g topically 3 times a day as needed (shingles)., Disp: 50 g, Rfl: 0    omeprazole (PRILOSEC) 20 MG delayed-release capsule, TAKE 1 CAPSULE BY MOUTH IN THE MORNING ON AN EMPTY STOMACH 30 MINUTES PRIOR TO EATING AND DRINKING., Disp: , Rfl:     vitamin D (CHOLECALCIFEROL) 1000 UNIT Tab, Take 1 Tablet by mouth every day., Disp: , Rfl:     Omega-3 Fatty Acids (FISH OIL) 1000 MG Cap capsule, Take 1 Capsule by mouth every day., Disp: , Rfl:       ROS:  Review of Systems   Constitutional:  Negative for chills and fever.   Respiratory:  Positive for cough, shortness of breath and wheezing.    Cardiovascular:  Negative for chest pain, palpitations, orthopnea and leg swelling.       Objective:     Exam:  /78   Pulse 85   Temp 36.2 °C (97.1 °F) (Temporal)   Wt 104 kg (228 lb 9.6 oz)   SpO2 96%   BMI 39.24 kg/m²  Body mass index is 39.24 kg/m².    Physical Exam  Constitutional:       Appearance: He is normal weight.   Eyes:      Pupils: Pupils are equal, round, and reactive to light.   Cardiovascular:      Rate and Rhythm: Normal rate and regular rhythm.      Pulses: Normal pulses.      Heart sounds: Normal heart sounds.   Pulmonary:      Effort: Pulmonary effort is normal.      Breath sounds: Examination of the right-upper field reveals wheezing and rhonchi. Examination of the right-middle field reveals wheezing and rhonchi.  Examination of the right-lower field reveals wheezing and rhonchi. Examination of the left-lower field reveals wheezing. Wheezing and rhonchi present.   Neurological:      Mental Status: He is alert and oriented to person, place, and time.   Psychiatric:         Mood and Affect: Mood normal.         Behavior: Behavior normal.           Assessment & Plan:     Jonathan de la vega 79 y.o. male with the following -     Assessment & Plan      1. Type 2 diabetes mellitus with diabetic mononeuropathy, without long-term current use of insulin (HCC)  Chronic, stable  His A1c level remains stable at 6.6, indicating effective management of his diabetes with the current medication regimen. He has completed his urine test. A retinal eye exam will be conducted today to ensure there are no changes in his eyes due to diabetes.  Recommend continuing glipizide-metformin 5-500 mg twice daily, Jardiance 25 mg daily.  Continue to work on diet and exercise as tolerated to help manage diabetes.  - POCT Retinal Eye Exam  - POCT Hemoglobin A1C  - POCT Microalbumin Creat Ratio Urine; Future    2. Hyperlipidemia associated with type 2 diabetes mellitus (HCC)  Chronic, stable  Continue to work on diet and exercise to help with cholesterol management.  Continue atorvastatin 40 mg nightly.    3. High blood pressure associated with diabetes (HCC)  Chronic, stable  His blood pressure was initially high but normalized upon recheck.  Recommend continuing amlodipine 10 mg daily, carvedilol 12.5 mg twice daily, olmesartan 5 mg daily.    4. ONI on CPAP  Chronic, stable  Continue CPAP as directed.  Follow-up with pulmonology as directed.    5. Subacute cough  6. Wheezing  7. Scattered rhonchi of right lung  Undiagnosed problem with uncertain prognosis  He reports a persistent cough that worsens when lying on the right side, accompanied by occasional shortness of breath and wheezing. His lungs sound congested on the right side, but he has no fever or chills. A chest  x-ray will be ordered to rule out any signs of infection or pneumonia. Depending on the results, antibiotics may be prescribed to prevent further complications.  - DX-CHEST-2 VIEWS; Future        Anticipatory guidance included the following: Patient counseled about skin care, diet, supplements, smoking, drugs/alcohol use, safe sex and exercise.     Return in about 3 months (around 3/18/2025) for F/U Diabetes.    Please note that this dictation was created using voice recognition software. I have made every reasonable attempt to correct obvious errors, but I expect that there are errors of grammar and possibly content that I did not discover before finalizing the note.      I have placed the below orders and discussed them with an approved delegating provider.  The MA is performing the below orders under the direction of Dr. Valencia.

## 2024-12-19 DIAGNOSIS — J18.9 COMMUNITY ACQUIRED PNEUMONIA OF RIGHT UPPER LOBE OF LUNG: ICD-10-CM

## 2024-12-19 RX ORDER — AZITHROMYCIN 250 MG/1
TABLET, FILM COATED ORAL
Qty: 6 TABLET | Refills: 0 | Status: SHIPPED | OUTPATIENT
Start: 2024-12-19

## 2025-01-22 ENCOUNTER — TELEPHONE (OUTPATIENT)
Dept: HEALTH INFORMATION MANAGEMENT | Facility: OTHER | Age: 80
End: 2025-01-22

## 2025-03-20 ENCOUNTER — OFFICE VISIT (OUTPATIENT)
Dept: MEDICAL GROUP | Facility: MEDICAL CENTER | Age: 80
End: 2025-03-20
Payer: MEDICARE

## 2025-03-20 VITALS
DIASTOLIC BLOOD PRESSURE: 68 MMHG | RESPIRATION RATE: 16 BRPM | HEIGHT: 64 IN | TEMPERATURE: 97.3 F | BODY MASS INDEX: 40.97 KG/M2 | WEIGHT: 240 LBS | OXYGEN SATURATION: 95 % | SYSTOLIC BLOOD PRESSURE: 124 MMHG | HEART RATE: 85 BPM

## 2025-03-20 DIAGNOSIS — R42 DIZZINESS: ICD-10-CM

## 2025-03-20 DIAGNOSIS — E11.41 TYPE 2 DIABETES MELLITUS WITH DIABETIC MONONEUROPATHY, WITHOUT LONG-TERM CURRENT USE OF INSULIN (HCC): ICD-10-CM

## 2025-03-20 DIAGNOSIS — G47.33 OSA ON CPAP: ICD-10-CM

## 2025-03-20 DIAGNOSIS — E66.01 MORBID OBESITY WITH BMI OF 40.0-44.9, ADULT (HCC): ICD-10-CM

## 2025-03-20 DIAGNOSIS — L60.0 INGROWN NAIL: ICD-10-CM

## 2025-03-20 PROBLEM — E66.9 OBESITY (BMI 30-39.9): Status: RESOLVED | Noted: 2024-06-14 | Resolved: 2025-03-20

## 2025-03-20 PROCEDURE — 99214 OFFICE O/P EST MOD 30 MIN: CPT

## 2025-03-20 PROCEDURE — 3078F DIAST BP <80 MM HG: CPT

## 2025-03-20 PROCEDURE — 3074F SYST BP LT 130 MM HG: CPT

## 2025-03-20 ASSESSMENT — ENCOUNTER SYMPTOMS
PALPITATIONS: 0
FEVER: 0
CHILLS: 0
ORTHOPNEA: 0
COUGH: 0
SHORTNESS OF BREATH: 0

## 2025-03-20 ASSESSMENT — FIBROSIS 4 INDEX: FIB4 SCORE: 1.52

## 2025-03-20 ASSESSMENT — PATIENT HEALTH QUESTIONNAIRE - PHQ9: CLINICAL INTERPRETATION OF PHQ2 SCORE: 0

## 2025-03-20 NOTE — PROGRESS NOTES
Subjective:     Verbal consent was acquired by the patient to use [employer] ambient listening note generation during this visit Yes    CC: Follow-Up      HPI:   Jonathan is a 79 y.o. male who presents today for:    History of Present Illness  The patient presents for evaluation of ingrown toenails, diabetes, pneumonia, sleep apnea, and medication management.    He has been experiencing bilateral ingrown toenails on his great toes, which were previously managed surgically. Despite the intervention, he continues to experience mild pain in the affected areas. His next appointment is scheduled in 3 months. He would like a referral to a new podiatrist that can see him sooner.     He reports an improvement in his overall health status following the completion of his antibiotic course for pneumonia. However, he occasionally experiences a cough and shortness of breath during ambulation, necessitating periods of rest. He does not report any associated pain.    He has been utilizing a CPAP machine at night but has been experiencing excessive daytime sleepiness.     He also reports occasional episodes of dizziness. He uses a cane or walker when walking further distances to help balance himself.               Allergies: Patient has no known allergies.     Medications:   Current Outpatient Medications:     olmesartan (BENICAR) 5 MG tablet, Take 1 Tablet by mouth every day., Disp: 100 Tablet, Rfl: 3    glipizide-metformin (METAGLIP) 5-500 MG per tablet, Take 1 Tablet by mouth 2 times daily, before breakfast and dinner., Disp: 200 Tablet, Rfl: 3    FLUoxetine (PROZAC) 20 MG Cap, Take 1 Capsule by mouth every day., Disp: 100 Capsule, Rfl: 3    atorvastatin (LIPITOR) 40 MG Tab, Take 1 Tablet by mouth every day., Disp: 100 Tablet, Rfl: 3    SSD 1 % Cream, APPLY TO SKIN FISSURE ONCE OR TWICE DAILY., Disp: , Rfl:     albuterol 108 (90 Base) MCG/ACT Aero Soln inhalation aerosol, Inhale 2 Puffs every four hours as needed for Shortness of  "Breath., Disp: 1 Each, Rfl: 11    fluticasone furoate-vilanterol (BREO ELLIPTA) 100-25 MCG/ACT AEROSOL POWDER, BREATH ACTIVATED, Inhale 1 Puff every day., Disp: 90 Each, Rfl: 3    carvedilol (COREG) 12.5 MG Tab, TAKE 1 TABLET BY MOUTH TWICE DAILY WITH MEALS, Disp: 200 Tablet, Rfl: 3    tamsulosin (FLOMAX) 0.4 MG capsule, Take 1 Capsule by mouth 1/2 hour after breakfast., Disp: 100 Capsule, Rfl: 3    finasteride (PROSCAR) 5 MG Tab, Take 1 tablet by mouth once daily, Disp: 90 Tablet, Rfl: 3    Empagliflozin (JARDIANCE) 25 MG Tab, Take 25 mg by mouth every day., Disp: 100 Tablet, Rfl: 3    amLODIPine (NORVASC) 10 MG Tab, Take 1 Tablet by mouth every day., Disp: 100 Tablet, Rfl: 3    lidocaine (XYLOCAINE) 5 % Ointment, Apply 2 g topically 3 times a day as needed (shingles)., Disp: 50 g, Rfl: 0    omeprazole (PRILOSEC) 20 MG delayed-release capsule, TAKE 1 CAPSULE BY MOUTH IN THE MORNING ON AN EMPTY STOMACH 30 MINUTES PRIOR TO EATING AND DRINKING., Disp: , Rfl:     vitamin D (CHOLECALCIFEROL) 1000 UNIT Tab, Take 1 Tablet by mouth every day., Disp: , Rfl:     Omega-3 Fatty Acids (FISH OIL) 1000 MG Cap capsule, Take 1 Capsule by mouth every day., Disp: , Rfl:       ROS:  Review of Systems   Constitutional:  Negative for chills and fever.   Respiratory:  Negative for cough and shortness of breath.    Cardiovascular:  Negative for chest pain, palpitations, orthopnea and leg swelling.       Objective:     Exam:  /68   Pulse 85   Temp 36.3 °C (97.3 °F) (Temporal)   Resp 16   Ht 1.626 m (5' 4\")   Wt 109 kg (240 lb)   SpO2 95%   BMI 41.20 kg/m²  Body mass index is 41.2 kg/m².    Physical Exam  Constitutional:       Appearance: He is normal weight.   Eyes:      Pupils: Pupils are equal, round, and reactive to light.   Cardiovascular:      Rate and Rhythm: Normal rate and regular rhythm.      Pulses: Normal pulses.      Heart sounds: Normal heart sounds.   Pulmonary:      Effort: Pulmonary effort is normal.      " Breath sounds: Normal breath sounds.   Neurological:      Mental Status: He is alert and oriented to person, place, and time.   Psychiatric:         Mood and Affect: Mood normal.         Behavior: Behavior normal.           Assessment & Plan:     Jonathan de la vega 79 y.o. male with the following -     Assessment & Plan     1. Ingrown nail  Chronic, stable  He was informed that he could contact us if he encounters any issues, and we will endeavor to expedite the referral process for him.  - Referral to Podiatry    2. 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 Continue Jardiance 25 mg daily, Metaglip 5-500 mg twice daily.   - Referral to Podiatry    3. BMI 40.0-44.9, adult (HCC)  4. Morbid obesity with BMI of 40.0-44.9, adult (HCC)  Chronic, stable  - Patient identified as having weight management issue.  Appropriate orders and counseling given.    5. ONI on CPAP  Chronic, stable  He is using his CPAP machine at night. The excessive sleepiness he experiences may be attributed to the natural aging process or as a side effect of his current medications.    6. Dizziness  Chronic, stable  Continue to use walker or chaparro as needed.  If symptoms worsen will can consider referral to physical therapy.    Anticipatory guidance included the following: Patient counseled about skin care, diet, supplements, smoking, drugs/alcohol use, safe sex and exercise.     Return in about 3 months (around 6/20/2025).    Please note that this dictation was created using voice recognition software. I have made every reasonable attempt to correct obvious errors, but I expect that there are errors of grammar and possibly content that I did not discover before finalizing the note.

## 2025-04-02 NOTE — PROCEDURE: BIOPSY BY SHAVE METHOD
no rashes , no jaundice present , good turgor , no masses , no tenderness on palpation
Detail Level: Detailed
Depth Of Biopsy: dermis
Was A Bandage Applied: Yes
Size Of Lesion In Cm: 0
Biopsy Type: H and E
Biopsy Method: Dermablade
Anesthesia Type: 1% lidocaine with epinephrine and a 1:10 solution of 8.4% sodium bicarbonate
Anesthesia Volume In Cc: 1.5
Hemostasis: Drysol
Wound Care: Petrolatum
Dressing: bandage
Destruction After The Procedure: No
Type Of Destruction Used: Curettage
Curettage Text: The wound bed was treated with curettage after the biopsy was performed.
Cryotherapy Text: The wound bed was treated with cryotherapy after the biopsy was performed.
Electrodesiccation Text: The wound bed was treated with electrodesiccation after the biopsy was performed.
Electrodesiccation And Curettage Text: The wound bed was treated with electrodesiccation and curettage after the biopsy was performed.
Silver Nitrate Text: The wound bed was treated with silver nitrate after the biopsy was performed.
Lab: [id number]
Lab Facility: 
Consent: Written consent was obtained and risks were reviewed including but not limited to scarring, infection, bleeding, scabbing, incomplete removal, nerve damage and allergy to anesthesia.
Post-Care Instructions: I reviewed with the patient in detail post-care instructions. Patient is to keep the biopsy site dry overnight, and then apply bacitracin twice daily until healed. Patient may apply hydrogen peroxide soaks to remove any crusting.
Notification Instructions: Patient will be notified of biopsy results. However, patient instructed to call the office if not contacted within 2 weeks.
Billing Type: Third-Party Bill
Information: Selecting Yes will display possible errors in your note based on the variables you have selected. This validation is only offered as a suggestion for you. PLEASE NOTE THAT THE VALIDATION TEXT WILL BE REMOVED WHEN YOU FINALIZE YOUR NOTE. IF YOU WANT TO FAX A PRELIMINARY NOTE YOU WILL NEED TO TOGGLE THIS TO 'NO' IF YOU DO NOT WANT IT IN YOUR FAXED NOTE.

## 2025-04-07 DIAGNOSIS — I10 ESSENTIAL HYPERTENSION: ICD-10-CM

## 2025-04-07 DIAGNOSIS — I10 ESSENTIAL HYPERTENSION, BENIGN: ICD-10-CM

## 2025-04-07 RX ORDER — AMLODIPINE BESYLATE 10 MG/1
10 TABLET ORAL DAILY
Qty: 100 TABLET | Refills: 0 | Status: SHIPPED | OUTPATIENT
Start: 2025-04-07

## 2025-04-28 ENCOUNTER — APPOINTMENT (OUTPATIENT)
Dept: URBAN - METROPOLITAN AREA CLINIC 4 | Facility: CLINIC | Age: 80
Setting detail: DERMATOLOGY
End: 2025-04-28

## 2025-04-28 DIAGNOSIS — L82.1 OTHER SEBORRHEIC KERATOSIS: ICD-10-CM

## 2025-04-28 DIAGNOSIS — L81.4 OTHER MELANIN HYPERPIGMENTATION: ICD-10-CM

## 2025-04-28 DIAGNOSIS — Z71.89 OTHER SPECIFIED COUNSELING: ICD-10-CM

## 2025-04-28 PROCEDURE — ? SUNSCREEN RECOMMENDATIONS

## 2025-04-28 PROCEDURE — 99213 OFFICE O/P EST LOW 20 MIN: CPT

## 2025-04-28 PROCEDURE — ? COUNSELING

## 2025-04-28 ASSESSMENT — LOCATION DETAILED DESCRIPTION DERM
LOCATION DETAILED: LEFT PROXIMAL DORSAL FOREARM
LOCATION DETAILED: RIGHT PROXIMAL DORSAL FOREARM
LOCATION DETAILED: RIGHT SUPERIOR MEDIAL MIDBACK
LOCATION DETAILED: RIGHT MID-UPPER BACK
LOCATION DETAILED: LEFT MEDIAL FRONTAL SCALP
LOCATION DETAILED: LEFT PROXIMAL POSTERIOR UPPER ARM
LOCATION DETAILED: RIGHT PROXIMAL POSTERIOR UPPER ARM
LOCATION DETAILED: RIGHT MEDIAL FRONTAL SCALP
LOCATION DETAILED: INFERIOR MID FOREHEAD

## 2025-04-28 ASSESSMENT — LOCATION SIMPLE DESCRIPTION DERM
LOCATION SIMPLE: LEFT UPPER ARM
LOCATION SIMPLE: LEFT SCALP
LOCATION SIMPLE: RIGHT SCALP
LOCATION SIMPLE: RIGHT UPPER BACK
LOCATION SIMPLE: INFERIOR FOREHEAD
LOCATION SIMPLE: RIGHT FOREARM
LOCATION SIMPLE: RIGHT LOWER BACK
LOCATION SIMPLE: RIGHT UPPER ARM
LOCATION SIMPLE: LEFT FOREARM

## 2025-04-28 ASSESSMENT — LOCATION ZONE DERM
LOCATION ZONE: TRUNK
LOCATION ZONE: SCALP
LOCATION ZONE: FACE
LOCATION ZONE: ARM

## 2025-05-01 DIAGNOSIS — N40.0 BENIGN PROSTATIC HYPERPLASIA WITHOUT LOWER URINARY TRACT SYMPTOMS: ICD-10-CM

## 2025-05-01 RX ORDER — FINASTERIDE 5 MG/1
5 TABLET, FILM COATED ORAL DAILY
Qty: 90 TABLET | Refills: 0 | Status: SHIPPED | OUTPATIENT
Start: 2025-05-01

## 2025-05-01 NOTE — TELEPHONE ENCOUNTER
Received request via: Pharmacy    Was the patient seen in the last year in this department? Yes    Does the patient have an active prescription (recently filled or refills available) for medication(s) requested? No    Pharmacy Name:   [id number] - [place], [place] - [address]          Does the patient have [facility] Plus and need 100-day supply? (This applies to ALL medications) Yes, quantity updated to 100 days

## 2025-05-08 ENCOUNTER — OFFICE VISIT (OUTPATIENT)
Dept: CARDIOLOGY | Facility: MEDICAL CENTER | Age: 80
End: 2025-05-08
Attending: INTERNAL MEDICINE
Payer: MEDICARE

## 2025-05-08 VITALS
WEIGHT: 238 LBS | SYSTOLIC BLOOD PRESSURE: 128 MMHG | HEART RATE: 81 BPM | HEIGHT: 64 IN | OXYGEN SATURATION: 96 % | RESPIRATION RATE: 12 BRPM | BODY MASS INDEX: 40.63 KG/M2 | DIASTOLIC BLOOD PRESSURE: 72 MMHG

## 2025-05-08 DIAGNOSIS — E11.59 HIGH BLOOD PRESSURE ASSOCIATED WITH DIABETES (HCC): ICD-10-CM

## 2025-05-08 DIAGNOSIS — E11.69 HYPERLIPIDEMIA ASSOCIATED WITH TYPE 2 DIABETES MELLITUS (HCC): ICD-10-CM

## 2025-05-08 DIAGNOSIS — I45.10 RIGHT BUNDLE BRANCH BLOCK: ICD-10-CM

## 2025-05-08 DIAGNOSIS — E78.5 HYPERLIPIDEMIA ASSOCIATED WITH TYPE 2 DIABETES MELLITUS (HCC): ICD-10-CM

## 2025-05-08 DIAGNOSIS — I15.2 HIGH BLOOD PRESSURE ASSOCIATED WITH DIABETES (HCC): ICD-10-CM

## 2025-05-08 DIAGNOSIS — I70.0 ATHEROSCLEROSIS OF AORTA (HCC): ICD-10-CM

## 2025-05-08 DIAGNOSIS — I44.4 LAFB (LEFT ANTERIOR FASCICULAR BLOCK): ICD-10-CM

## 2025-05-08 PROCEDURE — 3074F SYST BP LT 130 MM HG: CPT | Performed by: INTERNAL MEDICINE

## 2025-05-08 PROCEDURE — 99212 OFFICE O/P EST SF 10 MIN: CPT | Performed by: INTERNAL MEDICINE

## 2025-05-08 PROCEDURE — 3078F DIAST BP <80 MM HG: CPT | Performed by: INTERNAL MEDICINE

## 2025-05-08 PROCEDURE — 99214 OFFICE O/P EST MOD 30 MIN: CPT | Performed by: INTERNAL MEDICINE

## 2025-05-08 ASSESSMENT — ENCOUNTER SYMPTOMS
SHORTNESS OF BREATH: 0
PND: 0
WEIGHT LOSS: 0
WEIGHT GAIN: 0
BLURRED VISION: 0
PALPITATIONS: 0
ORTHOPNEA: 0
DIARRHEA: 0
ALTERED MENTAL STATUS: 0
CLAUDICATION: 0
DECREASED APPETITE: 0
BACK PAIN: 0
FLANK PAIN: 0
ABDOMINAL PAIN: 0
NAUSEA: 0
IRREGULAR HEARTBEAT: 0
DYSPNEA ON EXERTION: 0
HEARTBURN: 0
FEVER: 0
SYNCOPE: 0
DEPRESSION: 0
CONSTIPATION: 0
COUGH: 0
NEAR-SYNCOPE: 0
VOMITING: 0
DIZZINESS: 0

## 2025-05-08 ASSESSMENT — FIBROSIS 4 INDEX: FIB4 SCORE: 1.54

## 2025-05-08 NOTE — PROGRESS NOTES
Cardiology Note    Chief Complaint   Patient presents with    Hyperlipidemia     F/V DX: Hyperlipidemia associated with type 2 diabetes mellitus (HCC)    Hypertension     F/V DX: HTN    Follow-Up     F/X DX: RBBB       History of Present Illness: Jonathan Slade is a 80 y.o. male who presents for follow up. Previously followed Dr Serrano for vasovagal syncope, vertigo s/p PT, abnormal EKG with RBBB and LAFB, HTN, HLD, DM, ONI, COPD, obesity.    Doing generally well. No cardiac complaints. Still symptoms of vertigo especially with rapid turning. Still does epley maneuvers at home. States they do help. Compliant with medications and denies adverse effects.     Review of Systems   Constitutional: Negative for decreased appetite, fever, malaise/fatigue, weight gain and weight loss.   HENT:  Negative for congestion and nosebleeds.    Eyes:  Negative for blurred vision.   Cardiovascular:  Negative for chest pain, claudication, dyspnea on exertion, irregular heartbeat, leg swelling, near-syncope, orthopnea, palpitations, paroxysmal nocturnal dyspnea and syncope.   Respiratory:  Negative for cough and shortness of breath.    Endocrine: Negative for cold intolerance and heat intolerance.   Skin:  Negative for rash.   Musculoskeletal:  Negative for back pain.   Gastrointestinal:  Negative for abdominal pain, constipation, diarrhea, heartburn, melena, nausea and vomiting.   Genitourinary:  Negative for dysuria, flank pain and hematuria.   Neurological:  Negative for dizziness.   Psychiatric/Behavioral:  Negative for altered mental status and depression.          Past Medical History:   Diagnosis Date    Asthma     Back pain     Cough     Diabetes     ONI treated with BiPAP 05/10/2016    Painful breathing     Shortness of breath     Sleep apnea     Sputum production     Wheezing          Past Surgical History:   Procedure Laterality Date    GASTROSCOPY-ENDO N/A 07/23/2019    Procedure: GASTROSCOPY;  Surgeon: Tyler Rothman  M.D.;  Location: ENDOSCOPY [facility];  Service: Gastroenterology    FULL THICKNESS BLOCK RESECTION  02/19/2013    Performed by Greyson Lopez M.D. at SURGERY [facility]    BIOPSY GENERAL  02/19/2013    Performed by Greyson Lopez M.D. at SURGERY [facility]    CATARACT EXTRACTION WITH IOL      KNEE REPLACEMENT, TOTAL           Current Outpatient Medications   Medication Sig Dispense Refill    finasteride (PROSCAR) 5 MG Tab Take 1 tablet by mouth once daily 90 Tablet 0    amLODIPine (NORVASC) 10 MG Tab Take 1 tablet by mouth once daily 100 Tablet 0    olmesartan (BENICAR) 5 MG tablet Take 1 Tablet by mouth every day. 100 Tablet 3    glipizide-metformin (METAGLIP) 5-500 MG per tablet Take 1 Tablet by mouth 2 times daily, before breakfast and dinner. 200 Tablet 3    FLUoxetine (PROZAC) 20 MG Cap Take 1 Capsule by mouth every day. 100 Capsule 3    atorvastatin (LIPITOR) 40 MG Tab Take 1 Tablet by mouth every day. 100 Tablet 3    SSD 1 % Cream APPLY TO SKIN FISSURE ONCE OR TWICE DAILY.      albuterol 108 (90 Base) MCG/ACT Aero Soln inhalation aerosol Inhale 2 Puffs every four hours as needed for Shortness of Breath. 1 Each 11    fluticasone furoate-vilanterol (BREO ELLIPTA) 100-25 MCG/ACT AEROSOL POWDER, BREATH ACTIVATED Inhale 1 Puff every day. 90 Each 3    carvedilol (COREG) 12.5 MG Tab TAKE 1 TABLET BY MOUTH TWICE DAILY WITH MEALS 200 Tablet 3    tamsulosin (FLOMAX) 0.4 MG capsule Take 1 Capsule by mouth 1/2 hour after breakfast. 100 Capsule 3    Empagliflozin (JARDIANCE) 25 MG Tab Take 25 mg by mouth every day. 100 Tablet 3    lidocaine (XYLOCAINE) 5 % Ointment Apply 2 g topically 3 times a day as needed (shingles). 50 g 0    omeprazole (PRILOSEC) 20 MG delayed-release capsule TAKE 1 CAPSULE BY MOUTH IN THE MORNING ON AN EMPTY STOMACH 30 MINUTES PRIOR TO EATING AND DRINKING.      vitamin D (CHOLECALCIFEROL) 1000 UNIT Tab Take 1 Tablet by mouth every day.      Omega-3 Fatty Acids (FISH OIL)  1000 MG Cap capsule Take 1 Capsule by mouth every day.       No current facility-administered medications for this visit.         No Known Allergies      Family History   Problem Relation Age of Onset    Heart Attack Brother     Cancer Neg Hx          Social History     Socioeconomic History    Marital status:      Spouse name: Not on file    Number of children: Not on file    Years of education: Not on file    Highest education level: Not on file   Occupational History    Not on file   Tobacco Use    Smoking status: Former     Current packs/day: 0.00     Average packs/day: 1 pack/day for 10.0 years (10.0 ttl pk-yrs)     Types: Cigarettes     Start date: 1985     Quit date: 1995     Years since quittin.3    Smokeless tobacco: Never   Vaping Use    Vaping status: Never Used   Substance and Sexual Activity    Alcohol use: Not Currently     Alcohol/week: 2.4 - 3.6 oz     Types: 4 - 6 Cans of beer per week    Drug use: No    Sexual activity: Not on file   Other Topics Concern    Not on file   Social History Narrative    ** Merged History Encounter **          Social Drivers of Health     Financial Resource Strain: Low Risk  (2024)    Overall Financial Resource Strain (CARDIA)     Difficulty of Paying Living Expenses: Not hard at all   Food Insecurity: No Food Insecurity (2024)    Hunger Vital Sign     Worried About Running Out of Food in the Last Year: Never true     Ran Out of Food in the Last Year: Never true   Transportation Needs: No Transportation Needs (2024)    PRAPARE - Transportation     Lack of Transportation (Medical): No     Lack of Transportation (Non-Medical): No   Physical Activity: Sufficiently Active (2024)    Exercise Vital Sign     Days of Exercise per Week: 7 days     Minutes of Exercise per Session: 30 min   Stress: Not on file   Social Connections: Not on file   Intimate Partner Violence: Not on file   Housing Stability: Unknown (2024)    Housing Stability  "Vital Sign     Unable to Pay for Housing in the Last Year: No     Number of Places Lived in the Last Year: Not on file     Unstable Housing in the Last Year: No         Physical Exam:  Ambulatory Vitals  /72 (BP Location: Left arm, Patient Position: Sitting, BP Cuff Size: Adult)   Pulse 81   Resp 12   Ht 1.626 m (5' 4\")   Wt 108 kg (238 lb)   SpO2 96%    BP Readings from Last 4 Encounters:   05/08/25 128/72   03/20/25 124/68   12/18/24 119/78   10/15/24 122/70     Weight/BMI:   Vitals:    05/08/25 0955   BP: 128/72   Weight: 108 kg (238 lb)   Height: 1.626 m (5' 4\")    Body mass index is 40.85 kg/m².  Wt Readings from Last 4 Encounters:   05/08/25 108 kg (238 lb)   03/20/25 109 kg (240 lb)   12/18/24 104 kg (228 lb 9.6 oz)   10/15/24 106 kg (234 lb)       Physical Exam  Constitutional:       General: He is not in acute distress.  HENT:      Head: Normocephalic and atraumatic.   Eyes:      Conjunctiva/sclera: Conjunctivae normal.      Pupils: Pupils are equal, round, and reactive to light.   Neck:      Vascular: No JVD.   Cardiovascular:      Rate and Rhythm: Normal rate and regular rhythm.      Heart sounds: Normal heart sounds. No murmur heard.     No friction rub. No gallop.   Pulmonary:      Effort: Pulmonary effort is normal. No respiratory distress.      Breath sounds: Normal breath sounds. No wheezing or rales.   Chest:      Chest wall: No tenderness.   Abdominal:      General: Bowel sounds are normal. There is no distension.      Palpations: Abdomen is soft.   Musculoskeletal:      Cervical back: Normal range of motion and neck supple.   Skin:     General: Skin is warm and dry.   Neurological:      Mental Status: He is alert and oriented to person, place, and time.   Psychiatric:         Mood and Affect: Affect normal.         Judgment: Judgment normal.         Lab Data Review:  Lab Results   Component Value Date/Time    CHOLSTRLTOT 107 11/13/2024 07:36 AM    LDL 37 11/13/2024 07:36 AM    HDL 30 " "(A) 11/13/2024 07:36 AM    TRIGLYCERIDE 200 (H) 11/13/2024 07:36 AM       Lab Results   Component Value Date/Time    SODIUM 138 11/13/2024 07:36 AM    POTASSIUM 4.0 11/13/2024 07:36 AM    CHLORIDE 101 11/13/2024 07:36 AM    CO2 27 11/13/2024 07:36 AM    GLUCOSE 137 (H) 11/13/2024 07:36 AM    BUN 23 (H) 11/13/2024 07:36 AM    CREATININE 0.89 11/13/2024 07:36 AM    CREATININE 1.0 02/19/2007 05:55 PM     CrCl cannot be calculated (Patient's most recent lab result is older than the maximum 7 days allowed.).  Lab Results   Component Value Date/Time    ALKPHOSPHAT 70 11/13/2024 07:36 AM    ASTSGOT 21 11/13/2024 07:36 AM    ALTSGPT 22 11/13/2024 07:36 AM    TBILIRUBIN 0.3 11/13/2024 07:36 AM      Lab Results   Component Value Date/Time    WBC 8.4 11/13/2024 07:36 AM     Lab Results   Component Value Date/Time    HBA1C 6.6 (A) 12/18/2024 10:28 AM     No components found for: \"TROP\"      Cardiac Imaging and Procedures Review:      TTE 6/2022  CONCLUSIONS  Normal left ventricular chamber size.  The left ventricular ejection fraction is visually estimated to be 65%.  Indeterminate diastolic function.  Normal left ventricular wall thickness.  No significant valvular disease.     Medical Decision Making:  Problem List Items Addressed This Visit       High blood pressure associated with diabetes (HCC)    RBBB    LAFB (left anterior fascicular block)    Hyperlipidemia associated with type 2 diabetes mellitus (HCC)    Atherosclerosis of aorta (HCC)       HTN - controlled. Goal <130/80. Continue regimen.    HLD / athero - annual lipids with primary. LDL stable.    Asymptomatic rbbb/lafb.     It was my pleasure to meet with Mr. Carbones [place].                        "

## 2025-06-07 DIAGNOSIS — E11.41 TYPE 2 DIABETES MELLITUS WITH DIABETIC MONONEUROPATHY, WITHOUT LONG-TERM CURRENT USE OF INSULIN (HCC): ICD-10-CM

## 2025-06-10 RX ORDER — EMPAGLIFLOZIN 25 MG/1
1 TABLET, FILM COATED ORAL DAILY
Qty: 100 TABLET | Refills: 3 | Status: SHIPPED | OUTPATIENT
Start: 2025-06-10

## 2025-06-10 NOTE — TELEPHONE ENCOUNTER
Received request via: Pharmacy    Was the patient seen in the last year in this department? Yes    Does the patient have an active prescription (recently filled or refills available) for medication(s) requested? No    Pharmacy Name:  [id number] - [place], [place] - [address]     Does the patient have skilled nursing Plus and need 100-day supply? (This applies to ALL medications) Yes, quantity updated to 100 days

## 2025-06-23 ENCOUNTER — RESULTS FOLLOW-UP (OUTPATIENT)
Dept: MEDICAL GROUP | Facility: MEDICAL CENTER | Age: 80
End: 2025-06-23

## 2025-06-23 ENCOUNTER — OFFICE VISIT (OUTPATIENT)
Dept: MEDICAL GROUP | Facility: MEDICAL CENTER | Age: 80
End: 2025-06-23
Payer: MEDICARE

## 2025-06-23 VITALS
HEART RATE: 88 BPM | WEIGHT: 231.8 LBS | TEMPERATURE: 97.8 F | HEIGHT: 64 IN | BODY MASS INDEX: 39.57 KG/M2 | RESPIRATION RATE: 17 BRPM | DIASTOLIC BLOOD PRESSURE: 62 MMHG | SYSTOLIC BLOOD PRESSURE: 116 MMHG | OXYGEN SATURATION: 96 %

## 2025-06-23 DIAGNOSIS — R42 DIZZINESS: ICD-10-CM

## 2025-06-23 DIAGNOSIS — E11.41 TYPE 2 DIABETES MELLITUS WITH DIABETIC MONONEUROPATHY, WITHOUT LONG-TERM CURRENT USE OF INSULIN (HCC): Primary | ICD-10-CM

## 2025-06-23 DIAGNOSIS — E66.9 OBESITY (BMI 30-39.9): ICD-10-CM

## 2025-06-23 DIAGNOSIS — L60.0 INGROWN NAIL: ICD-10-CM

## 2025-06-23 PROBLEM — E66.01 MORBID OBESITY WITH BMI OF 40.0-44.9, ADULT (HCC): Status: RESOLVED | Noted: 2025-03-20 | Resolved: 2025-06-23

## 2025-06-23 LAB
HBA1C MFR BLD: 6.8 % (ref ?–5.8)
POCT INT CON NEG: NEGATIVE
POCT INT CON POS: POSITIVE

## 2025-06-23 PROCEDURE — 99214 OFFICE O/P EST MOD 30 MIN: CPT

## 2025-06-23 PROCEDURE — 3074F SYST BP LT 130 MM HG: CPT

## 2025-06-23 PROCEDURE — 83036 HEMOGLOBIN GLYCOSYLATED A1C: CPT

## 2025-06-23 PROCEDURE — 3078F DIAST BP <80 MM HG: CPT

## 2025-06-23 RX ORDER — AMMONIUM LACTATE 12 G/100G
LOTION TOPICAL
COMMUNITY
Start: 2025-05-07

## 2025-06-23 ASSESSMENT — FIBROSIS 4 INDEX: FIB4 SCORE: 1.54

## 2025-06-23 ASSESSMENT — ENCOUNTER SYMPTOMS
ORTHOPNEA: 0
FEVER: 0
COUGH: 0
CHILLS: 0
PALPITATIONS: 0
SHORTNESS OF BREATH: 0

## 2025-06-23 NOTE — PROGRESS NOTES
Subjective:     Verbal consent was acquired by the patient to use [employer] ambient listening note generation during this visit Yes    CC: Follow-Up      HPI:   Jonathan is a 80 y.o. male who presents today for:    History of Present Illness  The patient presents for a follow-up visit.    Hemoglobin A1c Level and Physical Activity  He reports no specific concerns at this time. His hemoglobin A1c level has increased slightly from 6.6% to 6.8%. There have been no modifications to his diet, and his physical activity remains limited due to ambulatory difficulties. Despite these limitations, he has experienced some weight loss. He typically awakens at 11:00 AM and does not participate in morning walks. After dinner, he prefers to rest or watch television rather than engage in physical activity.  - Onset: No specific onset mentioned.  - Duration: Ongoing.  - Character: Slight increase in hemoglobin A1c level, limited physical activity, some weight loss.  - Alleviating/Aggravating Factors: Limited physical activity due to ambulatory difficulties.  - Timing: Typically awakens at 11:00 AM, does not participate in morning walks, prefers to rest or watch television after dinner.    Dizziness  The patient experiences intermittent episodes of dizziness, occasionally requiring him to pause before resuming ambulation. However, he has not experienced any recent falls. He consulted with his cardiologist, Dr. Carney, last month regarding this issue, but he does not recall the specifics of their discussion. He was advised to seek emergency care if necessary. He continues to perform home-based exercises.  - Onset: No specific onset mentioned.  - Duration: Intermittent.  - Character: Episodes of dizziness, occasionally requiring pauses before resuming ambulation.  - Alleviating/Aggravating Factors: No specific factors mentioned.  - Timing: Intermittent episodes.  - Severity: No recent falls, advised to seek emergency care if  "necessary.    Ingrown Toenails  He visited a podiatrist for the management of his ingrown toenails, and the condition is currently being managed.         Allergies: Patient has no known allergies.     Medications: Current Medications[1]      ROS:  Review of Systems   Constitutional:  Negative for chills and fever.   Respiratory:  Negative for cough and shortness of breath.    Cardiovascular:  Negative for chest pain, palpitations, orthopnea and leg swelling.       Objective:     Exam:  /62   Pulse 88   Temp 36.6 °C (97.8 °F) (Temporal)   Resp 17   Ht 1.626 m (5' 4\")   Wt 105 kg (231 lb 12.8 oz)   SpO2 96%   BMI 39.79 kg/m²  Body mass index is 39.79 kg/m².    Physical Exam  Constitutional:       Appearance: He is normal weight.   Eyes:      Pupils: Pupils are equal, round, and reactive to light.   Cardiovascular:      Rate and Rhythm: Normal rate and regular rhythm.      Pulses: Normal pulses.      Heart sounds: Normal heart sounds.   Pulmonary:      Effort: Pulmonary effort is normal.      Breath sounds: Normal breath sounds.   Neurological:      Mental Status: He is alert and oriented to person, place, and time.   Psychiatric:         Mood and Affect: Mood normal.         Behavior: Behavior normal.           Assessment & Plan:     Jonathan de la vega 80 y.o. male with the following -     Assessment & Plan     1. 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 No significant changes in diet or exercise habits reported.  Treatment plan: Advised to incorporate walking into routine, aiming for 2 to 3 times per week after dinner, to help manage blood sugar levels and weight.   - POCT Hemoglobin A1C    2. Obesity (BMI 30-39.9)  Chronic, stable  Treatment plan: Advised to incorporate walking into routine, aiming for 2 to 3 times per week after dinner, to help " manage blood sugar levels and weight.     3. Ingrown nail  Chronic, stable  Referral for podiatrist for ingrown toenails was sent during last visit. He is seeing them to treat the ingrown nails.     4. Dizziness  Chronic, stable  Experiences intermittent dizziness, sometimes necessitating a pause before continuing to walk. No recent falls reported.  Treatment plan: Consulted with cardiologist, Dr. Carney, last month; details of discussion not recalled, advised to seek emergency care if necessary. Continues to perform exercises at home; physical therapy may be considered if dizziness worsens.    Other orders  - ammonium lactate (LAC-HYDRIN) 12 % Lotion; APPLY LOTION TO THE AFFECTED AREAS TWICE DAILY        Anticipatory guidance included the following: Patient counseled about skin care, diet, supplements, smoking, drugs/alcohol use, safe sex and exercise.     Return in about 3 months (around 9/23/2025).    Please note that this dictation was created using voice recognition software. I have made every reasonable attempt to correct obvious errors, but I expect that there are errors of grammar and possibly content that I did not discover before finalizing the note.      I have placed the below orders and discussed them with an approved delegating provider.  The MA is performing the below orders under the direction of Dr. Valencia.         [1]   Current Outpatient Medications:     ammonium lactate (LAC-HYDRIN) 12 % Lotion, APPLY LOTION TO THE AFFECTED AREAS TWICE DAILY, Disp: , Rfl:     Empagliflozin (JARDIANCE) 25 MG Tab, Take 1 tablet by mouth once daily, Disp: 100 Tablet, Rfl: 3    finasteride (PROSCAR) 5 MG Tab, Take 1 tablet by mouth once daily, Disp: 90 Tablet, Rfl: 0    amLODIPine (NORVASC) 10 MG Tab, Take 1 tablet by mouth once daily, Disp: 100 Tablet, Rfl: 0    olmesartan (BENICAR) 5 MG tablet, Take 1 Tablet by mouth every day., Disp: 100 Tablet, Rfl: 3    glipizide-metformin (METAGLIP) 5-500 MG per tablet, Take  1 Tablet by mouth 2 times daily, before breakfast and dinner., Disp: 200 Tablet, Rfl: 3    FLUoxetine (PROZAC) 20 MG Cap, Take 1 Capsule by mouth every day., Disp: 100 Capsule, Rfl: 3    atorvastatin (LIPITOR) 40 MG Tab, Take 1 Tablet by mouth every day., Disp: 100 Tablet, Rfl: 3    SSD 1 % Cream, APPLY TO SKIN FISSURE ONCE OR TWICE DAILY., Disp: , Rfl:     albuterol 108 (90 Base) MCG/ACT Aero Soln inhalation aerosol, Inhale 2 Puffs every four hours as needed for Shortness of Breath., Disp: 1 Each, Rfl: 11    fluticasone furoate-vilanterol (BREO ELLIPTA) 100-25 MCG/ACT AEROSOL POWDER, BREATH ACTIVATED, Inhale 1 Puff every day., Disp: 90 Each, Rfl: 3    carvedilol (COREG) 12.5 MG Tab, TAKE 1 TABLET BY MOUTH TWICE DAILY WITH MEALS, Disp: 200 Tablet, Rfl: 3    tamsulosin (FLOMAX) 0.4 MG capsule, Take 1 Capsule by mouth 1/2 hour after breakfast., Disp: 100 Capsule, Rfl: 3    lidocaine (XYLOCAINE) 5 % Ointment, Apply 2 g topically 3 times a day as needed (shingles)., Disp: 50 g, Rfl: 0    omeprazole (PRILOSEC) 20 MG delayed-release capsule, TAKE 1 CAPSULE BY MOUTH IN THE MORNING ON AN EMPTY STOMACH 30 MINUTES PRIOR TO EATING AND DRINKING., Disp: , Rfl:     vitamin D (CHOLECALCIFEROL) 1000 UNIT Tab, Take 1 Tablet by mouth every day., Disp: , Rfl:     Omega-3 Fatty Acids (FISH OIL) 1000 MG Cap capsule, Take 1 Capsule by mouth every day., Disp: , Rfl:

## 2025-07-08 DIAGNOSIS — R39.15 URINARY URGENCY: ICD-10-CM

## 2025-07-08 RX ORDER — TAMSULOSIN HYDROCHLORIDE 0.4 MG/1
0.4 CAPSULE ORAL DAILY
Qty: 100 CAPSULE | Refills: 3 | Status: SHIPPED | OUTPATIENT
Start: 2025-07-08

## 2025-07-14 DIAGNOSIS — I10 ESSENTIAL HYPERTENSION: ICD-10-CM

## 2025-07-14 DIAGNOSIS — I10 ESSENTIAL HYPERTENSION, BENIGN: ICD-10-CM

## 2025-07-14 RX ORDER — AMLODIPINE BESYLATE 10 MG/1
10 TABLET ORAL DAILY
Qty: 100 TABLET | Refills: 3 | Status: SHIPPED | OUTPATIENT
Start: 2025-07-14

## 2025-07-31 PROBLEM — J45.20 MILD INTERMITTENT ASTHMA WITHOUT COMPLICATION: Status: RESOLVED | Noted: 2023-07-05 | Resolved: 2025-07-31

## 2025-08-01 DIAGNOSIS — N40.0 BENIGN PROSTATIC HYPERPLASIA WITHOUT LOWER URINARY TRACT SYMPTOMS: ICD-10-CM

## 2025-08-01 RX ORDER — FINASTERIDE 5 MG/1
5 TABLET, FILM COATED ORAL DAILY
Qty: 90 TABLET | Refills: 3 | Status: SHIPPED | OUTPATIENT
Start: 2025-08-01

## 2025-08-07 VITALS
WEIGHT: 231 LBS | OXYGEN SATURATION: 97 % | HEART RATE: 73 BPM | DIASTOLIC BLOOD PRESSURE: 89 MMHG | BODY MASS INDEX: 39.44 KG/M2 | HEIGHT: 64 IN | SYSTOLIC BLOOD PRESSURE: 139 MMHG

## 2025-08-07 DIAGNOSIS — E11.69 HYPERLIPIDEMIA ASSOCIATED WITH TYPE 2 DIABETES MELLITUS (HCC): ICD-10-CM

## 2025-08-07 DIAGNOSIS — J45.40 MODERATE PERSISTENT ASTHMA WITHOUT COMPLICATION: ICD-10-CM

## 2025-08-07 DIAGNOSIS — R80.9 MICROALBUMINURIA DUE TO TYPE 2 DIABETES MELLITUS (HCC): ICD-10-CM

## 2025-08-07 DIAGNOSIS — E78.5 HYPERLIPIDEMIA ASSOCIATED WITH TYPE 2 DIABETES MELLITUS (HCC): ICD-10-CM

## 2025-08-07 DIAGNOSIS — N40.0 BENIGN PROSTATIC HYPERPLASIA, UNSPECIFIED WHETHER LOWER URINARY TRACT SYMPTOMS PRESENT: ICD-10-CM

## 2025-08-07 DIAGNOSIS — G31.9 CEREBRAL ATROPHY (HCC): Primary | ICD-10-CM

## 2025-08-07 DIAGNOSIS — I10 PRIMARY HYPERTENSION: ICD-10-CM

## 2025-08-07 DIAGNOSIS — E11.29 MICROALBUMINURIA DUE TO TYPE 2 DIABETES MELLITUS (HCC): ICD-10-CM

## 2025-08-07 DIAGNOSIS — G47.33 OSA ON CPAP: ICD-10-CM

## 2025-08-07 DIAGNOSIS — E66.01 SEVERE OBESITY (BMI 35.0-39.9) WITH COMORBIDITY (HCC): ICD-10-CM

## 2025-08-07 PROCEDURE — G0439 PPPS, SUBSEQ VISIT: HCPCS | Performed by: NURSE PRACTITIONER

## 2025-08-07 SDOH — ECONOMIC STABILITY: FOOD INSECURITY: WITHIN THE PAST 12 MONTHS, THE FOOD YOU BOUGHT JUST DIDN'T LAST AND YOU DIDN'T HAVE MONEY TO GET MORE.: NEVER TRUE

## 2025-08-07 SDOH — ECONOMIC STABILITY: HOUSING INSECURITY: IN THE LAST 12 MONTHS, WAS THERE A TIME WHEN YOU WERE NOT ABLE TO PAY THE MORTGAGE OR RENT ON TIME?: NO

## 2025-08-07 SDOH — ECONOMIC STABILITY: HOUSING INSECURITY: AT ANY TIME IN THE PAST 12 MONTHS, WERE YOU HOMELESS OR LIVING IN A SHELTER (INCLUDING NOW)?: NO

## 2025-08-07 SDOH — HEALTH STABILITY: PHYSICAL HEALTH: ON AVERAGE, HOW MANY DAYS PER WEEK DO YOU ENGAGE IN MODERATE TO STRENUOUS EXERCISE (LIKE A BRISK WALK)?: 3 DAYS

## 2025-08-07 SDOH — ECONOMIC STABILITY: FOOD INSECURITY: HOW HARD IS IT FOR YOU TO PAY FOR THE VERY BASICS LIKE FOOD, HOUSING, MEDICAL CARE, AND HEATING?: NOT VERY HARD

## 2025-08-07 SDOH — HEALTH STABILITY: PHYSICAL HEALTH: ON AVERAGE, HOW MANY MINUTES DO YOU ENGAGE IN EXERCISE AT THIS LEVEL?: 30 MIN

## 2025-08-07 SDOH — ECONOMIC STABILITY: FOOD INSECURITY: WITHIN THE PAST 12 MONTHS, YOU WORRIED THAT YOUR FOOD WOULD RUN OUT BEFORE YOU GOT THE MONEY TO BUY MORE.: NEVER TRUE

## 2025-08-07 SDOH — ECONOMIC STABILITY: TRANSPORTATION INSECURITY: IN THE PAST 12 MONTHS, HAS LACK OF TRANSPORTATION KEPT YOU FROM MEDICAL APPOINTMENTS OR FROM GETTING MEDICATIONS?: NO

## 2025-08-07 SDOH — ECONOMIC STABILITY: HOUSING INSECURITY: IN THE PAST 12 MONTHS, HOW MANY TIMES HAVE YOU MOVED WHERE YOU WERE LIVING?: 0

## 2025-08-07 ASSESSMENT — ACTIVITIES OF DAILY LIVING (ADL)
BATHING_REQUIRES_ASSISTANCE: 0
LACK_OF_TRANSPORTATION: NO

## 2025-08-07 ASSESSMENT — PAIN SCALES - GENERAL: PAINLEVEL_OUTOF10: NO PAIN

## 2025-08-07 ASSESSMENT — ENCOUNTER SYMPTOMS: GENERAL WELL-BEING: FAIR

## 2025-08-07 ASSESSMENT — PATIENT HEALTH QUESTIONNAIRE - PHQ9: CLINICAL INTERPRETATION OF PHQ2 SCORE: 2

## 2025-08-07 ASSESSMENT — FIBROSIS 4 INDEX: FIB4 SCORE: 1.54

## 2025-08-09 PROBLEM — E66.01 SEVERE OBESITY (BMI 35.0-39.9) WITH COMORBIDITY (HCC): Status: ACTIVE | Noted: 2024-06-14

## 2025-08-19 ASSESSMENT — ENCOUNTER SYMPTOMS
DIARRHEA: 0
CHILLS: 0
NAUSEA: 0
SINUS PAIN: 0
VOMITING: 0
PALPITATIONS: 0
DIZZINESS: 0
DIAPHORESIS: 0
FALLS: 0
HEARTBURN: 0
MYALGIAS: 0
SPUTUM PRODUCTION: 0
HEADACHES: 0
FEVER: 0
WEAKNESS: 0
HEMOPTYSIS: 0

## 2025-08-20 ENCOUNTER — OFFICE VISIT (OUTPATIENT)
Dept: SLEEP MEDICINE | Facility: MEDICAL CENTER | Age: 80
End: 2025-08-20
Payer: MEDICARE

## 2025-08-20 VITALS
SYSTOLIC BLOOD PRESSURE: 118 MMHG | HEIGHT: 64 IN | OXYGEN SATURATION: 94 % | BODY MASS INDEX: 40.63 KG/M2 | DIASTOLIC BLOOD PRESSURE: 60 MMHG | WEIGHT: 238 LBS | HEART RATE: 80 BPM

## 2025-08-20 DIAGNOSIS — J45.20 MILD INTERMITTENT ASTHMA WITHOUT COMPLICATION: Primary | Chronic | ICD-10-CM

## 2025-08-20 DIAGNOSIS — G47.33 OSA ON CPAP: ICD-10-CM

## 2025-08-20 DIAGNOSIS — K21.9 GASTROESOPHAGEAL REFLUX DISEASE WITHOUT ESOPHAGITIS: ICD-10-CM

## 2025-08-20 PROCEDURE — 3074F SYST BP LT 130 MM HG: CPT

## 2025-08-20 PROCEDURE — 99213 OFFICE O/P EST LOW 20 MIN: CPT

## 2025-08-20 PROCEDURE — 99212 OFFICE O/P EST SF 10 MIN: CPT

## 2025-08-20 PROCEDURE — 3078F DIAST BP <80 MM HG: CPT

## 2025-08-20 RX ORDER — FLUTICASONE FUROATE AND VILANTEROL 100; 25 UG/1; UG/1
1 POWDER RESPIRATORY (INHALATION) DAILY
Qty: 90 EACH | Refills: 3 | Status: SHIPPED | OUTPATIENT
Start: 2025-08-20

## 2025-08-20 RX ORDER — ALBUTEROL SULFATE 90 UG/1
2 INHALANT RESPIRATORY (INHALATION) EVERY 4 HOURS PRN
Qty: 1 EACH | Refills: 11 | Status: SHIPPED | OUTPATIENT
Start: 2025-08-20

## 2025-08-20 ASSESSMENT — ENCOUNTER SYMPTOMS
WHEEZING: 1
SHORTNESS OF BREATH: 1
COUGH: 1

## 2025-08-20 ASSESSMENT — FIBROSIS 4 INDEX: FIB4 SCORE: 1.54

## (undated) DEVICE — SYRINGE DISP. 50CC LS - (40/BX)

## (undated) DEVICE — TUBING CLEARLINK DUO-VENT - C-FLO (48EA/CA)

## (undated) DEVICE — CON SEDATION/>5 YR 1ST 15 MIN

## (undated) DEVICE — GOWN SURGEONS X-LARGE - DISP. (30/CA)

## (undated) DEVICE — CONTAINER, SPECIMEN, STERILE

## (undated) DEVICE — SOD. CHL 10CC SYRINGE PREFILL - W/10 CC (30/BX)

## (undated) DEVICE — SPONGE GAUZE NON-STERILE 4X4 - (2000/CA 10PK/CA)

## (undated) DEVICE — SYRINGE 3 CC 22 GA X 1-1/2 - NDL SAFETY (50/BX 8BX/CA)

## (undated) DEVICE — KIT CUSTOM PROCEDURE SINGLE FOR ENDO  (15/CA)

## (undated) DEVICE — BITE BLOCK ADULT 60FR (100EA/CA)

## (undated) DEVICE — CANISTER SUCTION RIGID RED 1500CC (40EA/CA)

## (undated) DEVICE — TUBE CONNECTING SUCTION - CLEAR PLASTIC STERILE 72 IN (50EA/CA)

## (undated) DEVICE — ELECTRODE 850 FOAM ADHESIVE - HYDROGEL RADIOTRNSPRNT (50/PK)

## (undated) DEVICE — BASIN EMESIS DISP. - (250/CA)

## (undated) DEVICE — SYRINGE 6 CC 20 GA X 1 1/2 - NDL SAFETY  (50/BX)

## (undated) DEVICE — SODIUM CHL. INJ. 0.9% 500ML (24EA/CA 50CA/PF)

## (undated) DEVICE — FILM CASSETTE ENDO